# Patient Record
Sex: FEMALE | Race: WHITE | NOT HISPANIC OR LATINO | Employment: OTHER | URBAN - METROPOLITAN AREA
[De-identification: names, ages, dates, MRNs, and addresses within clinical notes are randomized per-mention and may not be internally consistent; named-entity substitution may affect disease eponyms.]

---

## 2017-07-31 ENCOUNTER — GENERIC CONVERSION - ENCOUNTER (OUTPATIENT)
Dept: OTHER | Facility: OTHER | Age: 62
End: 2017-07-31

## 2018-01-11 NOTE — PROCEDURES
Results/Data    Procedure: Electromyogram and Nerve Conduction Study  Indication: Bilateral Upper Extremities   Referred by Dr Champion Slight  The procedure's were discussed with the patient  Written consent was obtained prior to the procedure and is detailed in the patient's record  Prior to the start of the procedure a time out was taken and the identity of the patient was confirmed via name and date of birth with the patient  The correct site and the procedure to be performed were confirmed  The correct side was confirmed if applicable  The positioning of the patient was verified  The availability of the correct equipment was verified  Procedure Start Time: 9:30    Technique: A sterile concentric needle electrode was used  The patient tolerated the procedure well  There were no complications  Results  : Motor and sensory nerve conduction studies were performed on the bilateral median and ulnar nerves  The bilateral median and ulnar compound motor action potentials were within normal limits  The bilateral median and ulnar F wave latencies were within normal limits  The right median sensory peak latency was prolonged with a normal sensory action potential amplitude  The left median sensory peak latency was prolonged with a normal sensory action potential amplitude  The bilateral ulnar sensory action potentials were within normal limits  The right median palmar evoked response was prolonged by 1 0 ms as compared to the right ulnar palmar evoked response at the same distance  The left median palmar evoked response was prolonged by 0 7 ms as compared to the left ulnar palmar evoked response at the same distance  Concentric needle examination was performed on various proximal and distal muscles of the left upper extremity including deltoid, biceps, triceps, pronator teres, APB, FDI and low cervical paraspinals   Needle examination was not performed on the right upper extremity secondary to history of recent mastectomy with lymph node resection  There was no evidence of active denervation in any of the muscles tested  The compound motor unit action potentials were of normal configuration with interference patterns being full or full for effort  There is electro physiologic evidence of a:  1  Mild median nerve compression neuropathy at the wrist bilaterally with demyelinative changes, consistent with a diagnosis of carpal tunnel syndrome  2  There is no evidence of a ulnar neuropathy bilaterally  3  There is no evidence of a cervical radiculopathy on the left  Clinical correlation is recommended           Signatures   Electronically signed by : Joel Leonard MD; Feb 8 2016 10:30AM EST                       (Author)

## 2018-07-11 ENCOUNTER — TRANSCRIBE ORDERS (OUTPATIENT)
Dept: LAB | Facility: CLINIC | Age: 63
End: 2018-07-11

## 2018-07-11 ENCOUNTER — APPOINTMENT (OUTPATIENT)
Dept: LAB | Facility: CLINIC | Age: 63
End: 2018-07-11
Payer: MEDICARE

## 2018-07-11 DIAGNOSIS — R07.89 CHEST WALL PAIN: ICD-10-CM

## 2018-07-11 DIAGNOSIS — M54.2 NECK PAIN: ICD-10-CM

## 2018-07-11 DIAGNOSIS — K76.0 FATTY LIVER: ICD-10-CM

## 2018-07-11 DIAGNOSIS — K59.00 CONSTIPATION, UNSPECIFIED CONSTIPATION TYPE: ICD-10-CM

## 2018-07-11 DIAGNOSIS — I25.119 CORONARY ARTERY DISEASE WITH ANGINA PECTORIS, UNSPECIFIED VESSEL OR LESION TYPE, UNSPECIFIED WHETHER NATIVE OR TRANSPLANTED HEART (HCC): ICD-10-CM

## 2018-07-11 DIAGNOSIS — E03.9 HYPOTHYROIDISM, UNSPECIFIED TYPE: ICD-10-CM

## 2018-07-11 DIAGNOSIS — F41.9 ANXIETY: Primary | ICD-10-CM

## 2018-07-11 DIAGNOSIS — K57.90 DIVERTICULOSIS OF INTESTINE WITHOUT BLEEDING, UNSPECIFIED INTESTINAL TRACT LOCATION: ICD-10-CM

## 2018-07-11 DIAGNOSIS — G47.33 OBSTRUCTIVE SLEEP APNEA (ADULT) (PEDIATRIC): ICD-10-CM

## 2018-07-11 LAB
ALBUMIN SERPL BCP-MCNC: 4.1 G/DL (ref 3.5–5)
ALP SERPL-CCNC: 73 U/L (ref 46–116)
ALT SERPL W P-5'-P-CCNC: 39 U/L (ref 12–78)
ANION GAP SERPL CALCULATED.3IONS-SCNC: 8 MMOL/L (ref 4–13)
AST SERPL W P-5'-P-CCNC: 19 U/L (ref 5–45)
BASOPHILS # BLD AUTO: 0.06 THOUSANDS/ΜL (ref 0–0.1)
BASOPHILS NFR BLD AUTO: 1 % (ref 0–1)
BILIRUB SERPL-MCNC: 0.69 MG/DL (ref 0.2–1)
BUN SERPL-MCNC: 19 MG/DL (ref 5–25)
CALCIUM SERPL-MCNC: 9.1 MG/DL (ref 8.3–10.1)
CHLORIDE SERPL-SCNC: 106 MMOL/L (ref 100–108)
CHOLEST SERPL-MCNC: 131 MG/DL (ref 50–200)
CO2 SERPL-SCNC: 27 MMOL/L (ref 21–32)
CREAT SERPL-MCNC: 0.78 MG/DL (ref 0.6–1.3)
EOSINOPHIL # BLD AUTO: 0.22 THOUSAND/ΜL (ref 0–0.61)
EOSINOPHIL NFR BLD AUTO: 3 % (ref 0–6)
ERYTHROCYTE [DISTWIDTH] IN BLOOD BY AUTOMATED COUNT: 13.2 % (ref 11.6–15.1)
GFR SERPL CREATININE-BSD FRML MDRD: 82 ML/MIN/1.73SQ M
GLUCOSE P FAST SERPL-MCNC: 98 MG/DL (ref 65–99)
HCT VFR BLD AUTO: 41 % (ref 34.8–46.1)
HDLC SERPL-MCNC: 40 MG/DL (ref 40–60)
HGB BLD-MCNC: 13.9 G/DL (ref 11.5–15.4)
IMM GRANULOCYTES # BLD AUTO: 0.02 THOUSAND/UL (ref 0–0.2)
IMM GRANULOCYTES NFR BLD AUTO: 0 % (ref 0–2)
LDLC SERPL CALC-MCNC: 65 MG/DL (ref 0–100)
LYMPHOCYTES # BLD AUTO: 2.13 THOUSANDS/ΜL (ref 0.6–4.47)
LYMPHOCYTES NFR BLD AUTO: 33 % (ref 14–44)
MCH RBC QN AUTO: 30.4 PG (ref 26.8–34.3)
MCHC RBC AUTO-ENTMCNC: 33.9 G/DL (ref 31.4–37.4)
MCV RBC AUTO: 90 FL (ref 82–98)
MONOCYTES # BLD AUTO: 0.56 THOUSAND/ΜL (ref 0.17–1.22)
MONOCYTES NFR BLD AUTO: 9 % (ref 4–12)
NEUTROPHILS # BLD AUTO: 3.51 THOUSANDS/ΜL (ref 1.85–7.62)
NEUTS SEG NFR BLD AUTO: 54 % (ref 43–75)
NONHDLC SERPL-MCNC: 91 MG/DL
NRBC BLD AUTO-RTO: 0 /100 WBCS
PLATELET # BLD AUTO: 321 THOUSANDS/UL (ref 149–390)
PMV BLD AUTO: 10.1 FL (ref 8.9–12.7)
POTASSIUM SERPL-SCNC: 4.1 MMOL/L (ref 3.5–5.3)
PROT SERPL-MCNC: 7.8 G/DL (ref 6.4–8.2)
RBC # BLD AUTO: 4.57 MILLION/UL (ref 3.81–5.12)
SODIUM SERPL-SCNC: 141 MMOL/L (ref 136–145)
T4 SERPL-MCNC: 9.5 UG/DL (ref 4.7–13.3)
TRIGL SERPL-MCNC: 130 MG/DL
TSH SERPL DL<=0.05 MIU/L-ACNC: 2.56 UIU/ML (ref 0.36–3.74)
WBC # BLD AUTO: 6.5 THOUSAND/UL (ref 4.31–10.16)

## 2018-07-11 PROCEDURE — 80053 COMPREHEN METABOLIC PANEL: CPT

## 2018-07-11 PROCEDURE — 84436 ASSAY OF TOTAL THYROXINE: CPT

## 2018-07-11 PROCEDURE — 80061 LIPID PANEL: CPT

## 2018-07-11 PROCEDURE — 85025 COMPLETE CBC W/AUTO DIFF WBC: CPT

## 2018-07-11 PROCEDURE — 84443 ASSAY THYROID STIM HORMONE: CPT

## 2018-11-07 DIAGNOSIS — F41.9 ANXIETY: Primary | ICD-10-CM

## 2018-11-07 RX ORDER — LORAZEPAM 1 MG/1
1 TABLET ORAL 2 TIMES DAILY PRN
Qty: 60 TABLET | Refills: 1 | Status: SHIPPED | OUTPATIENT
Start: 2018-11-07 | End: 2018-12-04 | Stop reason: SDUPTHER

## 2018-11-07 RX ORDER — LORAZEPAM 1 MG/1
1 TABLET ORAL 2 TIMES DAILY PRN
Refills: 1 | COMMUNITY
Start: 2018-09-09 | End: 2018-11-07 | Stop reason: SDUPTHER

## 2018-12-04 ENCOUNTER — OFFICE VISIT (OUTPATIENT)
Dept: FAMILY MEDICINE CLINIC | Facility: CLINIC | Age: 63
End: 2018-12-04
Payer: MEDICARE

## 2018-12-04 VITALS
TEMPERATURE: 98.3 F | DIASTOLIC BLOOD PRESSURE: 82 MMHG | SYSTOLIC BLOOD PRESSURE: 118 MMHG | WEIGHT: 205 LBS | HEIGHT: 66 IN | OXYGEN SATURATION: 94 % | BODY MASS INDEX: 32.95 KG/M2 | HEART RATE: 79 BPM

## 2018-12-04 DIAGNOSIS — I10 ESSENTIAL HYPERTENSION: ICD-10-CM

## 2018-12-04 DIAGNOSIS — I25.10 ATHEROSCLEROSIS OF NATIVE CORONARY ARTERY OF NATIVE HEART WITHOUT ANGINA PECTORIS: Primary | ICD-10-CM

## 2018-12-04 DIAGNOSIS — F32.A DEPRESSIVE DISORDER: ICD-10-CM

## 2018-12-04 DIAGNOSIS — F41.9 ANXIETY: ICD-10-CM

## 2018-12-04 DIAGNOSIS — F41.1 GENERALIZED ANXIETY DISORDER: ICD-10-CM

## 2018-12-04 DIAGNOSIS — Z85.3 HISTORY OF BREAST CANCER: ICD-10-CM

## 2018-12-04 PROCEDURE — 99214 OFFICE O/P EST MOD 30 MIN: CPT | Performed by: FAMILY MEDICINE

## 2018-12-04 RX ORDER — MONTELUKAST SODIUM 10 MG/1
10 TABLET ORAL
COMMUNITY
End: 2019-06-06 | Stop reason: ALTCHOICE

## 2018-12-04 RX ORDER — DULOXETIN HYDROCHLORIDE 60 MG/1
60 CAPSULE, DELAYED RELEASE ORAL 2 TIMES DAILY
Refills: 3 | COMMUNITY
Start: 2018-11-14 | End: 2019-12-06 | Stop reason: SDUPTHER

## 2018-12-04 RX ORDER — GEMFIBROZIL 600 MG/1
600 TABLET, FILM COATED ORAL 2 TIMES DAILY
Refills: 3 | COMMUNITY
Start: 2018-10-18 | End: 2022-07-11

## 2018-12-04 RX ORDER — LANOLIN ALCOHOL/MO/W.PET/CERES
20 CREAM (GRAM) TOPICAL
COMMUNITY

## 2018-12-04 RX ORDER — LEVOTHYROXINE SODIUM 0.07 MG/1
75 TABLET ORAL DAILY
Refills: 3 | COMMUNITY
Start: 2018-10-23 | End: 2019-04-16 | Stop reason: SDUPTHER

## 2018-12-04 RX ORDER — IBUPROFEN 600 MG/1
600 TABLET ORAL EVERY 6 HOURS PRN
COMMUNITY
End: 2019-02-08

## 2018-12-04 RX ORDER — ASPIRIN 81 MG/1
81 TABLET ORAL DAILY
Refills: 3 | COMMUNITY
Start: 2018-10-29 | End: 2019-06-06 | Stop reason: ALTCHOICE

## 2018-12-04 RX ORDER — NITROGLYCERIN 0.4 MG/1
0.4 TABLET SUBLINGUAL
COMMUNITY
End: 2019-06-06 | Stop reason: ALTCHOICE

## 2018-12-04 RX ORDER — LUBIPROSTONE 24 UG/1
24 CAPSULE, GELATIN COATED ORAL 2 TIMES DAILY WITH MEALS
Refills: 3 | COMMUNITY
Start: 2018-09-21 | End: 2019-09-09 | Stop reason: SDUPTHER

## 2018-12-04 RX ORDER — OMEPRAZOLE 20 MG/1
20 CAPSULE, DELAYED RELEASE ORAL DAILY
Refills: 3 | COMMUNITY
Start: 2018-11-11 | End: 2019-04-16

## 2018-12-04 RX ORDER — LORAZEPAM 1 MG/1
1 TABLET ORAL 2 TIMES DAILY PRN
Qty: 60 TABLET | Refills: 0 | Status: SHIPPED | OUTPATIENT
Start: 2018-12-04 | End: 2019-03-14 | Stop reason: SDUPTHER

## 2018-12-04 RX ORDER — CLOPIDOGREL BISULFATE 75 MG/1
75 TABLET ORAL DAILY
Refills: 3 | COMMUNITY
Start: 2018-10-23

## 2018-12-04 NOTE — ASSESSMENT & PLAN NOTE
Patient is sleeping well appetite is good no recent panic attacks her mood changes  Anxiety is under good control  The current medical regimen is effective;  continue present plan and medications  On the lorazepam now with control   Stress in her life due to her daughters; oldest in  [address] and youngest on psych meds

## 2018-12-04 NOTE — ASSESSMENT & PLAN NOTE
Patient denies chest pain or angina no shortness of breath at this time she follows up with Cardiology periodically and is medically stable on her current regimen no changes to the medications at this time

## 2018-12-04 NOTE — ASSESSMENT & PLAN NOTE
Depression and mood is stable on Cymbalta no change in dosage at this time patient is sleeping well appetite is good she remains active

## 2018-12-04 NOTE — PROGRESS NOTES
Assessment/Plan:       Problem List Items Addressed This Visit     Atherosclerotic heart disease of native coronary artery without angina pectoris - Primary     Patient denies chest pain or angina no shortness of breath at this time she follows up with Cardiology periodically and is medically stable on her current regimen no changes to the medications at this time         Relevant Medications    clopidogrel (PLAVIX) 75 mg tablet    metoprolol tartrate (LOPRESSOR) 25 mg tablet    nitroglycerin (NITROSTAT) 0 4 mg SL tablet    Depressive disorder     Depression and mood is stable on Cymbalta no change in dosage at this time patient is sleeping well appetite is good she remains active         Relevant Medications    DULoxetine (CYMBALTA) 60 mg delayed release capsule    Essential hypertension     Blood pressure is stable at 1 18/82 no change to the medication at this time avoid salt intake         Relevant Medications    metoprolol tartrate (LOPRESSOR) 25 mg tablet    Generalized anxiety disorder     Patient is sleeping well appetite is good no recent panic attacks her mood changes  Anxiety is under good control         Relevant Medications    DULoxetine (CYMBALTA) 60 mg delayed release capsule    History of breast cancer     Patient is post mastectomy stable at this time follows up with her gynecologist yearly           Other Visit Diagnoses     Anxiety                Subjective:      Patient ID: Osmany Garcia is a 59 y o  female  HPI    The following portions of the patient's history were reviewed and updated as appropriate: allergies, current medications, past family history, past medical history, past social history, past surgical history and problem list     Review of Systems   Constitutional: Negative for chills, fatigue and fever  HENT: Negative for congestion, nosebleeds, rhinorrhea, sinus pressure and sore throat  Eyes: Negative for discharge and redness     Respiratory: Negative for cough and shortness of breath  Cardiovascular: Negative for chest pain, palpitations and leg swelling  Gastrointestinal: Negative for abdominal pain, blood in stool and nausea  Endocrine: Negative for cold intolerance, heat intolerance and polyuria  Genitourinary: Negative for dysuria and frequency  Musculoskeletal: Negative for arthralgias, back pain and myalgias  Skin: Negative for rash  Neurological: Negative for dizziness, weakness and headaches  Hematological: Negative for adenopathy  Psychiatric/Behavioral: Negative for behavioral problems and sleep disturbance  The patient is not nervous/anxious  Objective:      /82   Pulse 79   Temp 98 3 °F (36 8 °C)   Ht 5' 6" (1 676 m)   Wt 93 kg (205 lb)   SpO2 94%   BMI 33 09 kg/m²        Physical Exam   Constitutional: She is oriented to person, place, and time  She appears well-developed and well-nourished  No distress  HENT:   Head: Normocephalic and atraumatic  Right Ear: External ear normal    Left Ear: External ear normal    Nose: Nose normal    Mouth/Throat: Oropharynx is clear and moist  No oropharyngeal exudate  Eyes: Pupils are equal, round, and reactive to light  Conjunctivae and EOM are normal  Right eye exhibits no discharge  Left eye exhibits no discharge  No scleral icterus  Neck: Normal range of motion  No JVD present  No thyromegaly present  Cardiovascular: Normal rate, regular rhythm and normal heart sounds  No murmur heard  Pulmonary/Chest: Effort normal  She has no wheezes  She has no rales  She exhibits no tenderness  Abdominal: Soft  Bowel sounds are normal  She exhibits no distension and no mass  There is no tenderness  Musculoskeletal: Normal range of motion  She exhibits no edema, tenderness or deformity  Lymphadenopathy:     She has no cervical adenopathy  Neurological: She is alert and oriented to person, place, and time  She has normal reflexes  No cranial nerve deficit   Coordination normal    Skin: Skin is warm and dry  No rash noted  Psychiatric: She has a normal mood and affect  Her behavior is normal  Judgment and thought content normal    Nursing note and vitals reviewed         Data:    Laboratory Results: I have personally reviewed the pertinent laboratory results/reports   Radiology/Other Diagnostic Testing Results:      Lab Results   Component Value Date    WBC 6 50 07/11/2018    HGB 13 9 07/11/2018    HCT 41 0 07/11/2018    MCV 90 07/11/2018     07/11/2018     Lab Results   Component Value Date    K 4 1 07/11/2018     07/11/2018    CO2 27 07/11/2018    BUN 19 07/11/2018    CREATININE 0 78 07/11/2018    GLUF 98 07/11/2018    CALCIUM 9 1 07/11/2018    AST 19 07/11/2018    ALT 39 07/11/2018    ALKPHOS 73 07/11/2018    EGFR 82 07/11/2018     Lab Results   Component Value Date    CHOLESTEROL 131 07/11/2018     Lab Results   Component Value Date    HDL 40 07/11/2018     Lab Results   Component Value Date    LDLCALC 65 07/11/2018     Lab Results   Component Value Date    TRIG 130 07/11/2018     No results found for: [place], [place]  Lab Results   Component Value Date    JOD3PHWIBQER 2 560 07/11/2018     No results found for: HGBA1C  No results found for: PSA    Soraya Or, DO

## 2018-12-04 NOTE — PATIENT INSTRUCTIONS
Hypertension   AMBULATORY CARE:   Hypertension  is high blood pressure (BP)  Your BP is the force of your blood moving against the walls of your arteries  Normal BP is less than 120/80  Prehypertension is between 120/80 and 139/89  Hypertension is 140/90 or higher  Hypertension causes your BP to get so high that your heart has to work much harder than normal  This can damage your heart  You can control hypertension with a healthy lifestyle or medicines  A controlled blood pressure helps protect your organs, such as your heart, lungs, brain, and kidneys  Common symptoms include the following:   · Headache     · Blurred vision     · Chest pain     · Dizziness or weakness     · Trouble breathing    · Nosebleeds  Call 911 for any of the following:   · You have discomfort in your chest that feels like squeezing, pressure, fullness, or pain  · You become confused or have difficulty speaking  · You suddenly feel lightheaded or have trouble breathing  · You have pain or discomfort in your back, neck, jaw, stomach, or arm  Seek care immediately if:   · You have a severe headache or vision loss  · You have weakness in an arm or leg  Contact your healthcare provider if:   · You feel faint, dizzy, confused, or drowsy  · You have been taking your BP medicine and your BP is still higher than your healthcare provider says it should be  · You have questions or concerns about your condition or care  Treatment for hypertension  may include medicine to lower your BP and lower your cholesterol level  A low cholesterol level helps prevent heart disease and makes it easier to control your blood pressure  You may also need to make lifestyle changes  Take your medicine exactly as directed  Manage hypertension:  Talk with your healthcare provider about these and other ways to manage hypertension:  · Check your BP at home  Sit and rest for 5 minutes before you take your BP   Extend your arm and support it on a flat surface  Your arm should be at the same level as your heart  Follow the directions that came with your BP monitor  If possible, take at least 2 BP readings each time  Take your BP at least twice a day at the same times each day, such as morning and evening  Keep a record of your BP readings and bring it to your follow-up visits  Ask your healthcare provider what your BP should be  · Limit sodium (salt) as directed  Too much sodium can affect your fluid balance  Check labels to find low-sodium or no-salt-added foods  Some low-sodium foods use potassium salts for flavor  Too much potassium can also cause health problems  Your healthcare provider will tell you how much sodium and potassium are safe for you to have in a day  He or she may recommend that you limit sodium to 2,300 mg a day  · Follow the meal plan recommended by your healthcare provider  A dietitian or your provider can give you more information on low-sodium plans or the DASH (Dietary Approaches to Stop Hypertension) eating plan  The DASH plan is low in sodium, unhealthy fats, and total fat  It is high in potassium, calcium, and fiber  · Exercise to maintain a healthy weight  Exercise at least 30 minutes per day, on most days of the week  This will help decrease your blood pressure  Ask your healthcare provider about the best exercise plan for you  · Decrease stress  This may help lower your BP  Learn ways to relax, such as deep breathing or listening to music  · Limit alcohol  Women should limit alcohol to 1 drink a day  Men should limit alcohol to 2 drinks a day  A drink of alcohol is 12 ounces of beer, 5 ounces of wine, or 1½ ounces of liquor  · Do not smoke  Nicotine and other chemicals in cigarettes and cigars can increase your BP and also cause lung damage  Ask your healthcare provider for information if you currently smoke and need help to quit  E-cigarettes or smokeless tobacco still contain nicotine  Talk to your healthcare provider before you use these products  · Manage any other health conditions you have  Health conditions such as diabetes can increase your risk for hypertension  Follow your healthcare provider's instructions and take all your medicines as directed  Follow up with your healthcare provider as directed: You will need to return to have your BP checked and to have other lab tests done  Write down your questions so you remember to ask them during your visits  © 2017 [employer] Information is for End User's use only and may not be sold, redistributed or otherwise used for commercial purposes  All illustrations and images included in CareNotes® are the copyrighted property of [employer]  or [employer]  The above information is an  only  It is not intended as medical advice for individual conditions or treatments  Talk to your doctor, nurse or pharmacist before following any medical regimen to see if it is safe and effective for you

## 2019-01-29 ENCOUNTER — TELEPHONE (OUTPATIENT)
Dept: GASTROENTEROLOGY | Facility: CLINIC | Age: 64
End: 2019-01-29

## 2019-01-29 DIAGNOSIS — K21.9 GASTROESOPHAGEAL REFLUX DISEASE WITHOUT ESOPHAGITIS: Primary | ICD-10-CM

## 2019-01-29 RX ORDER — OMEPRAZOLE 20 MG/1
20 CAPSULE, DELAYED RELEASE ORAL 2 TIMES DAILY
Qty: 180 CAPSULE | Refills: 3 | Status: SHIPPED | OUTPATIENT
Start: 2019-01-29 | End: 2019-04-16

## 2019-01-29 RX ORDER — OMEPRAZOLE 20 MG/1
20 CAPSULE, DELAYED RELEASE ORAL DAILY
Qty: 90 CAPSULE | Refills: 3 | Status: SHIPPED | OUTPATIENT
Start: 2019-01-29 | End: 2019-01-29 | Stop reason: SDUPTHER

## 2019-01-29 NOTE — TELEPHONE ENCOUNTER
Received request from [facility] to refill RX Omeprazole DR 20 mg capsule, qty 60, 0 refills  Please advise   Thank you

## 2019-02-07 ENCOUNTER — TELEPHONE (OUTPATIENT)
Dept: OTHER | Facility: OTHER | Age: 64
End: 2019-02-07

## 2019-02-08 ENCOUNTER — APPOINTMENT (EMERGENCY)
Dept: CT IMAGING | Facility: HOSPITAL | Age: 64
End: 2019-02-08
Payer: MEDICARE

## 2019-02-08 ENCOUNTER — HOSPITAL ENCOUNTER (EMERGENCY)
Facility: HOSPITAL | Age: 64
Discharge: HOME | End: 2019-02-08
Attending: EMERGENCY MEDICINE | Admitting: EMERGENCY MEDICINE
Payer: MEDICARE

## 2019-02-08 VITALS
OXYGEN SATURATION: 94 % | BODY MASS INDEX: 33.09 KG/M2 | SYSTOLIC BLOOD PRESSURE: 133 MMHG | TEMPERATURE: 98.5 F | WEIGHT: 205.03 LBS | HEART RATE: 70 BPM | RESPIRATION RATE: 18 BRPM | DIASTOLIC BLOOD PRESSURE: 82 MMHG

## 2019-02-08 DIAGNOSIS — R10.9 RIGHT FLANK PAIN: Primary | ICD-10-CM

## 2019-02-08 LAB
ANION GAP SERPL CALCULATED.3IONS-SCNC: 9 MMOL/L (ref 4–13)
BASOPHILS # BLD AUTO: 0.07 THOUSANDS/ΜL (ref 0–0.1)
BASOPHILS NFR BLD AUTO: 1 % (ref 0–1)
BILIRUB UR QL STRIP: NEGATIVE
BUN SERPL-MCNC: 19 MG/DL (ref 5–25)
CALCIUM SERPL-MCNC: 9.5 MG/DL (ref 8.3–10.1)
CHLORIDE SERPL-SCNC: 103 MMOL/L (ref 100–108)
CLARITY UR: NORMAL
CO2 SERPL-SCNC: 28 MMOL/L (ref 21–32)
COLOR UR: YELLOW
CREAT SERPL-MCNC: 0.76 MG/DL (ref 0.6–1.3)
EOSINOPHIL # BLD AUTO: 0.29 THOUSAND/ΜL (ref 0–0.61)
EOSINOPHIL NFR BLD AUTO: 3 % (ref 0–6)
ERYTHROCYTE [DISTWIDTH] IN BLOOD BY AUTOMATED COUNT: 13 % (ref 11.6–15.1)
GFR SERPL CREATININE-BSD FRML MDRD: 84 ML/MIN/1.73SQ M
GLUCOSE SERPL-MCNC: 114 MG/DL (ref 65–140)
GLUCOSE UR STRIP-MCNC: NEGATIVE MG/DL
HCT VFR BLD AUTO: 42.8 % (ref 34.8–46.1)
HGB BLD-MCNC: 14.2 G/DL (ref 11.5–15.4)
HGB UR QL STRIP.AUTO: NEGATIVE
IMM GRANULOCYTES # BLD AUTO: 0.03 THOUSAND/UL (ref 0–0.2)
IMM GRANULOCYTES NFR BLD AUTO: 0 % (ref 0–2)
KETONES UR STRIP-MCNC: NEGATIVE MG/DL
LEUKOCYTE ESTERASE UR QL STRIP: NEGATIVE
LYMPHOCYTES # BLD AUTO: 3.07 THOUSANDS/ΜL (ref 0.6–4.47)
LYMPHOCYTES NFR BLD AUTO: 34 % (ref 14–44)
MCH RBC QN AUTO: 30.8 PG (ref 26.8–34.3)
MCHC RBC AUTO-ENTMCNC: 33.2 G/DL (ref 31.4–37.4)
MCV RBC AUTO: 93 FL (ref 82–98)
MONOCYTES # BLD AUTO: 0.94 THOUSAND/ΜL (ref 0.17–1.22)
MONOCYTES NFR BLD AUTO: 10 % (ref 4–12)
NEUTROPHILS # BLD AUTO: 4.75 THOUSANDS/ΜL (ref 1.85–7.62)
NEUTS SEG NFR BLD AUTO: 52 % (ref 43–75)
NITRITE UR QL STRIP: NEGATIVE
NRBC BLD AUTO-RTO: 0 /100 WBCS
PH UR STRIP.AUTO: 6.5 [PH] (ref 4.5–8)
PLATELET # BLD AUTO: 375 THOUSANDS/UL (ref 149–390)
PMV BLD AUTO: 9.1 FL (ref 8.9–12.7)
POTASSIUM SERPL-SCNC: 3.7 MMOL/L (ref 3.5–5.3)
PROT UR STRIP-MCNC: NEGATIVE MG/DL
RBC # BLD AUTO: 4.61 MILLION/UL (ref 3.81–5.12)
SODIUM SERPL-SCNC: 140 MMOL/L (ref 136–145)
SP GR UR STRIP.AUTO: 1.01 (ref 1–1.03)
UROBILINOGEN UR QL STRIP.AUTO: 1 E.U./DL
WBC # BLD AUTO: 9.15 THOUSAND/UL (ref 4.31–10.16)

## 2019-02-08 PROCEDURE — 81003 URINALYSIS AUTO W/O SCOPE: CPT | Performed by: EMERGENCY MEDICINE

## 2019-02-08 PROCEDURE — 96360 HYDRATION IV INFUSION INIT: CPT

## 2019-02-08 PROCEDURE — 80048 BASIC METABOLIC PNL TOTAL CA: CPT | Performed by: EMERGENCY MEDICINE

## 2019-02-08 PROCEDURE — 99284 EMERGENCY DEPT VISIT MOD MDM: CPT

## 2019-02-08 PROCEDURE — 85025 COMPLETE CBC W/AUTO DIFF WBC: CPT | Performed by: EMERGENCY MEDICINE

## 2019-02-08 PROCEDURE — 36415 COLL VENOUS BLD VENIPUNCTURE: CPT | Performed by: EMERGENCY MEDICINE

## 2019-02-08 PROCEDURE — 74176 CT ABD & PELVIS W/O CONTRAST: CPT

## 2019-02-08 RX ORDER — LIDOCAINE 50 MG/G
1 PATCH TOPICAL ONCE
Status: DISCONTINUED | OUTPATIENT
Start: 2019-02-08 | End: 2019-02-08 | Stop reason: HOSPADM

## 2019-02-08 RX ORDER — METHOCARBAMOL 750 MG/1
750 TABLET, FILM COATED ORAL 3 TIMES DAILY PRN
Qty: 42 TABLET | Refills: 0 | Status: SHIPPED | OUTPATIENT
Start: 2019-02-08 | End: 2019-06-06 | Stop reason: ALTCHOICE

## 2019-02-08 RX ORDER — METHOCARBAMOL 500 MG/1
500 TABLET, FILM COATED ORAL ONCE
Status: COMPLETED | OUTPATIENT
Start: 2019-02-08 | End: 2019-02-08

## 2019-02-08 RX ORDER — IBUPROFEN 600 MG/1
600 TABLET ORAL EVERY 6 HOURS PRN
Qty: 30 TABLET | Refills: 0 | Status: SHIPPED | OUTPATIENT
Start: 2019-02-08 | End: 2019-06-06 | Stop reason: ALTCHOICE

## 2019-02-08 RX ORDER — LIDOCAINE 50 MG/G
1 PATCH TOPICAL EVERY 24 HOURS
Qty: 6 PATCH | Refills: 0 | Status: SHIPPED | OUTPATIENT
Start: 2019-02-08 | End: 2019-06-06 | Stop reason: ALTCHOICE

## 2019-02-08 RX ADMIN — SODIUM CHLORIDE 500 ML: 0.9 INJECTION, SOLUTION INTRAVENOUS at 14:55

## 2019-02-08 RX ADMIN — LIDOCAINE 1 PATCH: 50 PATCH TOPICAL at 15:15

## 2019-02-08 RX ADMIN — METHOCARBAMOL 500 MG: 500 TABLET ORAL at 15:14

## 2019-02-08 NOTE — ED PROVIDER NOTES
History  Chief Complaint   Patient presents with    Flank Pain     R flank pain "for months"  worsening this week  nausea  denies fever/vomiting/diarrhea  hx stones  denies urinary s/s     58 yo female c/o pain she localizes to right mid to lower flank area, no radiation to low back or right leg, without associated paresthesias, or weakness, she says has been ongoing for "several months", although she did affirm she has pain free days, and this flare has been for "several days" although she can't pinpoint an exact onset  She attributes it to kidney stone, knowing that she was previously treated, several years ago with lithotripsy and a stent  Although, for this pain, she has not had workup over the several months she has been experiencing it  In fact, she was at [facility]/1/19 for a chest pain symptom that was evaluated, and asked if she had the discomfort then, she said "you know I did  I think I mentioned it "          History provided by:  Patient  Flank Pain   Pain location:  R flank  Pain quality: aching    Pain radiates to:  Does not radiate  Pain severity:  Moderate  Onset quality:  Unable to specify  Timing:  Constant  Progression:  Waxing and waning  Chronicity:  Chronic  Context: not diet changes, not previous surgeries (required surgery for renal stones) and not suspicious food intake    Relieved by:  Nothing  Worsened by:  Nothing  Associated symptoms: no chest pain, no chills, no cough, no diarrhea, no dysuria, no fever, no hematemesis, no hematochezia, no hematuria, no nausea, no shortness of breath, no sore throat and no vomiting    Risk factors: has not had multiple surgeries        Prior to Admission Medications   Prescriptions Last Dose Informant Patient Reported? Taking?    AMITIZA 24 MCG capsule   Yes Yes   Sig: Take 24 mcg by mouth 2 (two) times a day with meals   B Complex Vitamins (VITAMIN-B COMPLEX PO)   Yes Yes   Sig: Take 1 tablet by mouth   DULoxetine (CYMBALTA) 60 mg delayed release capsule   Yes Yes   Sig: Take 60 mg by mouth 2 (two) times a day   Evolocumab 140 MG/ML SOAJ   Yes Yes   Sig: Inject 2 mL under the skin   LORazepam (ATIVAN) 1 mg tablet   No Yes   Sig: Take 1 tablet (1 mg total) by mouth 2 (two) times a day as needed for anxiety   aspirin (ECOTRIN LOW STRENGTH) 81 mg EC tablet   Yes Yes   Sig: Take 81 mg by mouth daily   clopidogrel (PLAVIX) 75 mg tablet   Yes Yes   Sig: Take 75 mg by mouth daily   gemfibrozil (LOPID) 600 mg tablet   Yes Yes   Sig: Take 600 mg by mouth 2 (two) times a day   levothyroxine 75 mcg tablet   Yes Yes   Sig: Take 75 mcg by mouth daily   melatonin 3 mg   Yes Yes   Sig: Take 5 mg by mouth   metoprolol tartrate (LOPRESSOR) 25 mg tablet   Yes Yes   Sig: Take 25 mg by mouth 2 (two) times a day   montelukast (SINGULAIR) 10 mg tablet   Yes Yes   Sig: Take 10 mg by mouth   nitroglycerin (NITROSTAT) 0 4 mg SL tablet   Yes Yes   Sig: Place 0 4 mg under the tongue   omeprazole (PriLOSEC) 20 mg delayed release capsule   Yes Yes   Sig: Take 20 mg by mouth daily   omeprazole (PriLOSEC) 20 mg delayed release capsule   No Yes   Sig: Take 1 capsule (20 mg total) by mouth 2 (two) times a day      Facility-Administered Medications: None       Past Medical History:   Diagnosis Date    Anxiety     Breast cancer ([facility] [address] )     CAD (coronary artery disease)     Elevated cholesterol     Fibromyalgia     Hypertension     BRAYDEN (obstructive sleep apnea)     Panic attack        Past Surgical History:   Procedure Laterality Date    BREAST SURGERY      MASTECTOMY Bilateral        Family History   Problem Relation Age of Onset    Heart attack Mother     Alcohol abuse Mother     Heart failure Mother      I have reviewed and agree with the history as documented      Social History   Substance Use Topics    Smoking status: Never Smoker    Smokeless tobacco: Never Used    Alcohol use No        Review of Systems   Constitutional: Negative for appetite change, chills and fever    HENT: Negative for sore throat  Respiratory: Negative for cough, shortness of breath and wheezing  Cardiovascular: Negative for chest pain and palpitations  Gastrointestinal: Negative for abdominal pain, diarrhea, hematemesis, hematochezia, nausea and vomiting  Genitourinary: Positive for flank pain  Negative for dysuria, hematuria and urgency  Musculoskeletal: Negative for neck pain  Skin: Negative for rash  Neurological: Negative for dizziness, weakness and headaches  Psychiatric/Behavioral: Negative for suicidal ideas  All other systems reviewed and are negative  Physical Exam  Physical Exam   Constitutional: She is oriented to person, place, and time  Vital signs are normal  She appears well-developed and well-nourished  Non-toxic appearance  HENT:   Head: Normocephalic and atraumatic  Right Ear: Tympanic membrane and external ear normal    Left Ear: Tympanic membrane and external ear normal    Nose: Nose normal    Mouth/Throat: Oropharynx is clear and moist    Eyes: Pupils are equal, round, and reactive to light  Conjunctivae and EOM are normal    Neck: Normal range of motion and full passive range of motion without pain  Neck supple  No Brudzinski's sign and no Kernig's sign noted  Cardiovascular: Normal rate, regular rhythm, normal heart sounds, intact distal pulses and normal pulses  No murmur heard  Pulmonary/Chest: Effort normal and breath sounds normal  No tachypnea  No respiratory distress  She has no wheezes  Abdominal: Soft  Bowel sounds are normal  She exhibits no distension  There is no tenderness  There is no rigidity, no rebound, no guarding and no CVA tenderness (Not really reproducible, she just affirms "yeah that's where it hurts")  Musculoskeletal: Normal range of motion  Right lower leg: She exhibits no swelling  Left lower leg: She exhibits no swelling  Lymphadenopathy:     She has no cervical adenopathy     Neurological: She is alert and oriented to person, place, and time  She has normal strength and normal reflexes  No cranial nerve deficit or sensory deficit  Coordination and gait normal  GCS eye subscore is 4  GCS verbal subscore is 5  GCS motor subscore is 6  Skin: Skin is warm and dry  No rash noted  She is not diaphoretic  No pallor  Psychiatric: She has a normal mood and affect  Her speech is normal and behavior is normal  Judgment and thought content normal  Cognition and memory are normal    Nursing note and vitals reviewed        Vital Signs  ED Triage Vitals   Temperature Pulse Respirations Blood Pressure SpO2   02/08/19 1345 02/08/19 1345 02/08/19 1345 02/08/19 1345 02/08/19 1345   98 5 °F (36 9 °C) 88 20 129/82 95 %      Temp src Heart Rate Source Patient Position - Orthostatic VS BP Location FiO2 (%)   -- 02/08/19 1603 02/08/19 1603 02/08/19 1603 --    Monitor Lying Left arm       Pain Score       02/08/19 1345       7           Vitals:    02/08/19 1345 02/08/19 1603   BP: 129/82 133/82   Pulse: 88 70   Patient Position - Orthostatic VS:  Lying       Visual Acuity      ED Medications  Medications   lidocaine (LIDODERM) 5 % patch 1 patch (1 patch Topical Medication Applied 2/8/19 1515)   sodium chloride 0 9 % bolus 500 mL (0 mL Intravenous Stopped 2/8/19 1603)   methocarbamol (ROBAXIN) tablet 500 mg (500 mg Oral Given 2/8/19 1514)       Diagnostic Studies  Results Reviewed     Procedure Component Value Units Date/Time    Basic metabolic panel [[id number]] Collected:  02/08/19 1454    Lab Status:  Final result Specimen:  Blood from Hand, Left Updated:  02/08/19 1512     Sodium 140 mmol/L      Potassium 3 7 mmol/L      Chloride 103 mmol/L      CO2 28 mmol/L      ANION GAP 9 mmol/L      BUN 19 mg/dL      Creatinine 0 76 mg/dL      Glucose 114 mg/dL      Calcium 9 5 mg/dL      eGFR 84 ml/min/1 73sq m     Narrative:         National Kidney Disease Education Program recommendations are as follows:  GFR calculation is accurate only with a steady state creatinine  Chronic Kidney disease less than 60 ml/min/1 73 sq  meters  Kidney failure less than 15 ml/min/1 73 sq  meters  CBC and differential [[id number]] Collected:  02/08/19 1454    Lab Status:  Final result Specimen:  Blood from Hand, Left Updated:  02/08/19 1503     WBC 9 15 Thousand/uL      RBC 4 61 Million/uL      Hemoglobin 14 2 g/dL      Hematocrit 42 8 %      MCV 93 fL      MCH 30 8 pg      MCHC 33 2 g/dL      RDW 13 0 %      MPV 9 1 fL      Platelets 657 Thousands/uL      nRBC 0 /100 WBCs      Neutrophils Relative 52 %      Immat GRANS % 0 %      Lymphocytes Relative 34 %      Monocytes Relative 10 %      Eosinophils Relative 3 %      Basophils Relative 1 %      Neutrophils Absolute 4 75 Thousands/µL      Immature Grans Absolute 0 03 Thousand/uL      Lymphocytes Absolute 3 07 Thousands/µL      Monocytes Absolute 0 94 Thousand/µL      Eosinophils Absolute 0 29 Thousand/µL      Basophils Absolute 0 07 Thousands/µL     UA w Reflex to Microscopic w Reflex to Culture [[id number]] Collected:  02/08/19 1445    Lab Status:  Final result Specimen:  Urine from Urine, Clean Catch Updated:  02/08/19 1458     Color, UA Yellow     Clarity, UA Slightly Cloudy     Specific Gravity, UA 1 015     pH, UA 6 5     Leukocytes, UA Negative     Nitrite, UA Negative     Protein, UA Negative mg/dl      Glucose, UA Negative mg/dl      Ketones, UA Negative mg/dl      Urobilinogen, UA 1 0 E U /dl      Bilirubin, UA Negative     Blood, UA Negative                 CT renal stone study abdomen pelvis without contrast   Final Result by Tye Gill MD (02/08 1608)         1  No obstructive uropathy  2   Normal appendix  3   No acute abnormality in the abdomen or pelvis               Workstation performed: [id number]                    Procedures  Procedures       Phone Contacts  ED Phone Contact    ED Course  ED Course as of Feb 08 1621 Fri Feb 08, 2019   1408 Reviewed PA --prescribed lorazepam 1mg, #60 monthly    7127 UA is normal, CT to investigate the chronic pain component, suspecting more musculoskeletal    1617 Normal CT CT renal stone study abdomen pelvis without contrast   1617 Reviewed results with patient at bedside and updated on the plan  She reports improvement, mainly from the topical treatment, and she can take NSAIDs, just has to "be careful with my stomach "  She is satisfied with workup negative for kidney stone  MDM    Disposition  Final diagnoses:   Right flank pain     Time reflects when diagnosis was documented in both MDM as applicable and the Disposition within this note     Time User Action Codes Description Comment    2/8/2019  4:20 PM Kameronjohn Ivan Add [R10 9] Right flank pain       ED Disposition     ED Disposition Condition Date/Time Comment    Discharge  Fri Feb 8, 2019  4:20 PM Rudolfo Sensor discharge to home/self care  Condition at discharge: Good        Follow-up Information     Follow up With Specialties Details Why Contact Chioma Anthony DO Family Medicine Schedule an appointment as soon as possible for a visit For followup [address]  [phone]  [phone] [facility]  [phone]            Patient's Medications   Discharge Prescriptions    IBUPROFEN (MOTRIN) 600 MG TABLET    Take 1 tablet (600 mg total) by mouth every 6 (six) hours as needed for mild pain or moderate pain       Start Date: 2/8/2019  End Date: --       Order Dose: 600 mg       Quantity: 30 tablet    Refills: 0    LIDOCAINE (LIDODERM) 5 %    Apply 1 patch topically every 24 hours Remove & Discard patch within 12 hours or as directed by MD       Start Date: 2/8/2019  End Date: --       Order Dose: 1 patch       Quantity: 6 patch    Refills: 0    METHOCARBAMOL (ROBAXIN) 750 MG TABLET    Take 1 tablet (750 mg total) by mouth 3 (three) times a day as needed for muscle spasms       Start Date: 2/8/2019  End Date: --       Order Dose: 750 mg       Quantity: 42 tablet    Refills: 0     No discharge procedures on file      ED Provider  Electronically Signed by           Blade Sheppard MD  02/08/19 9357

## 2019-03-06 DIAGNOSIS — I25.10 ATHEROSCLEROSIS OF NATIVE CORONARY ARTERY OF NATIVE HEART WITHOUT ANGINA PECTORIS: Primary | ICD-10-CM

## 2019-03-06 RX ORDER — DIAPER,BRIEF,ADULT, DISPOSABLE
EACH MISCELLANEOUS
Qty: 30 TABLET | Refills: 5 | Status: SHIPPED | OUTPATIENT
Start: 2019-03-06 | End: 2019-07-18 | Stop reason: SDUPTHER

## 2019-03-13 DIAGNOSIS — Z12.11 COLON CANCER SCREENING: Primary | ICD-10-CM

## 2019-03-14 ENCOUNTER — TELEPHONE (OUTPATIENT)
Dept: FAMILY MEDICINE CLINIC | Facility: CLINIC | Age: 64
End: 2019-03-14

## 2019-03-14 ENCOUNTER — OFFICE VISIT (OUTPATIENT)
Dept: FAMILY MEDICINE CLINIC | Facility: CLINIC | Age: 64
End: 2019-03-14
Payer: MEDICARE

## 2019-03-14 VITALS
DIASTOLIC BLOOD PRESSURE: 90 MMHG | HEART RATE: 88 BPM | SYSTOLIC BLOOD PRESSURE: 130 MMHG | HEIGHT: 66 IN | TEMPERATURE: 98.7 F | BODY MASS INDEX: 33.07 KG/M2 | OXYGEN SATURATION: 96 % | WEIGHT: 205.8 LBS

## 2019-03-14 DIAGNOSIS — M54.50 LOW BACK PAIN AT MULTIPLE SITES: ICD-10-CM

## 2019-03-14 DIAGNOSIS — F41.1 GENERALIZED ANXIETY DISORDER: ICD-10-CM

## 2019-03-14 DIAGNOSIS — F41.9 ANXIETY: ICD-10-CM

## 2019-03-14 DIAGNOSIS — R07.89 CHEST WALL PAIN: Primary | ICD-10-CM

## 2019-03-14 DIAGNOSIS — I25.119 CORONARY ARTERY DISEASE WITH ANGINA PECTORIS, UNSPECIFIED VESSEL OR LESION TYPE, UNSPECIFIED WHETHER NATIVE OR TRANSPLANTED HEART (HCC): ICD-10-CM

## 2019-03-14 PROCEDURE — 99214 OFFICE O/P EST MOD 30 MIN: CPT | Performed by: FAMILY MEDICINE

## 2019-03-14 RX ORDER — LORAZEPAM 1 MG/1
1 TABLET ORAL 2 TIMES DAILY PRN
Qty: 60 TABLET | Refills: 0 | Status: SHIPPED | OUTPATIENT
Start: 2019-03-14 | End: 2019-05-06 | Stop reason: SDUPTHER

## 2019-03-14 NOTE — PROGRESS NOTES
Assessment/Plan:       Problem List Items Addressed This Visit        Cardiovascular and Mediastinum    Coronary artery disease with angina pectoris ([address] )       Other    Generalized anxiety disorder     General anxiety disorder renew the lorazepam at this time continue this as needed and follow up at next office visit         Relevant Medications    LORazepam (ATIVAN) 1 mg tablet    Chest wall pain - Primary     Chest wall pain costochondritis and rib pain bilaterally present since lifting wood and changing moving firewood around  No angina with this  She will need to do home stretching exercises and wait for resolution if not improved will get a chest x-ray         Low back pain at multiple sites     Degenerative joint disease osteoarthritis and chronic back pain throughout the low back thoracic and even into the neck at times  She benefits from a rolling walker has difficulty with ambulation after 50 ft of walking she requires rest or to lean on a walker she did use a rolling walker in the past and borrowed 1 this helped dramatically with reducing back pain  At this point I will prescribe a rolling walker           Other Visit Diagnoses     Anxiety        Relevant Medications    LORazepam (ATIVAN) 1 mg tablet            Subjective:      Patient ID: Marine Duverney is a 59 y o  female  Patient presents for left rib pain and re-evaluate of general medical problems denies trauma falling at this time no shortness of breath or anginal-type chest pain no indigestion headaches or visual change no change in bowel or bladder      The following portions of the patient's history were reviewed and updated as appropriate: allergies, current medications, past family history, past medical history, past social history, past surgical history and problem list     Review of Systems   Constitutional: Negative for chills, fatigue and fever  HENT: Negative for congestion, nosebleeds, rhinorrhea, sinus pressure and sore throat  Eyes: Negative for discharge and redness  Respiratory: Negative for cough and shortness of breath  Cardiovascular: Positive for chest pain  Negative for palpitations and leg swelling  Gastrointestinal: Negative for abdominal pain, blood in stool and nausea  Endocrine: Negative for cold intolerance, heat intolerance and polyuria  Genitourinary: Negative for dysuria and frequency  Musculoskeletal: Negative for arthralgias, back pain and myalgias  Skin: Negative for rash  Neurological: Negative for dizziness, weakness and headaches  Hematological: Negative for adenopathy  Psychiatric/Behavioral: Negative for behavioral problems and sleep disturbance  The patient is not nervous/anxious  Objective:      /90 (BP Location: Left arm, Patient Position: Sitting)   Pulse 88   Temp 98 7 °F (37 1 °C) (Tympanic)   Ht 5' 6" (1 676 m)   Wt 93 4 kg (205 lb 12 8 oz)   SpO2 96%   BMI 33 22 kg/m²        Physical Exam   Constitutional: She is oriented to person, place, and time  She appears well-developed and well-nourished  No distress  HENT:   Head: Normocephalic and atraumatic  Right Ear: External ear normal    Left Ear: External ear normal    Nose: Nose normal    Mouth/Throat: Oropharynx is clear and moist  No oropharyngeal exudate  Eyes: Pupils are equal, round, and reactive to light  Conjunctivae and EOM are normal  Right eye exhibits no discharge  Left eye exhibits no discharge  No scleral icterus  Neck: Normal range of motion  No JVD present  No thyromegaly present  Cardiovascular: Normal rate, regular rhythm and normal heart sounds  No murmur heard  Pulmonary/Chest: Effort normal  She has no wheezes  She has no rales  She exhibits no tenderness  Abdominal: Soft  Bowel sounds are normal  She exhibits no distension and no mass  There is no tenderness  Musculoskeletal: Normal range of motion  She exhibits no edema, tenderness or deformity      Tender over left ribcage ribs 6 through 9 laterally   Lymphadenopathy:     She has no cervical adenopathy  Neurological: She is alert and oriented to person, place, and time  She has normal reflexes  She displays normal reflexes  No cranial nerve deficit  Coordination normal    Skin: Skin is warm and dry  No rash noted  Psychiatric: She has a normal mood and affect  Her behavior is normal  Judgment and thought content normal    Nursing note and vitals reviewed  Data:    Laboratory Results: I have personally reviewed the pertinent laboratory results/reports   Radiology/Other Diagnostic Testing Results: I have personally reviewed pertinent reports         Lab Results   Component Value Date    WBC 9 15 02/08/2019    HGB 14 2 02/08/2019    HCT 42 8 02/08/2019    MCV 93 02/08/2019     02/08/2019     Lab Results   Component Value Date    K 3 7 02/08/2019     02/08/2019    CO2 28 02/08/2019    BUN 19 02/08/2019    CREATININE 0 76 02/08/2019    GLUF 98 07/11/2018    CALCIUM 9 5 02/08/2019    AST 19 07/11/2018    ALT 39 07/11/2018    ALKPHOS 73 07/11/2018    EGFR 84 02/08/2019     Lab Results   Component Value Date    CHOLESTEROL 131 07/11/2018     Lab Results   Component Value Date    HDL 40 07/11/2018     Lab Results   Component Value Date    LDLCALC 65 07/11/2018     Lab Results   Component Value Date    TRIG 130 07/11/2018     No results found for: [place], [place]  Lab Results   Component Value Date    [id number] 2 560 07/11/2018     No results found for: HGBA1C  No results found for: MIGUEL Richards DO

## 2019-03-14 NOTE — ASSESSMENT & PLAN NOTE
Chest wall pain costochondritis and rib pain bilaterally present since lifting wood and changing moving firewood around  No angina with this    She will need to do home stretching exercises and wait for resolution if not improved will get a chest x-ray

## 2019-03-14 NOTE — ASSESSMENT & PLAN NOTE
Degenerative joint disease osteoarthritis and chronic back pain throughout the low back thoracic and even into the neck at times  She benefits from a rolling walker has difficulty with ambulation after 50 ft of walking she requires rest or to lean on a walker she did use a rolling walker in the past and borrowed 1 this helped dramatically with reducing back pain    At this point I will prescribe a rolling walker

## 2019-03-14 NOTE — ASSESSMENT & PLAN NOTE
General anxiety disorder renew the lorazepam at this time continue this as needed and follow up at next office visit

## 2019-03-15 ENCOUNTER — TELEPHONE (OUTPATIENT)
Dept: FAMILY MEDICINE CLINIC | Facility: CLINIC | Age: 64
End: 2019-03-15

## 2019-03-15 NOTE — TELEPHONE ENCOUNTER
Send pharmacy yesterday's office visit note if requested  Clarify if the diagnosis codes are not acceptable and inquire if a new code needs to be given for ambulatory dysfunction    I have it noted in the chart under generalized back pain and osteoarthritis and described the need for a rolling walker however they may need  A specific code for approval

## 2019-03-15 NOTE — TELEPHONE ENCOUNTER
Clarice from [facility] [facility] called and stated that for the walker to be issued the notes in her records from yesterday need to reflect that she is in need of it - this is due to Medicare requiring it to make sure she is eligible

## 2019-03-18 DIAGNOSIS — M54.5 BILATERAL LOW BACK PAIN, UNSPECIFIED CHRONICITY, WITH SCIATICA PRESENCE UNSPECIFIED: Primary | ICD-10-CM

## 2019-03-18 NOTE — TELEPHONE ENCOUNTER
The prescription for walker was written wrong, it is supposed to be a rollator with wheels, [address]  They will also need dr note for why she needs it , please fax to [phone] [facility]

## 2019-04-01 ENCOUNTER — TELEPHONE (OUTPATIENT)
Dept: FAMILY MEDICINE CLINIC | Facility: CLINIC | Age: 64
End: 2019-04-01

## 2019-04-02 DIAGNOSIS — I25.119 CORONARY ARTERY DISEASE INVOLVING NATIVE HEART WITH ANGINA PECTORIS, UNSPECIFIED VESSEL OR LESION TYPE (HCC): Primary | ICD-10-CM

## 2019-04-15 RX ORDER — ASPIRIN 325 MG
TABLET ORAL
COMMUNITY
End: 2019-06-06 | Stop reason: ALTCHOICE

## 2019-04-15 RX ORDER — RANITIDINE 300 MG/1
CAPSULE ORAL
COMMUNITY
End: 2019-04-16 | Stop reason: SDUPTHER

## 2019-04-15 RX ORDER — TOPIRAMATE 25 MG/1
25 TABLET ORAL
COMMUNITY
Start: 2019-04-03 | End: 2019-06-06 | Stop reason: ALTCHOICE

## 2019-04-15 RX ORDER — GABAPENTIN 300 MG/1
CAPSULE ORAL
COMMUNITY
End: 2019-06-06 | Stop reason: ALTCHOICE

## 2019-04-15 RX ORDER — ASPIRIN 81 MG/1
TABLET ORAL
COMMUNITY

## 2019-04-15 RX ORDER — SUCRALFATE 1 G/1
TABLET ORAL
COMMUNITY
End: 2021-10-12

## 2019-04-15 RX ORDER — ATORVASTATIN CALCIUM 10 MG/1
TABLET, FILM COATED ORAL
COMMUNITY
End: 2019-06-06 | Stop reason: ALTCHOICE

## 2019-04-15 RX ORDER — GABAPENTIN 600 MG/1
TABLET ORAL
COMMUNITY
End: 2019-06-06 | Stop reason: ALTCHOICE

## 2019-04-15 RX ORDER — MULTIVITAMIN WITH IRON
150 TABLET ORAL
COMMUNITY
End: 2019-06-06 | Stop reason: SDUPTHER

## 2019-04-15 RX ORDER — LANSOPRAZOLE 30 MG/1
CAPSULE, DELAYED RELEASE ORAL
COMMUNITY
End: 2019-06-06 | Stop reason: ALTCHOICE

## 2019-04-15 RX ORDER — MULTIVITAMIN WITH IRON
TABLET ORAL
COMMUNITY
End: 2019-06-06 | Stop reason: SDUPTHER

## 2019-04-15 RX ORDER — TRAMADOL HYDROCHLORIDE 50 MG/1
TABLET ORAL
COMMUNITY
End: 2019-06-06 | Stop reason: ALTCHOICE

## 2019-04-15 RX ORDER — ALBUTEROL SULFATE 2.5 MG/3ML
SOLUTION RESPIRATORY (INHALATION)
COMMUNITY
End: 2019-06-18

## 2019-04-15 RX ORDER — SUCRALFATE ORAL 1 G/10ML
1 SUSPENSION ORAL 4 TIMES DAILY PRN
COMMUNITY
End: 2019-06-06 | Stop reason: ALTCHOICE

## 2019-04-16 ENCOUNTER — OFFICE VISIT (OUTPATIENT)
Dept: GASTROENTEROLOGY | Facility: CLINIC | Age: 64
End: 2019-04-16
Payer: MEDICARE

## 2019-04-16 ENCOUNTER — APPOINTMENT (EMERGENCY)
Dept: RADIOLOGY | Facility: HOSPITAL | Age: 64
End: 2019-04-16
Payer: MEDICARE

## 2019-04-16 ENCOUNTER — HOSPITAL ENCOUNTER (EMERGENCY)
Facility: HOSPITAL | Age: 64
Discharge: HOME | End: 2019-04-16
Payer: MEDICARE

## 2019-04-16 VITALS
SYSTOLIC BLOOD PRESSURE: 153 MMHG | RESPIRATION RATE: 18 BRPM | HEIGHT: 66 IN | HEART RATE: 90 BPM | TEMPERATURE: 98.7 F | OXYGEN SATURATION: 98 % | DIASTOLIC BLOOD PRESSURE: 82 MMHG | BODY MASS INDEX: 33.27 KG/M2 | WEIGHT: 207 LBS

## 2019-04-16 VITALS
HEART RATE: 89 BPM | HEIGHT: 66 IN | DIASTOLIC BLOOD PRESSURE: 88 MMHG | WEIGHT: 208.6 LBS | SYSTOLIC BLOOD PRESSURE: 122 MMHG | BODY MASS INDEX: 33.52 KG/M2

## 2019-04-16 DIAGNOSIS — K21.9 GASTROESOPHAGEAL REFLUX DISEASE WITHOUT ESOPHAGITIS: Primary | ICD-10-CM

## 2019-04-16 DIAGNOSIS — R14.0 BLOATING: ICD-10-CM

## 2019-04-16 DIAGNOSIS — E03.9 HYPOTHYROIDISM, UNSPECIFIED TYPE: Primary | ICD-10-CM

## 2019-04-16 DIAGNOSIS — R42 LIGHTHEADEDNESS: Primary | ICD-10-CM

## 2019-04-16 LAB
ALBUMIN SERPL BCP-MCNC: 4.1 G/DL (ref 3.5–5)
ALP SERPL-CCNC: 92 U/L (ref 46–116)
ALT SERPL W P-5'-P-CCNC: 56 U/L (ref 12–78)
ANION GAP SERPL CALCULATED.3IONS-SCNC: 8 MMOL/L (ref 4–13)
AST SERPL W P-5'-P-CCNC: 21 U/L (ref 5–45)
ATRIAL RATE: 91 BPM
BASOPHILS # BLD AUTO: 0.08 THOUSANDS/ΜL (ref 0–0.1)
BASOPHILS NFR BLD AUTO: 1 % (ref 0–1)
BILIRUB SERPL-MCNC: 0.4 MG/DL (ref 0.2–1)
BUN SERPL-MCNC: 17 MG/DL (ref 5–25)
CALCIUM SERPL-MCNC: 9.6 MG/DL (ref 8.3–10.1)
CHLORIDE SERPL-SCNC: 105 MMOL/L (ref 100–108)
CO2 SERPL-SCNC: 27 MMOL/L (ref 21–32)
CREAT SERPL-MCNC: 0.97 MG/DL (ref 0.6–1.3)
DEPRECATED D DIMER PPP: 393 NG/ML (FEU)
EOSINOPHIL # BLD AUTO: 0.23 THOUSAND/ΜL (ref 0–0.61)
EOSINOPHIL NFR BLD AUTO: 2 % (ref 0–6)
ERYTHROCYTE [DISTWIDTH] IN BLOOD BY AUTOMATED COUNT: 13.6 % (ref 11.6–15.1)
GFR SERPL CREATININE-BSD FRML MDRD: 63 ML/MIN/1.73SQ M
GLUCOSE SERPL-MCNC: 121 MG/DL (ref 65–140)
HCT VFR BLD AUTO: 41.5 % (ref 34.8–46.1)
HGB BLD-MCNC: 13.9 G/DL (ref 11.5–15.4)
IMM GRANULOCYTES # BLD AUTO: 0.06 THOUSAND/UL (ref 0–0.2)
IMM GRANULOCYTES NFR BLD AUTO: 1 % (ref 0–2)
LYMPHOCYTES # BLD AUTO: 3.35 THOUSANDS/ΜL (ref 0.6–4.47)
LYMPHOCYTES NFR BLD AUTO: 35 % (ref 14–44)
MCH RBC QN AUTO: 30.8 PG (ref 26.8–34.3)
MCHC RBC AUTO-ENTMCNC: 33.5 G/DL (ref 31.4–37.4)
MCV RBC AUTO: 92 FL (ref 82–98)
MONOCYTES # BLD AUTO: 0.9 THOUSAND/ΜL (ref 0.17–1.22)
MONOCYTES NFR BLD AUTO: 9 % (ref 4–12)
NEUTROPHILS # BLD AUTO: 4.95 THOUSANDS/ΜL (ref 1.85–7.62)
NEUTS SEG NFR BLD AUTO: 52 % (ref 43–75)
NRBC BLD AUTO-RTO: 0 /100 WBCS
P AXIS: 50 DEGREES
PLATELET # BLD AUTO: 396 THOUSANDS/UL (ref 149–390)
PMV BLD AUTO: 9.2 FL (ref 8.9–12.7)
POTASSIUM SERPL-SCNC: 4.1 MMOL/L (ref 3.5–5.3)
PR INTERVAL: 158 MS
PROT SERPL-MCNC: 8.1 G/DL (ref 6.4–8.2)
QRS AXIS: 89 DEGREES
QRSD INTERVAL: 80 MS
QT INTERVAL: 352 MS
QTC INTERVAL: 432 MS
RBC # BLD AUTO: 4.51 MILLION/UL (ref 3.81–5.12)
SODIUM SERPL-SCNC: 140 MMOL/L (ref 136–145)
T WAVE AXIS: 82 DEGREES
TROPONIN I SERPL-MCNC: <0.02 NG/ML
TSH SERPL DL<=0.05 MIU/L-ACNC: 3.1 UIU/ML (ref 0.36–3.74)
VENTRICULAR RATE: 91 BPM
WBC # BLD AUTO: 9.57 THOUSAND/UL (ref 4.31–10.16)

## 2019-04-16 PROCEDURE — 84443 ASSAY THYROID STIM HORMONE: CPT | Performed by: PHYSICIAN ASSISTANT

## 2019-04-16 PROCEDURE — 96361 HYDRATE IV INFUSION ADD-ON: CPT

## 2019-04-16 PROCEDURE — 85025 COMPLETE CBC W/AUTO DIFF WBC: CPT

## 2019-04-16 PROCEDURE — 93005 ELECTROCARDIOGRAM TRACING: CPT

## 2019-04-16 PROCEDURE — 80053 COMPREHEN METABOLIC PANEL: CPT

## 2019-04-16 PROCEDURE — 85379 FIBRIN DEGRADATION QUANT: CPT | Performed by: PHYSICIAN ASSISTANT

## 2019-04-16 PROCEDURE — 96360 HYDRATION IV INFUSION INIT: CPT

## 2019-04-16 PROCEDURE — 99284 EMERGENCY DEPT VISIT MOD MDM: CPT | Performed by: PHYSICIAN ASSISTANT

## 2019-04-16 PROCEDURE — 93010 ELECTROCARDIOGRAM REPORT: CPT | Performed by: INTERNAL MEDICINE

## 2019-04-16 PROCEDURE — 99214 OFFICE O/P EST MOD 30 MIN: CPT | Performed by: PHYSICIAN ASSISTANT

## 2019-04-16 PROCEDURE — 99284 EMERGENCY DEPT VISIT MOD MDM: CPT

## 2019-04-16 PROCEDURE — 36415 COLL VENOUS BLD VENIPUNCTURE: CPT

## 2019-04-16 PROCEDURE — 84484 ASSAY OF TROPONIN QUANT: CPT

## 2019-04-16 PROCEDURE — 71046 X-RAY EXAM CHEST 2 VIEWS: CPT

## 2019-04-16 RX ORDER — LEVOTHYROXINE SODIUM 0.07 MG/1
TABLET ORAL
Qty: 90 TABLET | Refills: 3 | Status: SHIPPED | OUTPATIENT
Start: 2019-04-16 | End: 2020-04-29 | Stop reason: SDUPTHER

## 2019-04-16 RX ORDER — RANITIDINE 300 MG/1
300 CAPSULE ORAL EVERY EVENING
Qty: 30 CAPSULE | Refills: 0 | Status: SHIPPED | OUTPATIENT
Start: 2019-04-16 | End: 2019-05-09 | Stop reason: SDUPTHER

## 2019-04-16 RX ORDER — PANTOPRAZOLE SODIUM 40 MG/1
40 TABLET, DELAYED RELEASE ORAL 2 TIMES DAILY
Qty: 60 TABLET | Refills: 0 | Status: SHIPPED | OUTPATIENT
Start: 2019-04-16 | End: 2019-05-09 | Stop reason: SDUPTHER

## 2019-04-16 RX ADMIN — SODIUM CHLORIDE 1000 ML: 0.9 INJECTION, SOLUTION INTRAVENOUS at 16:01

## 2019-04-23 ENCOUNTER — HOSPITAL ENCOUNTER (OUTPATIENT)
Dept: RADIOLOGY | Facility: HOSPITAL | Age: 64
Discharge: HOME | End: 2019-04-23
Payer: MEDICARE

## 2019-04-23 ENCOUNTER — TELEPHONE (OUTPATIENT)
Dept: GASTROENTEROLOGY | Facility: CLINIC | Age: 64
End: 2019-04-23

## 2019-04-23 DIAGNOSIS — K21.9 GASTROESOPHAGEAL REFLUX DISEASE WITHOUT ESOPHAGITIS: ICD-10-CM

## 2019-04-23 PROCEDURE — 74249 HB CONTRST X-RAY UPPR GI TRACT (SMALL INTESTINE FOLLOW-THROUGH): CPT

## 2019-04-27 ENCOUNTER — TELEPHONE (OUTPATIENT)
Dept: OTHER | Facility: HOSPITAL | Age: 64
End: 2019-04-27

## 2019-04-29 ENCOUNTER — TELEPHONE (OUTPATIENT)
Dept: GASTROENTEROLOGY | Facility: CLINIC | Age: 64
End: 2019-04-29

## 2019-05-05 ENCOUNTER — HOSPITAL ENCOUNTER (OUTPATIENT)
Dept: NUCLEAR MEDICINE | Facility: HOSPITAL | Age: 64
Discharge: HOME | End: 2019-05-05
Payer: MEDICARE

## 2019-05-05 DIAGNOSIS — K21.9 GASTROESOPHAGEAL REFLUX DISEASE WITHOUT ESOPHAGITIS: ICD-10-CM

## 2019-05-05 PROCEDURE — A9541 TC99M SULFUR COLLOID: HCPCS

## 2019-05-05 PROCEDURE — 78264 GASTRIC EMPTYING IMG STUDY: CPT

## 2019-05-06 ENCOUNTER — TELEPHONE (OUTPATIENT)
Dept: FAMILY MEDICINE CLINIC | Facility: CLINIC | Age: 64
End: 2019-05-06

## 2019-05-06 ENCOUNTER — TELEPHONE (OUTPATIENT)
Dept: GASTROENTEROLOGY | Facility: CLINIC | Age: 64
End: 2019-05-06

## 2019-05-06 DIAGNOSIS — F41.9 ANXIETY: ICD-10-CM

## 2019-05-06 DIAGNOSIS — K31.84 GASTROPARESIS: Primary | ICD-10-CM

## 2019-05-06 RX ORDER — METOCLOPRAMIDE 5 MG/1
5 TABLET ORAL 2 TIMES DAILY
Qty: 60 TABLET | Refills: 0 | Status: SHIPPED | OUTPATIENT
Start: 2019-05-06 | End: 2019-06-06 | Stop reason: ALTCHOICE

## 2019-05-06 RX ORDER — LORAZEPAM 1 MG/1
1 TABLET ORAL 2 TIMES DAILY PRN
Qty: 60 TABLET | Refills: 0 | Status: SHIPPED | OUTPATIENT
Start: 2019-05-06 | End: 2019-06-06 | Stop reason: ALTCHOICE

## 2019-05-08 ENCOUNTER — TELEPHONE (OUTPATIENT)
Dept: GASTROENTEROLOGY | Facility: CLINIC | Age: 64
End: 2019-05-08

## 2019-05-09 DIAGNOSIS — K21.9 GASTROESOPHAGEAL REFLUX DISEASE WITHOUT ESOPHAGITIS: ICD-10-CM

## 2019-05-09 RX ORDER — PANTOPRAZOLE SODIUM 40 MG/1
TABLET, DELAYED RELEASE ORAL
Qty: 180 TABLET | Refills: 3 | Status: SHIPPED | OUTPATIENT
Start: 2019-05-09 | End: 2020-02-01 | Stop reason: SDUPTHER

## 2019-05-09 RX ORDER — RANITIDINE 300 MG/1
300 CAPSULE ORAL EVERY EVENING
Qty: 30 CAPSULE | Refills: 0 | Status: SHIPPED | OUTPATIENT
Start: 2019-05-09 | End: 2019-06-13 | Stop reason: SDUPTHER

## 2019-05-16 ENCOUNTER — TELEPHONE (OUTPATIENT)
Dept: GASTROENTEROLOGY | Facility: CLINIC | Age: 64
End: 2019-05-16

## 2019-05-21 ENCOUNTER — TELEPHONE (OUTPATIENT)
Dept: GASTROENTEROLOGY | Facility: CLINIC | Age: 64
End: 2019-05-21

## 2019-05-22 RX ORDER — BACLOFEN 10 MG/1
TABLET ORAL
COMMUNITY
Start: 2019-04-03 | End: 2019-06-06 | Stop reason: ALTCHOICE

## 2019-05-22 RX ORDER — MODAFINIL 200 MG/1
TABLET ORAL
COMMUNITY
Start: 2019-05-20 | End: 2019-06-18 | Stop reason: SDUPTHER

## 2019-05-22 RX ORDER — OMEPRAZOLE 20 MG/1
20 CAPSULE, DELAYED RELEASE ORAL 2 TIMES DAILY
Refills: 3 | COMMUNITY
Start: 2019-05-01 | End: 2019-06-06 | Stop reason: ALTCHOICE

## 2019-05-22 RX ORDER — TOPIRAMATE 50 MG/1
TABLET, FILM COATED ORAL
COMMUNITY
Start: 2019-04-17 | End: 2019-06-06 | Stop reason: ALTCHOICE

## 2019-05-23 ENCOUNTER — OFFICE VISIT (OUTPATIENT)
Dept: GASTROENTEROLOGY | Facility: CLINIC | Age: 64
End: 2019-05-23
Payer: MEDICARE

## 2019-05-23 VITALS
HEART RATE: 75 BPM | HEIGHT: 66 IN | DIASTOLIC BLOOD PRESSURE: 90 MMHG | SYSTOLIC BLOOD PRESSURE: 128 MMHG | BODY MASS INDEX: 33.59 KG/M2 | WEIGHT: 209 LBS

## 2019-05-23 DIAGNOSIS — K31.84 GASTROPARESIS: Primary | ICD-10-CM

## 2019-05-23 PROCEDURE — 99213 OFFICE O/P EST LOW 20 MIN: CPT | Performed by: PHYSICIAN ASSISTANT

## 2019-05-28 ENCOUNTER — PREP FOR PROCEDURE (OUTPATIENT)
Dept: GASTROENTEROLOGY | Facility: CLINIC | Age: 64
End: 2019-05-28

## 2019-05-28 DIAGNOSIS — R14.0 BLOATING: ICD-10-CM

## 2019-05-28 DIAGNOSIS — K21.9 GASTROESOPHAGEAL REFLUX DISEASE WITHOUT ESOPHAGITIS: ICD-10-CM

## 2019-05-28 DIAGNOSIS — K31.84 GASTROPARESIS: Primary | ICD-10-CM

## 2019-06-05 ENCOUNTER — TELEPHONE (OUTPATIENT)
Dept: GASTROENTEROLOGY | Facility: CLINIC | Age: 64
End: 2019-06-05

## 2019-06-05 ENCOUNTER — ANESTHESIA EVENT (OUTPATIENT)
Dept: GASTROENTEROLOGY | Facility: HOSPITAL | Age: 64
End: 2019-06-05

## 2019-06-06 ENCOUNTER — HOSPITAL ENCOUNTER (OUTPATIENT)
Dept: GASTROENTEROLOGY | Facility: HOSPITAL | Age: 64
Setting detail: OUTPATIENT SURGERY
Discharge: HOME | End: 2019-06-06
Attending: INTERNAL MEDICINE | Admitting: INTERNAL MEDICINE
Payer: MEDICARE

## 2019-06-06 ENCOUNTER — ANESTHESIA (OUTPATIENT)
Dept: GASTROENTEROLOGY | Facility: HOSPITAL | Age: 64
End: 2019-06-06

## 2019-06-06 ENCOUNTER — TELEPHONE (OUTPATIENT)
Dept: GASTROENTEROLOGY | Facility: CLINIC | Age: 64
End: 2019-06-06

## 2019-06-06 VITALS
TEMPERATURE: 97 F | BODY MASS INDEX: 32.88 KG/M2 | SYSTOLIC BLOOD PRESSURE: 142 MMHG | HEART RATE: 69 BPM | DIASTOLIC BLOOD PRESSURE: 75 MMHG | HEIGHT: 66 IN | RESPIRATION RATE: 22 BRPM | OXYGEN SATURATION: 96 % | WEIGHT: 204.59 LBS

## 2019-06-06 DIAGNOSIS — K21.9 GASTROESOPHAGEAL REFLUX DISEASE WITHOUT ESOPHAGITIS: ICD-10-CM

## 2019-06-06 DIAGNOSIS — R14.0 BLOATING: ICD-10-CM

## 2019-06-06 DIAGNOSIS — K31.84 GASTROPARESIS: ICD-10-CM

## 2019-06-06 RX ORDER — SODIUM CHLORIDE, SODIUM LACTATE, POTASSIUM CHLORIDE, CALCIUM CHLORIDE 600; 310; 30; 20 MG/100ML; MG/100ML; MG/100ML; MG/100ML
125 INJECTION, SOLUTION INTRAVENOUS CONTINUOUS
Status: DISCONTINUED | OUTPATIENT
Start: 2019-06-06 | End: 2019-06-10 | Stop reason: HOSPADM

## 2019-06-06 RX ORDER — LORAZEPAM 1 MG/1
1 TABLET ORAL 2 TIMES DAILY
COMMUNITY
End: 2019-07-02 | Stop reason: SDUPTHER

## 2019-06-06 RX ORDER — SODIUM CHLORIDE, SODIUM LACTATE, POTASSIUM CHLORIDE, CALCIUM CHLORIDE 600; 310; 30; 20 MG/100ML; MG/100ML; MG/100ML; MG/100ML
INJECTION, SOLUTION INTRAVENOUS CONTINUOUS PRN
Status: DISCONTINUED | OUTPATIENT
Start: 2019-06-06 | End: 2019-06-06 | Stop reason: SURG

## 2019-06-06 RX ORDER — PROPOFOL 10 MG/ML
INJECTION, EMULSION INTRAVENOUS AS NEEDED
Status: DISCONTINUED | OUTPATIENT
Start: 2019-06-06 | End: 2019-06-06 | Stop reason: SURG

## 2019-06-06 RX ORDER — LIDOCAINE HYDROCHLORIDE 10 MG/ML
INJECTION, SOLUTION INFILTRATION; PERINEURAL AS NEEDED
Status: DISCONTINUED | OUTPATIENT
Start: 2019-06-06 | End: 2019-06-06 | Stop reason: SURG

## 2019-06-06 RX ADMIN — PROPOFOL 80 MG: 10 INJECTION, EMULSION INTRAVENOUS at 14:56

## 2019-06-06 RX ADMIN — PROPOFOL 40 MG: 10 INJECTION, EMULSION INTRAVENOUS at 15:02

## 2019-06-06 RX ADMIN — PROPOFOL 40 MG: 10 INJECTION, EMULSION INTRAVENOUS at 14:59

## 2019-06-06 RX ADMIN — SODIUM CHLORIDE, SODIUM LACTATE, POTASSIUM CHLORIDE, AND CALCIUM CHLORIDE 125 ML/HR: .6; .31; .03; .02 INJECTION, SOLUTION INTRAVENOUS at 14:36

## 2019-06-06 RX ADMIN — ONABOTULINUMTOXINA 100 UNITS: 100 INJECTION, POWDER, LYOPHILIZED, FOR SOLUTION INTRADERMAL; INTRAMUSCULAR at 15:03

## 2019-06-06 RX ADMIN — SODIUM CHLORIDE, SODIUM LACTATE, POTASSIUM CHLORIDE, AND CALCIUM CHLORIDE: .6; .31; .03; .02 INJECTION, SOLUTION INTRAVENOUS at 14:36

## 2019-06-06 RX ADMIN — LIDOCAINE HYDROCHLORIDE 50 MG: 10 INJECTION, SOLUTION INFILTRATION; PERINEURAL at 14:56

## 2019-06-06 RX ADMIN — PROPOFOL 20 MG: 10 INJECTION, EMULSION INTRAVENOUS at 14:57

## 2019-06-13 ENCOUNTER — OFFICE VISIT (OUTPATIENT)
Dept: GASTROENTEROLOGY | Facility: CLINIC | Age: 64
End: 2019-06-13
Payer: MEDICARE

## 2019-06-13 VITALS
RESPIRATION RATE: 16 BRPM | BODY MASS INDEX: 33.27 KG/M2 | HEIGHT: 66 IN | WEIGHT: 207 LBS | DIASTOLIC BLOOD PRESSURE: 90 MMHG | SYSTOLIC BLOOD PRESSURE: 122 MMHG | HEART RATE: 98 BPM

## 2019-06-13 DIAGNOSIS — K21.9 GASTROESOPHAGEAL REFLUX DISEASE WITHOUT ESOPHAGITIS: Primary | ICD-10-CM

## 2019-06-13 DIAGNOSIS — R14.0 BLOATING: ICD-10-CM

## 2019-06-13 PROCEDURE — 99213 OFFICE O/P EST LOW 20 MIN: CPT | Performed by: PHYSICIAN ASSISTANT

## 2019-06-13 RX ORDER — DICYCLOMINE HCL 20 MG
20 TABLET ORAL EVERY 6 HOURS
Qty: 60 TABLET | Refills: 3 | Status: SHIPPED | OUTPATIENT
Start: 2019-06-13 | End: 2020-10-20

## 2019-06-13 RX ORDER — RANITIDINE 300 MG/1
300 CAPSULE ORAL EVERY EVENING
Qty: 30 CAPSULE | Refills: 6 | Status: SHIPPED | OUTPATIENT
Start: 2019-06-13 | End: 2019-12-18 | Stop reason: ALTCHOICE

## 2019-06-14 ENCOUNTER — TELEPHONE (OUTPATIENT)
Dept: GASTROENTEROLOGY | Facility: CLINIC | Age: 64
End: 2019-06-14

## 2019-06-17 ENCOUNTER — TELEPHONE (OUTPATIENT)
Dept: GASTROENTEROLOGY | Facility: CLINIC | Age: 64
End: 2019-06-17

## 2019-06-18 ENCOUNTER — OFFICE VISIT (OUTPATIENT)
Dept: FAMILY MEDICINE CLINIC | Facility: CLINIC | Age: 64
End: 2019-06-18
Payer: MEDICARE

## 2019-06-18 VITALS
HEART RATE: 78 BPM | BODY MASS INDEX: 32.62 KG/M2 | TEMPERATURE: 98.7 F | WEIGHT: 203 LBS | DIASTOLIC BLOOD PRESSURE: 82 MMHG | OXYGEN SATURATION: 93 % | HEIGHT: 66 IN | SYSTOLIC BLOOD PRESSURE: 122 MMHG

## 2019-06-18 DIAGNOSIS — E78.2 MIXED HYPERLIPIDEMIA: ICD-10-CM

## 2019-06-18 DIAGNOSIS — E03.9 HYPOTHYROIDISM, UNSPECIFIED TYPE: ICD-10-CM

## 2019-06-18 DIAGNOSIS — Z99.89 OSA ON CPAP: ICD-10-CM

## 2019-06-18 DIAGNOSIS — I25.10 ATHEROSCLEROSIS OF NATIVE CORONARY ARTERY OF NATIVE HEART WITHOUT ANGINA PECTORIS: ICD-10-CM

## 2019-06-18 DIAGNOSIS — I10 ESSENTIAL HYPERTENSION: ICD-10-CM

## 2019-06-18 DIAGNOSIS — G47.33 OSA ON CPAP: ICD-10-CM

## 2019-06-18 DIAGNOSIS — Z00.00 MEDICARE ANNUAL WELLNESS VISIT, SUBSEQUENT: ICD-10-CM

## 2019-06-18 DIAGNOSIS — Z11.59 ENCOUNTER FOR HEPATITIS C SCREENING TEST FOR LOW RISK PATIENT: Primary | ICD-10-CM

## 2019-06-18 PROCEDURE — 99214 OFFICE O/P EST MOD 30 MIN: CPT | Performed by: FAMILY MEDICINE

## 2019-06-18 PROCEDURE — G0439 PPPS, SUBSEQ VISIT: HCPCS | Performed by: FAMILY MEDICINE

## 2019-06-18 RX ORDER — MODAFINIL 200 MG/1
200 TABLET ORAL DAILY
Qty: 30 TABLET | Refills: 1 | Status: SHIPPED | OUTPATIENT
Start: 2019-06-18 | End: 2019-12-18 | Stop reason: ALTCHOICE

## 2019-07-02 DIAGNOSIS — F41.1 GENERALIZED ANXIETY DISORDER: Primary | ICD-10-CM

## 2019-07-02 DIAGNOSIS — F41.9 ANXIETY: ICD-10-CM

## 2019-07-02 RX ORDER — LORAZEPAM 1 MG/1
1 TABLET ORAL 2 TIMES DAILY PRN
Qty: 60 TABLET | Refills: 0 | OUTPATIENT
Start: 2019-07-02

## 2019-07-02 RX ORDER — LORAZEPAM 1 MG/1
1 TABLET ORAL 2 TIMES DAILY
Qty: 60 TABLET | Refills: 1 | Status: CANCELLED | OUTPATIENT
Start: 2019-07-02

## 2019-07-02 RX ORDER — LORAZEPAM 1 MG/1
1 TABLET ORAL 2 TIMES DAILY
Qty: 60 TABLET | Refills: 0 | Status: SHIPPED | OUTPATIENT
Start: 2019-07-02 | End: 2019-08-19 | Stop reason: SDUPTHER

## 2019-07-02 NOTE — TELEPHONE ENCOUNTER
Called pt she states that medication was never D/C and she is still taking 2 tabs daily pt states that she does need a refill on medication

## 2019-07-03 ENCOUNTER — TELEPHONE (OUTPATIENT)
Dept: FAMILY MEDICINE CLINIC | Facility: CLINIC | Age: 64
End: 2019-07-03

## 2019-07-03 NOTE — TELEPHONE ENCOUNTER
I recommend that she increase her blood pressure medication the metoprolol she has takes 25 mg twice daily I would like her to take 50 mg in the morning and 25 mg in the evening now and do this for the next 5 days and give me a call on Monday with her blood pressure report at that time if her pressure is still elevated she can return to my office for an appointment and I can possibly start another medication or change things also she must avoid sodium in her diet as this can raise the blood pressure quickly over the course of a several hours if she 8 out at dinner and had a salty meal with caffeine also this could have raised her pressure

## 2019-07-03 NOTE — TELEPHONE ENCOUNTER
PT is worried about her BP it was 150/100 last night she took her med and then this morning it was 134/93  Should she be concerned or is there something she should do

## 2019-07-18 DIAGNOSIS — I25.10 ATHEROSCLEROSIS OF NATIVE CORONARY ARTERY OF NATIVE HEART WITHOUT ANGINA PECTORIS: ICD-10-CM

## 2019-07-18 RX ORDER — DIAPER,BRIEF,ADULT, DISPOSABLE
EACH MISCELLANEOUS
Qty: 90 TABLET | Refills: 1 | Status: SHIPPED | OUTPATIENT
Start: 2019-07-18 | End: 2020-01-20

## 2019-07-30 ENCOUNTER — APPOINTMENT (OUTPATIENT)
Dept: LAB | Age: 64
End: 2019-07-30
Payer: MEDICARE

## 2019-07-30 ENCOUNTER — TRANSCRIBE ORDERS (OUTPATIENT)
Dept: ADMINISTRATIVE | Facility: HOSPITAL | Age: 64
End: 2019-07-30

## 2019-07-30 DIAGNOSIS — R53.83 OTHER FATIGUE: ICD-10-CM

## 2019-07-30 DIAGNOSIS — M79.10 MYALGIA: ICD-10-CM

## 2019-07-30 DIAGNOSIS — M79.7 SCAPULOHUMERAL FIBROSITIS: Primary | ICD-10-CM

## 2019-07-30 DIAGNOSIS — M79.7 SCAPULOHUMERAL FIBROSITIS: ICD-10-CM

## 2019-07-30 LAB
25(OH)D3 SERPL-MCNC: 29.6 NG/ML (ref 30–100)
ALBUMIN SERPL BCP-MCNC: 3.9 G/DL (ref 3.5–5)
ALP SERPL-CCNC: 89 U/L (ref 46–116)
ALT SERPL W P-5'-P-CCNC: 39 U/L (ref 12–78)
ANION GAP SERPL CALCULATED.3IONS-SCNC: 6 MMOL/L (ref 4–13)
AST SERPL W P-5'-P-CCNC: 21 U/L (ref 5–45)
BASOPHILS # BLD AUTO: 0.08 THOUSANDS/ΜL (ref 0–0.1)
BASOPHILS NFR BLD AUTO: 1 % (ref 0–1)
BILIRUB SERPL-MCNC: 0.53 MG/DL (ref 0.2–1)
BUN SERPL-MCNC: 15 MG/DL (ref 5–25)
CALCIUM SERPL-MCNC: 9.3 MG/DL (ref 8.3–10.1)
CHLORIDE SERPL-SCNC: 105 MMOL/L (ref 100–108)
CO2 SERPL-SCNC: 28 MMOL/L (ref 21–32)
CREAT SERPL-MCNC: 0.94 MG/DL (ref 0.6–1.3)
EOSINOPHIL # BLD AUTO: 0.23 THOUSAND/ΜL (ref 0–0.61)
EOSINOPHIL NFR BLD AUTO: 3 % (ref 0–6)
ERYTHROCYTE [DISTWIDTH] IN BLOOD BY AUTOMATED COUNT: 13.2 % (ref 11.6–15.1)
ERYTHROCYTE [SEDIMENTATION RATE] IN BLOOD: 16 MM/HOUR (ref 0–20)
GFR SERPL CREATININE-BSD FRML MDRD: 65 ML/MIN/1.73SQ M
GLUCOSE SERPL-MCNC: 124 MG/DL (ref 65–140)
HCT VFR BLD AUTO: 41.8 % (ref 34.8–46.1)
HGB BLD-MCNC: 13.6 G/DL (ref 11.5–15.4)
IMM GRANULOCYTES # BLD AUTO: 0.03 THOUSAND/UL (ref 0–0.2)
IMM GRANULOCYTES NFR BLD AUTO: 0 % (ref 0–2)
LYMPHOCYTES # BLD AUTO: 3.18 THOUSANDS/ΜL (ref 0.6–4.47)
LYMPHOCYTES NFR BLD AUTO: 37 % (ref 14–44)
MCH RBC QN AUTO: 30.1 PG (ref 26.8–34.3)
MCHC RBC AUTO-ENTMCNC: 32.5 G/DL (ref 31.4–37.4)
MCV RBC AUTO: 93 FL (ref 82–98)
MONOCYTES # BLD AUTO: 0.77 THOUSAND/ΜL (ref 0.17–1.22)
MONOCYTES NFR BLD AUTO: 9 % (ref 4–12)
NEUTROPHILS # BLD AUTO: 4.23 THOUSANDS/ΜL (ref 1.85–7.62)
NEUTS SEG NFR BLD AUTO: 50 % (ref 43–75)
NRBC BLD AUTO-RTO: 0 /100 WBCS
PLATELET # BLD AUTO: 385 THOUSANDS/UL (ref 149–390)
PMV BLD AUTO: 9.7 FL (ref 8.9–12.7)
POTASSIUM SERPL-SCNC: 4 MMOL/L (ref 3.5–5.3)
PROT SERPL-MCNC: 7.8 G/DL (ref 6.4–8.2)
RBC # BLD AUTO: 4.52 MILLION/UL (ref 3.81–5.12)
SODIUM SERPL-SCNC: 139 MMOL/L (ref 136–145)
TSH SERPL DL<=0.05 MIU/L-ACNC: 2.5 UIU/ML (ref 0.36–3.74)
WBC # BLD AUTO: 8.52 THOUSAND/UL (ref 4.31–10.16)

## 2019-07-30 PROCEDURE — 85652 RBC SED RATE AUTOMATED: CPT

## 2019-07-30 PROCEDURE — 84443 ASSAY THYROID STIM HORMONE: CPT

## 2019-07-30 PROCEDURE — 36415 COLL VENOUS BLD VENIPUNCTURE: CPT

## 2019-07-30 PROCEDURE — 80053 COMPREHEN METABOLIC PANEL: CPT

## 2019-07-30 PROCEDURE — 86038 ANTINUCLEAR ANTIBODIES: CPT

## 2019-07-30 PROCEDURE — 85025 COMPLETE CBC W/AUTO DIFF WBC: CPT

## 2019-07-30 PROCEDURE — 82306 VITAMIN D 25 HYDROXY: CPT

## 2019-07-31 LAB — RYE IGE QN: NEGATIVE

## 2019-08-19 ENCOUNTER — OFFICE VISIT (OUTPATIENT)
Dept: FAMILY MEDICINE CLINIC | Facility: CLINIC | Age: 64
End: 2019-08-19
Payer: MEDICARE

## 2019-08-19 ENCOUNTER — APPOINTMENT (OUTPATIENT)
Dept: LAB | Facility: HOSPITAL | Age: 64
End: 2019-08-19
Payer: MEDICARE

## 2019-08-19 VITALS
HEART RATE: 95 BPM | SYSTOLIC BLOOD PRESSURE: 122 MMHG | OXYGEN SATURATION: 93 % | TEMPERATURE: 99.5 F | WEIGHT: 200.2 LBS | DIASTOLIC BLOOD PRESSURE: 80 MMHG | HEIGHT: 66 IN | BODY MASS INDEX: 32.17 KG/M2

## 2019-08-19 DIAGNOSIS — D72.828 OTHER ELEVATED WHITE BLOOD CELL (WBC) COUNT: Primary | ICD-10-CM

## 2019-08-19 DIAGNOSIS — R53.82 CHRONIC FATIGUE: ICD-10-CM

## 2019-08-19 DIAGNOSIS — D72.828 OTHER ELEVATED WHITE BLOOD CELL (WBC) COUNT: ICD-10-CM

## 2019-08-19 DIAGNOSIS — F41.1 GENERALIZED ANXIETY DISORDER: ICD-10-CM

## 2019-08-19 LAB
BASOPHILS # BLD AUTO: 0.09 THOUSANDS/ΜL (ref 0–0.1)
BASOPHILS NFR BLD AUTO: 1 % (ref 0–1)
EOSINOPHIL # BLD AUTO: 0.22 THOUSAND/ΜL (ref 0–0.61)
EOSINOPHIL NFR BLD AUTO: 2 % (ref 0–6)
ERYTHROCYTE [DISTWIDTH] IN BLOOD BY AUTOMATED COUNT: 13.8 % (ref 11.6–15.1)
HCT VFR BLD AUTO: 41.9 % (ref 34.8–46.1)
HGB BLD-MCNC: 13.8 G/DL (ref 11.5–15.4)
IMM GRANULOCYTES # BLD AUTO: 0.07 THOUSAND/UL (ref 0–0.2)
IMM GRANULOCYTES NFR BLD AUTO: 1 % (ref 0–2)
LYMPHOCYTES # BLD AUTO: 3.34 THOUSANDS/ΜL (ref 0.6–4.47)
LYMPHOCYTES NFR BLD AUTO: 27 % (ref 14–44)
MCH RBC QN AUTO: 30.7 PG (ref 26.8–34.3)
MCHC RBC AUTO-ENTMCNC: 32.9 G/DL (ref 31.4–37.4)
MCV RBC AUTO: 93 FL (ref 82–98)
MONOCYTES # BLD AUTO: 1.01 THOUSAND/ΜL (ref 0.17–1.22)
MONOCYTES NFR BLD AUTO: 8 % (ref 4–12)
NEUTROPHILS # BLD AUTO: 7.63 THOUSANDS/ΜL (ref 1.85–7.62)
NEUTS SEG NFR BLD AUTO: 61 % (ref 43–75)
NRBC BLD AUTO-RTO: 0 /100 WBCS
PLATELET # BLD AUTO: 323 THOUSANDS/UL (ref 149–390)
PMV BLD AUTO: 9 FL (ref 8.9–12.7)
RBC # BLD AUTO: 4.49 MILLION/UL (ref 3.81–5.12)
VIT B12 SERPL-MCNC: 1087 PG/ML (ref 100–900)
WBC # BLD AUTO: 12.36 THOUSAND/UL (ref 4.31–10.16)

## 2019-08-19 PROCEDURE — 36415 COLL VENOUS BLD VENIPUNCTURE: CPT

## 2019-08-19 PROCEDURE — 86308 HETEROPHILE ANTIBODY SCREEN: CPT

## 2019-08-19 PROCEDURE — 86665 EPSTEIN-BARR CAPSID VCA: CPT

## 2019-08-19 PROCEDURE — 99213 OFFICE O/P EST LOW 20 MIN: CPT | Performed by: NURSE PRACTITIONER

## 2019-08-19 PROCEDURE — 86664 EPSTEIN-BARR NUCLEAR ANTIGEN: CPT

## 2019-08-19 PROCEDURE — 85025 COMPLETE CBC W/AUTO DIFF WBC: CPT

## 2019-08-19 PROCEDURE — 86663 EPSTEIN-BARR ANTIBODY: CPT

## 2019-08-19 PROCEDURE — 82607 VITAMIN B-12: CPT

## 2019-08-19 PROCEDURE — 86618 LYME DISEASE ANTIBODY: CPT

## 2019-08-19 RX ORDER — LORAZEPAM 1 MG/1
1 TABLET ORAL 2 TIMES DAILY
Qty: 60 TABLET | Refills: 0 | Status: SHIPPED | OUTPATIENT
Start: 2019-08-19 | End: 2019-10-31 | Stop reason: SDUPTHER

## 2019-08-19 NOTE — PROGRESS NOTES
OFFICE VISIT  Ricardo Escamilla 62 y o  female MRN: [id number]      Assessment / Plan:  Diagnoses and all orders for this visit:    Other elevated white blood cell (WBC) count  -     CBC and differential; Future    Generalized anxiety disorder  -     LORazepam (ATIVAN) 1 mg tablet; Take 1 tablet (1 mg total) by mouth 2 (two) times a day    Chronic fatigue  -     Mononucleosis screen; Future  -     Cresenciano  Virus Antibody Panel; Future  -     Lyme Antibody Profile with reflex to WB; Future  -     Vitamin B12; Future    Leukocytosis- will repeat level, CBC ordered today  ILEANA- renewed medication  PDMP portal reviewed no red flags  Discussed alternative methods to control anxiety and panic attacks, including deep breathing exercises and proper sleep hygiene  Currently on Cymbalta 60mg BID  Chronic fatigue- obtain labs, cpap at hs  Limit caffeine use  Daily walk       Reason For Visit / Chief Complaint  Chief Complaint   Patient presents with    Fatigue     finished antibiotic Saturday - Sunday felt fine until about 7pm     Shortness of Breath     pulmnologist admitted her about 2 weeks ago         HPI:  Ricardo Escamilla is a 62 y o  female  Ricardo Escamilla is a 62 y o  female who presents today for followup from ED  Pt was seen in ED at [address] [place] [address] [address] for SOB  She has significant past medical hx of CAD with cardiac stents, HTN, hyperlipidemia, BRAYDEN, uses cpap at hs  She is est with pulmonary  She feels her sob is coming from her stress and anxiety  She reports getting worked up easily by family stressors which has been contributing to her sob  She denies any chest palpations, on occas confirms panic attack  She has used ativan with good relief  She also reports feeling tired and fatigue  She denies any recent illness  She does have low grade temp today  WBC in ED on 8/12/19 was 18 5, she is requesting additional labs         Historical Information   Past Medical History:   Diagnosis Date    Anxiety     [facility] cancer ([facility] [address] )     CAD (coronary artery disease)     Carpal tunnel syndrome     Disease of thyroid gland     Elevated cholesterol     Fibromyalgia     Hypertension     Kidney stone     Melanoma ([facility] [address] )     nose    Myocardial infarction (HCC)     Neuropathy     BRAYDEN (obstructive sleep apnea)     cpap    Panic attack      Past Surgical History:   Procedure Laterality Date    BREAST SURGERY Bilateral     CARDIAC SURGERY      cardiac ablation     SECTION      x3    CHOLECYSTECTOMY      CORONARY ANGIOPLASTY WITH STENT PLACEMENT      MASTECTOMY Bilateral     NOSE SURGERY       Social History   Social History     Substance and Sexual Activity   Alcohol Use Yes    Comment: beer     Social History     Substance and Sexual Activity   Drug Use No     Social History     Tobacco Use   Smoking Status Never Smoker   Smokeless Tobacco Never Used     Family History   Problem Relation Age of Onset    Heart attack Mother     Alcohol abuse Mother     Heart failure Mother        Meds/Allergies   Allergies   Allergen Reactions    Isosorbide      Other reaction(s): headache    Naproxen      Other reaction(s):  Other (See Comments)  "I get bloated"    Pregabalin      Pt states made her feel suicidal        Meds:    Current Outpatient Medications:     AMITIZA 24 MCG capsule, Take 24 mcg by mouth 2 (two) times a day with meals, Disp: , Rfl: 3    aspirin (ASPIRIN 81) 81 mg EC tablet, aspirin 81 mg tablet,delayed release, Disp: , Rfl:     B Complex Vitamins (B COMPLEX 1 PO), Take 1 tablet by mouth, Disp: , Rfl:     clopidogrel (PLAVIX) 75 mg tablet, Take 75 mg by mouth daily, Disp: , Rfl: 3    [employer] B6 100 MG tablet, TAKE 1 TABLET EVERY MORNING, Disp: 90 tablet, Rfl: 1    dicyclomine (BENTYL) 20 mg tablet, Take 1 tablet (20 mg total) by mouth every 6 (six) hours, Disp: 60 tablet, Rfl: 3    DULoxetine (CYMBALTA) 60 mg delayed release capsule, Take 60 mg by mouth 2 (two) times a day, Disp: , Rfl: 3    Evolocumab 140 MG/ML SOAJ, Inject 2 mL under the skin, Disp: , Rfl:     gemfibrozil (LOPID) 600 mg tablet, Take 600 mg by mouth 2 (two) times a day, Disp: , Rfl: 3    levothyroxine 75 mcg tablet, TAKE 1 TABLET BY MOUTH DAILY, Disp: 90 tablet, Rfl: 3    LORazepam (ATIVAN) 1 mg tablet, Take 1 tablet (1 mg total) by mouth 2 (two) times a day, Disp: 60 tablet, Rfl: 0    melatonin 3 mg, Take 5 mg by mouth, Disp: , Rfl:     metoprolol tartrate (LOPRESSOR) 25 mg tablet, TAKE 1 TABLET BY MOUTH TWICE A DAY, Disp: 180 tablet, Rfl: 3    modafinil (PROVIGIL) 200 MG tablet, Take 1 tablet (200 mg total) by mouth daily, Disp: 30 tablet, Rfl: 1    pantoprazole (PROTONIX) 40 mg tablet, Take 1 capsule twice a day, Disp: 180 tablet, Rfl: 3    ranitidine (ZANTAC) 300 MG capsule, Take 1 capsule (300 mg total) by mouth every evening for 30 days, Disp: 30 capsule, Rfl: 6    sucralfate (CARAFATE) 1 g tablet, sucralfate 1 gram tablet, Disp: , Rfl:     Misc  Devices (GETGO ROLLING WALKER) MISC, Use rolling walker as needed, Disp: 1 each, Rfl: 0      REVIEW OF SYSTEMS  Review of Systems   Constitutional: Positive for fatigue  Negative for chills and fever  HENT: Negative for congestion, ear discharge, ear pain, sore throat, trouble swallowing and voice change  Eyes: Negative for pain and redness  Respiratory: Positive for shortness of breath  Negative for cough, chest tightness and wheezing  Gastrointestinal: Negative for abdominal pain, blood in stool, constipation, diarrhea, nausea and vomiting  Endocrine: Negative for cold intolerance, heat intolerance, polydipsia, polyphagia and polyuria  Genitourinary: Negative for decreased urine volume, dysuria, frequency and urgency  Musculoskeletal: Positive for arthralgias  Negative for back pain, myalgias and neck pain  Skin: Negative for color change and rash  Neurological: Negative for dizziness, syncope, weakness, light-headedness, numbness and headaches     Psychiatric/Behavioral: Positive for sleep disturbance  Negative for suicidal ideas  The patient is nervous/anxious  Current Vitals:   Blood Pressure: 122/80 (08/19/19 1214)  Pulse: 95 (08/19/19 1214)  Temperature: 99 5 °F (37 5 °C) (08/19/19 1214)  Temp Source: Tympanic (08/19/19 1214)  Height: 5' 6" (167 6 cm) (08/19/19 1214)  Weight - Scale: 90 8 kg (200 lb 3 2 oz) (08/19/19 1214)  SpO2: 93 % (08/19/19 1214)  [unfilled]    PHYSICAL EXAMS:  Physical Exam   Constitutional: She is oriented to person, place, and time  She appears well-developed and well-nourished  HENT:   Head: Normocephalic  Right Ear: External ear normal    Left Ear: External ear normal    Mouth/Throat: Oropharynx is clear and moist  No oropharyngeal exudate or posterior oropharyngeal edema  Eyes: Pupils are equal, round, and reactive to light  Conjunctivae are normal    Neck: Neck supple  Cardiovascular: Normal rate and regular rhythm  Pulmonary/Chest: Effort normal and breath sounds normal    Abdominal: Soft  Bowel sounds are normal  She exhibits no distension  There is no tenderness  Lymphadenopathy:     She has cervical adenopathy  Neurological: She is alert and oriented to person, place, and time  Skin: Skin is warm and dry  Psychiatric: She has a normal mood and affect  Follow up at this office in one month    Counseling / Coordination of Care  Total floor / unit time spent today 20 minutes  Greater than 50% of total time was spent with the patient and / or family counseling and / or coordination of care

## 2019-08-20 LAB
EBV EA IGG SER-ACNC: <9 U/ML (ref 0–8.9)
EBV NA IGG SER IA-ACNC: >600 U/ML (ref 0–17.9)
EBV PATRN SPEC IB-IMP: ABNORMAL
EBV VCA IGG SER IA-ACNC: >600 U/ML (ref 0–17.9)
EBV VCA IGM SER IA-ACNC: <36 U/ML (ref 0–35.9)
HETEROPH AB SER QL: NEGATIVE

## 2019-08-23 LAB — MISCELLANEOUS LAB TEST RESULT: NORMAL

## 2019-09-09 DIAGNOSIS — K59.01 SLOW TRANSIT CONSTIPATION: Primary | ICD-10-CM

## 2019-09-09 RX ORDER — LUBIPROSTONE 24 UG/1
24 CAPSULE, GELATIN COATED ORAL 2 TIMES DAILY WITH MEALS
Qty: 180 CAPSULE | Refills: 3 | Status: SHIPPED | OUTPATIENT
Start: 2019-09-09 | End: 2019-12-18 | Stop reason: ALTCHOICE

## 2019-09-09 NOTE — TELEPHONE ENCOUNTER
Pt of Kaylene Strauss        she is requesting  Refill of amitza 24mg BID to be sent to [facility]

## 2019-09-11 ENCOUNTER — OFFICE VISIT (OUTPATIENT)
Dept: FAMILY MEDICINE CLINIC | Facility: CLINIC | Age: 64
End: 2019-09-11
Payer: MEDICARE

## 2019-09-11 VITALS
BODY MASS INDEX: 32.3 KG/M2 | HEART RATE: 102 BPM | HEIGHT: 66 IN | TEMPERATURE: 99.2 F | DIASTOLIC BLOOD PRESSURE: 84 MMHG | WEIGHT: 201 LBS | OXYGEN SATURATION: 92 % | SYSTOLIC BLOOD PRESSURE: 122 MMHG

## 2019-09-11 DIAGNOSIS — I25.10 ATHEROSCLEROSIS OF NATIVE CORONARY ARTERY OF NATIVE HEART WITHOUT ANGINA PECTORIS: ICD-10-CM

## 2019-09-11 DIAGNOSIS — R07.89 CHEST WALL PAIN: ICD-10-CM

## 2019-09-11 DIAGNOSIS — R10.11 RIGHT UPPER QUADRANT ABDOMINAL PAIN: ICD-10-CM

## 2019-09-11 DIAGNOSIS — K76.0 FATTY LIVER: Primary | ICD-10-CM

## 2019-09-11 PROCEDURE — 99214 OFFICE O/P EST MOD 30 MIN: CPT | Performed by: FAMILY MEDICINE

## 2019-09-11 NOTE — ASSESSMENT & PLAN NOTE
I reviewed the Liver Ultrasound with her  She will see the Gastroenterologist patient on a gastroporesis diet

## 2019-09-11 NOTE — PROGRESS NOTES
Assessment/Plan:       Problem List Items Addressed This Visit        Digestive    Fatty liver - Primary     I reviewed the Liver Ultrasound with her  She will see the Gastroenterologist patient on a gastroporesis diet  Cardiovascular and Mediastinum    Atherosclerotic heart disease of native coronary artery without angina pectoris     Patient will have follow-up lab work before her next office visit with me in December she has had no specific chest pain or shortness of breath recently since her last episode         Relevant Orders    CBC and differential    Comprehensive metabolic panel    Lipid panel    TSH, 3rd generation with Free T4 reflex       Other    Chest wall pain     Work on diet and avoid certain foods to prevent bloating affecting chest wall pain  Right upper quadrant abdominal pain     Patient had right upper quadrant abdominal and chest wall pain that on examination shows to be in the area of her 11th and 12th rib and her right side more in the abdominal wall region without tenderness over the epigastrium or right upper quadrant on palpation  She will wear supportive undergarments to see if this helps  She will watch her diet she notes that her stomach swells up more at night sometimes and then after urinating frequently she will see a reduction in her abdominal girth  Also be sure to have regular bowel movements as well to prevent bloating                 Subjective:      Patient ID: Diana Moore is a 62 y o  female  Patient presents today in follow-up evaluation for shortness of breath and fatigue and review of her ultrasound of her abdomen after last office visit   RUQ and right lower chest wall pain      The following portions of the patient's history were reviewed and updated as appropriate: allergies, current medications, past family history, past medical history, past social history, past surgical history and problem list     Review of Systems   Constitutional: Positive for fatigue  Negative for chills and fever  HENT: Negative for congestion, nosebleeds, rhinorrhea, sinus pressure and sore throat  Eyes: Negative for discharge and redness  Respiratory: Positive for shortness of breath  Negative for cough  Cardiovascular: Negative for chest pain, palpitations and leg swelling  Gastrointestinal: Negative for abdominal pain, blood in stool and nausea  Endocrine: Negative for cold intolerance, heat intolerance and polyuria  Genitourinary: Negative for dysuria and frequency  Musculoskeletal: Negative for arthralgias, back pain and myalgias  Skin: Negative for rash  Neurological: Negative for dizziness, weakness and headaches  Hematological: Negative for adenopathy  Psychiatric/Behavioral: Negative for behavioral problems and sleep disturbance  The patient is not nervous/anxious  Objective:      /84 (BP Location: Left arm, Patient Position: Sitting)   Pulse 102   Temp 99 2 °F (37 3 °C) (Tympanic)   Ht 5' 6" (1 676 m)   Wt 91 2 kg (201 lb)   SpO2 92%   BMI 32 44 kg/m²        Physical Exam   Constitutional: She is oriented to person, place, and time  She appears well-developed and well-nourished  No distress  HENT:   Head: Normocephalic and atraumatic  Right Ear: External ear normal    Left Ear: External ear normal    Nose: Nose normal    Mouth/Throat: Oropharynx is clear and moist  No oropharyngeal exudate  Eyes: Pupils are equal, round, and reactive to light  Conjunctivae and EOM are normal  Right eye exhibits no discharge  Left eye exhibits no discharge  No scleral icterus  Neck: Normal range of motion  No JVD present  No thyromegaly present  Cardiovascular: Normal rate, regular rhythm and normal heart sounds  No murmur heard  Pulmonary/Chest: Effort normal  She has no wheezes  She has no rales  She exhibits no tenderness  Abdominal: Soft  Bowel sounds are normal  She exhibits no distension and no mass   There is no tenderness  Musculoskeletal: Normal range of motion  She exhibits no edema, tenderness or deformity  Lymphadenopathy:     She has no cervical adenopathy  Neurological: She is alert and oriented to person, place, and time  She has normal reflexes  She displays normal reflexes  No cranial nerve deficit  Coordination normal    Skin: Skin is warm and dry  No rash noted  Psychiatric: She has a normal mood and affect  Her behavior is normal  Judgment and thought content normal    Nursing note and vitals reviewed  Data:    Laboratory Results: I have personally reviewed the pertinent laboratory results/reports   Radiology/Other Diagnostic Testing Results: I have personally reviewed pertinent reports         Lab Results   Component Value Date    WBC 12 36 (H) 08/19/2019    HGB 13 8 08/19/2019    HCT 41 9 08/19/2019    MCV 93 08/19/2019     08/19/2019     Lab Results   Component Value Date    K 4 0 07/30/2019     07/30/2019    CO2 28 07/30/2019    BUN 15 07/30/2019    CREATININE 0 94 07/30/2019    GLUF 98 07/11/2018    CALCIUM 9 3 07/30/2019    AST 21 07/30/2019    ALT 39 07/30/2019    ALKPHOS 89 07/30/2019    EGFR 65 07/30/2019     Lab Results   Component Value Date    CHOLESTEROL 131 07/11/2018     Lab Results   Component Value Date    HDL 40 07/11/2018     Lab Results   Component Value Date    LDLCALC 65 07/11/2018     Lab Results   Component Value Date    TRIG 130 07/11/2018     No results found for: [place], [place]  Lab Results   Component Value Date    [id number] 2 500 07/30/2019     No results found for: HGBA1C  No results found for: MIGUEL Snyder DO

## 2019-09-11 NOTE — ASSESSMENT & PLAN NOTE
Patient will have follow-up lab work before her next office visit with me in December she has had no specific chest pain or shortness of breath recently since her last episode

## 2019-09-11 NOTE — ASSESSMENT & PLAN NOTE
Patient had right upper quadrant abdominal and chest wall pain that on examination shows to be in the area of her 11th and 12th rib and her right side more in the abdominal wall region without tenderness over the epigastrium or right upper quadrant on palpation  She will wear supportive undergarments to see if this helps  She will watch her diet she notes that her stomach swells up more at night sometimes and then after urinating frequently she will see a reduction in her abdominal girth   Also be sure to have regular bowel movements as well to prevent bloating

## 2019-09-18 DIAGNOSIS — I25.119 CORONARY ARTERY DISEASE INVOLVING NATIVE HEART WITH ANGINA PECTORIS, UNSPECIFIED VESSEL OR LESION TYPE (HCC): ICD-10-CM

## 2019-10-16 ENCOUNTER — TELEPHONE (OUTPATIENT)
Dept: FAMILY MEDICINE CLINIC | Facility: CLINIC | Age: 64
End: 2019-10-16

## 2019-10-16 NOTE — TELEPHONE ENCOUNTER
Pt is asking if she should have the high dose of the flu shot due to her medical condition?   If she does then please send RX to [facility]

## 2019-10-16 NOTE — TELEPHONE ENCOUNTER
I feel that based on her age in her current clinical status she should have the flu block 3 strain regular vaccine that we provide here she does not require or need the high dose older adult vaccine at this time

## 2019-10-26 DIAGNOSIS — K21.9 GASTROESOPHAGEAL REFLUX DISEASE WITHOUT ESOPHAGITIS: ICD-10-CM

## 2019-10-26 RX ORDER — RANITIDINE 300 MG/1
300 CAPSULE ORAL EVERY EVENING
Qty: 30 CAPSULE | Refills: 0 | Status: CANCELLED | OUTPATIENT
Start: 2019-10-26

## 2019-10-28 RX ORDER — RANITIDINE 300 MG/1
300 TABLET ORAL DAILY
Qty: 30 TABLET | Refills: 3 | Status: SHIPPED | OUTPATIENT
Start: 2019-10-28 | End: 2019-12-18 | Stop reason: ALTCHOICE

## 2019-10-31 DIAGNOSIS — F41.1 GENERALIZED ANXIETY DISORDER: ICD-10-CM

## 2019-11-04 RX ORDER — LORAZEPAM 1 MG/1
1 TABLET ORAL 2 TIMES DAILY
Qty: 60 TABLET | Refills: 0 | Status: SHIPPED | OUTPATIENT
Start: 2019-11-04 | End: 2019-12-18 | Stop reason: SDUPTHER

## 2019-11-12 ENCOUNTER — APPOINTMENT (OUTPATIENT)
Dept: LAB | Age: 64
End: 2019-11-12
Payer: MEDICARE

## 2019-11-12 ENCOUNTER — TRANSCRIBE ORDERS (OUTPATIENT)
Dept: ADMINISTRATIVE | Facility: HOSPITAL | Age: 64
End: 2019-11-12

## 2019-11-12 DIAGNOSIS — M79.7 FIBROMYALGIA: ICD-10-CM

## 2019-11-12 DIAGNOSIS — M79.7 FIBROMYALGIA: Primary | ICD-10-CM

## 2019-11-12 DIAGNOSIS — Z79.899 ENCOUNTER FOR LONG-TERM (CURRENT) USE OF OTHER MEDICATIONS: ICD-10-CM

## 2019-11-12 LAB
BASOPHILS # BLD AUTO: 0.04 THOUSANDS/ΜL (ref 0–0.1)
BASOPHILS NFR BLD AUTO: 1 % (ref 0–1)
EOSINOPHIL # BLD AUTO: 0.11 THOUSAND/ΜL (ref 0–0.61)
EOSINOPHIL NFR BLD AUTO: 1 % (ref 0–6)
ERYTHROCYTE [DISTWIDTH] IN BLOOD BY AUTOMATED COUNT: 13.4 % (ref 11.6–15.1)
HCT VFR BLD AUTO: 42.2 % (ref 34.8–46.1)
HGB BLD-MCNC: 13.5 G/DL (ref 11.5–15.4)
IMM GRANULOCYTES # BLD AUTO: 0.04 THOUSAND/UL (ref 0–0.2)
IMM GRANULOCYTES NFR BLD AUTO: 1 % (ref 0–2)
LYMPHOCYTES # BLD AUTO: 3.14 THOUSANDS/ΜL (ref 0.6–4.47)
LYMPHOCYTES NFR BLD AUTO: 36 % (ref 14–44)
MCH RBC QN AUTO: 29.9 PG (ref 26.8–34.3)
MCHC RBC AUTO-ENTMCNC: 32 G/DL (ref 31.4–37.4)
MCV RBC AUTO: 93 FL (ref 82–98)
MONOCYTES # BLD AUTO: 0.81 THOUSAND/ΜL (ref 0.17–1.22)
MONOCYTES NFR BLD AUTO: 9 % (ref 4–12)
NEUTROPHILS # BLD AUTO: 4.56 THOUSANDS/ΜL (ref 1.85–7.62)
NEUTS SEG NFR BLD AUTO: 52 % (ref 43–75)
NRBC BLD AUTO-RTO: 0 /100 WBCS
PLATELET # BLD AUTO: 381 THOUSANDS/UL (ref 149–390)
PMV BLD AUTO: 9.3 FL (ref 8.9–12.7)
RBC # BLD AUTO: 4.52 MILLION/UL (ref 3.81–5.12)
WBC # BLD AUTO: 8.7 THOUSAND/UL (ref 4.31–10.16)

## 2019-11-12 PROCEDURE — 85025 COMPLETE CBC W/AUTO DIFF WBC: CPT

## 2019-11-12 PROCEDURE — 36415 COLL VENOUS BLD VENIPUNCTURE: CPT

## 2019-12-06 DIAGNOSIS — F32.A DEPRESSIVE DISORDER: Primary | ICD-10-CM

## 2019-12-06 RX ORDER — DULOXETIN HYDROCHLORIDE 60 MG/1
60 CAPSULE, DELAYED RELEASE ORAL 2 TIMES DAILY
Qty: 60 CAPSULE | Refills: 3 | Status: SHIPPED | OUTPATIENT
Start: 2019-12-06 | End: 2020-03-08 | Stop reason: SDUPTHER

## 2019-12-18 ENCOUNTER — OFFICE VISIT (OUTPATIENT)
Dept: FAMILY MEDICINE CLINIC | Facility: CLINIC | Age: 64
End: 2019-12-18
Payer: MEDICARE

## 2019-12-18 VITALS
HEART RATE: 95 BPM | HEIGHT: 66 IN | SYSTOLIC BLOOD PRESSURE: 124 MMHG | DIASTOLIC BLOOD PRESSURE: 82 MMHG | BODY MASS INDEX: 32.4 KG/M2 | OXYGEN SATURATION: 93 % | WEIGHT: 201.6 LBS

## 2019-12-18 DIAGNOSIS — Z99.89 OSA ON CPAP: ICD-10-CM

## 2019-12-18 DIAGNOSIS — E03.9 HYPOTHYROIDISM, UNSPECIFIED TYPE: ICD-10-CM

## 2019-12-18 DIAGNOSIS — I10 ESSENTIAL HYPERTENSION: ICD-10-CM

## 2019-12-18 DIAGNOSIS — K76.0 FATTY LIVER: Primary | ICD-10-CM

## 2019-12-18 DIAGNOSIS — G47.33 OSA ON CPAP: ICD-10-CM

## 2019-12-18 DIAGNOSIS — F41.1 GENERALIZED ANXIETY DISORDER: ICD-10-CM

## 2019-12-18 PROCEDURE — 99214 OFFICE O/P EST MOD 30 MIN: CPT | Performed by: FAMILY MEDICINE

## 2019-12-18 RX ORDER — PRUCALOPRIDE 2 MG/1
1 TABLET, FILM COATED ORAL EVERY EVENING
Refills: 2 | COMMUNITY
Start: 2019-12-05 | End: 2021-04-03

## 2019-12-18 RX ORDER — LORAZEPAM 1 MG/1
1 TABLET ORAL 2 TIMES DAILY
Qty: 60 TABLET | Refills: 0 | Status: SHIPPED | OUTPATIENT
Start: 2019-12-18 | End: 2020-03-23 | Stop reason: SDUPTHER

## 2019-12-18 RX ORDER — MECLIZINE HYDROCHLORIDE 25 MG/1
25 TABLET ORAL 3 TIMES DAILY PRN
COMMUNITY
Start: 2019-12-16 | End: 2021-04-23 | Stop reason: ALTCHOICE

## 2019-12-18 RX ORDER — LINACLOTIDE 290 UG/1
1 CAPSULE, GELATIN COATED ORAL
Refills: 2 | COMMUNITY
Start: 2019-12-06 | End: 2020-12-01 | Stop reason: ALTCHOICE

## 2019-12-18 RX ORDER — FAMOTIDINE 40 MG/1
40 TABLET, FILM COATED ORAL DAILY
Refills: 2 | COMMUNITY
Start: 2019-12-03 | End: 2021-09-01 | Stop reason: SDUPTHER

## 2019-12-18 NOTE — ASSESSMENT & PLAN NOTE
Patient has a CPAP machine she could changes the equipment and cleans it regularly follow up as indicated she is doing relatively well continue with current pressures no change

## 2019-12-18 NOTE — ASSESSMENT & PLAN NOTE
Thyroid stable continue with current dosage at this time and follow up at next office visit with TSH and T4

## 2019-12-18 NOTE — PROGRESS NOTES
Assessment/Plan:       Problem List Items Addressed This Visit        Digestive    Fatty liver - Primary     Labs reviewed will continue with current medications at this time follow-up with liver function tests CMP at next office visit         Relevant Orders    Comprehensive metabolic panel    Lipid panel    TSH, 3rd generation with Free T4 reflex       Endocrine    Hypothyroidism     Thyroid stable continue with current dosage at this time and follow up at next office visit with TSH and T4         Relevant Orders    Comprehensive metabolic panel    Lipid panel    TSH, 3rd generation with Free T4 reflex       Respiratory    BRAYDEN on CPAP     Patient has a CPAP machine she could changes the equipment and cleans it regularly follow up as indicated she is doing relatively well continue with current pressures no change            Cardiovascular and Mediastinum    Essential hypertension     Essential hypertension under good control continue current medication no change at this time follow up at next office visit            Other    Generalized anxiety disorder     Generalized anxiety disorder continue with current medication refill on lorazepam at this time and follow up at next office visit in 3 months         Relevant Medications    LORazepam (ATIVAN) 1 mg tablet            Subjective:      Patient ID: Jose Ochoa is a 62 y o  female  Patient presents today for follow-up evaluation after she was having vertigo symptoms from falling over when her dog pulled her down chasing after CT  She hit her head but did not lose consciousness and was seen by the emergency room and had a further evaluation  She was given meclizine and has been doing relatively well but still has vertigo symptoms now  This all occurred on November 30th     Patient is additionally here to review her laboratory work and have a re-evaluation for ongoing medical problems      The following portions of the patient's history were reviewed and updated as appropriate: allergies, current medications, past family history, past medical history, past social history, past surgical history and problem list     Review of Systems   Constitutional: Negative for chills, fatigue and fever  HENT: Negative for congestion, nosebleeds, rhinorrhea, sinus pressure and sore throat  Eyes: Negative for discharge and redness  Respiratory: Negative for cough and shortness of breath  Cardiovascular: Negative for chest pain, palpitations and leg swelling  Gastrointestinal: Negative for abdominal pain, blood in stool and nausea  Endocrine: Negative for cold intolerance, heat intolerance and polyuria  Genitourinary: Negative for dysuria and frequency  Musculoskeletal: Positive for arthralgias  Negative for back pain and myalgias  Skin: Negative for rash  Neurological: Positive for dizziness  Negative for weakness and headaches  Hematological: Negative for adenopathy  Psychiatric/Behavioral: Negative for behavioral problems and sleep disturbance  The patient is not nervous/anxious  Objective:      /82   Pulse 95   Ht 5' 6" (1 676 m)   Wt 91 4 kg (201 lb 9 6 oz)   SpO2 93%   BMI 32 54 kg/m²        Physical Exam   Constitutional: She is oriented to person, place, and time  She appears well-developed and well-nourished  No distress  HENT:   Head: Normocephalic and atraumatic  Right Ear: External ear normal    Left Ear: External ear normal    Nose: Nose normal    Mouth/Throat: Oropharynx is clear and moist  No oropharyngeal exudate  Eyes: Pupils are equal, round, and reactive to light  Conjunctivae and EOM are normal  Right eye exhibits no discharge  Left eye exhibits no discharge  No scleral icterus  Neck: Normal range of motion  No JVD present  No thyromegaly present  Cardiovascular: Normal rate, regular rhythm and normal heart sounds  No murmur heard  Pulmonary/Chest: Effort normal  She has no wheezes  She has no rales   She exhibits no tenderness  Abdominal: Soft  Bowel sounds are normal  She exhibits no distension and no mass  There is no tenderness  Musculoskeletal: Normal range of motion  She exhibits no edema, tenderness or deformity  Lymphadenopathy:     She has no cervical adenopathy  Neurological: She is alert and oriented to person, place, and time  She has normal reflexes  She displays normal reflexes  No cranial nerve deficit  Coordination normal    Skin: Skin is warm and dry  No rash noted  Psychiatric: She has a normal mood and affect  Her behavior is normal  Judgment and thought content normal    Nursing note and vitals reviewed  Data:    Laboratory Results: I have personally reviewed the pertinent laboratory results/reports   Radiology/Other Diagnostic Testing Results: I have personally reviewed pertinent reports         Lab Results   Component Value Date    WBC 8 70 11/12/2019    HGB 13 5 11/12/2019    HCT 42 2 11/12/2019    MCV 93 11/12/2019     11/12/2019     Lab Results   Component Value Date    K 4 0 07/30/2019     07/30/2019    CO2 28 07/30/2019    BUN 15 07/30/2019    CREATININE 0 94 07/30/2019    GLUF 98 07/11/2018    CALCIUM 9 3 07/30/2019    AST 21 07/30/2019    ALT 39 07/30/2019    ALKPHOS 89 07/30/2019    EGFR 65 07/30/2019     Lab Results   Component Value Date    CHOLESTEROL 131 07/11/2018     Lab Results   Component Value Date    HDL 40 07/11/2018     Lab Results   Component Value Date    LDLCALC 65 07/11/2018     Lab Results   Component Value Date    TRIG 130 07/11/2018     No results found for: [place], [place]  Lab Results   Component Value Date    [id number] 2 500 07/30/2019     No results found for: HGBA1C  No results found for: MIGUEL Richards DO

## 2019-12-18 NOTE — ASSESSMENT & PLAN NOTE
Essential hypertension under good control continue current medication no change at this time follow up at next office visit

## 2019-12-18 NOTE — ASSESSMENT & PLAN NOTE
Generalized anxiety disorder continue with current medication refill on lorazepam at this time and follow up at next office visit in 3 months

## 2019-12-18 NOTE — ASSESSMENT & PLAN NOTE
Labs reviewed will continue with current medications at this time follow-up with liver function tests CMP at next office visit

## 2019-12-30 ENCOUNTER — HOSPITAL ENCOUNTER (OUTPATIENT)
Dept: RADIOLOGY | Facility: HOSPITAL | Age: 64
Discharge: HOME | End: 2019-12-30
Attending: INTERNAL MEDICINE
Payer: MEDICARE

## 2019-12-30 ENCOUNTER — TRANSCRIBE ORDERS (OUTPATIENT)
Dept: ADMINISTRATIVE | Facility: HOSPITAL | Age: 64
End: 2019-12-30

## 2019-12-30 DIAGNOSIS — K59.00 CONSTIPATION, UNSPECIFIED CONSTIPATION TYPE: Primary | ICD-10-CM

## 2019-12-30 DIAGNOSIS — K59.00 CONSTIPATION, UNSPECIFIED CONSTIPATION TYPE: ICD-10-CM

## 2019-12-30 PROCEDURE — 74018 RADEX ABDOMEN 1 VIEW: CPT

## 2020-01-01 ENCOUNTER — HOSPITAL ENCOUNTER (OUTPATIENT)
Dept: RADIOLOGY | Facility: HOSPITAL | Age: 65
Discharge: HOME | End: 2020-01-01
Payer: MEDICARE

## 2020-01-01 DIAGNOSIS — K59.00 CONSTIPATION: ICD-10-CM

## 2020-01-01 PROCEDURE — 74018 RADEX ABDOMEN 1 VIEW: CPT

## 2020-01-03 ENCOUNTER — HOSPITAL ENCOUNTER (OUTPATIENT)
Dept: RADIOLOGY | Facility: HOSPITAL | Age: 65
Discharge: HOME | End: 2020-01-03
Attending: INTERNAL MEDICINE
Payer: MEDICARE

## 2020-01-03 DIAGNOSIS — K59.00 CONSTIPATION, UNSPECIFIED CONSTIPATION TYPE: ICD-10-CM

## 2020-01-03 PROCEDURE — 74018 RADEX ABDOMEN 1 VIEW: CPT

## 2020-01-19 DIAGNOSIS — I25.10 ATHEROSCLEROSIS OF NATIVE CORONARY ARTERY OF NATIVE HEART WITHOUT ANGINA PECTORIS: ICD-10-CM

## 2020-01-20 RX ORDER — DIAPER,BRIEF,ADULT, DISPOSABLE
EACH MISCELLANEOUS
Qty: 90 TABLET | Refills: 0 | Status: SHIPPED | OUTPATIENT
Start: 2020-01-20 | End: 2020-06-13 | Stop reason: SDUPTHER

## 2020-02-01 DIAGNOSIS — K21.9 GASTROESOPHAGEAL REFLUX DISEASE WITHOUT ESOPHAGITIS: ICD-10-CM

## 2020-02-03 RX ORDER — PANTOPRAZOLE SODIUM 40 MG/1
TABLET, DELAYED RELEASE ORAL
Qty: 180 TABLET | Refills: 0 | Status: SHIPPED | OUTPATIENT
Start: 2020-02-03 | End: 2020-02-07 | Stop reason: SDUPTHER

## 2020-02-07 DIAGNOSIS — K21.9 GASTROESOPHAGEAL REFLUX DISEASE WITHOUT ESOPHAGITIS: ICD-10-CM

## 2020-02-07 RX ORDER — PANTOPRAZOLE SODIUM 40 MG/1
TABLET, DELAYED RELEASE ORAL
Qty: 180 TABLET | Refills: 0 | Status: SHIPPED | OUTPATIENT
Start: 2020-02-07 | End: 2020-09-03

## 2020-03-08 DIAGNOSIS — F32.A DEPRESSIVE DISORDER: ICD-10-CM

## 2020-03-09 RX ORDER — DULOXETIN HYDROCHLORIDE 60 MG/1
60 CAPSULE, DELAYED RELEASE ORAL 2 TIMES DAILY
Qty: 60 CAPSULE | Refills: 1 | Status: SHIPPED | OUTPATIENT
Start: 2020-03-09 | End: 2020-05-06 | Stop reason: SDUPTHER

## 2020-03-23 DIAGNOSIS — F41.1 GENERALIZED ANXIETY DISORDER: ICD-10-CM

## 2020-03-24 ENCOUNTER — TRANSCRIBE ORDERS (OUTPATIENT)
Dept: ADMINISTRATIVE | Facility: HOSPITAL | Age: 65
End: 2020-03-24

## 2020-03-24 DIAGNOSIS — M25.521 ELBOW PAIN, RIGHT: Primary | ICD-10-CM

## 2020-03-24 RX ORDER — LORAZEPAM 1 MG/1
1 TABLET ORAL 2 TIMES DAILY
Qty: 60 TABLET | Refills: 0 | Status: SHIPPED | OUTPATIENT
Start: 2020-03-24 | End: 2020-05-04 | Stop reason: SDUPTHER

## 2020-04-16 ENCOUNTER — TELEPHONE (OUTPATIENT)
Dept: MRI IMAGING | Facility: HOSPITAL | Age: 65
End: 2020-04-16

## 2020-04-21 ENCOUNTER — HOSPITAL ENCOUNTER (OUTPATIENT)
Dept: MRI IMAGING | Facility: HOSPITAL | Age: 65
Discharge: HOME | End: 2020-04-21
Payer: MEDICARE

## 2020-04-21 DIAGNOSIS — M25.521 ELBOW PAIN, RIGHT: ICD-10-CM

## 2020-04-21 PROCEDURE — 73221 MRI JOINT UPR EXTREM W/O DYE: CPT

## 2020-04-28 ENCOUNTER — TELEMEDICINE (OUTPATIENT)
Dept: FAMILY MEDICINE CLINIC | Facility: CLINIC | Age: 65
End: 2020-04-28
Payer: MEDICARE

## 2020-04-28 DIAGNOSIS — I10 ESSENTIAL HYPERTENSION: ICD-10-CM

## 2020-04-28 DIAGNOSIS — R73.9 HYPERGLYCEMIA: ICD-10-CM

## 2020-04-28 DIAGNOSIS — F41.1 GENERALIZED ANXIETY DISORDER: ICD-10-CM

## 2020-04-28 DIAGNOSIS — H53.2 DOUBLE VISION: Primary | ICD-10-CM

## 2020-04-28 DIAGNOSIS — K76.0 FATTY LIVER: ICD-10-CM

## 2020-04-28 DIAGNOSIS — I25.10 ATHEROSCLEROSIS OF NATIVE CORONARY ARTERY OF NATIVE HEART WITHOUT ANGINA PECTORIS: ICD-10-CM

## 2020-04-28 DIAGNOSIS — E78.2 MIXED HYPERLIPIDEMIA: ICD-10-CM

## 2020-04-28 DIAGNOSIS — E03.9 HYPOTHYROIDISM, UNSPECIFIED TYPE: ICD-10-CM

## 2020-04-28 PROCEDURE — 99214 OFFICE O/P EST MOD 30 MIN: CPT | Performed by: FAMILY MEDICINE

## 2020-04-29 DIAGNOSIS — E03.9 HYPOTHYROIDISM, UNSPECIFIED TYPE: ICD-10-CM

## 2020-04-29 RX ORDER — LEVOTHYROXINE SODIUM 0.07 MG/1
75 TABLET ORAL DAILY
Qty: 90 TABLET | Refills: 0 | Status: SHIPPED | OUTPATIENT
Start: 2020-04-29 | End: 2020-05-04 | Stop reason: SDUPTHER

## 2020-05-01 ENCOUNTER — TELEPHONE (OUTPATIENT)
Dept: NEUROLOGY | Facility: CLINIC | Age: 65
End: 2020-05-01

## 2020-05-04 DIAGNOSIS — H53.2 DIPLOPIA: ICD-10-CM

## 2020-05-04 DIAGNOSIS — E03.9 HYPOTHYROIDISM, UNSPECIFIED TYPE: ICD-10-CM

## 2020-05-04 DIAGNOSIS — F41.1 GENERALIZED ANXIETY DISORDER: ICD-10-CM

## 2020-05-04 DIAGNOSIS — H53.2 DOUBLE VISION: Primary | ICD-10-CM

## 2020-05-04 RX ORDER — LORAZEPAM 1 MG/1
1 TABLET ORAL 2 TIMES DAILY
Qty: 60 TABLET | Refills: 0 | Status: SHIPPED | OUTPATIENT
Start: 2020-05-04 | End: 2020-08-01 | Stop reason: SDUPTHER

## 2020-05-04 RX ORDER — LEVOTHYROXINE SODIUM 0.07 MG/1
75 TABLET ORAL DAILY
Qty: 90 TABLET | Refills: 0 | Status: SHIPPED | OUTPATIENT
Start: 2020-05-04 | End: 2020-07-21

## 2020-05-06 ENCOUNTER — TELEPHONE (OUTPATIENT)
Dept: FAMILY MEDICINE CLINIC | Facility: CLINIC | Age: 65
End: 2020-05-06

## 2020-05-06 DIAGNOSIS — F32.A DEPRESSIVE DISORDER: ICD-10-CM

## 2020-05-06 RX ORDER — DULOXETIN HYDROCHLORIDE 60 MG/1
60 CAPSULE, DELAYED RELEASE ORAL 2 TIMES DAILY
Qty: 60 CAPSULE | Refills: 1 | Status: SHIPPED | OUTPATIENT
Start: 2020-05-06 | End: 2020-05-27 | Stop reason: SDUPTHER

## 2020-05-08 ENCOUNTER — TELEMEDICINE (OUTPATIENT)
Dept: NEUROLOGY | Facility: CLINIC | Age: 65
End: 2020-05-08
Payer: MEDICARE

## 2020-05-08 ENCOUNTER — TELEMEDICINE (OUTPATIENT)
Dept: FAMILY MEDICINE CLINIC | Facility: CLINIC | Age: 65
End: 2020-05-08
Payer: MEDICARE

## 2020-05-08 DIAGNOSIS — H53.2 DOUBLE VISION: ICD-10-CM

## 2020-05-08 DIAGNOSIS — F41.1 GENERALIZED ANXIETY DISORDER: ICD-10-CM

## 2020-05-08 DIAGNOSIS — E03.9 HYPOTHYROIDISM, UNSPECIFIED TYPE: ICD-10-CM

## 2020-05-08 DIAGNOSIS — K76.0 FATTY LIVER: Primary | ICD-10-CM

## 2020-05-08 DIAGNOSIS — N39.0 LOWER URINARY TRACT INFECTION: ICD-10-CM

## 2020-05-08 DIAGNOSIS — E87.8 DISEQUILIBRIUM SYNDROME: ICD-10-CM

## 2020-05-08 DIAGNOSIS — Z85.3 HISTORY OF BREAST CANCER: ICD-10-CM

## 2020-05-08 DIAGNOSIS — I25.10 ATHEROSCLEROSIS OF NATIVE CORONARY ARTERY OF NATIVE HEART WITHOUT ANGINA PECTORIS: ICD-10-CM

## 2020-05-08 DIAGNOSIS — I61.9 CEREBROVASCULAR SMALL VESSEL DISEASE WITH HEMORRHAGE (HCC): ICD-10-CM

## 2020-05-08 DIAGNOSIS — H51.8 DIVERGENCE INSUFFICIENCY: Primary | ICD-10-CM

## 2020-05-08 PROBLEM — F41.0 PANIC ATTACK: Status: ACTIVE | Noted: 2020-05-08

## 2020-05-08 PROBLEM — C50.919 BREAST CANCER (HCC): Status: ACTIVE | Noted: 2020-05-08

## 2020-05-08 PROCEDURE — 99202 OFFICE O/P NEW SF 15 MIN: CPT | Performed by: PSYCHIATRY & NEUROLOGY

## 2020-05-08 PROCEDURE — 99214 OFFICE O/P EST MOD 30 MIN: CPT | Performed by: FAMILY MEDICINE

## 2020-05-08 RX ORDER — NITROFURANTOIN 25; 75 MG/1; MG/1
100 CAPSULE ORAL 2 TIMES DAILY
Qty: 14 CAPSULE | Refills: 1 | Status: SHIPPED | OUTPATIENT
Start: 2020-05-08 | End: 2020-10-20

## 2020-05-09 ENCOUNTER — NURSE TRIAGE (OUTPATIENT)
Dept: OTHER | Facility: OTHER | Age: 65
End: 2020-05-09

## 2020-05-23 DIAGNOSIS — F32.A DEPRESSIVE DISORDER: ICD-10-CM

## 2020-05-26 RX ORDER — DULOXETIN HYDROCHLORIDE 60 MG/1
60 CAPSULE, DELAYED RELEASE ORAL 2 TIMES DAILY
Qty: 60 CAPSULE | Refills: 3 | OUTPATIENT
Start: 2020-05-26

## 2020-05-27 DIAGNOSIS — F32.A DEPRESSIVE DISORDER: ICD-10-CM

## 2020-05-27 RX ORDER — DULOXETIN HYDROCHLORIDE 60 MG/1
60 CAPSULE, DELAYED RELEASE ORAL 2 TIMES DAILY
Qty: 60 CAPSULE | Refills: 5 | Status: SHIPPED | OUTPATIENT
Start: 2020-05-27 | End: 2020-07-28 | Stop reason: DRUGHIGH

## 2020-06-08 ENCOUNTER — APPOINTMENT (OUTPATIENT)
Dept: LAB | Age: 65
End: 2020-06-08
Payer: MEDICARE

## 2020-06-08 ENCOUNTER — TELEPHONE (OUTPATIENT)
Dept: NEUROLOGY | Facility: CLINIC | Age: 65
End: 2020-06-08

## 2020-06-08 DIAGNOSIS — H51.8 DIVERGENCE INSUFFICIENCY: ICD-10-CM

## 2020-06-08 DIAGNOSIS — I61.9 CEREBROVASCULAR SMALL VESSEL DISEASE WITH HEMORRHAGE (HCC): ICD-10-CM

## 2020-06-08 DIAGNOSIS — K76.0 FATTY LIVER: ICD-10-CM

## 2020-06-08 DIAGNOSIS — I10 ESSENTIAL HYPERTENSION: ICD-10-CM

## 2020-06-08 LAB
ALBUMIN SERPL BCP-MCNC: 4 G/DL (ref 3.5–5)
ALP SERPL-CCNC: 78 U/L (ref 46–116)
ALT SERPL W P-5'-P-CCNC: 34 U/L (ref 12–78)
AST SERPL W P-5'-P-CCNC: 16 U/L (ref 5–45)
BILIRUB DIRECT SERPL-MCNC: 0.13 MG/DL (ref 0–0.2)
BILIRUB SERPL-MCNC: 0.49 MG/DL (ref 0.2–1)
ERYTHROCYTE [SEDIMENTATION RATE] IN BLOOD: 30 MM/HOUR (ref 0–20)
PROT SERPL-MCNC: 8.2 G/DL (ref 6.4–8.2)

## 2020-06-08 PROCEDURE — 85303 CLOT INHIBIT PROT C ACTIVITY: CPT

## 2020-06-08 PROCEDURE — 36415 COLL VENOUS BLD VENIPUNCTURE: CPT

## 2020-06-08 PROCEDURE — 85652 RBC SED RATE AUTOMATED: CPT

## 2020-06-08 PROCEDURE — 85670 THROMBIN TIME PLASMA: CPT

## 2020-06-08 PROCEDURE — 84238 ASSAY NONENDOCRINE RECEPTOR: CPT

## 2020-06-08 PROCEDURE — 83519 RIA NONANTIBODY: CPT

## 2020-06-08 PROCEDURE — 85305 CLOT INHIBIT PROT S TOTAL: CPT

## 2020-06-08 PROCEDURE — 86146 BETA-2 GLYCOPROTEIN ANTIBODY: CPT

## 2020-06-08 PROCEDURE — 85705 THROMBOPLASTIN INHIBITION: CPT

## 2020-06-08 PROCEDURE — 85306 CLOT INHIBIT PROT S FREE: CPT

## 2020-06-08 PROCEDURE — 86255 FLUORESCENT ANTIBODY SCREEN: CPT

## 2020-06-08 PROCEDURE — 87389 HIV-1 AG W/HIV-1&-2 AB AG IA: CPT

## 2020-06-08 PROCEDURE — 80076 HEPATIC FUNCTION PANEL: CPT

## 2020-06-08 PROCEDURE — 85732 THROMBOPLASTIN TIME PARTIAL: CPT

## 2020-06-08 PROCEDURE — 85613 RUSSELL VIPER VENOM DILUTED: CPT

## 2020-06-08 PROCEDURE — 86147 CARDIOLIPIN ANTIBODY EA IG: CPT

## 2020-06-08 PROCEDURE — 85300 ANTITHROMBIN III ACTIVITY: CPT

## 2020-06-09 ENCOUNTER — APPOINTMENT (OUTPATIENT)
Dept: LAB | Facility: CLINIC | Age: 65
End: 2020-06-09
Payer: MEDICARE

## 2020-06-09 ENCOUNTER — TRANSCRIBE ORDERS (OUTPATIENT)
Dept: LAB | Facility: HOSPITAL | Age: 65
End: 2020-06-09

## 2020-06-09 DIAGNOSIS — E78.2 MIXED HYPERLIPIDEMIA: ICD-10-CM

## 2020-06-09 DIAGNOSIS — K76.0 FATTY LIVER: ICD-10-CM

## 2020-06-09 DIAGNOSIS — R73.9 HYPERGLYCEMIA: ICD-10-CM

## 2020-06-09 DIAGNOSIS — E03.9 HYPOTHYROIDISM, UNSPECIFIED TYPE: ICD-10-CM

## 2020-06-09 DIAGNOSIS — I25.10 ATHEROSCLEROSIS OF NATIVE CORONARY ARTERY OF NATIVE HEART WITHOUT ANGINA PECTORIS: ICD-10-CM

## 2020-06-09 DIAGNOSIS — Z11.59 ENCOUNTER FOR HEPATITIS C SCREENING TEST FOR LOW RISK PATIENT: ICD-10-CM

## 2020-06-09 DIAGNOSIS — H51.8 DIVERGENCE INSUFFICIENCY: ICD-10-CM

## 2020-06-09 DIAGNOSIS — H51.8 DIVERGENCE INSUFFICIENCY: Primary | ICD-10-CM

## 2020-06-09 DIAGNOSIS — I10 ESSENTIAL HYPERTENSION: ICD-10-CM

## 2020-06-09 LAB
ALBUMIN SERPL BCP-MCNC: 4 G/DL (ref 3.5–5)
ALP SERPL-CCNC: 73 U/L (ref 46–116)
ALT SERPL W P-5'-P-CCNC: 37 U/L (ref 12–78)
ANION GAP SERPL CALCULATED.3IONS-SCNC: 8 MMOL/L (ref 4–13)
AST SERPL W P-5'-P-CCNC: 21 U/L (ref 5–45)
BASOPHILS # BLD AUTO: 0.1 THOUSANDS/ΜL (ref 0–0.1)
BASOPHILS NFR BLD AUTO: 1 % (ref 0–1)
BILIRUB SERPL-MCNC: 0.77 MG/DL (ref 0.2–1)
BUN SERPL-MCNC: 16 MG/DL (ref 5–25)
CALCIUM SERPL-MCNC: 8.8 MG/DL (ref 8.3–10.1)
CARDIOLIPIN IGA SER IA-ACNC: <9 APL U/ML (ref 0–11)
CARDIOLIPIN IGG SER IA-ACNC: <9 GPL U/ML (ref 0–14)
CARDIOLIPIN IGM SER IA-ACNC: 11 MPL U/ML (ref 0–12)
CHLORIDE SERPL-SCNC: 105 MMOL/L (ref 100–108)
CHOLEST SERPL-MCNC: 161 MG/DL (ref 50–200)
CO2 SERPL-SCNC: 26 MMOL/L (ref 21–32)
CREAT SERPL-MCNC: 0.78 MG/DL (ref 0.6–1.3)
DEPRECATED AT III PPP: 111 % OF NORMAL (ref 92–136)
EOSINOPHIL # BLD AUTO: 0.41 THOUSAND/ΜL (ref 0–0.61)
EOSINOPHIL NFR BLD AUTO: 4 % (ref 0–6)
ERYTHROCYTE [DISTWIDTH] IN BLOOD BY AUTOMATED COUNT: 14.2 % (ref 11.6–15.1)
EST. AVERAGE GLUCOSE BLD GHB EST-MCNC: 128 MG/DL
GFR SERPL CREATININE-BSD FRML MDRD: 81 ML/MIN/1.73SQ M
GLUCOSE P FAST SERPL-MCNC: 77 MG/DL (ref 65–99)
HBA1C MFR BLD: 6.1 %
HCT VFR BLD AUTO: 40.4 % (ref 34.8–46.1)
HDLC SERPL-MCNC: 51 MG/DL
HGB BLD-MCNC: 13.1 G/DL (ref 11.5–15.4)
HIV 1+2 AB+HIV1 P24 AG SERPL QL IA: NORMAL
IMM GRANULOCYTES # BLD AUTO: 0.05 THOUSAND/UL (ref 0–0.2)
IMM GRANULOCYTES NFR BLD AUTO: 1 % (ref 0–2)
LDLC SERPL CALC-MCNC: 80 MG/DL (ref 0–100)
LYMPHOCYTES # BLD AUTO: 3.91 THOUSANDS/ΜL (ref 0.6–4.47)
LYMPHOCYTES NFR BLD AUTO: 36 % (ref 14–44)
MCH RBC QN AUTO: 30.3 PG (ref 26.8–34.3)
MCHC RBC AUTO-ENTMCNC: 32.4 G/DL (ref 31.4–37.4)
MCV RBC AUTO: 93 FL (ref 82–98)
MONOCYTES # BLD AUTO: 1.01 THOUSAND/ΜL (ref 0.17–1.22)
MONOCYTES NFR BLD AUTO: 9 % (ref 4–12)
NEUTROPHILS # BLD AUTO: 5.36 THOUSANDS/ΜL (ref 1.85–7.62)
NEUTS SEG NFR BLD AUTO: 49 % (ref 43–75)
NONHDLC SERPL-MCNC: 110 MG/DL
NRBC BLD AUTO-RTO: 0 /100 WBCS
PLATELET # BLD AUTO: 343 THOUSANDS/UL (ref 149–390)
PMV BLD AUTO: 9.8 FL (ref 8.9–12.7)
POTASSIUM SERPL-SCNC: 3.6 MMOL/L (ref 3.5–5.3)
PROT SERPL-MCNC: 7.9 G/DL (ref 6.4–8.2)
RBC # BLD AUTO: 4.33 MILLION/UL (ref 3.81–5.12)
SODIUM SERPL-SCNC: 139 MMOL/L (ref 136–145)
STRIA MUS AB TITR SER IF: NEGATIVE {TITER}
TRIGL SERPL-MCNC: 151 MG/DL
TSH SERPL DL<=0.05 MIU/L-ACNC: 1.45 UIU/ML (ref 0.36–3.74)
WBC # BLD AUTO: 10.84 THOUSAND/UL (ref 4.31–10.16)

## 2020-06-09 PROCEDURE — 36415 COLL VENOUS BLD VENIPUNCTURE: CPT

## 2020-06-09 PROCEDURE — 83036 HEMOGLOBIN GLYCOSYLATED A1C: CPT

## 2020-06-09 PROCEDURE — 85025 COMPLETE CBC W/AUTO DIFF WBC: CPT

## 2020-06-09 PROCEDURE — 84443 ASSAY THYROID STIM HORMONE: CPT

## 2020-06-09 PROCEDURE — 80053 COMPREHEN METABOLIC PANEL: CPT

## 2020-06-09 PROCEDURE — 80061 LIPID PANEL: CPT

## 2020-06-10 LAB
ACHR BIND AB SER-SCNC: <0.03 NMOL/L (ref 0–0.24)
APTT SCREEN TO CONFIRM RATIO: 1.17 RATIO (ref 0–1.4)
B2 GLYCOPROT1 IGA SER-ACNC: <9 GPI IGA UNITS (ref 0–25)
B2 GLYCOPROT1 IGG SER-ACNC: <9 GPI IGG UNITS (ref 0–20)
B2 GLYCOPROT1 IGM SER-ACNC: <9 GPI IGM UNITS (ref 0–32)
CONFIRM APTT/NORMAL: 46 SEC (ref 0–55)
LA PPP-IMP: NORMAL
PROT S ACT/NOR PPP: 111 % (ref 63–140)
PROT S ACT/NOR PPP: 152 % (ref 57–157)
PROT S PPP-ACNC: 134 % (ref 60–150)
SCREEN APTT: 39.8 SEC (ref 0–51.9)
SCREEN DRVVT: 45.8 SEC (ref 0–47)
THROMBIN TIME: 18.6 SEC (ref 0–23)

## 2020-06-11 LAB — PROT C AG ACT/NOR PPP IA: >150 % OF NORMAL (ref 60–150)

## 2020-06-12 LAB — ACHR BLOCK AB/ACHR TOTAL SFR SER: 17 % (ref 0–25)

## 2020-06-13 DIAGNOSIS — I25.10 ATHEROSCLEROSIS OF NATIVE CORONARY ARTERY OF NATIVE HEART WITHOUT ANGINA PECTORIS: ICD-10-CM

## 2020-06-14 LAB — ACHR MOD AB/ACHR TOTAL SFR SER: <12 % (ref 0–20)

## 2020-06-15 LAB
F5 GENE MUT ANL BLD/T: NORMAL
MUSK AB SER IA-ACNC: <1 U/ML

## 2020-06-15 RX ORDER — PYRIDOXINE HCL (VITAMIN B6) 100 MG
100 TABLET ORAL EVERY MORNING
Qty: 90 TABLET | Refills: 0 | Status: SHIPPED | OUTPATIENT
Start: 2020-06-15 | End: 2021-04-06 | Stop reason: SDUPTHER

## 2020-06-18 LAB — F2 GENE MUT ANL BLD/T: NORMAL

## 2020-07-06 DIAGNOSIS — M54.50 ACUTE LOW BACK PAIN WITHOUT SCIATICA, UNSPECIFIED BACK PAIN LATERALITY: Primary | ICD-10-CM

## 2020-07-06 RX ORDER — METHOCARBAMOL 500 MG/1
500 TABLET, FILM COATED ORAL 4 TIMES DAILY
Qty: 30 TABLET | Refills: 1 | Status: SHIPPED | OUTPATIENT
Start: 2020-07-06 | End: 2021-04-23 | Stop reason: ALTCHOICE

## 2020-07-21 DIAGNOSIS — E03.9 HYPOTHYROIDISM, UNSPECIFIED TYPE: ICD-10-CM

## 2020-07-21 RX ORDER — LEVOTHYROXINE SODIUM 0.07 MG/1
TABLET ORAL
Qty: 90 TABLET | Refills: 0 | Status: SHIPPED | OUTPATIENT
Start: 2020-07-21 | End: 2020-12-03 | Stop reason: SDUPTHER

## 2020-07-26 NOTE — PROGRESS NOTES
[facility] MULTIPLE SCLEROSIS CENTER  PATIENT:  Lor Anne  MRN:  [id number]  :  1955  DATE OF SERVICE:  2020    Assessment/Plan:           Problem List Items Addressed This Visit        Cardiovascular and Mediastinum    Cerebrovascular small vessel disease with hemorrhage (HCC)       Nervous and Auditory    Bilateral carpal tunnel syndrome    Disequilibrium syndrome - Primary       Other    Right upper quadrant abdominal pain      Other Visit Diagnoses     Restless legs syndrome (RLS)        Relevant Medications    pramipexole (MIRAPEX) 0 125 mg tablet         Mrs Iman Li has presented to 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  Since last visit, patient described her Vision dysfunction has been improved, with no other issues has been reported, including vision loss, double vision, dysphagia, dysphasia or vertigo  We personally reviewed patient brain imaging, we agreed patient has no signs of acute or remote ischemic or hemorrhagic changes, with no metastatic changes considering her recently diagnosed metastatic neoplasm  Patient does have several microhemorrhages in bifrontal region, with recommendation to continue Plavix 75 mg and consider tight blood pressure control  Carotid Doppler ultrasound was completed with no signs of internal carotid arteries pathology appreciated  Duloxetine 20 mg bid has been recently advised, considering she had confusional state and fall while taking duloxetine 120 mg/day    Patient has been reporting signs of restless leg syndrome, including pain and paresthesia in her feet with jerking movement in her lower extremities starting around 6:00 p m  patient previously was taking ropinirole- we agreed to restart this medication again, but considering her epidural increasing risk of melanoma and patient has no known melanoma, decision was made to have patient starting pramipexole 0 125 mg 3 fours before bed, patient may take up to 4 tablets at night gradual titration required  Gabapentin 3000 mg a day was previously provided discontinued due to side effects  Breast cancer with lung and bony metastasis has been recently diagnosed  Patient has been working with rad Oncology for malignant neoplasm to left lung, breast primary as per Dr Ana Muñoz  Follow-up with Neurology within 4-6 months  Subjective: visual dysfunction, pain in feet, jerking legs     HPI    Jaylene Plunkett is a 60 y o  female who was referred to [address] [place] [address] for evaluation of double vision  Patient was seeing as virtual visit on 5/8/2020 for disequilibrium syndrome, divergence insufficiency/double vision, ILEANA, cerebrovascular small vessels disease with hemorrhage  Patient was advised to consider thrombosis panel for hypercoagulable state, as well as myasthenia gravis and autoimmune dysfunction  Blood work was completed and normal      needle bx and showed cancer in her lung, going for a pet scan tomorrow at [address] [place] [address]  she was hospitalized for a week in may and they took her off cymbalta  states that she was just collapsing, so weak that she couldn't stand up   pulse ox was low and they said she was taking to much cymbalta  she was previously taking 60mg 1 tab bid, now taking 20mg bid, has terrible nerve pain  Patient had MRI brain in May 2020 as patient was noted to have moderate degree small microvascular disorder, and several micro-hemmorhages noted in bi-frontal region  PET scan : Hypermetabolic 1 8 cm left upper lobe pulmonary nodule, most consistent with  malignancy  Hypermetabolic foci in the left scapula and the right iliac bone, suspicious  for bony metastatic disease  Cardiomegaly  Atherosclerotic disease  Hepatic steatosis  Stable left adrenal nodularity that is without abnormal FDG uptake    Biopsy of the left upper lung nodule was obtained on 6/1/20 and pathology showed poorly differentiated non-small cell carcinoma consistent with adenocarcinoma, most likely of breast origin  Possibility of a lung primary cannot be excluded, however  eCadherin and the SOX10 are positive in the tumor cells  The Brst2 is negative  S/p SBRT to the left lung mass and left scapular focal bone area, completed on 20  The following portions of the patient's history were reviewed and updated as appropriate:   She  has a past medical history of Anxiety, Breast cancer ([facility] ), CAD (coronary artery disease), Cancer ([facility] ), Carpal tunnel syndrome, Disease of thyroid gland, Elevated cholesterol, Fibromyalgia, Hypertension, Kidney stone, Melanoma ([facility] ), Myocardial infarction ([facility] ), Neuropathy, BRAYDEN (obstructive sleep apnea), and Panic attack  She   Patient Active Problem List    Diagnosis Date Noted    Lower urinary tract infection 2020    Breast cancer ([facility] ) 2020    Panic attack 2020    Divergence insufficiency 2020    Cerebrovascular small vessel disease with hemorrhage ([facility] ) 2020    Disequilibrium syndrome 2020    Double vision 2020    Right upper quadrant abdominal pain 2019    Fatty liver 2019    Medicare annual wellness visit, subsequent 2019    Hypothyroidism 2019    Chest wall pain 2019    Coronary artery disease with angina pectoris ([facility] ) 2019    Low back pain at multiple sites 2019    Atherosclerotic heart disease of native coronary artery without angina pectoris 2018    Depressive disorder 2016    Essential hypertension 2016    Fibromyalgia 2016    Generalized anxiety disorder 2016    History of breast cancer 2016    Mixed hyperlipidemia 2016    BRAYDEN on CPAP 2016    Bilateral carpal tunnel syndrome 2016     She  has a past surgical history that includes Mastectomy (Bilateral); Breast surgery (Bilateral); Coronary angioplasty with stent; Cholecystectomy;  section;  Nose surgery; and Cardiac surgery  Her family history includes Alcohol abuse in her mother; Heart attack in her mother; Heart failure in her mother  She  reports that she has never smoked  She has never used smokeless tobacco  She reports that she drinks alcohol  She reports that she has current or past drug history  Drug: Marijuana  Current Outpatient Medications   Medication Sig Dispense Refill    aspirin (ASPIRIN 81) 81 mg EC tablet aspirin 81 mg tablet,delayed release      B Complex Vitamins (B COMPLEX 1 PO) Take 1 tablet by mouth      clopidogrel (PLAVIX) 75 mg tablet Take 75 mg by mouth daily  3    DULoxetine (CYMBALTA) 20 mg capsule Take 20 mg by mouth 2 (two) times a day      Evolocumab 140 MG/ML SOAJ Inject 2 mL under the skin      famotidine (PEPCID) 40 MG tablet Take 40 mg by mouth daily  2    gemfibrozil (LOPID) 600 mg tablet Take 600 mg by mouth 2 (two) times a day  3    levothyroxine 75 mcg tablet TAKE 1 TABLET BY MOUTH EVERY DAY 90 tablet 0    LINZESS 290 MCG CAPS Take 1 capsule by mouth daily before breakfast  2    LORazepam (ATIVAN) 1 mg tablet Take 1 tablet (1 mg total) by mouth 2 (two) times a day 60 tablet 0    meclizine (ANTIVERT) 25 mg tablet Take 25 mg by mouth Three times daily as needed      melatonin 3 mg Take 5 mg by mouth      methocarbamol (ROBAXIN) 500 mg tablet Take 1 tablet (500 mg total) by mouth 4 (four) times a day 30 tablet 1    metoprolol tartrate (LOPRESSOR) 25 mg tablet Take 1 tablet (25 mg total) by mouth 2 (two) times a day 180 tablet 3    Misc   Devices (GETGO ROLLING WALKER) MISC Use rolling walker as needed 1 each 0    MOTEGRITY 2 MG TABS Take 1 tablet by mouth every evening  2    pantoprazole (PROTONIX) 40 mg tablet Take 1 capsule twice a day 180 tablet 0    pyridoxine (CVS B6) 100 MG tablet Take 1 tablet (100 mg total) by mouth every morning 90 tablet 0    sucralfate (CARAFATE) 1 g tablet sucralfate 1 gram tablet      dicyclomine (BENTYL) 20 mg tablet Take 1 tablet (20 mg total) by mouth every 6 (six) hours (Patient not taking: Reported on 12/18/2019) 60 tablet 3    nitrofurantoin (MACROBID) 100 mg capsule Take 1 capsule (100 mg total) by mouth 2 (two) times a day (Patient not taking: Reported on 5/8/2020) 14 capsule 1    pramipexole (MIRAPEX) 0 125 mg tablet Take 1 tab PO 3 h before bed for 4 days, then 2 tabs at night for 4 d , increase by 1 tab every night till 4 tabs at night  120 tablet 1     No current facility-administered medications for this visit  Current Outpatient Medications on File Prior to Visit   Medication Sig    aspirin (ASPIRIN 81) 81 mg EC tablet aspirin 81 mg tablet,delayed release    B Complex Vitamins (B COMPLEX 1 PO) Take 1 tablet by mouth    clopidogrel (PLAVIX) 75 mg tablet Take 75 mg by mouth daily    DULoxetine (CYMBALTA) 20 mg capsule Take 20 mg by mouth 2 (two) times a day    Evolocumab 140 MG/ML SOAJ Inject 2 mL under the skin    famotidine (PEPCID) 40 MG tablet Take 40 mg by mouth daily    gemfibrozil (LOPID) 600 mg tablet Take 600 mg by mouth 2 (two) times a day    levothyroxine 75 mcg tablet TAKE 1 TABLET BY MOUTH EVERY DAY    LINZESS 290 MCG CAPS Take 1 capsule by mouth daily before breakfast    LORazepam (ATIVAN) 1 mg tablet Take 1 tablet (1 mg total) by mouth 2 (two) times a day    meclizine (ANTIVERT) 25 mg tablet Take 25 mg by mouth Three times daily as needed    melatonin 3 mg Take 5 mg by mouth    methocarbamol (ROBAXIN) 500 mg tablet Take 1 tablet (500 mg total) by mouth 4 (four) times a day    metoprolol tartrate (LOPRESSOR) 25 mg tablet Take 1 tablet (25 mg total) by mouth 2 (two) times a day    Misc   Devices (GETGO ROLLING WALKER) MISC Use rolling walker as needed    MOTEGRITY 2 MG TABS Take 1 tablet by mouth every evening    pantoprazole (PROTONIX) 40 mg tablet Take 1 capsule twice a day    pyridoxine (CVS B6) 100 MG tablet Take 1 tablet (100 mg total) by mouth every morning    sucralfate (CARAFATE) 1 g tablet sucralfate 1 gram tablet    dicyclomine (BENTYL) 20 mg tablet Take 1 tablet (20 mg total) by mouth every 6 (six) hours (Patient not taking: Reported on 12/18/2019)    nitrofurantoin (MACROBID) 100 mg capsule Take 1 capsule (100 mg total) by mouth 2 (two) times a day (Patient not taking: Reported on 5/8/2020)    [DISCONTINUED] DULoxetine (CYMBALTA) 60 mg delayed release capsule Take 1 capsule (60 mg total) by mouth 2 (two) times a day (Patient not taking: Reported on 7/28/2020)     No current facility-administered medications on file prior to visit  She is allergic to metoclopramide; nitrofurantoin; isosorbide; naproxen; and pregabalin            Objective:    Blood pressure 116/90, pulse 90, temperature 98 5 °F (36 9 °C), temperature source Oral, resp  rate 16, height 5' 6" (1 676 m), weight 94 5 kg (208 lb 6 4 oz)  Physical Exam    Neurological Exam  CONSTITUTIONAL: NAD, pleasant  NECK: supple, no lymphadenopathy, no thyromegaly, no JVD  CARDIOVASCULAR: RRR, normal S1S2, no murmurs, no rubs  RESP: clear to auscultation bilaterally, no wheezes/rhonchi/rales  ABDOMEN: soft, non tender, non distended  SKIN: no rash or skin lesions  EXTREMITIES: no edema, pulses 2+bilaterally  PSYCH: appropriate mood and affect  NEUROLOGIC COMPREHENSIVE EXAM: Patient is oriented to person, place and time, NAD; appropriate affect  CN II, III, IV, V, VI, VII,VIII,IX,X,XI-XII intact with EOMI, PERRLA, OKN intact, VF grossly intact, fundi poorly visualized secondary to pupillary constriction; symmetric face noted  Motor: 5/5 UE/LE bilateral symmetric; Sensory: intact to light touch and pinprick bilaterally; normal vibration sensation feet bilaterally; Coordination within normal limits on FTN and SHERI testing; DTR: 1/4 through, no Babinski, no clonus  Tandem gait is abnormal  Romberg: positive        ROS:  12 points of review of system was reviewed with the patient and was unremarkable with exception: see HPI  Review of Systems  Constitutional: Negative  Negative for appetite change and fever  HENT: Negative  Negative for hearing loss, tinnitus, trouble swallowing and voice change  Eyes: Negative  Negative for photophobia and pain  Respiratory: Negative  Negative for shortness of breath  Cardiovascular: Negative  Negative for palpitations  Gastrointestinal: Negative  Negative for nausea and vomiting  Endocrine: Negative  Negative for cold intolerance  Genitourinary: Negative  Negative for dysuria, frequency and urgency  Musculoskeletal: Negative  Negative for myalgias and neck pain  Skin: Negative  Negative for rash  Allergic/Immunologic: Negative  Neurological: Negative  Negative for dizziness, tremors, seizures, syncope, facial asymmetry, speech difficulty, weakness, light-headedness, numbness and headaches  Hematological: Negative  Does not bruise/bleed easily  Psychiatric/Behavioral: Negative  Negative for confusion, hallucinations and sleep disturbance

## 2020-07-28 ENCOUNTER — OFFICE VISIT (OUTPATIENT)
Dept: NEUROLOGY | Facility: CLINIC | Age: 65
End: 2020-07-28
Payer: MEDICARE

## 2020-07-28 VITALS
SYSTOLIC BLOOD PRESSURE: 116 MMHG | WEIGHT: 208.4 LBS | RESPIRATION RATE: 16 BRPM | BODY MASS INDEX: 33.49 KG/M2 | DIASTOLIC BLOOD PRESSURE: 90 MMHG | TEMPERATURE: 98.5 F | HEIGHT: 66 IN | HEART RATE: 90 BPM

## 2020-07-28 DIAGNOSIS — E87.8 DISEQUILIBRIUM SYNDROME: Primary | ICD-10-CM

## 2020-07-28 DIAGNOSIS — R10.11 RIGHT UPPER QUADRANT ABDOMINAL PAIN: ICD-10-CM

## 2020-07-28 DIAGNOSIS — G25.81 RESTLESS LEGS SYNDROME (RLS): ICD-10-CM

## 2020-07-28 DIAGNOSIS — G56.03 BILATERAL CARPAL TUNNEL SYNDROME: ICD-10-CM

## 2020-07-28 DIAGNOSIS — I61.9 CEREBROVASCULAR SMALL VESSEL DISEASE WITH HEMORRHAGE (HCC): ICD-10-CM

## 2020-07-28 PROCEDURE — 99215 OFFICE O/P EST HI 40 MIN: CPT | Performed by: PSYCHIATRY & NEUROLOGY

## 2020-07-28 PROCEDURE — 3008F BODY MASS INDEX DOCD: CPT | Performed by: PSYCHIATRY & NEUROLOGY

## 2020-07-28 PROCEDURE — 3074F SYST BP LT 130 MM HG: CPT | Performed by: PSYCHIATRY & NEUROLOGY

## 2020-07-28 PROCEDURE — 3080F DIAST BP >= 90 MM HG: CPT | Performed by: PSYCHIATRY & NEUROLOGY

## 2020-07-28 PROCEDURE — 1036F TOBACCO NON-USER: CPT | Performed by: PSYCHIATRY & NEUROLOGY

## 2020-07-28 RX ORDER — DULOXETIN HYDROCHLORIDE 20 MG/1
20 CAPSULE, DELAYED RELEASE ORAL 2 TIMES DAILY
COMMUNITY
Start: 2020-07-09 | End: 2020-12-30 | Stop reason: ALTCHOICE

## 2020-07-28 RX ORDER — ROPINIROLE 0.25 MG/1
TABLET, FILM COATED ORAL
Qty: 120 TABLET | Refills: 1 | Status: SHIPPED | OUTPATIENT
Start: 2020-07-28 | End: 2020-07-28 | Stop reason: ALTCHOICE

## 2020-07-28 RX ORDER — PRAMIPEXOLE DIHYDROCHLORIDE 0.12 MG/1
TABLET ORAL
Qty: 120 TABLET | Refills: 1 | Status: SHIPPED | OUTPATIENT
Start: 2020-07-28 | End: 2020-09-22

## 2020-08-01 DIAGNOSIS — F41.1 GENERALIZED ANXIETY DISORDER: ICD-10-CM

## 2020-08-03 RX ORDER — LORAZEPAM 1 MG/1
1 TABLET ORAL 2 TIMES DAILY
Qty: 60 TABLET | Refills: 0 | Status: SHIPPED | OUTPATIENT
Start: 2020-08-03 | End: 2020-09-19 | Stop reason: SDUPTHER

## 2020-09-03 DIAGNOSIS — K21.9 GASTROESOPHAGEAL REFLUX DISEASE WITHOUT ESOPHAGITIS: ICD-10-CM

## 2020-09-03 RX ORDER — PANTOPRAZOLE SODIUM 40 MG/1
TABLET, DELAYED RELEASE ORAL
Qty: 180 TABLET | Refills: 0 | Status: SHIPPED | OUTPATIENT
Start: 2020-09-03 | End: 2021-01-04 | Stop reason: SDUPTHER

## 2020-09-19 DIAGNOSIS — F41.1 GENERALIZED ANXIETY DISORDER: ICD-10-CM

## 2020-09-22 DIAGNOSIS — G25.81 RESTLESS LEGS SYNDROME (RLS): ICD-10-CM

## 2020-09-22 RX ORDER — LORAZEPAM 1 MG/1
1 TABLET ORAL 2 TIMES DAILY
Qty: 60 TABLET | Refills: 0 | Status: SHIPPED | OUTPATIENT
Start: 2020-09-22 | End: 2020-11-10 | Stop reason: SDUPTHER

## 2020-09-22 RX ORDER — PRAMIPEXOLE DIHYDROCHLORIDE 0.12 MG/1
TABLET ORAL
Qty: 120 TABLET | Refills: 1 | Status: SHIPPED | OUTPATIENT
Start: 2020-09-22 | End: 2020-11-28 | Stop reason: SDUPTHER

## 2020-10-20 ENCOUNTER — OFFICE VISIT (OUTPATIENT)
Dept: FAMILY MEDICINE CLINIC | Facility: CLINIC | Age: 65
End: 2020-10-20
Payer: MEDICARE

## 2020-10-20 VITALS
WEIGHT: 221 LBS | HEART RATE: 104 BPM | SYSTOLIC BLOOD PRESSURE: 124 MMHG | OXYGEN SATURATION: 97 % | TEMPERATURE: 98.7 F | HEIGHT: 66 IN | BODY MASS INDEX: 35.52 KG/M2 | DIASTOLIC BLOOD PRESSURE: 74 MMHG

## 2020-10-20 DIAGNOSIS — Z00.00 MEDICARE ANNUAL WELLNESS VISIT, SUBSEQUENT: ICD-10-CM

## 2020-10-20 DIAGNOSIS — K76.0 FATTY LIVER: ICD-10-CM

## 2020-10-20 DIAGNOSIS — I10 ESSENTIAL HYPERTENSION: ICD-10-CM

## 2020-10-20 DIAGNOSIS — I25.119 CORONARY ARTERY DISEASE WITH ANGINA PECTORIS, UNSPECIFIED VESSEL OR LESION TYPE, UNSPECIFIED WHETHER NATIVE OR TRANSPLANTED HEART (HCC): ICD-10-CM

## 2020-10-20 DIAGNOSIS — G47.33 OSA ON CPAP: ICD-10-CM

## 2020-10-20 DIAGNOSIS — E78.2 MIXED HYPERLIPIDEMIA: ICD-10-CM

## 2020-10-20 DIAGNOSIS — E03.9 HYPOTHYROIDISM, UNSPECIFIED TYPE: ICD-10-CM

## 2020-10-20 DIAGNOSIS — Z99.89 OSA ON CPAP: ICD-10-CM

## 2020-10-20 DIAGNOSIS — Z23 ENCOUNTER FOR VACCINATION: Primary | ICD-10-CM

## 2020-10-20 DIAGNOSIS — I61.9 CEREBROVASCULAR SMALL VESSEL DISEASE WITH HEMORRHAGE (HCC): ICD-10-CM

## 2020-10-20 PROCEDURE — 90682 RIV4 VACC RECOMBINANT DNA IM: CPT | Performed by: FAMILY MEDICINE

## 2020-10-20 PROCEDURE — 99214 OFFICE O/P EST MOD 30 MIN: CPT | Performed by: FAMILY MEDICINE

## 2020-10-20 PROCEDURE — G0439 PPPS, SUBSEQ VISIT: HCPCS | Performed by: FAMILY MEDICINE

## 2020-10-20 PROCEDURE — G0008 ADMIN INFLUENZA VIRUS VAC: HCPCS | Performed by: FAMILY MEDICINE

## 2020-10-20 RX ORDER — NAPROXEN 500 MG/1
500 TABLET ORAL 2 TIMES DAILY WITH MEALS
Qty: 60 TABLET | Refills: 5 | Status: SHIPPED | OUTPATIENT
Start: 2020-10-20 | End: 2021-04-23 | Stop reason: ALTCHOICE

## 2020-10-26 ENCOUNTER — OFFICE VISIT (OUTPATIENT)
Dept: OBGYN CLINIC | Facility: CLINIC | Age: 65
End: 2020-10-26
Payer: MEDICARE

## 2020-10-26 VITALS
SYSTOLIC BLOOD PRESSURE: 152 MMHG | BODY MASS INDEX: 35.13 KG/M2 | HEIGHT: 66 IN | DIASTOLIC BLOOD PRESSURE: 88 MMHG | WEIGHT: 218.6 LBS | TEMPERATURE: 97.4 F

## 2020-10-26 DIAGNOSIS — M70.61 TROCHANTERIC BURSITIS OF RIGHT HIP: ICD-10-CM

## 2020-10-26 DIAGNOSIS — M46.1 SACROILIITIS (HCC): ICD-10-CM

## 2020-10-26 DIAGNOSIS — M25.551 PAIN IN RIGHT HIP: Primary | ICD-10-CM

## 2020-10-26 PROCEDURE — 20610 DRAIN/INJ JOINT/BURSA W/O US: CPT | Performed by: ORTHOPAEDIC SURGERY

## 2020-10-26 PROCEDURE — 99213 OFFICE O/P EST LOW 20 MIN: CPT | Performed by: ORTHOPAEDIC SURGERY

## 2020-10-26 RX ORDER — METHYLPREDNISOLONE ACETATE 40 MG/ML
2 INJECTION, SUSPENSION INTRA-ARTICULAR; INTRALESIONAL; INTRAMUSCULAR; SOFT TISSUE
Status: COMPLETED | OUTPATIENT
Start: 2020-10-26 | End: 2020-10-26

## 2020-10-26 RX ORDER — BETAMETHASONE SODIUM PHOSPHATE AND BETAMETHASONE ACETATE 3; 3 MG/ML; MG/ML
6 INJECTION, SUSPENSION INTRA-ARTICULAR; INTRALESIONAL; INTRAMUSCULAR; SOFT TISSUE
Status: COMPLETED | OUTPATIENT
Start: 2020-10-26 | End: 2020-10-26

## 2020-10-26 RX ORDER — BUPIVACAINE HYDROCHLORIDE 2.5 MG/ML
1 INJECTION, SOLUTION EPIDURAL; INFILTRATION; INTRACAUDAL
Status: COMPLETED | OUTPATIENT
Start: 2020-10-26 | End: 2020-10-26

## 2020-10-26 RX ORDER — BUPIVACAINE HYDROCHLORIDE 2.5 MG/ML
2 INJECTION, SOLUTION INFILTRATION; PERINEURAL
Status: COMPLETED | OUTPATIENT
Start: 2020-10-26 | End: 2020-10-26

## 2020-10-26 RX ADMIN — BUPIVACAINE HYDROCHLORIDE 1 ML: 2.5 INJECTION, SOLUTION EPIDURAL; INFILTRATION; INTRACAUDAL at 15:35

## 2020-10-26 RX ADMIN — BUPIVACAINE HYDROCHLORIDE 2 ML: 2.5 INJECTION, SOLUTION INFILTRATION; PERINEURAL at 15:34

## 2020-10-26 RX ADMIN — BETAMETHASONE SODIUM PHOSPHATE AND BETAMETHASONE ACETATE 6 MG: 3; 3 INJECTION, SUSPENSION INTRA-ARTICULAR; INTRALESIONAL; INTRAMUSCULAR; SOFT TISSUE at 15:35

## 2020-10-26 RX ADMIN — METHYLPREDNISOLONE ACETATE 2 ML: 40 INJECTION, SUSPENSION INTRA-ARTICULAR; INTRALESIONAL; INTRAMUSCULAR; SOFT TISSUE at 15:34

## 2020-11-03 DIAGNOSIS — F32.A DEPRESSIVE DISORDER: ICD-10-CM

## 2020-11-03 RX ORDER — DULOXETIN HYDROCHLORIDE 60 MG/1
60 CAPSULE, DELAYED RELEASE ORAL 2 TIMES DAILY
Qty: 60 CAPSULE | Refills: 1 | Status: SHIPPED | OUTPATIENT
Start: 2020-11-03 | End: 2020-12-30

## 2020-11-10 DIAGNOSIS — F41.1 GENERALIZED ANXIETY DISORDER: ICD-10-CM

## 2020-11-10 RX ORDER — LORAZEPAM 1 MG/1
1 TABLET ORAL 2 TIMES DAILY
Qty: 60 TABLET | Refills: 0 | Status: SHIPPED | OUTPATIENT
Start: 2020-11-10 | End: 2021-01-03 | Stop reason: SDUPTHER

## 2020-11-28 DIAGNOSIS — G25.81 RESTLESS LEGS SYNDROME (RLS): ICD-10-CM

## 2020-11-30 RX ORDER — PRAMIPEXOLE DIHYDROCHLORIDE 0.12 MG/1
TABLET ORAL
Qty: 120 TABLET | Refills: 0 | Status: SHIPPED | OUTPATIENT
Start: 2020-11-30 | End: 2020-12-18

## 2020-12-03 DIAGNOSIS — E03.9 HYPOTHYROIDISM, UNSPECIFIED TYPE: ICD-10-CM

## 2020-12-03 RX ORDER — LEVOTHYROXINE SODIUM 0.07 MG/1
75 TABLET ORAL DAILY
Qty: 90 TABLET | Refills: 1 | Status: SHIPPED | OUTPATIENT
Start: 2020-12-03 | End: 2021-03-12 | Stop reason: DRUGHIGH

## 2020-12-07 ENCOUNTER — OFFICE VISIT (OUTPATIENT)
Dept: OBGYN CLINIC | Facility: CLINIC | Age: 65
End: 2020-12-07
Payer: MEDICARE

## 2020-12-07 VITALS
HEART RATE: 80 BPM | HEIGHT: 66 IN | WEIGHT: 218 LBS | SYSTOLIC BLOOD PRESSURE: 127 MMHG | DIASTOLIC BLOOD PRESSURE: 84 MMHG | BODY MASS INDEX: 35.03 KG/M2

## 2020-12-07 DIAGNOSIS — M70.61 TROCHANTERIC BURSITIS OF RIGHT HIP: Primary | ICD-10-CM

## 2020-12-07 PROCEDURE — 99213 OFFICE O/P EST LOW 20 MIN: CPT | Performed by: PHYSICIAN ASSISTANT

## 2020-12-07 PROCEDURE — 20610 DRAIN/INJ JOINT/BURSA W/O US: CPT | Performed by: PHYSICIAN ASSISTANT

## 2020-12-07 RX ORDER — BUPIVACAINE HYDROCHLORIDE 2.5 MG/ML
4 INJECTION, SOLUTION INFILTRATION; PERINEURAL
Status: COMPLETED | OUTPATIENT
Start: 2020-12-07 | End: 2020-12-07

## 2020-12-07 RX ORDER — METHYLPREDNISOLONE ACETATE 40 MG/ML
2 INJECTION, SUSPENSION INTRA-ARTICULAR; INTRALESIONAL; INTRAMUSCULAR; SOFT TISSUE
Status: COMPLETED | OUTPATIENT
Start: 2020-12-07 | End: 2020-12-07

## 2020-12-07 RX ADMIN — BUPIVACAINE HYDROCHLORIDE 4 ML: 2.5 INJECTION, SOLUTION INFILTRATION; PERINEURAL at 11:35

## 2020-12-07 RX ADMIN — METHYLPREDNISOLONE ACETATE 2 ML: 40 INJECTION, SUSPENSION INTRA-ARTICULAR; INTRALESIONAL; INTRAMUSCULAR; SOFT TISSUE at 11:35

## 2020-12-15 DIAGNOSIS — G25.81 RESTLESS LEGS SYNDROME (RLS): ICD-10-CM

## 2020-12-18 RX ORDER — PRAMIPEXOLE DIHYDROCHLORIDE 0.12 MG/1
TABLET ORAL
Qty: 120 TABLET | Refills: 0 | Status: SHIPPED | OUTPATIENT
Start: 2020-12-18 | End: 2021-01-15 | Stop reason: SDUPTHER

## 2020-12-30 ENCOUNTER — TELEPHONE (OUTPATIENT)
Dept: NEUROLOGY | Facility: CLINIC | Age: 65
End: 2020-12-30

## 2020-12-30 ENCOUNTER — TELEMEDICINE (OUTPATIENT)
Dept: NEUROLOGY | Facility: CLINIC | Age: 65
End: 2020-12-30
Payer: MEDICARE

## 2020-12-30 VITALS — HEIGHT: 66 IN | WEIGHT: 215 LBS | BODY MASS INDEX: 34.55 KG/M2

## 2020-12-30 DIAGNOSIS — G25.81 RESTLESS LEG SYNDROME: Primary | ICD-10-CM

## 2020-12-30 DIAGNOSIS — R73.01 IMPAIRED FASTING GLUCOSE: ICD-10-CM

## 2020-12-30 DIAGNOSIS — G60.9 IDIOPATHIC PERIPHERAL NEUROPATHY: ICD-10-CM

## 2020-12-30 DIAGNOSIS — C80.1 MALIGNANT (PRIMARY) NEOPLASM, UNSPECIFIED (HCC): ICD-10-CM

## 2020-12-30 DIAGNOSIS — E55.9 VITAMIN D DEFICIENCY: ICD-10-CM

## 2020-12-30 DIAGNOSIS — G60.9 IDIOPATHIC PERIPHERAL NEUROPATHY: Primary | ICD-10-CM

## 2020-12-30 PROCEDURE — 99214 OFFICE O/P EST MOD 30 MIN: CPT | Performed by: PHYSICIAN ASSISTANT

## 2020-12-30 RX ORDER — DULOXETIN HYDROCHLORIDE 30 MG/1
30 CAPSULE, DELAYED RELEASE ORAL 2 TIMES DAILY
COMMUNITY
Start: 2020-12-03 | End: 2020-12-30

## 2020-12-30 RX ORDER — DULOXETIN HYDROCHLORIDE 30 MG/1
CAPSULE, DELAYED RELEASE ORAL
Qty: 90 CAPSULE | Refills: 2 | Status: SHIPPED | OUTPATIENT
Start: 2020-12-30 | End: 2020-12-31

## 2020-12-31 RX ORDER — DULOXETIN HYDROCHLORIDE 60 MG/1
CAPSULE, DELAYED RELEASE ORAL
Qty: 30 CAPSULE | Refills: 5 | Status: SHIPPED | OUTPATIENT
Start: 2020-12-31 | End: 2021-06-23

## 2020-12-31 RX ORDER — DULOXETIN HYDROCHLORIDE 30 MG/1
CAPSULE, DELAYED RELEASE ORAL
Qty: 30 CAPSULE | Refills: 5 | Status: SHIPPED | OUTPATIENT
Start: 2020-12-31 | End: 2021-07-01

## 2021-01-03 ENCOUNTER — PATIENT MESSAGE (OUTPATIENT)
Dept: FAMILY MEDICINE CLINIC | Facility: CLINIC | Age: 66
End: 2021-01-03

## 2021-01-03 DIAGNOSIS — K21.9 GASTROESOPHAGEAL REFLUX DISEASE WITHOUT ESOPHAGITIS: ICD-10-CM

## 2021-01-03 DIAGNOSIS — F41.1 GENERALIZED ANXIETY DISORDER: ICD-10-CM

## 2021-01-04 RX ORDER — LORAZEPAM 1 MG/1
1 TABLET ORAL 2 TIMES DAILY
Qty: 60 TABLET | Refills: 0 | Status: SHIPPED | OUTPATIENT
Start: 2021-01-04 | End: 2021-03-16 | Stop reason: SDUPTHER

## 2021-01-04 RX ORDER — PANTOPRAZOLE SODIUM 40 MG/1
TABLET, DELAYED RELEASE ORAL
Qty: 180 TABLET | Refills: 0 | Status: SHIPPED | OUTPATIENT
Start: 2021-01-04 | End: 2021-01-14

## 2021-01-04 NOTE — TELEPHONE ENCOUNTER
From: Zacarias Enter  To: Ericka Gloria DO  Sent: 1/3/2021 8:36 PM EST  Subject: Prescription Question    Please refill my protonex 40 ml

## 2021-01-07 ENCOUNTER — TELEPHONE (OUTPATIENT)
Dept: NEUROLOGY | Facility: CLINIC | Age: 66
End: 2021-01-07

## 2021-01-07 LAB — HBA1C MFR BLD HPLC: 6.5 %

## 2021-01-07 NOTE — TELEPHONE ENCOUNTER
Patient calling to have lab scripts faxed to [facility] at [phone]  Labs faxed

## 2021-01-08 ENCOUNTER — TELEPHONE (OUTPATIENT)
Dept: FAMILY MEDICINE CLINIC | Facility: CLINIC | Age: 66
End: 2021-01-08

## 2021-01-08 ENCOUNTER — TELEMEDICINE (OUTPATIENT)
Dept: FAMILY MEDICINE CLINIC | Facility: CLINIC | Age: 66
End: 2021-01-08
Payer: MEDICARE

## 2021-01-08 DIAGNOSIS — E78.2 MIXED HYPERLIPIDEMIA: ICD-10-CM

## 2021-01-08 DIAGNOSIS — I25.119 CORONARY ARTERY DISEASE WITH ANGINA PECTORIS, UNSPECIFIED VESSEL OR LESION TYPE, UNSPECIFIED WHETHER NATIVE OR TRANSPLANTED HEART (HCC): ICD-10-CM

## 2021-01-08 DIAGNOSIS — R73.9 HYPERGLYCEMIA: Primary | ICD-10-CM

## 2021-01-08 DIAGNOSIS — I25.119 CORONARY ARTERY DISEASE INVOLVING NATIVE HEART WITH ANGINA PECTORIS, UNSPECIFIED VESSEL OR LESION TYPE (HCC): ICD-10-CM

## 2021-01-08 DIAGNOSIS — I10 ESSENTIAL HYPERTENSION: ICD-10-CM

## 2021-01-08 DIAGNOSIS — K76.0 FATTY LIVER: ICD-10-CM

## 2021-01-08 PROCEDURE — 99213 OFFICE O/P EST LOW 20 MIN: CPT | Performed by: FAMILY MEDICINE

## 2021-01-08 RX ORDER — METFORMIN HYDROCHLORIDE 500 MG/1
500 TABLET, EXTENDED RELEASE ORAL
Qty: 30 TABLET | Refills: 5 | Status: SHIPPED | OUTPATIENT
Start: 2021-01-08 | End: 2021-03-12 | Stop reason: DRUGHIGH

## 2021-01-08 NOTE — TELEPHONE ENCOUNTER
----- Message from Zoila Molina sent at 1/8/2021  2:37 PM EST -----  Regarding: Prescription Question  Contact: [phone]  Refill for metoprolol 25 please

## 2021-01-08 NOTE — PROGRESS NOTES
Virtual Regular Visit      Assessment/Plan:    Problem List Items Addressed This Visit        Digestive    Fatty liver    Relevant Orders    CBC and differential    Comprehensive metabolic panel    Lipid panel    Hemoglobin A1C    TSH, 3rd generation with Free T4 reflex       Cardiovascular and Mediastinum    Essential hypertension    Relevant Orders    CBC and differential    Comprehensive metabolic panel    Lipid panel    Hemoglobin A1C    TSH, 3rd generation with Free T4 reflex    Coronary artery disease with angina pectoris (HCC)    Relevant Orders    CBC and differential    Comprehensive metabolic panel    Lipid panel    Hemoglobin A1C    TSH, 3rd generation with Free T4 reflex       Other    Mixed hyperlipidemia    Relevant Orders    CBC and differential    Comprehensive metabolic panel    Lipid panel    Hemoglobin A1C    TSH, 3rd generation with Free T4 reflex    Hyperglycemia - Primary      Patient notes increased urinary frequency nocturia excessive thirst fatigue and she has gained weight concerned about diabetes and A1c test was done and ordered through her neurologist and a came back at 6 5 she will start metformin extended release 500 mg now work on diet exercise weight reduction avoidance of excess carbohydrates start using more whole grains and re-evaluate here with A1c and repeat lab work in 3 months before her next appointment         Relevant Medications    metFORMIN (GLUCOPHAGE-XR) 500 mg 24 hr tablet    Other Relevant Orders    CBC and differential    Comprehensive metabolic panel    Lipid panel    Hemoglobin A1C    TSH, 3rd generation with Free T4 reflex               Reason for visit is No chief complaint on file  Encounter provider Iris Milligan DO    Provider located at [address]  [address] [address]  [phone]      Recent Visits  No visits were found meeting these conditions     Showing recent visits within past 7 days and meeting all other requirements     Today's Visits  Date Type Provider Dept   21 Telemedicine [facility] O[facility]ry, DO [address] today's visits and meeting all other requirements     Future Appointments  No visits were found meeting these conditions  Showing future appointments within next 150 days and meeting all other requirements        The patient was identified by name and date of birth  Pancho Harding was informed that this is a telemedicine visit and that the visit is being conducted through [employer] and patient was informed that this is not a secure, HIPAA-compliant platform  She agrees to proceed     My office door was closed  No one else was in the room  She acknowledged consent and understanding of privacy and security of the video platform  The patient has agreed to participate and understands they can discontinue the visit at any time  Patient is aware this is a billable service  Subjective  Pancho Harding is a 60 y o  female  New onset diabetes          Patient presents for new onset of diabetes with weight gain excessive thirst increased frequency of urination       Past Medical History:   Diagnosis Date    Anxiety     [facility]     CAD (coronary artery disease)     [facility]     Carpal tunnel syndrome     Disease of thyroid gland     Elevated cholesterol     Fibromyalgia     Hypertension     Kidney stone     Melanoma [facility] )     nose    Myocardial infarction (HCC)     Neuropathy     BRAYDEN (obstructive sleep apnea)     cpap    Panic attack        Past Surgical History:   Procedure Laterality Date    BREAST SURGERY Bilateral     CARDIAC SURGERY      cardiac ablation     SECTION      x3    CHOLECYSTECTOMY      CORONARY ANGIOPLASTY WITH STENT PLACEMENT      MASTECTOMY Bilateral     NOSE SURGERY         Current Outpatient Medications   Medication Sig Dispense Refill    aspirin (ASPIRIN 81) 81 mg EC tablet aspirin 81 mg tablet,delayed release      B Complex Vitamins (B COMPLEX 1 PO) Take 1 tablet by mouth      clopidogrel (PLAVIX) 75 mg tablet Take 75 mg by mouth daily  3    DULoxetine (CYMBALTA) 30 mg delayed release capsule Take 1 cap by mouth daily in the am 30 capsule 5    DULoxetine (CYMBALTA) 60 mg delayed release capsule Take 1 cap daily by mouth in the evening 30 capsule 5    Evolocumab 140 MG/ML SOAJ Inject 2 mL under the skin      famotidine (PEPCID) 40 MG tablet Take 40 mg by mouth daily  2    gemfibrozil (LOPID) 600 mg tablet Take 600 mg by mouth 2 (two) times a day  3    levothyroxine 75 mcg tablet Take 1 tablet (75 mcg total) by mouth daily 90 tablet 1    LORazepam (ATIVAN) 1 mg tablet Take 1 tablet (1 mg total) by mouth 2 (two) times a day 60 tablet 0    meclizine (ANTIVERT) 25 mg tablet Take 25 mg by mouth Three times daily as needed      melatonin 3 mg Take 5 mg by mouth      metFORMIN (GLUCOPHAGE-XR) 500 mg 24 hr tablet Take 1 tablet (500 mg total) by mouth daily with dinner 30 tablet 5    methocarbamol (ROBAXIN) 500 mg tablet Take 1 tablet (500 mg total) by mouth 4 (four) times a day (Patient not taking: Reported on 12/30/2020) 30 tablet 1    metoprolol tartrate (LOPRESSOR) 25 mg tablet Take 1 tablet (25 mg total) by mouth 2 (two) times a day 180 tablet 3    Misc   Devices (GETGO ROLLING WALKER) MISC Use rolling walker as needed (Patient not taking: Reported on 12/30/2020) 1 each 0    MOTEGRITY 2 MG TABS Take 1 tablet by mouth every evening  2    naproxen (NAPROSYN) 500 mg tablet Take 1 tablet (500 mg total) by mouth 2 (two) times a day with meals 60 tablet 5    pantoprazole (PROTONIX) 40 mg tablet TAKE 1 TABLET BY MOUTH TWICE A  tablet 0    pramipexole (MIRAPEX) 0 125 mg tablet PLEASE SEE ATTACHED FOR DETAILED DIRECTIONS 120 tablet 0    pyridoxine (CVS B6) 100 MG tablet Take 1 tablet (100 mg total) by mouth every morning 90 tablet 0    sucralfate (CARAFATE) 1 g tablet sucralfate 1 gram tablet       No current facility-administered medications for this visit  Allergies   Allergen Reactions    Metoclopramide Other (See Comments)    Nitrofurantoin Other (See Comments)     Very weak  Fell    Isosorbide      Other reaction(s): headache    Naproxen      Other reaction(s): Other (See Comments)  "I get bloated"    Pregabalin      Pt states made her feel suicidal        Review of Systems   Constitutional: Negative for chills, fatigue and fever  HENT: Negative for congestion, nosebleeds, rhinorrhea, sinus pressure and sore throat  Eyes: Negative for discharge and redness  Respiratory: Negative for cough and shortness of breath  Cardiovascular: Negative for chest pain, palpitations and leg swelling  Gastrointestinal: Negative for abdominal pain, blood in stool and nausea  Endocrine: Positive for cold intolerance, polydipsia and polyuria  Negative for heat intolerance  Genitourinary: Positive for frequency  Negative for dysuria  Musculoskeletal: Negative for arthralgias, back pain and myalgias  Skin: Negative for rash  Neurological: Negative for dizziness, weakness and headaches  Hematological: Negative for adenopathy  Psychiatric/Behavioral: Negative for behavioral problems and sleep disturbance  The patient is not nervous/anxious  Video Exam    There were no vitals filed for this visit  Physical Exam  Vitals signs and nursing note reviewed  Constitutional:       General: She is not in acute distress  Appearance: Normal appearance  She is well-developed  She is obese  HENT:      Head: Normocephalic and atraumatic  Right Ear: External ear normal       Left Ear: External ear normal       Nose: Nose normal       Mouth/Throat:      Pharynx: No oropharyngeal exudate  Eyes:      General: No scleral icterus  Right eye: No discharge  Left eye: No discharge        Conjunctiva/sclera: Conjunctivae normal       Pupils: Pupils are equal, round, and reactive to light  Neck:      Musculoskeletal: Normal range of motion  Thyroid: No thyromegaly  Vascular: No JVD  Cardiovascular:      Rate and Rhythm: Normal rate and regular rhythm  Heart sounds: Normal heart sounds  No murmur  Pulmonary:      Effort: Pulmonary effort is normal       Breath sounds: No wheezing or rales  Chest:      Chest wall: No tenderness  Abdominal:      General: Bowel sounds are normal  There is no distension  Palpations: Abdomen is soft  There is no mass  Tenderness: There is no abdominal tenderness  Musculoskeletal: Normal range of motion  General: No tenderness or deformity  Lymphadenopathy:      Cervical: No cervical adenopathy  Skin:     General: Skin is warm and dry  Findings: No rash  Neurological:      Mental Status: She is alert and oriented to person, place, and time  Cranial Nerves: No cranial nerve deficit  Coordination: Coordination normal       Deep Tendon Reflexes: Reflexes are normal and symmetric  Reflexes normal    Psychiatric:         Behavior: Behavior normal          Thought Content: Thought content normal          Judgment: Judgment normal           I spent 15 minutes directly with the patient during this visit      [address] acknowledges that she has consented to an online visit or consultation  She understands that the online visit is based solely on information provided by her, and that, in the absence of a face-to-face physical evaluation by the physician, the diagnosis she receives is both limited and provisional in terms of accuracy and completeness  This is not intended to replace a full medical face-to-face evaluation by the physician  Cathie Campos understands and accepts these terms

## 2021-01-08 NOTE — ASSESSMENT & PLAN NOTE
Patient notes increased urinary frequency nocturia excessive thirst fatigue and she has gained weight concerned about diabetes and A1c test was done and ordered through her neurologist and a came back at 6 5 she will start metformin extended release 500 mg now work on diet exercise weight reduction avoidance of excess carbohydrates start using more whole grains and re-evaluate here with A1c and repeat lab work in 3 months before her next appointment

## 2021-01-13 DIAGNOSIS — K21.9 GASTROESOPHAGEAL REFLUX DISEASE WITHOUT ESOPHAGITIS: ICD-10-CM

## 2021-01-14 RX ORDER — PANTOPRAZOLE SODIUM 40 MG/1
TABLET, DELAYED RELEASE ORAL
Qty: 180 TABLET | Refills: 0 | Status: SHIPPED | OUTPATIENT
Start: 2021-01-14 | End: 2021-04-23 | Stop reason: ALTCHOICE

## 2021-01-15 DIAGNOSIS — G25.81 RESTLESS LEGS SYNDROME (RLS): ICD-10-CM

## 2021-01-15 NOTE — TELEPHONE ENCOUNTER
pramipexole (MIRAPEX) 0 125 mg tablet     Medication refill check list    Correct patient? yes   Correct medication name, dose, and pill size? yes   Correct provider? yes   Last and Next appt  scheduled? Yes, last date 12/30/20 & next date 02/01/21   Right pharmacy listed? yes   Correct quantity for 30 or 90 days? yes   Is the patient out of refills? When was it last prescribed? Yes, last date 12/15/20   Directions match what the patient says they are taking?  yes   Enough refills? (none for controlled substances, 1 year for routine medications) yes

## 2021-01-17 RX ORDER — PRAMIPEXOLE DIHYDROCHLORIDE 0.12 MG/1
TABLET ORAL
Qty: 120 TABLET | Refills: 0 | Status: SHIPPED | OUTPATIENT
Start: 2021-01-17 | End: 2021-02-11

## 2021-01-29 NOTE — PROGRESS NOTES
[facility] MULTIPLE SCLEROSIS CENTER  PATIENT:  Shy Schrader  MRN:  [id number]  :  1955  DATE OF SERVICE:  2021    Assessment/Plan:         {Assess/PlanSmartLinks:92340}       Subjective:    HPI    {[facility] HPI texts:78717}    {Common ambulatory SmartLinks:92516}         Objective: There were no vitals taken for this visit  Physical Exam    Neurological Exam      ROS:    Review of Systems   Constitutional: Negative  Negative for appetite change and fever  HENT: Negative  Negative for hearing loss, tinnitus, trouble swallowing and voice change  Eyes: Negative  Negative for photophobia and pain  Respiratory: Negative  Negative for shortness of breath  Cardiovascular: Negative  Negative for palpitations  Gastrointestinal: Negative  Negative for nausea and vomiting  Endocrine: Negative  Negative for cold intolerance  Genitourinary: Negative  Negative for dysuria, frequency and urgency  Musculoskeletal: Negative  Negative for myalgias and neck pain  Skin: Negative  Negative for rash  Allergic/Immunologic: Negative  Neurological: Negative  Negative for dizziness, tremors, seizures, syncope, facial asymmetry, speech difficulty, weakness, light-headedness, numbness and headaches  Hematological: Negative  Does not bruise/bleed easily  Psychiatric/Behavioral: Negative  Negative for confusion, hallucinations and sleep disturbance

## 2021-02-01 ENCOUNTER — TELEMEDICINE (OUTPATIENT)
Dept: NEUROLOGY | Facility: CLINIC | Age: 66
End: 2021-02-01
Payer: MEDICARE

## 2021-02-01 DIAGNOSIS — G51.4 MYOKYMIA OF RIGHT SIDE OF FACE: ICD-10-CM

## 2021-02-01 DIAGNOSIS — G62.9 PERIPHERAL POLYNEUROPATHY: ICD-10-CM

## 2021-02-01 DIAGNOSIS — I61.9 CEREBROVASCULAR SMALL VESSEL DISEASE WITH HEMORRHAGE (HCC): Primary | ICD-10-CM

## 2021-02-01 PROCEDURE — 99214 OFFICE O/P EST MOD 30 MIN: CPT | Performed by: PSYCHIATRY & NEUROLOGY

## 2021-02-01 NOTE — PROGRESS NOTES
[facility] MULTIPLE SCLEROSIS CENTER  PATIENT:  Jeffrey Sánchez  MRN:  [id number]  :  1955  DATE OF SERVICE:  2021  Virtual Regular Visit      Assessment/Plan:    Problem List Items Addressed This Visit        Cardiovascular and Mediastinum    Cerebrovascular small vessel disease with hemorrhage ([facility] [address] ) - Primary       Nervous and Auditory    Peripheral polyneuropathy       Other    Myokymia of right side of face               Reason for visit is Routine Visit   Chief Complaint   Patient presents with    Virtual Regular Visit        Encounter provider Rachel Avalos MD    Provider located at [facility] [facility] [address]      Recent Visits  No visits were found meeting these conditions  Showing recent visits within past 7 days and meeting all other requirements     Today's Visits  Date Type Provider Dept   21 Telemedicine Rachel Avalos MD [facility]   Showing today's visits and meeting all other requirements     Future Appointments  No visits were found meeting these conditions  Showing future appointments within next 150 days and meeting all other requirements        The patient was identified by name and date of birth  Jeffrey Sánchez was informed that this is a telemedicine visit and that the visit is being conducted through [employer] and patient was informed that this is a secure, HIPAA-compliant platform  She agrees to proceed     My office door was closed  The patient was notified the following individuals were present in the room Dr Elizabeth Stearns  She acknowledged consent and understanding of privacy and security of the video platform  The patient has agreed to participate and understands they can discontinue the visit at any time  Patient agrees to participate in a virtual check in via telephone/video visit instead of presenting to the office to address urgent/immediate medical needs   Patient is aware this is a billable service  Patient acknowledged consent and understood privacy and security of the virtual check -in visit  I informed the patient that I have reviewed the records in Epic and presented the opportunity to ask any questions regarding the visit today  The patient initiated communication and agreed to participate  We discussed the limitations of the telemedicine platform and that my medical advice and examination would be limited as a consequence, the patient expressed full understanding  The patient initiated communication and agreed to participate  Subjective  Dominique Devries is a 60 y o  female with disequilibrium and eye twitching, numbness in her feet   HPI     Darrel Dean a 60 y  o  female who presented to  [address] [place] [address] for follo up onf double vision  Patient was seeing as virtual visit on 5/8/2020 for disequilibrium syndrome, divergence insufficiency/double vision, ILEANA, cerebrovascular small vessels disease with micro-hemorrhage;   Patient reported No further events of visual dysfunction, she described intermittent twitching under her right eye, with no double vision has been noted  Patient stated she has spasms in her right side of the face lasting few minutes, can be multiple events of the day  No facial asymmetry has been reported, no dysphagia or dysarthria has been described  Patient has been taking Cymbalta 30 mg in the morning 60 mg at night with cannabis use provides good benefits and in part due to [facility] in the product  patient reports no significant headaches, considering she had several microhemorrhages in bifrontal region  Worsening numbness in her lower extremities has been discussed as well, patient has sensory dysfunction due to remote chemotherapy use in addition to worsening diabetes and related polyneuropathy  Patient has been taking pramipexole 0 125 mg  4 tablets a day with wonderful response has been noted  Patient described no new weakness, but persistent balance dysfunction  ASSESSMENT AND PLAN    Mrs Chinedu Cameron  Has presented to [facility] multiple [address] for follow-up on sensory dysfunction in her lower extremities as well as facial spasms  We agreed patient has developed right-sided facial myokymia, with no concern for blepharospasm, no signs of facial asymmetry  Patient had her brain imaging in May 2020, we previously discussed her findings S several microhemorrhages noted in frontal region, no concerning at that time, patient described no headaches  patient is continue taking her regimen of Cymbalta with pramipexole for peripheral polyneuropathy which we believed  Is multifactorial     Patient is willing to continue having sessions and swimming pool, she is going to be reaching [facility] today to schedule her sessions  Patient is to continue following with oncology team Dr Jovan Lau for lung nodule/ metastasis, with malignant neoplasm of right breast     patient is following with Pain Management for lower back issues,  No significant changes has been reported  patient was advised to continue taking magnesium 500 mg with lipoic acid for right sided myokymia,  May offer baclofen 10 mg at night if myokymia gets worse  follow-up with [facility] Neurology within 6 months      Past Medical History:   Diagnosis Date    Anxiety     [facility]     CAD (coronary artery disease)     Cancer ([facility] )     Carpal tunnel syndrome     Disease of thyroid gland     Elevated cholesterol     Fibromyalgia     Hypertension     Kidney stone     Melanoma [facility] )     nose    Myocardial infarction (HCC)     Neuropathy     BRAYDEN (obstructive sleep apnea)     cpap    Panic attack        Past Surgical History:   Procedure Laterality Date    BREAST SURGERY Bilateral     CARDIAC SURGERY      cardiac ablation     SECTION      x3    CHOLECYSTECTOMY      CORONARY ANGIOPLASTY WITH STENT PLACEMENT  MASTECTOMY Bilateral     NOSE SURGERY         Current Outpatient Medications   Medication Sig Dispense Refill    aspirin (ASPIRIN 81) 81 mg EC tablet aspirin 81 mg tablet,delayed release      B Complex Vitamins (B COMPLEX 1 PO) Take 1 tablet by mouth      clopidogrel (PLAVIX) 75 mg tablet Take 75 mg by mouth daily  3    DULoxetine (CYMBALTA) 30 mg delayed release capsule Take 1 cap by mouth daily in the am 30 capsule 5    DULoxetine (CYMBALTA) 60 mg delayed release capsule Take 1 cap daily by mouth in the evening 30 capsule 5    Evolocumab 140 MG/ML SOAJ Inject 2 mL under the skin      famotidine (PEPCID) 40 MG tablet Take 40 mg by mouth daily  2    gemfibrozil (LOPID) 600 mg tablet Take 600 mg by mouth 2 (two) times a day  3    levothyroxine 75 mcg tablet Take 1 tablet (75 mcg total) by mouth daily 90 tablet 1    LORazepam (ATIVAN) 1 mg tablet Take 1 tablet (1 mg total) by mouth 2 (two) times a day 60 tablet 0    meclizine (ANTIVERT) 25 mg tablet Take 25 mg by mouth Three times daily as needed      melatonin 3 mg Take 5 mg by mouth      metFORMIN (GLUCOPHAGE-XR) 500 mg 24 hr tablet Take 1 tablet (500 mg total) by mouth daily with dinner 30 tablet 5    methocarbamol (ROBAXIN) 500 mg tablet Take 1 tablet (500 mg total) by mouth 4 (four) times a day (Patient not taking: Reported on 12/30/2020) 30 tablet 1    metoprolol tartrate (LOPRESSOR) 25 mg tablet Take 1 tablet (25 mg total) by mouth 2 (two) times a day 180 tablet 3    Misc   Devices (GETGO ROLLING WALKER) MISC Use rolling walker as needed (Patient not taking: Reported on 12/30/2020) 1 each 0    MOTEGRITY 2 MG TABS Take 1 tablet by mouth every evening  2    naproxen (NAPROSYN) 500 mg tablet Take 1 tablet (500 mg total) by mouth 2 (two) times a day with meals 60 tablet 5    pantoprazole (PROTONIX) 40 mg tablet TAKE 1 TABLET BY MOUTH TWICE A  tablet 0    pramipexole (MIRAPEX) 0 125 mg tablet Take four tablets daily, one hour apart 120 tablet 0    pyridoxine (CVS B6) 100 MG tablet Take 1 tablet (100 mg total) by mouth every morning 90 tablet 0    sucralfate (CARAFATE) 1 g tablet sucralfate 1 gram tablet       No current facility-administered medications for this visit  Allergies   Allergen Reactions    Metoclopramide Other (See Comments)    Nitrofurantoin Other (See Comments)     Very weak  Fell    Isosorbide      Other reaction(s): headache    Naproxen      Other reaction(s): Other (See Comments)  "I get bloated"    Pregabalin      Pt states made her feel suicidal        Review of Systems  Constitutional: Negative  Negative for appetite change and fever  HENT: Negative  Negative for hearing loss, tinnitus, trouble swallowing and voice change  Eyes: Negative  Negative for photophobia and pain  Respiratory: Negative  Negative for shortness of breath  Cardiovascular: Negative  Negative for palpitations  Gastrointestinal: Negative  Negative for nausea and vomiting  Endocrine: Negative  Negative for cold intolerance  Genitourinary: Negative  Negative for dysuria, frequency and urgency  Musculoskeletal: Negative  Negative for myalgias and neck pain  Skin: Negative  Negative for rash  Allergic/Immunologic: Negative  Neurological: Negative  Negative for dizziness, tremors, seizures, syncope, facial asymmetry, speech difficulty, weakness, light-headedness, numbness and headaches  Hematological: Negative  Does not bruise/bleed easily  Psychiatric/Behavioral: Negative  Negative for confusion, hallucinations and sleep disturbance  Video Exam    There were no vitals filed for this visit  Physical Exam   CONSTITUTIONAL: NAD, pleasant  NECK: supple,  PSYCH: appropriate mood and affect  NEUROLOGIC COMPREHENSIVE EXAM: Patient is oriented to person, place and time, NAD; appropriate affect  CN II, III, IV, V, VI, VII,VIII,IX,X,XI-XII intact with EOMI, PERRLA, OKN intact,  symmetric face noted   Motor: grossly intact UE/LE bilateral symmetric; Sensory: decreased to light touch and pinprick bilaterally; normal vibration sensation feet bilaterally; Coordination within normal limits on FTN and SHERI testing; Tandem gait is abnormal  Romberg: negative  I spent 30 minutes with patient today in which greater than 50% of the time was spent in counseling/coordination of care regarding Pathophysiology of underlying neurological condition  VIRTUAL VISIT DISCLAIMER    Cathie Campos acknowledges that she has consented to an online visit or consultation  She understands that the online visit is based solely on information provided by her, and that, in the absence of a face-to-face physical evaluation by the physician, the diagnosis she receives is both limited and provisional in terms of accuracy and completeness  This is not intended to replace a full medical face-to-face evaluation by the physician  Cathie Campos understands and accepts these terms

## 2021-02-02 ENCOUNTER — VBI (OUTPATIENT)
Dept: ADMINISTRATIVE | Facility: OTHER | Age: 66
End: 2021-02-02

## 2021-02-02 NOTE — TELEPHONE ENCOUNTER
02/02/21 12:23 PM     [facility]

## 2021-02-10 DIAGNOSIS — G25.81 RESTLESS LEGS SYNDROME (RLS): ICD-10-CM

## 2021-02-11 RX ORDER — PRAMIPEXOLE DIHYDROCHLORIDE 0.12 MG/1
TABLET ORAL
Qty: 120 TABLET | Refills: 0 | Status: SHIPPED | OUTPATIENT
Start: 2021-02-11 | End: 2021-03-04 | Stop reason: SDUPTHER

## 2021-02-12 DIAGNOSIS — I25.119 CORONARY ARTERY DISEASE INVOLVING NATIVE HEART WITH ANGINA PECTORIS, UNSPECIFIED VESSEL OR LESION TYPE (HCC): ICD-10-CM

## 2021-02-12 RX ORDER — METOPROLOL TARTRATE 50 MG/1
50 TABLET, FILM COATED ORAL 2 TIMES DAILY
Qty: 180 TABLET | Refills: 1 | Status: SHIPPED | OUTPATIENT
Start: 2021-02-12 | End: 2021-05-02 | Stop reason: SDUPTHER

## 2021-02-16 ENCOUNTER — TELEPHONE (OUTPATIENT)
Dept: FAMILY MEDICINE CLINIC | Facility: CLINIC | Age: 66
End: 2021-02-16

## 2021-02-16 NOTE — TELEPHONE ENCOUNTER
----- Message from Tonna Ganser, DO sent at 2/12/2021  4:14 PM EST -----   Notify patient I sent in an increased dose on the metoprolol up to 50 mg twice daily now she can remain on this while on the prednisone and then cut back down to half a tablet at the 25 mg dose that she was currently taking  ----- Message -----  From: Amanda Gibbs MA  Sent: 2/12/2021   9:52 AM EST  To: Tonna Ganser, DO    Pt called stating that her Pulmonologist had prescribed her prednisone which is causing her blood pressure to rise  This morning her blood pressure was 150-96  Pt stated that she had doubled up on her metoprolol tartrate (LOPRESSOR) 25 mg tablet  She would like to know if its ok to doubled up on he meds and if so she needs a refill because she is almost out of meds  She tried to call her pulmonologist but she stated they are not in the office today     Ty

## 2021-02-16 NOTE — TELEPHONE ENCOUNTER
----- Message from Tj Hagan DO sent at 2/12/2021  4:15 PM EST -----   I contacted patient  ----- Message -----  From: Israel Larry MA  Sent: 2/12/2021   9:52 AM EST  To: Tj Hagan DO    Pt called stating that her Pulmonologist had prescribed her prednisone which is causing her blood pressure to rise  This morning her blood pressure was 150-96  Pt stated that she had doubled up on her metoprolol tartrate (LOPRESSOR) 25 mg tablet  She would like to know if its ok to doubled up on he meds and if so she needs a refill because she is almost out of meds  She tried to call her pulmonologist but she stated they are not in the office today     Ty

## 2021-03-04 DIAGNOSIS — G25.81 RESTLESS LEGS SYNDROME (RLS): ICD-10-CM

## 2021-03-05 RX ORDER — PRAMIPEXOLE DIHYDROCHLORIDE 0.12 MG/1
TABLET ORAL
Qty: 120 TABLET | Refills: 11 | Status: SHIPPED | OUTPATIENT
Start: 2021-03-05 | End: 2021-05-25

## 2021-03-10 DIAGNOSIS — Z23 ENCOUNTER FOR IMMUNIZATION: ICD-10-CM

## 2021-03-12 ENCOUNTER — OFFICE VISIT (OUTPATIENT)
Dept: FAMILY MEDICINE CLINIC | Facility: CLINIC | Age: 66
End: 2021-03-12
Payer: MEDICARE

## 2021-03-12 VITALS
HEART RATE: 70 BPM | DIASTOLIC BLOOD PRESSURE: 80 MMHG | WEIGHT: 221 LBS | TEMPERATURE: 97.5 F | SYSTOLIC BLOOD PRESSURE: 130 MMHG | BODY MASS INDEX: 35.52 KG/M2 | HEIGHT: 66 IN | OXYGEN SATURATION: 96 %

## 2021-03-12 DIAGNOSIS — M46.1 SACROILIITIS (HCC): ICD-10-CM

## 2021-03-12 DIAGNOSIS — C79.51 BONE METASTASES (HCC): ICD-10-CM

## 2021-03-12 DIAGNOSIS — N17.9 AKI (ACUTE KIDNEY INJURY) (HCC): ICD-10-CM

## 2021-03-12 DIAGNOSIS — J43.1 PANLOBULAR EMPHYSEMA (HCC): ICD-10-CM

## 2021-03-12 DIAGNOSIS — E03.9 HYPOTHYROIDISM, UNSPECIFIED TYPE: ICD-10-CM

## 2021-03-12 DIAGNOSIS — R73.9 HYPERGLYCEMIA: Primary | ICD-10-CM

## 2021-03-12 DIAGNOSIS — F41.1 GENERALIZED ANXIETY DISORDER: ICD-10-CM

## 2021-03-12 DIAGNOSIS — R06.02 SHORTNESS OF BREATH: Primary | ICD-10-CM

## 2021-03-12 DIAGNOSIS — I25.119 CORONARY ARTERY DISEASE WITH ANGINA PECTORIS, UNSPECIFIED VESSEL OR LESION TYPE, UNSPECIFIED WHETHER NATIVE OR TRANSPLANTED HEART (HCC): ICD-10-CM

## 2021-03-12 DIAGNOSIS — I10 ESSENTIAL HYPERTENSION: ICD-10-CM

## 2021-03-12 DIAGNOSIS — G47.33 OSA ON CPAP: ICD-10-CM

## 2021-03-12 DIAGNOSIS — G62.0 DRUG-INDUCED PERIPHERAL NEUROPATHY (HCC): ICD-10-CM

## 2021-03-12 DIAGNOSIS — Z99.89 OSA ON CPAP: ICD-10-CM

## 2021-03-12 DIAGNOSIS — B37.0 THRUSH: ICD-10-CM

## 2021-03-12 DIAGNOSIS — R73.9 HYPERGLYCEMIA: ICD-10-CM

## 2021-03-12 DIAGNOSIS — E78.2 MIXED HYPERLIPIDEMIA: ICD-10-CM

## 2021-03-12 DIAGNOSIS — E66.01 OBESITY, MORBID (HCC): ICD-10-CM

## 2021-03-12 PROCEDURE — 99214 OFFICE O/P EST MOD 30 MIN: CPT | Performed by: FAMILY MEDICINE

## 2021-03-12 RX ORDER — PREDNISONE 10 MG/1
TABLET ORAL
Qty: 24 TABLET | Refills: 0 | Status: SHIPPED | OUTPATIENT
Start: 2021-03-12 | End: 2021-04-23 | Stop reason: ALTCHOICE

## 2021-03-12 RX ORDER — BLOOD SUGAR DIAGNOSTIC
STRIP MISCELLANEOUS
Qty: 100 EACH | Refills: 1 | Status: SHIPPED | OUTPATIENT
Start: 2021-03-12 | End: 2021-10-19 | Stop reason: ALTCHOICE

## 2021-03-12 RX ORDER — BLOOD-GLUCOSE METER
EACH MISCELLANEOUS
Qty: 1 EACH | Refills: 0 | Status: SHIPPED | OUTPATIENT
Start: 2021-03-12 | End: 2021-10-19 | Stop reason: ALTCHOICE

## 2021-03-12 RX ORDER — BUDESONIDE AND FORMOTEROL FUMARATE DIHYDRATE 160; 4.5 UG/1; UG/1
2 AEROSOL RESPIRATORY (INHALATION) 2 TIMES DAILY
COMMUNITY
Start: 2021-02-09 | End: 2022-03-05 | Stop reason: ALTCHOICE

## 2021-03-12 RX ORDER — LEVOTHYROXINE SODIUM 88 UG/1
TABLET ORAL
COMMUNITY
Start: 2021-03-09 | End: 2021-04-23 | Stop reason: ALTCHOICE

## 2021-03-12 RX ORDER — ALBUTEROL SULFATE 90 UG/1
2 AEROSOL, METERED RESPIRATORY (INHALATION) EVERY 6 HOURS PRN
Qty: 1 INHALER | Refills: 5 | Status: SHIPPED | OUTPATIENT
Start: 2021-03-12 | End: 2022-03-05 | Stop reason: ALTCHOICE

## 2021-03-12 RX ORDER — ALBUTEROL SULFATE 2.5 MG/3ML
2.5 SOLUTION RESPIRATORY (INHALATION) EVERY 6 HOURS PRN
COMMUNITY
Start: 2021-02-09 | End: 2022-03-05 | Stop reason: ALTCHOICE

## 2021-03-12 RX ORDER — B-COMPLEX WITH VITAMIN C
1 TABLET ORAL 2 TIMES DAILY WITH MEALS
COMMUNITY
Start: 2021-02-21 | End: 2021-11-03 | Stop reason: SDUPTHER

## 2021-03-12 RX ORDER — LANCETS
EACH MISCELLANEOUS
Qty: 100 EACH | Refills: 1 | Status: SHIPPED | OUTPATIENT
Start: 2021-03-12

## 2021-03-12 RX ORDER — BLOOD-GLUCOSE METER
EACH MISCELLANEOUS
Qty: 1 EACH | Refills: 0 | OUTPATIENT
Start: 2021-03-12

## 2021-03-12 RX ORDER — TIOTROPIUM BROMIDE INHALATION SPRAY 3.12 UG/1
SPRAY, METERED RESPIRATORY (INHALATION) DAILY
COMMUNITY
Start: 2021-02-09 | End: 2021-04-23 | Stop reason: ALTCHOICE

## 2021-03-12 NOTE — ASSESSMENT & PLAN NOTE
He woke up with significant sore throat and redness will add nystatin liquid if not improved will call me on Monday

## 2021-03-12 NOTE — ASSESSMENT & PLAN NOTE
Continue medications as directed if change in mood or worsening anxiety or panic attacks follow-up closely

## 2021-03-12 NOTE — PROGRESS NOTES
Assessment/Plan:       Problem List Items Addressed This Visit        Digestive    Nonda Deep     He woke up with significant sore throat and redness will add nystatin liquid if not improved will call me on Monday         Relevant Medications    predniSONE 10 mg tablet       Endocrine    Hypothyroidism      Follow T3-T4 TSH every 6 months continue with medications         Relevant Medications    levothyroxine 88 mcg tablet    nystatin (MYCOSTATIN) 500,000 units/5 mL suspension    albuterol (PROVENTIL HFA,VENTOLIN HFA) 90 mcg/act inhaler    predniSONE 10 mg tablet       Respiratory    BRAYDEN on CPAP      Continue CPAP clean equipment regularly exchange equipment every 6 months         Relevant Medications    nystatin (MYCOSTATIN) 500,000 units/5 mL suspension    albuterol (PROVENTIL HFA,VENTOLIN HFA) 90 mcg/act inhaler    predniSONE 10 mg tablet    Panlobular emphysema (HCC)    Relevant Medications    tiotropium (Spiriva Respimat) 2 5 MCG/ACT AERS inhaler    budesonide-formoterol (Symbicort) 160-4 5 mcg/act inhaler    albuterol (2 5 mg/3 mL) 0 083 % nebulizer solution    nystatin (MYCOSTATIN) 500,000 units/5 mL suspension    albuterol (PROVENTIL HFA,VENTOLIN HFA) 90 mcg/act inhaler    predniSONE 10 mg tablet       Cardiovascular and Mediastinum    Essential hypertension      Continue medications avoiding sodium in the diet follow-up at next scheduled office visit         Relevant Medications    nystatin (MYCOSTATIN) 500,000 units/5 mL suspension    albuterol (PROVENTIL HFA,VENTOLIN HFA) 90 mcg/act inhaler    predniSONE 10 mg tablet    Coronary artery disease with angina pectoris (HCC)      Recent worsening shortness of breath follow-up with Cardiology         Relevant Medications    nystatin (MYCOSTATIN) 500,000 units/5 mL suspension    albuterol (PROVENTIL HFA,VENTOLIN HFA) 90 mcg/act inhaler    predniSONE 10 mg tablet       Nervous and Auditory    Drug-induced peripheral neuropathy (HCC)    Relevant Medications nystatin (MYCOSTATIN) 500,000 units/5 mL suspension    albuterol (PROVENTIL HFA,VENTOLIN HFA) 90 mcg/act inhaler    predniSONE 10 mg tablet       Musculoskeletal and Integument    Sacroiliitis (HCC)    Relevant Medications    nystatin (MYCOSTATIN) 500,000 units/5 mL suspension    albuterol (PROVENTIL HFA,VENTOLIN HFA) 90 mcg/act inhaler    predniSONE 10 mg tablet    Bone metastases (HCC)    Relevant Medications    nystatin (MYCOSTATIN) 500,000 units/5 mL suspension    albuterol (PROVENTIL HFA,VENTOLIN HFA) 90 mcg/act inhaler    predniSONE 10 mg tablet       Genitourinary    JESUS (acute kidney injury) (HCC)    Relevant Medications    nystatin (MYCOSTATIN) 500,000 units/5 mL suspension    albuterol (PROVENTIL HFA,VENTOLIN HFA) 90 mcg/act inhaler    predniSONE 10 mg tablet       Other    Generalized anxiety disorder      Continue medications as directed if change in mood or worsening anxiety or panic attacks follow-up closely         Relevant Medications    nystatin (MYCOSTATIN) 500,000 units/5 mL suspension    albuterol (PROVENTIL HFA,VENTOLIN HFA) 90 mcg/act inhaler    predniSONE 10 mg tablet    Mixed hyperlipidemia      Continue current medications follow-up heart healthy diet avoiding saturated fats repeat laboratory work CMP lipid profile every 6 months         Relevant Medications    nystatin (MYCOSTATIN) 500,000 units/5 mL suspension    albuterol (PROVENTIL HFA,VENTOLIN HFA) 90 mcg/act inhaler    predniSONE 10 mg tablet    Shortness of breath - Primary      Shortness of breath persistent over the last several weeks likely multifactorial patient has been evaluated at the emergency department and should follow-up with both Pulmonary Medicine and Cardiology continue with her current inhalers follow a heart healthy diet avoid exacerbating factors for her asthma continue with CPAP         Relevant Medications    nystatin (MYCOSTATIN) 500,000 units/5 mL suspension    albuterol (PROVENTIL HFA,VENTOLIN HFA) 90 mcg/act inhaler    predniSONE 10 mg tablet    Obesity, morbid (HCC)    Relevant Medications    nystatin (MYCOSTATIN) 500,000 units/5 mL suspension    albuterol (PROVENTIL HFA,VENTOLIN HFA) 90 mcg/act inhaler    predniSONE 10 mg tablet            Subjective:      Patient ID: Brandin Lares is a 60 y o  female  Patient presents for ongoing shortness of breath general checkup today after evaluation at the emergency department and review of general health concerns and medications      The following portions of the patient's history were reviewed and updated as appropriate: allergies, current medications, past family history, past medical history, past social history, past surgical history and problem list     Review of Systems   Constitutional: Negative for chills, fatigue and fever  HENT: Negative for congestion, nosebleeds, rhinorrhea, sinus pressure and sore throat  Eyes: Negative for discharge and redness  Respiratory: Positive for shortness of breath  Negative for cough  Cardiovascular: Negative for chest pain, palpitations and leg swelling  Gastrointestinal: Negative for abdominal pain, blood in stool and nausea  Endocrine: Negative for cold intolerance, heat intolerance and polyuria  Genitourinary: Negative for dysuria and frequency  Musculoskeletal: Negative for arthralgias, back pain and myalgias  Skin: Negative for rash  Neurological: Negative for dizziness, weakness and headaches  Hematological: Negative for adenopathy  Psychiatric/Behavioral: Negative for behavioral problems and sleep disturbance  The patient is not nervous/anxious  Objective:      /80 (BP Location: Left arm, Patient Position: Sitting)   Pulse 70   Temp 97 5 °F (36 4 °C)   Ht 5' 6" (1 676 m)   Wt 100 kg (221 lb)   SpO2 96%   BMI 35 67 kg/m²        Physical Exam  Vitals signs and nursing note reviewed  Constitutional:       General: She is not in acute distress       Appearance: She is well-developed and normal weight  HENT:      Head: Normocephalic and atraumatic  Right Ear: External ear normal       Left Ear: External ear normal       Nose: Nose normal       Mouth/Throat:      Pharynx: No oropharyngeal exudate  Eyes:      General: No scleral icterus  Right eye: No discharge  Left eye: No discharge  Conjunctiva/sclera: Conjunctivae normal       Pupils: Pupils are equal, round, and reactive to light  Neck:      Musculoskeletal: Normal range of motion  Thyroid: No thyromegaly  Vascular: No JVD  Cardiovascular:      Rate and Rhythm: Normal rate and regular rhythm  Heart sounds: Normal heart sounds  No murmur  Pulmonary:      Effort: Pulmonary effort is normal       Breath sounds: No decreased breath sounds, wheezing or rales  Chest:      Chest wall: No tenderness  Abdominal:      General: Bowel sounds are normal  There is no distension  Palpations: Abdomen is soft  There is no mass  Tenderness: There is no abdominal tenderness  Musculoskeletal: Normal range of motion  General: No tenderness or deformity  Lymphadenopathy:      Cervical: No cervical adenopathy  Skin:     General: Skin is warm and dry  Findings: No rash  Neurological:      Mental Status: She is alert and oriented to person, place, and time  Cranial Nerves: No cranial nerve deficit  Coordination: Coordination normal       Deep Tendon Reflexes: Reflexes are normal and symmetric  Reflexes normal    Psychiatric:         Behavior: Behavior normal          Thought Content: Thought content normal          Judgment: Judgment normal           Data:    Laboratory Results: I have personally reviewed the pertinent laboratory results/reports   Radiology/Other Diagnostic Testing Results: I have personally reviewed pertinent reports         Lab Results   Component Value Date    WBC 10 84 (H) 06/09/2020    HGB 13 1 06/09/2020    HCT 40 4 06/09/2020    MCV 93 06/09/2020  06/09/2020     Lab Results   Component Value Date    K 3 6 06/09/2020     06/09/2020    CO2 26 06/09/2020    BUN 16 06/09/2020    CREATININE 0 78 06/09/2020    GLUF 77 06/09/2020    CALCIUM 8 8 06/09/2020    AST 21 06/09/2020    ALT 37 06/09/2020    ALKPHOS 73 06/09/2020    EGFR 81 06/09/2020     Lab Results   Component Value Date    CHOLESTEROL 161 06/09/2020    CHOLESTEROL 131 07/11/2018     Lab Results   Component Value Date    HDL 51 06/09/2020    HDL 40 07/11/2018     Lab Results   Component Value Date    LDLCALC 80 06/09/2020    LDLCALC 65 07/11/2018     Lab Results   Component Value Date    TRIG 151 (H) 06/09/2020    TRIG 130 07/11/2018     No results found for: [place], [place]  Lab Results   Component Value Date    [id number] 1 450 06/09/2020     Lab Results   Component Value Date    HGBA1C 6 5 (H) 01/07/2021     No results found for: MIGUEL Richards DO

## 2021-03-12 NOTE — ASSESSMENT & PLAN NOTE
Shortness of breath persistent over the last several weeks likely multifactorial patient has been evaluated at the emergency department and should follow-up with both Pulmonary Medicine and Cardiology continue with her current inhalers follow a heart healthy diet avoid exacerbating factors for her asthma continue with CPAP

## 2021-03-12 NOTE — ASSESSMENT & PLAN NOTE
Continue current medications follow-up heart healthy diet avoiding saturated fats repeat laboratory work CMP lipid profile every 6 months Self

## 2021-03-16 DIAGNOSIS — F41.1 GENERALIZED ANXIETY DISORDER: ICD-10-CM

## 2021-03-17 ENCOUNTER — IMMUNIZATIONS (OUTPATIENT)
Dept: FAMILY MEDICINE CLINIC | Facility: HOSPITAL | Age: 66
End: 2021-03-17

## 2021-03-17 DIAGNOSIS — Z23 ENCOUNTER FOR IMMUNIZATION: Primary | ICD-10-CM

## 2021-03-17 PROCEDURE — 91301 SARS-COV-2 / COVID-19 MRNA VACCINE (MODERNA) 100 MCG: CPT

## 2021-03-17 PROCEDURE — 0011A SARS-COV-2 / COVID-19 MRNA VACCINE (MODERNA) 100 MCG: CPT

## 2021-03-17 RX ORDER — LORAZEPAM 1 MG/1
1 TABLET ORAL 2 TIMES DAILY
Qty: 60 TABLET | Refills: 0 | Status: SHIPPED | OUTPATIENT
Start: 2021-03-17 | End: 2021-05-09 | Stop reason: SDUPTHER

## 2021-03-22 DIAGNOSIS — M79.672 FOOT PAIN, BILATERAL: Primary | ICD-10-CM

## 2021-03-22 DIAGNOSIS — M79.671 FOOT PAIN, BILATERAL: Primary | ICD-10-CM

## 2021-03-23 ENCOUNTER — PREP FOR PROCEDURE (OUTPATIENT)
Dept: GASTROENTEROLOGY | Facility: CLINIC | Age: 66
End: 2021-03-23

## 2021-03-23 ENCOUNTER — TELEPHONE (OUTPATIENT)
Dept: GASTROENTEROLOGY | Facility: CLINIC | Age: 66
End: 2021-03-23

## 2021-03-23 DIAGNOSIS — Z86.010 HISTORY OF COLON POLYPS: Primary | ICD-10-CM

## 2021-03-25 NOTE — TELEPHONE ENCOUNTER
Received clearance from Dr Bishop Bryan  Pt is clear for procedure and may hold plavix 5 days prior to her procedure  Do not stop Aspirin  Will call patient to inform

## 2021-03-26 ENCOUNTER — OFFICE VISIT (OUTPATIENT)
Dept: OBGYN CLINIC | Facility: CLINIC | Age: 66
End: 2021-03-26
Payer: MEDICARE

## 2021-03-26 VITALS — HEIGHT: 66 IN | BODY MASS INDEX: 35.52 KG/M2 | WEIGHT: 221 LBS

## 2021-03-26 DIAGNOSIS — M70.61 TROCHANTERIC BURSITIS OF RIGHT HIP: ICD-10-CM

## 2021-03-26 DIAGNOSIS — M17.0 PRIMARY OSTEOARTHRITIS OF BOTH KNEES: ICD-10-CM

## 2021-03-26 DIAGNOSIS — M46.1 SACROILIITIS (HCC): Primary | ICD-10-CM

## 2021-03-26 PROCEDURE — 99213 OFFICE O/P EST LOW 20 MIN: CPT | Performed by: PHYSICIAN ASSISTANT

## 2021-03-26 PROCEDURE — 20610 DRAIN/INJ JOINT/BURSA W/O US: CPT | Performed by: PHYSICIAN ASSISTANT

## 2021-03-26 RX ORDER — BUPIVACAINE HYDROCHLORIDE 2.5 MG/ML
1 INJECTION, SOLUTION INFILTRATION; PERINEURAL
Status: COMPLETED | OUTPATIENT
Start: 2021-03-26 | End: 2021-03-26

## 2021-03-26 RX ORDER — METHYLPREDNISOLONE ACETATE 40 MG/ML
2 INJECTION, SUSPENSION INTRA-ARTICULAR; INTRALESIONAL; INTRAMUSCULAR; SOFT TISSUE
Status: COMPLETED | OUTPATIENT
Start: 2021-03-26 | End: 2021-03-26

## 2021-03-26 RX ORDER — BETAMETHASONE SODIUM PHOSPHATE AND BETAMETHASONE ACETATE 3; 3 MG/ML; MG/ML
6 INJECTION, SUSPENSION INTRA-ARTICULAR; INTRALESIONAL; INTRAMUSCULAR; SOFT TISSUE
Status: COMPLETED | OUTPATIENT
Start: 2021-03-26 | End: 2021-03-26

## 2021-03-26 RX ORDER — BUPIVACAINE HYDROCHLORIDE 2.5 MG/ML
4 INJECTION, SOLUTION INFILTRATION; PERINEURAL
Status: COMPLETED | OUTPATIENT
Start: 2021-03-26 | End: 2021-03-26

## 2021-03-26 RX ADMIN — METHYLPREDNISOLONE ACETATE 2 ML: 40 INJECTION, SUSPENSION INTRA-ARTICULAR; INTRALESIONAL; INTRAMUSCULAR; SOFT TISSUE at 11:31

## 2021-03-26 RX ADMIN — BUPIVACAINE HYDROCHLORIDE 4 ML: 2.5 INJECTION, SOLUTION INFILTRATION; PERINEURAL at 11:31

## 2021-03-26 RX ADMIN — BUPIVACAINE HYDROCHLORIDE 1 ML: 2.5 INJECTION, SOLUTION INFILTRATION; PERINEURAL at 11:31

## 2021-03-26 RX ADMIN — BETAMETHASONE SODIUM PHOSPHATE AND BETAMETHASONE ACETATE 6 MG: 3; 3 INJECTION, SUSPENSION INTRA-ARTICULAR; INTRALESIONAL; INTRAMUSCULAR; SOFT TISSUE at 11:31

## 2021-03-26 NOTE — PROGRESS NOTES
ASSESSMENT/PLAN:    Diagnoses and all orders for this visit:    Sacroiliitis ([address] )    Trochanteric bursitis of right hip    Primary osteoarthritis of both knees    Other orders  -     Large joint arthrocentesis  -     Large joint arthrocentesis  -     Medium joint arthrocentesis          Both of the patient's knees and her right trochanteric bursa were injected with Depo-Medrol and Marcaine  She tolerated the injection quite well  The patient's right SI joint was injected with Celestone and Marcaine  The patient tolerated that injection without issue  She should continue a home exercise program for strengthening, stretching and range of motion  The patient will follow up with our office in 3 months for strength and motion check  She is acceptable to this plan  Return in about 3 months (around 6/26/2021)  both of patient's knees and right trochanteric bursa were injected with Depo-Medrol and Marcaine, the sacroiliac joint was injected with Celestone and Marcaine  She tolerated all the procedures well  Return back in 3 months for strength and motion check  I agree with the advanced practitioner history, physical exam, and medical decision making      _____________________________________________________  CHIEF COMPLAINT:  Chief Complaint   Patient presents with    Right Knee - Pain    Right Hip - Pain    Left Knee - Pain         SUBJECTIVE:  Lanny Watt is a 60 y o  female with a history of primary osteoarthritis of both knees, trochanteric bursitis of the right hip, and right-sided sacroiliitis  She presents to our office today complaining of pain in all 4 areas  She would like all 4 structures injected with corticosteroids  She denies any new falls or trauma  She denies any numbness or tingling  She denies any fever or chills      The following portions of the patient's history were reviewed and updated as appropriate: allergies, current medications, past family history, past medical history, past social history, past surgical history and problem list     PAST MEDICAL HISTORY:  Past Medical History:   Diagnosis Date    Anxiety     [facility]     CAD (coronary artery disease)     Cancer ([facility] )     Carpal tunnel syndrome     Disease of thyroid gland     Elevated cholesterol     Fibromyalgia     Hypertension     Kidney stone     Melanoma ([facility] )     nose    Myocardial infarction (HCC)     Neuropathy     BRAYDEN (obstructive sleep apnea)     cpap    Panic attack        PAST SURGICAL HISTORY:  Past Surgical History:   Procedure Laterality Date    BREAST SURGERY Bilateral     CARDIAC SURGERY      cardiac ablation     SECTION      x3    CHOLECYSTECTOMY      CORONARY ANGIOPLASTY WITH STENT PLACEMENT      MASTECTOMY Bilateral     NOSE SURGERY         FAMILY HISTORY:  Family History   Problem Relation Age of Onset    Heart attack Mother     Alcohol abuse Mother     Heart failure Mother        SOCIAL HISTORY:  Social History     Tobacco Use    Smoking status: Never Smoker    Smokeless tobacco: Never Used   Substance Use Topics    Alcohol use: Yes     Frequency: Monthly or less     Drinks per session: 1 or 2     Binge frequency: Never     Comment: beer    Drug use: Yes     Types: Marijuana     Comment: Medical reilly       MEDICATIONS:    Current Outpatient Medications:     albuterol (2 5 mg/3 mL) 0 083 % nebulizer solution, Inhale 2 5 mg every 6 (six) hours as needed, Disp: , Rfl:     albuterol (PROVENTIL HFA,VENTOLIN HFA) 90 mcg/act inhaler, Inhale 2 puffs every 6 (six) hours as needed for wheezing or shortness of breath, Disp: 1 Inhaler, Rfl: 5    aspirin (ASPIRIN 81) 81 mg EC tablet, aspirin 81 mg tablet,delayed release, Disp: , Rfl:     B Complex Vitamins (B COMPLEX 1 PO), Take 1 tablet by mouth, Disp: , Rfl:     Blood Glucose Monitoring Suppl (ONE TOUCH ULTRA 2) w/Device KIT, Patient to test once daily, Disp: 1 each, Rfl: 0    budesonide-formoterol (Symbicort) 160-4 5 mcg/act inhaler, Inhale 2 puffs 2 (two) times a day, Disp: , Rfl:     calcium carbonate-vitamin D (OSCAL-D) 500 mg-200 units per tablet, Take 1 tablet by mouth 2 (two) times a day with meals, Disp: , Rfl:     clopidogrel (PLAVIX) 75 mg tablet, Take 75 mg by mouth daily, Disp: , Rfl: 3    DULoxetine (CYMBALTA) 30 mg delayed release capsule, Take 1 cap by mouth daily in the am, Disp: 30 capsule, Rfl: 5    DULoxetine (CYMBALTA) 60 mg delayed release capsule, Take 1 cap daily by mouth in the evening, Disp: 30 capsule, Rfl: 5    Evolocumab 140 MG/ML SOAJ, Inject 2 mL under the skin, Disp: , Rfl:     famotidine (PEPCID) 40 MG tablet, Take 40 mg by mouth daily, Disp: , Rfl: 2    gemfibrozil (LOPID) 600 mg tablet, Take 600 mg by mouth 2 (two) times a day, Disp: , Rfl: 3    glucose blood (OneTouch Ultra) test strip, Patient to test once daily, Disp: 100 each, Rfl: 1    Lancets (onetouch ultrasoft) lancets, Patient to test once daily, Disp: 100 each, Rfl: 1    levothyroxine 88 mcg tablet, , Disp: , Rfl:     LORazepam (ATIVAN) 1 mg tablet, Take 1 tablet (1 mg total) by mouth 2 (two) times a day, Disp: 60 tablet, Rfl: 0    meclizine (ANTIVERT) 25 mg tablet, Take 25 mg by mouth Three times daily as needed, Disp: , Rfl:     melatonin 3 mg, Take 5 mg by mouth, Disp: , Rfl:     metFORMIN (GLUCOPHAGE) 500 mg tablet, Take 500 mg by mouth 2 (two) times a day with meals , Disp: , Rfl:     methocarbamol (ROBAXIN) 500 mg tablet, Take 1 tablet (500 mg total) by mouth 4 (four) times a day (Patient not taking: Reported on 12/30/2020), Disp: 30 tablet, Rfl: 1    metoprolol tartrate (LOPRESSOR) 50 mg tablet, Take 1 tablet (50 mg total) by mouth 2 (two) times a day, Disp: 180 tablet, Rfl: 1    Misc   Devices (GETGO ROLLING WALKER) MISC, Use rolling walker as needed (Patient not taking: Reported on 12/30/2020), Disp: 1 each, Rfl: 0    MOTEGRITY 2 MG TABS, Take 1 tablet by mouth every evening, Disp: , Rfl: 2    naproxen (NAPROSYN) 500 mg tablet, Take 1 tablet (500 mg total) by mouth 2 (two) times a day with meals, Disp: 60 tablet, Rfl: 5    nystatin (MYCOSTATIN) 500,000 units/5 mL suspension, Apply 5 mL (500,000 Units total) to the mouth or throat 4 (four) times a day, Disp: 120 mL, Rfl: 2    pantoprazole (PROTONIX) 40 mg tablet, TAKE 1 TABLET BY MOUTH TWICE A DAY, Disp: 180 tablet, Rfl: 0    pramipexole (MIRAPEX) 0 125 mg tablet, TAKE 4 TABLETS BY MOUTH DAILY AS DIRECTED, Disp: 120 tablet, Rfl: 11    predniSONE 10 mg tablet, Take 3 tablets daily for 4 days then 2 tablets daily for 4 days then 1 tablet daily for 4 days, Disp: 24 tablet, Rfl: 0    pyridoxine (CVS B6) 100 MG tablet, Take 1 tablet (100 mg total) by mouth every morning, Disp: 90 tablet, Rfl: 0    sucralfate (CARAFATE) 1 g tablet, sucralfate 1 gram tablet, Disp: , Rfl:     tiotropium (Spiriva Respimat) 2 5 MCG/ACT AERS inhaler, Inhale daily, Disp: , Rfl:     ALLERGIES:  Allergies   Allergen Reactions    Metoclopramide Other (See Comments)    Nitrofurantoin Other (See Comments)     Very weak  Fell    Isosorbide      Other reaction(s): headache    Pregabalin      Pt states made her feel suicidal        ROS:  Review of Systems     Constitutional: Negative for fatigue, fever or loss of appetite  HENT: Negative  Respiratory: Negative for shortness of breath, dyspnea  Cardiovascular: Negative for chest pain/tightness  Gastrointestinal: Negative for abdominal pain, N/V  Endocrine: Negative for cold/heat intolerance, unexplained weight loss/gain  Genitourinary: Negative for flank pain, dysuria, hematuria  Musculoskeletal: Positive for arthralgia   Skin: Negative for rash  Neurological: Negative for numbness or tingling  Psychiatric/Behavioral: Negative for agitation  _____________________________________________________  PHYSICAL EXAMINATION:    Height 5' 6" (1 676 m), weight 100 kg (221 lb)      Constitutional: Oriented to person, place, and time  Appears well-developed and well-nourished  No distress  HENT:   Head: Normocephalic  Eyes: Conjunctivae are normal  Right eye exhibits no discharge  Left eye exhibits no discharge  No scleral icterus  Cardiovascular: Normal rate  Pulmonary/Chest: Effort normal    Neurological: Alert and oriented to person, place, and time  Skin: Skin is warm and dry  No rash noted  Not diaphoretic  No erythema  No pallor  Psychiatric: Normal mood and affect  Behavior is normal  Judgment and thought content normal       MUSCULOSKELETAL EXAMINATION:   Physical Exam  Ortho Exam    Bilateral lower extremities are neurovascularly intact  Toes are pink and mobile   Compartments are soft  No warmth, erythema or ecchymosis  ROM of knees are from 5-115 degrees  Negative Lachman, drawer or pivot shift  No medial instability  Medial joint line tenderness, slight lateral joint line tenderness  Patellofemoral crepitation    Range of motion of the right hip is intact   Tenderness to palpation of the right trochanteric bursa and right SI joint  Positive Vladislav's  Objective:  BP Readings from Last 1 Encounters:   03/12/21 130/80      Wt Readings from Last 1 Encounters:   03/26/21 100 kg (221 lb)        BMI:   Estimated body mass index is 35 67 kg/m² as calculated from the following:    Height as of this encounter: 5' 6" (1 676 m)  Weight as of this encounter: 100 kg (221 lb)        PROCEDURES PERFORMED:  Large joint arthrocentesis: R greater trochanteric bursa  Universal Protocol:  Consent given by: patient  Site marked: the operative site was marked  Supporting Documentation  Indications: pain   Procedure Details  Location: hip - R greater trochanteric bursa  Needle size: 22 G  Approach: lateral  Medications administered: 4 mL bupivacaine 0 25 %; 2 mL methylPREDNISolone acetate 40 mg/mL    Patient tolerance: patient tolerated the procedure well with no immediate complications  Dressing:  Sterile dressing applied    Large joint arthrocentesis: bilateral knee  Universal Protocol:  Consent given by: patient  Time out: Immediately prior to procedure a "time out" was called to verify the correct patient, procedure, equipment, support staff and site/side marked as required  Site marked: the operative site was marked  Supporting Documentation  Indications: pain   Procedure Details  Location: knee - bilateral knee  Needle size: 22 G  Approach: lateral    Medications (Right): 4 mL bupivacaine 0 25 %; 2 mL methylPREDNISolone acetate 40 mg/mLMedications (Left): 4 mL bupivacaine 0 25 %; 2 mL methylPREDNISolone acetate 40 mg/mL   Patient tolerance: patient tolerated the procedure well with no immediate complications  Dressing:  Sterile dressing applied    Medium joint arthrocentesis  Universal Protocol:  Risks and benefits: risks, benefits and alternatives were discussed  Consent given by: patient  Patient understanding: patient states understanding of the procedure being performed    Supporting Documentation  Indications: pain   Procedure Details  Location: Right SI joint    Needle size: 27 G  Approach: dorsal  Medications administered: 1 mL bupivacaine 0 25 %; 6 mg betamethasone acetate-betamethasone sodium phosphate 6 (3-3) mg/mL    Patient tolerance: patient tolerated the procedure well with no immediate complications  Dressing:  Sterile dressing applied            Gina Helton PA-C

## 2021-04-01 NOTE — TELEPHONE ENCOUNTER
Called and informed patient she may hold her plavix 5 days prior to her 5/24 procedure but do not stop Aspirin, only hold morning of procedure and resume after

## 2021-04-03 DIAGNOSIS — K59.01 SLOW TRANSIT CONSTIPATION: Primary | ICD-10-CM

## 2021-04-03 RX ORDER — PRUCALOPRIDE 2 MG/1
TABLET, FILM COATED ORAL
Qty: 90 TABLET | Refills: 1 | Status: SHIPPED | OUTPATIENT
Start: 2021-04-03 | End: 2021-07-28 | Stop reason: ALTCHOICE

## 2021-04-06 DIAGNOSIS — I25.10 ATHEROSCLEROSIS OF NATIVE CORONARY ARTERY OF NATIVE HEART WITHOUT ANGINA PECTORIS: ICD-10-CM

## 2021-04-06 RX ORDER — PYRIDOXINE HCL (VITAMIN B6) 100 MG
100 TABLET ORAL EVERY MORNING
Qty: 90 TABLET | Refills: 0 | Status: SHIPPED | OUTPATIENT
Start: 2021-04-06 | End: 2021-05-28

## 2021-04-07 PROBLEM — K29.70 GASTRITIS: Status: ACTIVE | Noted: 2021-04-07

## 2021-04-07 PROBLEM — Z12.11 COLON CANCER SCREENING: Status: ACTIVE | Noted: 2021-04-07

## 2021-04-08 ENCOUNTER — TELEPHONE (OUTPATIENT)
Dept: GASTROENTEROLOGY | Facility: CLINIC | Age: 66
End: 2021-04-08

## 2021-04-08 ENCOUNTER — OFFICE VISIT (OUTPATIENT)
Dept: GASTROENTEROLOGY | Facility: CLINIC | Age: 66
End: 2021-04-08
Payer: MEDICARE

## 2021-04-08 VITALS
HEIGHT: 66 IN | BODY MASS INDEX: 36.48 KG/M2 | DIASTOLIC BLOOD PRESSURE: 69 MMHG | SYSTOLIC BLOOD PRESSURE: 127 MMHG | WEIGHT: 227 LBS | HEART RATE: 65 BPM

## 2021-04-08 DIAGNOSIS — K21.00 GASTROESOPHAGEAL REFLUX DISEASE WITH ESOPHAGITIS WITHOUT HEMORRHAGE: ICD-10-CM

## 2021-04-08 DIAGNOSIS — K59.01 CONSTIPATION BY DELAYED COLONIC TRANSIT: ICD-10-CM

## 2021-04-08 DIAGNOSIS — R14.0 BLOATING: ICD-10-CM

## 2021-04-08 DIAGNOSIS — E73.9 LACTOSE INTOLERANCE: ICD-10-CM

## 2021-04-08 DIAGNOSIS — Z12.11 COLON CANCER SCREENING: ICD-10-CM

## 2021-04-08 DIAGNOSIS — K31.84 GASTROPARESIS: Primary | ICD-10-CM

## 2021-04-08 PROBLEM — K63.5 COLON POLYPS: Status: ACTIVE | Noted: 2021-04-08

## 2021-04-08 PROBLEM — K21.9 GERD (GASTROESOPHAGEAL REFLUX DISEASE): Status: ACTIVE | Noted: 2021-04-08

## 2021-04-08 PROBLEM — K31.7 GASTRIC POLYPS: Status: ACTIVE | Noted: 2021-04-08

## 2021-04-08 PROCEDURE — 99214 OFFICE O/P EST MOD 30 MIN: CPT | Performed by: INTERNAL MEDICINE

## 2021-04-08 RX ORDER — PRAMIPEXOLE DIHYDROCHLORIDE 0.12 MG/1
0.5 TABLET ORAL DAILY
COMMUNITY
Start: 2021-03-05 | End: 2021-04-23 | Stop reason: ALTCHOICE

## 2021-04-08 RX ORDER — NAPROXEN 500 MG/1
500 TABLET ORAL 2 TIMES DAILY WITH MEALS
COMMUNITY
Start: 2021-03-08 | End: 2021-04-08 | Stop reason: ALTCHOICE

## 2021-04-08 RX ORDER — FAMOTIDINE 40 MG/1
40 TABLET, FILM COATED ORAL DAILY
Qty: 90 TABLET | Refills: 1 | Status: SHIPPED | OUTPATIENT
Start: 2021-04-08 | End: 2021-04-28 | Stop reason: SDUPTHER

## 2021-04-08 RX ORDER — PRUCALOPRIDE 2 MG/1
TABLET, FILM COATED ORAL
COMMUNITY
Start: 2021-04-05 | End: 2021-04-23 | Stop reason: ALTCHOICE

## 2021-04-08 RX ORDER — B-COMPLEX WITH VITAMIN C
1 TABLET ORAL 2 TIMES DAILY WITH MEALS
COMMUNITY
Start: 2021-02-21 | End: 2021-04-23 | Stop reason: ALTCHOICE

## 2021-04-08 RX ORDER — GEMFIBROZIL 600 MG/1
TABLET, FILM COATED ORAL
COMMUNITY
Start: 2021-04-05 | End: 2021-04-23 | Stop reason: ALTCHOICE

## 2021-04-08 RX ORDER — PANTOPRAZOLE SODIUM 40 MG/1
40 TABLET, DELAYED RELEASE ORAL 2 TIMES DAILY
COMMUNITY
Start: 2021-03-08 | End: 2021-04-08 | Stop reason: ALTCHOICE

## 2021-04-08 RX ORDER — TIOTROPIUM BROMIDE INHALATION SPRAY 3.12 UG/1
SPRAY, METERED RESPIRATORY (INHALATION)
COMMUNITY
Start: 2021-03-09 | End: 2022-03-05 | Stop reason: ALTCHOICE

## 2021-04-08 RX ORDER — LEVOTHYROXINE SODIUM 88 UG/1
88 TABLET ORAL DAILY
COMMUNITY
Start: 2021-03-09 | End: 2021-05-10 | Stop reason: SDUPTHER

## 2021-04-08 RX ORDER — ALBUTEROL SULFATE 90 UG/1
AEROSOL, METERED RESPIRATORY (INHALATION)
COMMUNITY
Start: 2021-03-12 | End: 2021-04-23 | Stop reason: ALTCHOICE

## 2021-04-08 RX ORDER — CLOPIDOGREL BISULFATE 75 MG/1
TABLET ORAL
COMMUNITY
Start: 2021-04-04 | End: 2021-04-23 | Stop reason: ALTCHOICE

## 2021-04-08 RX ORDER — LORAZEPAM 1 MG/1
1 TABLET ORAL 2 TIMES DAILY
COMMUNITY
Start: 2021-03-17 | End: 2021-04-23 | Stop reason: ALTCHOICE

## 2021-04-08 RX ORDER — MULTIVITAMIN WITH IRON
TABLET ORAL
COMMUNITY
Start: 2021-04-06 | End: 2021-04-23 | Stop reason: ALTCHOICE

## 2021-04-08 RX ORDER — EVOLOCUMAB 140 MG/ML
INJECTION, SOLUTION SUBCUTANEOUS
COMMUNITY
Start: 2021-03-25 | End: 2022-07-14

## 2021-04-08 RX ORDER — DULOXETIN HYDROCHLORIDE 30 MG/1
CAPSULE, DELAYED RELEASE ORAL
COMMUNITY
Start: 2021-04-08 | End: 2021-04-23 | Stop reason: ALTCHOICE

## 2021-04-08 NOTE — PROGRESS NOTES
[facility] - Outpatient Follow-up Note  Kayla Whatley 60 y o  female MRN: [id number]  Encounter: [id number]          ASSESSMENT AND PLAN:      1  Gastroparesis   status post Botox 2019 doing better  Recently diagnosed with diabetes currently on metformin  Takes Motegrity for constipation    2  Gastroesophageal reflux disease with esophagitis without hemorrhage    Well controlled occasional Carafate is required  We will stop her pantoprazole and start famotidine    3  Colon cancer screening   up-to-date she is due for colonoscopy in June she had 11 polyps last year  They were hyperplastic  4  Constipation by delayed colonic transit    Improved since on metformin she likewise takes motegrity    5  Bloating   continue mode take rate 18 we will initiate aligned as well as FD Guard  She will continue to avoid dairy products  6  Lactose intolerance   continue to avoid milk products  She will follow-up in 3 months in the meantime will attempt to obtain her CT scan from [facility]  ______________________________________________________________________    SUBJECTIVE:    Very pleasant 56-year-old lady with history gastroparesis  She is status post Botox 2019  She had a colonoscopy last year 12 polyps removed hyperplastic in nature  She additionally had a bleeding hyperplastic polyp removed from the stomach in 2019  She is due for colonoscopy later this year  She does complain of bloating  Since placed on metformin after a hospitalization at [facility] her bowel habits of markedly improved  No melena bright red blood per rectum  She occasionally has reflux symptoms she is on pantoprazole twice a day  We discussed stopping that we will start famotidine  She will avoid in soluble fibers  She will continue with reflux precautions  She is exercising and doing water walking  REVIEW OF SYSTEMS IS OTHERWISE NEGATIVE  Historical Information   History reviewed   No pertinent past medical history  History reviewed  No pertinent surgical history  Social History   Social History     Substance and Sexual Activity   Alcohol Use None     Social History     Substance and Sexual Activity   Drug Use Not on file     Social History     Tobacco Use   Smoking Status Not on file     History reviewed  No pertinent family history  Meds/Allergies       Current Outpatient Medications:     albuterol (PROVENTIL HFA,VENTOLIN HFA) 90 mcg/act inhaler    calcium carbonate-vitamin D (OSCAL-D) 500 mg-200 units per tablet    clopidogrel (PLAVIX) 75 mg tablet    DULoxetine (CYMBALTA) 30 mg delayed release capsule    gemfibrozil (LOPID) 600 mg tablet    levothyroxine 88 mcg tablet    LORazepam (ATIVAN) 1 mg tablet    metFORMIN (GLUCOPHAGE) 500 mg tablet    metoprolol tartrate (LOPRESSOR) 25 mg tablet    Motegrity 2 MG TABS    nystatin (MYCOSTATIN) 500,000 units/5 mL suspension    pramipexole (MIRAPEX) 0 125 mg tablet    pyridoxine (VITAMIN B6) 100 mg tablet    Repatha SureClick 391 MG/ML SOAJ    Spiriva Respimat 2 5 MCG/ACT AERS inhaler    Allergies   Allergen Reactions    Reglan [Metoclopramide] Other (See Comments)     Flares her neuropathy           Objective     Blood pressure 127/69, pulse 65, height 5' 6" (1 676 m), weight 103 kg (227 lb)  Body mass index is 36 64 kg/m²  PHYSICAL EXAM:      General Appearance:   Alert, cooperative, no distress   HEENT:   Normocephalic, atraumatic, anicteric      Neck:  Supple, symmetrical, trachea midline   Lungs:   Clear to auscultation bilaterally; no rales, rhonchi or wheezing; respirations unlabored    Heart[de-identified]   Regular rate and rhythm; no murmur, rub, or gallop     Abdomen:   Soft, non-tender, non-distended; normal bowel sounds; no masses, no organomegaly    Genitalia:   Deferred    Rectal:   Deferred    Extremities:  No cyanosis, clubbing or edema    Pulses:  2+ and symmetric    Skin:  No jaundice, rashes, or lesions    Lymph nodes:  No palpable cervical lymphadenopathy        Lab Results:   No visits with results within 1 Day(s) from this visit  Latest known visit with results is:   No results found for any previous visit  Radiology Results:   No results found

## 2021-04-12 NOTE — TELEPHONE ENCOUNTER
Spoke to the patient who is aware and understands to stop plavix 5 days prior to the procedure    Thanks [employer]

## 2021-04-15 ENCOUNTER — IMMUNIZATIONS (OUTPATIENT)
Dept: FAMILY MEDICINE CLINIC | Facility: HOSPITAL | Age: 66
End: 2021-04-15

## 2021-04-15 DIAGNOSIS — Z23 ENCOUNTER FOR IMMUNIZATION: Primary | ICD-10-CM

## 2021-04-15 PROCEDURE — 91301 SARS-COV-2 / COVID-19 MRNA VACCINE (MODERNA) 100 MCG: CPT

## 2021-04-15 PROCEDURE — 0012A SARS-COV-2 / COVID-19 MRNA VACCINE (MODERNA) 100 MCG: CPT

## 2021-04-19 ENCOUNTER — APPOINTMENT (OUTPATIENT)
Dept: LAB | Age: 66
End: 2021-04-19
Payer: MEDICARE

## 2021-04-19 DIAGNOSIS — E78.2 MIXED HYPERLIPIDEMIA: ICD-10-CM

## 2021-04-19 DIAGNOSIS — I10 ESSENTIAL HYPERTENSION: ICD-10-CM

## 2021-04-19 DIAGNOSIS — K76.0 FATTY LIVER: ICD-10-CM

## 2021-04-19 DIAGNOSIS — C80.1 MALIGNANT (PRIMARY) NEOPLASM, UNSPECIFIED (HCC): ICD-10-CM

## 2021-04-19 DIAGNOSIS — E55.9 VITAMIN D DEFICIENCY: ICD-10-CM

## 2021-04-19 DIAGNOSIS — R73.01 IMPAIRED FASTING GLUCOSE: ICD-10-CM

## 2021-04-19 DIAGNOSIS — G25.81 RESTLESS LEG SYNDROME: ICD-10-CM

## 2021-04-19 DIAGNOSIS — I25.119 CORONARY ARTERY DISEASE WITH ANGINA PECTORIS, UNSPECIFIED VESSEL OR LESION TYPE, UNSPECIFIED WHETHER NATIVE OR TRANSPLANTED HEART (HCC): ICD-10-CM

## 2021-04-19 DIAGNOSIS — G60.9 IDIOPATHIC PERIPHERAL NEUROPATHY: ICD-10-CM

## 2021-04-19 DIAGNOSIS — R73.9 HYPERGLYCEMIA: ICD-10-CM

## 2021-04-19 LAB
25(OH)D3 SERPL-MCNC: 86.6 NG/ML (ref 30–100)
ALBUMIN SERPL BCP-MCNC: 3.8 G/DL (ref 3.5–5)
ALP SERPL-CCNC: 40 U/L (ref 46–116)
ALT SERPL W P-5'-P-CCNC: 39 U/L (ref 12–78)
ANION GAP SERPL CALCULATED.3IONS-SCNC: 4 MMOL/L (ref 4–13)
AST SERPL W P-5'-P-CCNC: 18 U/L (ref 5–45)
BASOPHILS # BLD AUTO: 0.07 THOUSANDS/ΜL (ref 0–0.1)
BASOPHILS NFR BLD AUTO: 1 % (ref 0–1)
BILIRUB SERPL-MCNC: 0.53 MG/DL (ref 0.2–1)
BUN SERPL-MCNC: 19 MG/DL (ref 5–25)
CALCIUM SERPL-MCNC: 9.9 MG/DL (ref 8.3–10.1)
CHLORIDE SERPL-SCNC: 108 MMOL/L (ref 100–108)
CHOLEST SERPL-MCNC: 148 MG/DL (ref 50–200)
CO2 SERPL-SCNC: 28 MMOL/L (ref 21–32)
CREAT SERPL-MCNC: 0.92 MG/DL (ref 0.6–1.3)
EOSINOPHIL # BLD AUTO: 0.35 THOUSAND/ΜL (ref 0–0.61)
EOSINOPHIL NFR BLD AUTO: 5 % (ref 0–6)
ERYTHROCYTE [DISTWIDTH] IN BLOOD BY AUTOMATED COUNT: 14.7 % (ref 11.6–15.1)
EST. AVERAGE GLUCOSE BLD GHB EST-MCNC: 131 MG/DL
FERRITIN SERPL-MCNC: 18 NG/ML (ref 8–388)
GFR SERPL CREATININE-BSD FRML MDRD: 66 ML/MIN/1.73SQ M
GLUCOSE P FAST SERPL-MCNC: 86 MG/DL (ref 65–99)
HBA1C MFR BLD: 6.2 %
HCT VFR BLD AUTO: 40.9 % (ref 34.8–46.1)
HDLC SERPL-MCNC: 60 MG/DL
HGB BLD-MCNC: 12.7 G/DL (ref 11.5–15.4)
IMM GRANULOCYTES # BLD AUTO: 0.03 THOUSAND/UL (ref 0–0.2)
IMM GRANULOCYTES NFR BLD AUTO: 0 % (ref 0–2)
IRON SATN MFR SERPL: 16 %
IRON SERPL-MCNC: 77 UG/DL (ref 50–170)
LDLC SERPL CALC-MCNC: 69 MG/DL (ref 0–100)
LYMPHOCYTES # BLD AUTO: 2.44 THOUSANDS/ΜL (ref 0.6–4.47)
LYMPHOCYTES NFR BLD AUTO: 32 % (ref 14–44)
MCH RBC QN AUTO: 28.7 PG (ref 26.8–34.3)
MCHC RBC AUTO-ENTMCNC: 31.1 G/DL (ref 31.4–37.4)
MCV RBC AUTO: 92 FL (ref 82–98)
MONOCYTES # BLD AUTO: 0.82 THOUSAND/ΜL (ref 0.17–1.22)
MONOCYTES NFR BLD AUTO: 11 % (ref 4–12)
NEUTROPHILS # BLD AUTO: 3.87 THOUSANDS/ΜL (ref 1.85–7.62)
NEUTS SEG NFR BLD AUTO: 51 % (ref 43–75)
NONHDLC SERPL-MCNC: 88 MG/DL
NRBC BLD AUTO-RTO: 0 /100 WBCS
PLATELET # BLD AUTO: 351 THOUSANDS/UL (ref 149–390)
PMV BLD AUTO: 9.8 FL (ref 8.9–12.7)
POTASSIUM SERPL-SCNC: 4.5 MMOL/L (ref 3.5–5.3)
PROT SERPL-MCNC: 7.6 G/DL (ref 6.4–8.2)
RBC # BLD AUTO: 4.43 MILLION/UL (ref 3.81–5.12)
SODIUM SERPL-SCNC: 140 MMOL/L (ref 136–145)
TIBC SERPL-MCNC: 473 UG/DL (ref 250–450)
TRIGL SERPL-MCNC: 95 MG/DL
TSH SERPL DL<=0.05 MIU/L-ACNC: 1.62 UIU/ML (ref 0.36–3.74)
VIT B12 SERPL-MCNC: 951 PG/ML (ref 100–900)
WBC # BLD AUTO: 7.58 THOUSAND/UL (ref 4.31–10.16)

## 2021-04-19 PROCEDURE — 83540 ASSAY OF IRON: CPT

## 2021-04-19 PROCEDURE — 80053 COMPREHEN METABOLIC PANEL: CPT

## 2021-04-19 PROCEDURE — 80061 LIPID PANEL: CPT

## 2021-04-19 PROCEDURE — 82306 VITAMIN D 25 HYDROXY: CPT

## 2021-04-19 PROCEDURE — 83550 IRON BINDING TEST: CPT

## 2021-04-19 PROCEDURE — 84443 ASSAY THYROID STIM HORMONE: CPT

## 2021-04-19 PROCEDURE — 83036 HEMOGLOBIN GLYCOSYLATED A1C: CPT

## 2021-04-19 PROCEDURE — 36415 COLL VENOUS BLD VENIPUNCTURE: CPT

## 2021-04-19 PROCEDURE — 85025 COMPLETE CBC W/AUTO DIFF WBC: CPT

## 2021-04-19 PROCEDURE — 82728 ASSAY OF FERRITIN: CPT

## 2021-04-19 PROCEDURE — 82607 VITAMIN B-12: CPT

## 2021-04-23 ENCOUNTER — OFFICE VISIT (OUTPATIENT)
Dept: FAMILY MEDICINE CLINIC | Facility: CLINIC | Age: 66
End: 2021-04-23
Payer: MEDICARE

## 2021-04-23 VITALS
TEMPERATURE: 98.5 F | HEIGHT: 66 IN | BODY MASS INDEX: 35.2 KG/M2 | WEIGHT: 219 LBS | DIASTOLIC BLOOD PRESSURE: 76 MMHG | SYSTOLIC BLOOD PRESSURE: 136 MMHG | HEART RATE: 84 BPM | OXYGEN SATURATION: 97 %

## 2021-04-23 DIAGNOSIS — E03.9 HYPOTHYROIDISM, UNSPECIFIED TYPE: ICD-10-CM

## 2021-04-23 DIAGNOSIS — G47.33 OSA ON CPAP: ICD-10-CM

## 2021-04-23 DIAGNOSIS — Z99.89 OSA ON CPAP: ICD-10-CM

## 2021-04-23 DIAGNOSIS — G62.9 PERIPHERAL POLYNEUROPATHY: ICD-10-CM

## 2021-04-23 DIAGNOSIS — I25.10 ATHEROSCLEROSIS OF NATIVE CORONARY ARTERY OF NATIVE HEART WITHOUT ANGINA PECTORIS: Primary | ICD-10-CM

## 2021-04-23 DIAGNOSIS — E08.610 DIABETES MELLITUS DUE TO UNDERLYING CONDITION WITH DIABETIC NEUROPATHIC ARTHROPATHY, WITHOUT LONG-TERM CURRENT USE OF INSULIN (HCC): ICD-10-CM

## 2021-04-23 DIAGNOSIS — I61.9 CEREBROVASCULAR SMALL VESSEL DISEASE WITH HEMORRHAGE (HCC): ICD-10-CM

## 2021-04-23 DIAGNOSIS — M17.0 PRIMARY OSTEOARTHRITIS OF BOTH KNEES: ICD-10-CM

## 2021-04-23 DIAGNOSIS — I10 ESSENTIAL HYPERTENSION: ICD-10-CM

## 2021-04-23 DIAGNOSIS — K59.01 CONSTIPATION BY DELAYED COLONIC TRANSIT: ICD-10-CM

## 2021-04-23 PROCEDURE — 99214 OFFICE O/P EST MOD 30 MIN: CPT | Performed by: FAMILY MEDICINE

## 2021-04-23 NOTE — ASSESSMENT & PLAN NOTE
Essential hypertension under stable control continue current medications metoprolol heart healthy diet avoiding sodium and re-evaluate at next office visit

## 2021-04-23 NOTE — ASSESSMENT & PLAN NOTE
Heart disease has been currently stable with medication regimen now monitoring lipid profile and keeping blood pressure under stable control and following up with Cardiology continue with Plavix and metoprolol and baby aspirin as directed

## 2021-04-23 NOTE — ASSESSMENT & PLAN NOTE
Patient is doing well without neurologic change or deficit over the last several months since last office visit she will continue with combination Plavix and low-dose aspirin follow-up with neurology as needed yearly and recheck here at next office visit

## 2021-04-23 NOTE — ASSESSMENT & PLAN NOTE
Peripheral polyneuropathy currently stable patient notes at times neuropathic pain into her lower extremities but this is been under better control with duloxetine to continue medication without change follow-up as scheduled at next office visit

## 2021-04-23 NOTE — PROGRESS NOTES
BMI Counseling: Body mass index is 35 35 kg/m²  The BMI is above normal  Nutrition recommendations include reducing portion sizes, decreasing overall calorie intake, consuming healthier snacks, decreasing soda and/or juice intake, increasing intake of lean protein, reducing intake of saturated fat and trans fat and reducing intake of cholesterol  Exercise recommendations include exercising 3-5 times per week

## 2021-04-23 NOTE — PROGRESS NOTES
Assessment/Plan:       Problem List Items Addressed This Visit        Digestive    Constipation by delayed colonic transit     Overall improved with metformin continue medication as directed         Relevant Orders    Comprehensive metabolic panel    Lipid panel    CBC and differential    TSH, 3rd generation with Free T4 reflex    Hemoglobin A1C       Endocrine    Hypothyroidism      Continue current medications follow-up TSH T4         Relevant Orders    Comprehensive metabolic panel    Lipid panel    CBC and differential    TSH, 3rd generation with Free T4 reflex    Hemoglobin A1C       Respiratory    BRAYDEN on CPAP      Sleep apnea on CPAP stable no change in settings follow-up at next office visit         Relevant Orders    Comprehensive metabolic panel    Lipid panel    CBC and differential    TSH, 3rd generation with Free T4 reflex    Hemoglobin A1C       Cardiovascular and Mediastinum    Atherosclerotic heart disease of native coronary artery without angina pectoris - Primary     Heart disease has been currently stable with medication regimen now monitoring lipid profile and keeping blood pressure under stable control and following up with Cardiology continue with Plavix and metoprolol and baby aspirin as directed         Relevant Orders    Comprehensive metabolic panel    Lipid panel    CBC and differential    TSH, 3rd generation with Free T4 reflex    Hemoglobin A1C    Essential hypertension      Essential hypertension under stable control continue current medications metoprolol heart healthy diet avoiding sodium and re-evaluate at next office visit         Relevant Orders    Comprehensive metabolic panel    Lipid panel    CBC and differential    TSH, 3rd generation with Free T4 reflex    Hemoglobin A1C    Cerebrovascular small vessel disease with hemorrhage [facility])      Patient is doing well without neurologic change or deficit over the last several months since last office visit she will continue with combination Plavix and low-dose aspirin follow-up with neurology as needed yearly and recheck here at next office visit         Relevant Orders    Comprehensive metabolic panel    Lipid panel    CBC and differential    TSH, 3rd generation with Free T4 reflex    Hemoglobin A1C       Nervous and Auditory    Peripheral polyneuropathy      Peripheral polyneuropathy currently stable patient notes at times neuropathic pain into her lower extremities but this is been under better control with duloxetine to continue medication without change follow-up as scheduled at next office visit         Relevant Orders    Comprehensive metabolic panel    Lipid panel    CBC and differential    TSH, 3rd generation with Free T4 reflex    Hemoglobin A1C       Musculoskeletal and Integument    Primary osteoarthritis of both knees     Patient follows up with Orthopedics Dr REBECCA Lopez also she will add turmuric  Relevant Orders    Comprehensive metabolic panel    Lipid panel    CBC and differential    TSH, 3rd generation with Free T4 reflex    Hemoglobin A1C      Other Visit Diagnoses     Diabetes mellitus due to underlying condition with diabetic neuropathic arthropathy, without long-term current use of insulin (HCC)         Relevant Orders    Hemoglobin A1C            Subjective:      Patient ID: Janel Birmingham is a 60 y o  female  Patient presents for lab review checkup discussion about medications in overall health  Cant lose weight, knee pain and generalized arthritic pains in hips knees feet back  Metformin has help with bowel regularity  The following portions of the patient's history were reviewed and updated as appropriate: allergies, current medications, past family history, past medical history, past social history, past surgical history and problem list     Review of Systems   Constitutional: Negative for chills, fatigue and fever     HENT: Negative for congestion, nosebleeds, rhinorrhea, sinus pressure and sore throat  Eyes: Negative for discharge and redness  Respiratory: Negative for cough and shortness of breath  Cardiovascular: Negative for chest pain, palpitations and leg swelling  Gastrointestinal: Negative for abdominal pain, blood in stool and nausea  Endocrine: Negative for cold intolerance, heat intolerance and polyuria  Genitourinary: Negative for dysuria and frequency  Musculoskeletal: Positive for arthralgias  Negative for back pain and myalgias  Skin: Negative for rash  Neurological: Negative for dizziness, weakness and headaches  Hematological: Negative for adenopathy  Psychiatric/Behavioral: Negative for behavioral problems and sleep disturbance  The patient is not nervous/anxious  Objective:      /76   Pulse 84   Temp 98 5 °F (36 9 °C)   Ht 5' 6" (1 676 m)   Wt 99 3 kg (219 lb)   SpO2 97%   BMI 35 35 kg/m²        Physical Exam  Vitals signs and nursing note reviewed  Constitutional:       General: She is not in acute distress  Appearance: Normal appearance  She is well-developed  HENT:      Head: Normocephalic and atraumatic  Right Ear: Tympanic membrane and external ear normal       Left Ear: Tympanic membrane and external ear normal       Nose: Nose normal       Mouth/Throat:      Mouth: Mucous membranes are moist       Pharynx: Oropharynx is clear  No oropharyngeal exudate  Eyes:      General: No scleral icterus  Right eye: No discharge  Left eye: No discharge  Conjunctiva/sclera: Conjunctivae normal       Pupils: Pupils are equal, round, and reactive to light  Neck:      Musculoskeletal: Normal range of motion  Thyroid: No thyromegaly  Vascular: No JVD  Cardiovascular:      Rate and Rhythm: Normal rate and regular rhythm  Heart sounds: Normal heart sounds  No murmur  Pulmonary:      Effort: Pulmonary effort is normal       Breath sounds: No wheezing or rales  Chest:      Chest wall: No tenderness  Abdominal:      General: Bowel sounds are normal  There is no distension  Palpations: Abdomen is soft  There is no mass  Tenderness: There is no abdominal tenderness  Musculoskeletal: Normal range of motion  General: No tenderness or deformity  Lymphadenopathy:      Cervical: No cervical adenopathy  Skin:     General: Skin is warm and dry  Findings: No rash  Neurological:      General: No focal deficit present  Mental Status: She is alert and oriented to person, place, and time  Cranial Nerves: No cranial nerve deficit  Coordination: Coordination normal       Deep Tendon Reflexes: Reflexes are normal and symmetric  Reflexes normal    Psychiatric:         Mood and Affect: Mood normal          Behavior: Behavior normal          Thought Content: Thought content normal          Judgment: Judgment normal           Data:    Laboratory Results: I have personally reviewed the pertinent laboratory results/reports   Radiology/Other Diagnostic Testing Results: I have personally reviewed pertinent reports         Lab Results   Component Value Date    WBC 7 58 04/19/2021    HGB 12 7 04/19/2021    HCT 40 9 04/19/2021    MCV 92 04/19/2021     04/19/2021     Lab Results   Component Value Date    K 4 5 04/19/2021     04/19/2021    CO2 28 04/19/2021    BUN 19 04/19/2021    CREATININE 0 92 04/19/2021    GLUF 86 04/19/2021    CALCIUM 9 9 04/19/2021    AST 18 04/19/2021    ALT 39 04/19/2021    ALKPHOS 40 (L) 04/19/2021    EGFR 66 04/19/2021     Lab Results   Component Value Date    CHOLESTEROL 148 04/19/2021    CHOLESTEROL 161 06/09/2020    CHOLESTEROL 131 07/11/2018     Lab Results   Component Value Date    HDL 60 04/19/2021    HDL 51 06/09/2020    HDL 40 07/11/2018     Lab Results   Component Value Date    LDLCALC 69 04/19/2021    LDLCALC 80 06/09/2020    LDLCALC 65 07/11/2018     Lab Results   Component Value Date    TRIG 95 04/19/2021    TRIG 151 (H) 06/09/2020    TRIG 130 07/11/2018     No results found for: [place], [place]  Lab Results   Component Value Date    [id number] 1 620 04/19/2021     Lab Results   Component Value Date    HGBA1C 6 2 (H) 04/19/2021     No results found for: MIGUEL Reid DO

## 2021-04-27 ENCOUNTER — TELEPHONE (OUTPATIENT)
Dept: GASTROENTEROLOGY | Facility: CLINIC | Age: 66
End: 2021-04-27

## 2021-04-27 NOTE — TELEPHONE ENCOUNTER
Pt lmom to schedule appt with Dr Samantha Whiting for Stomach pain  LMOM advising her to call the office back to get scheduled

## 2021-04-28 ENCOUNTER — PREP FOR PROCEDURE (OUTPATIENT)
Dept: OTHER | Facility: HOSPITAL | Age: 66
End: 2021-04-28

## 2021-04-28 ENCOUNTER — TELEPHONE (OUTPATIENT)
Dept: GASTROENTEROLOGY | Facility: CLINIC | Age: 66
End: 2021-04-28

## 2021-04-28 DIAGNOSIS — K31.84 GASTROPARESIS: Primary | ICD-10-CM

## 2021-04-28 DIAGNOSIS — K21.00 GASTROESOPHAGEAL REFLUX DISEASE WITH ESOPHAGITIS WITHOUT HEMORRHAGE: ICD-10-CM

## 2021-04-28 DIAGNOSIS — R13.10 DYSPHAGIA, UNSPECIFIED TYPE: ICD-10-CM

## 2021-04-28 DIAGNOSIS — E61.1 IRON DEFICIENCY: ICD-10-CM

## 2021-04-28 DIAGNOSIS — K21.9 GASTROESOPHAGEAL REFLUX DISEASE WITHOUT ESOPHAGITIS: ICD-10-CM

## 2021-04-28 DIAGNOSIS — K31.7 GASTRIC POLYPS: ICD-10-CM

## 2021-04-28 RX ORDER — PANTOPRAZOLE SODIUM 40 MG/1
40 TABLET, DELAYED RELEASE ORAL DAILY
Qty: 90 TABLET | Refills: 0 | Status: SHIPPED | OUTPATIENT
Start: 2021-04-28 | End: 2021-10-19 | Stop reason: SDUPTHER

## 2021-04-28 NOTE — TELEPHONE ENCOUNTER
Spoke with patient, she denies any melena or hematochezia  She does report increased reflux symptoms since Dr Heladio Bowling stopped her PPI BID and put her on Pepcid earlier this month  She also notes intermittent dysphagia  She denies any nausea/vomiting, abdominal pain, constipation, diarrhea  Recent labs reviewed, her ferritin is low and she is scheduled for iron infusions with her hematologist Dr Lionel Essex  Hgb remains stable  Discussed with pt she can start PPI daily  Will obtain occult stool and add EGD to colonoscopy already ordered for June

## 2021-04-28 NOTE — TELEPHONE ENCOUNTER
Patient called to schedule appointment with Dr Dhara Sommer at the direction of her oncologist, Dr Colby Hernandez with [facility]  She had labs done that indicated possible internal bleeding  She is scheduled for a colonoscopy 6/1 and a FU in August   She is experiencing increased reflux pain, Right sided upper back pain and stabbing pains in the stomach area  She is scheduled for an iron infusion also  Dr Azul Perez next available appt in [facility] is 9/2/21  [place] in [place], she feels to ill to travel further  Please call PT to discuss and advise      Thank you

## 2021-05-02 DIAGNOSIS — I25.119 CORONARY ARTERY DISEASE INVOLVING NATIVE HEART WITH ANGINA PECTORIS, UNSPECIFIED VESSEL OR LESION TYPE (HCC): ICD-10-CM

## 2021-05-03 ENCOUNTER — APPOINTMENT (OUTPATIENT)
Dept: LAB | Facility: CLINIC | Age: 66
End: 2021-05-03
Payer: MEDICARE

## 2021-05-03 RX ORDER — METOPROLOL TARTRATE 50 MG/1
50 TABLET, FILM COATED ORAL 2 TIMES DAILY
Qty: 180 TABLET | Refills: 0 | Status: SHIPPED | OUTPATIENT
Start: 2021-05-03 | End: 2021-08-02

## 2021-05-04 DIAGNOSIS — K59.01 CONSTIPATION BY DELAYED COLONIC TRANSIT: Primary | ICD-10-CM

## 2021-05-04 RX ORDER — DOCUSATE SODIUM 100 MG/1
100 CAPSULE, LIQUID FILLED ORAL
Qty: 90 CAPSULE | Refills: 0 | Status: SHIPPED | OUTPATIENT
Start: 2021-05-04 | End: 2021-08-18 | Stop reason: SDUPTHER

## 2021-05-05 ENCOUNTER — TELEPHONE (OUTPATIENT)
Dept: GASTROENTEROLOGY | Facility: CLINIC | Age: 66
End: 2021-05-05

## 2021-05-05 NOTE — TELEPHONE ENCOUNTER
Pt called to let you know she had her first iron infusion yesterday and at 12:30 am she woke up with chest and abdominal pain that lasted for 20 minutes  She is feeling good now  I advised her to let her cardiologist and pcp know  Also I advised her to go to the ER if this happens again    She is scheduled for a flip and is taking her PPI

## 2021-05-07 ENCOUNTER — PATIENT MESSAGE (OUTPATIENT)
Dept: NEUROLOGY | Facility: CLINIC | Age: 66
End: 2021-05-07

## 2021-05-09 DIAGNOSIS — F41.1 GENERALIZED ANXIETY DISORDER: ICD-10-CM

## 2021-05-10 DIAGNOSIS — R73.9 HYPERGLYCEMIA: ICD-10-CM

## 2021-05-10 DIAGNOSIS — K59.01 CONSTIPATION BY DELAYED COLONIC TRANSIT: Primary | ICD-10-CM

## 2021-05-10 DIAGNOSIS — I25.10 ATHEROSCLEROSIS OF NATIVE CORONARY ARTERY OF NATIVE HEART WITHOUT ANGINA PECTORIS: ICD-10-CM

## 2021-05-10 RX ORDER — LEVOTHYROXINE SODIUM 88 UG/1
88 TABLET ORAL DAILY
Qty: 90 TABLET | Refills: 0 | Status: SHIPPED | OUTPATIENT
Start: 2021-05-10 | End: 2021-08-02

## 2021-05-10 RX ORDER — LORAZEPAM 1 MG/1
1 TABLET ORAL 2 TIMES DAILY
Qty: 60 TABLET | Refills: 0 | Status: SHIPPED | OUTPATIENT
Start: 2021-05-10 | End: 2021-07-16 | Stop reason: SDUPTHER

## 2021-05-20 ENCOUNTER — OFFICE VISIT (OUTPATIENT)
Dept: GASTROENTEROLOGY | Facility: CLINIC | Age: 66
End: 2021-05-20
Payer: MEDICARE

## 2021-05-20 VITALS
HEART RATE: 80 BPM | HEIGHT: 66 IN | BODY MASS INDEX: 35.2 KG/M2 | DIASTOLIC BLOOD PRESSURE: 98 MMHG | SYSTOLIC BLOOD PRESSURE: 142 MMHG | WEIGHT: 219 LBS

## 2021-05-20 DIAGNOSIS — E73.9 LACTOSE INTOLERANCE: ICD-10-CM

## 2021-05-20 DIAGNOSIS — R14.0 BLOATING: ICD-10-CM

## 2021-05-20 DIAGNOSIS — E61.1 IRON DEFICIENCY: ICD-10-CM

## 2021-05-20 DIAGNOSIS — K21.9 GASTROESOPHAGEAL REFLUX DISEASE WITHOUT ESOPHAGITIS: ICD-10-CM

## 2021-05-20 DIAGNOSIS — K31.84 GASTROPARESIS: ICD-10-CM

## 2021-05-20 DIAGNOSIS — K59.01 CONSTIPATION BY DELAYED COLONIC TRANSIT: Primary | ICD-10-CM

## 2021-05-20 PROCEDURE — 99214 OFFICE O/P EST MOD 30 MIN: CPT | Performed by: INTERNAL MEDICINE

## 2021-05-20 NOTE — PROGRESS NOTES
[facility] - Outpatient Follow-up Note  Reba Cartwright 60 y o  female MRN: [id number]  Encounter: [id number]          ASSESSMENT AND PLAN:      1  Constipation by delayed colonic transit    Much improved after starting metformin she is having  least 2 bowel movements a day  2  Gastroesophageal reflux disease without esophagitis    Controlled on famotidine  3  Iron deficiency    Apparently got iron infusion saturation 16% she is due for EGD colonoscopy  4  Lactose intolerance    Controlled by omission of lactose    5  Gastroparesis   responded to Botox in the past will repeat in her upcoming EGD colonoscopy  6  Bloating    Possibly components of diabetes  Will give a trial of meant that XL  She will stop FD Guard  Botox is planned  She will otherwise follow-up in 3 months     ______________________________________________________________________    SUBJECTIVE:    Very pleasant white female we see for low transit constipation bloating gastroesophageal reflux iron deficiency lactose intolerance gastroparesis, and multiple colon polyps  Last colonoscopy   She is due this year  She has no melena bright red blood per rectum  Had hemoccults x3 which were negative  Most recent iron saturation was 16% her iron level was 77  CBC was normal       REVIEW OF SYSTEMS IS OTHERWISE NEGATIVE        Historical Information   Past Medical History:   Diagnosis Date    Anxiety     Breast cancer ([facility] )     CAD (coronary artery disease)     Cancer ([facility] )     Carpal tunnel syndrome     Disease of thyroid gland     Elevated cholesterol     Fibromyalgia     Hypertension     Kidney stone     Melanoma ([facility] )     nose    Myocardial infarction (HCC)     Neuropathy     BRAYDEN (obstructive sleep apnea)     cpap    Panic attack      Past Surgical History:   Procedure Laterality Date    BREAST SURGERY Bilateral     CARDIAC SURGERY      cardiac ablation     SECTION      x3    CHOLECYSTECTOMY      CORONARY ANGIOPLASTY WITH STENT PLACEMENT      MASTECTOMY Bilateral     NOSE SURGERY       Social History   Social History     Substance and Sexual Activity   Alcohol Use Yes    Frequency: Monthly or less    Drinks per session: 1 or 2    Binge frequency: Never    Comment: beer     Social History     Substance and Sexual Activity   Drug Use Yes    Types: Marijuana    Comment: Medical reilly     Social History     Tobacco Use   Smoking Status Never Smoker   Smokeless Tobacco Never Used     Family History   Problem Relation Age of Onset    Heart attack Mother     Alcohol abuse Mother     Heart failure Mother        Meds/Allergies       Current Outpatient Medications:     albuterol (2 5 mg/3 mL) 0 083 % nebulizer solution    albuterol (PROVENTIL HFA,VENTOLIN HFA) 90 mcg/act inhaler    aspirin (ASPIRIN 81) 81 mg EC tablet    B Complex Vitamins (B COMPLEX 1 PO)    Blood Glucose Monitoring Suppl (ONE TOUCH ULTRA 2) w/Device KIT    budesonide-formoterol (Symbicort) 160-4 5 mcg/act inhaler    calcium carbonate-vitamin D (OSCAL-D) 500 mg-200 units per tablet    clopidogrel (PLAVIX) 75 mg tablet    docusate sodium (COLACE) 100 mg capsule    DULoxetine (CYMBALTA) 30 mg delayed release capsule    DULoxetine (CYMBALTA) 60 mg delayed release capsule    Evolocumab 140 MG/ML SOAJ    famotidine (PEPCID) 40 MG tablet    gemfibrozil (LOPID) 600 mg tablet    glucose blood (OneTouch Ultra) test strip    Lancets (onetouch ultrasoft) lancets    levothyroxine 88 mcg tablet    LORazepam (ATIVAN) 1 mg tablet    melatonin 3 mg    metFORMIN (GLUCOPHAGE) 500 mg tablet    metoprolol tartrate (LOPRESSOR) 50 mg tablet    Misc   Devices (GETGO ROLLING WALKER) MISC    Motegrity 2 MG TABS    pantoprazole (PROTONIX) 40 mg tablet    pramipexole (MIRAPEX) 0 125 mg tablet    pyridoxine (CVS B6) 100 MG tablet    Repatha SureClick 941 MG/ML SOAJ    Spiriva Respimat 2 5 MCG/ACT AERS inhaler   sucralfate (CARAFATE) 1 g tablet    Allergies   Allergen Reactions    Metoclopramide Other (See Comments)    Nitrofurantoin Other (See Comments)     Very weak  Fell    Isosorbide      Other reaction(s): headache    Pregabalin      Pt states made her feel suicidal     Reglan [Metoclopramide] Other (See Comments)     Flares her neuropathy           Objective     Blood pressure 142/98, pulse 80, height 5' 6" (1 676 m), weight 99 3 kg (219 lb)  Body mass index is 35 35 kg/m²  PHYSICAL EXAM:      General Appearance:   Alert, cooperative, no distress   HEENT:   Normocephalic, atraumatic, anicteric      Neck:  Supple, symmetrical, trachea midline   Lungs:   Clear to auscultation bilaterally; no rales, rhonchi or wheezing; respirations unlabored    Heart[de-identified]   Regular rate and rhythm; no murmur, rub, or gallop  Abdomen:   Soft, non-tender, non-distended; normal bowel sounds; no masses, no organomegaly    Genitalia:   Deferred    Rectal:   Deferred    Extremities:  No cyanosis, clubbing or edema    Pulses:  2+ and symmetric    Skin:  No jaundice, rashes, or lesions    Lymph nodes:  No palpable cervical lymphadenopathy        Lab Results:   No visits with results within 1 Day(s) from this visit  Latest known visit with results is:   Appointment on 04/30/2021   Component Date Value    Fecal Occult Blood Diagn* 05/03/2021 Negative     Fecal Occult Blood Diagn* 05/03/2021 Negative     Fecal Occult Blood Diagn* 05/03/2021 Negative          Radiology Results:   No results found

## 2021-05-20 NOTE — H&P (VIEW-ONLY)
[facility] - Outpatient Follow-up Note  Janel Birmingham 60 y o  female MRN: [id number]  Encounter: [id number]          ASSESSMENT AND PLAN:      1  Constipation by delayed colonic transit    Much improved after starting metformin she is having  least 2 bowel movements a day  2  Gastroesophageal reflux disease without esophagitis    Controlled on famotidine  3  Iron deficiency    Apparently got iron infusion saturation 16% she is due for EGD colonoscopy  4  Lactose intolerance    Controlled by omission of lactose    5  Gastroparesis   responded to Botox in the past will repeat in her upcoming EGD colonoscopy  6  Bloating    Possibly components of diabetes  Will give a trial of meant that XL  She will stop FD Guard  Botox is planned  She will otherwise follow-up in 3 months     ______________________________________________________________________    SUBJECTIVE:    Very pleasant white female we see for low transit constipation bloating gastroesophageal reflux iron deficiency lactose intolerance gastroparesis, and multiple colon polyps  Last colonoscopy   She is due this year  She has no melena bright red blood per rectum  Had hemoccults x3 which were negative  Most recent iron saturation was 16% her iron level was 77  CBC was normal       REVIEW OF SYSTEMS IS OTHERWISE NEGATIVE        Historical Information   Past Medical History:   Diagnosis Date    Anxiety     [facility] ([facility] )     CAD (coronary artery disease)     Cancer ([facility] )     Carpal tunnel syndrome     Disease of thyroid gland     Elevated cholesterol     Fibromyalgia     Hypertension     Kidney stone     Melanoma ([facility] )     nose    Myocardial infarction (HCC)     Neuropathy     BRAYDEN (obstructive sleep apnea)     cpap    Panic attack      Past Surgical History:   Procedure Laterality Date    BREAST SURGERY Bilateral     CARDIAC SURGERY      cardiac ablation     SECTION      x3    CHOLECYSTECTOMY      CORONARY ANGIOPLASTY WITH STENT PLACEMENT      MASTECTOMY Bilateral     NOSE SURGERY       Social History   Social History     Substance and Sexual Activity   Alcohol Use Yes    Frequency: Monthly or less    Drinks per session: 1 or 2    Binge frequency: Never    Comment: beer     Social History     Substance and Sexual Activity   Drug Use Yes    Types: Marijuana    Comment: Medical reilly     Social History     Tobacco Use   Smoking Status Never Smoker   Smokeless Tobacco Never Used     Family History   Problem Relation Age of Onset    Heart attack Mother     Alcohol abuse Mother     Heart failure Mother        Meds/Allergies       Current Outpatient Medications:     albuterol (2 5 mg/3 mL) 0 083 % nebulizer solution    albuterol (PROVENTIL HFA,VENTOLIN HFA) 90 mcg/act inhaler    aspirin (ASPIRIN 81) 81 mg EC tablet    B Complex Vitamins (B COMPLEX 1 PO)    Blood Glucose Monitoring Suppl (ONE TOUCH ULTRA 2) w/Device KIT    budesonide-formoterol (Symbicort) 160-4 5 mcg/act inhaler    calcium carbonate-vitamin D (OSCAL-D) 500 mg-200 units per tablet    clopidogrel (PLAVIX) 75 mg tablet    docusate sodium (COLACE) 100 mg capsule    DULoxetine (CYMBALTA) 30 mg delayed release capsule    DULoxetine (CYMBALTA) 60 mg delayed release capsule    Evolocumab 140 MG/ML SOAJ    famotidine (PEPCID) 40 MG tablet    gemfibrozil (LOPID) 600 mg tablet    glucose blood (OneTouch Ultra) test strip    Lancets (onetouch ultrasoft) lancets    levothyroxine 88 mcg tablet    LORazepam (ATIVAN) 1 mg tablet    melatonin 3 mg    metFORMIN (GLUCOPHAGE) 500 mg tablet    metoprolol tartrate (LOPRESSOR) 50 mg tablet    Misc   Devices (GETGO ROLLING WALKER) MISC    Motegrity 2 MG TABS    pantoprazole (PROTONIX) 40 mg tablet    pramipexole (MIRAPEX) 0 125 mg tablet    pyridoxine (CVS B6) 100 MG tablet    Repatha SureClick 262 MG/ML SOAJ    Spiriva Respimat 2 5 MCG/ACT AERS inhaler   sucralfate (CARAFATE) 1 g tablet    Allergies   Allergen Reactions    Metoclopramide Other (See Comments)    Nitrofurantoin Other (See Comments)     Very weak  Fell    Isosorbide      Other reaction(s): headache    Pregabalin      Pt states made her feel suicidal     Reglan [Metoclopramide] Other (See Comments)     Flares her neuropathy           Objective     Blood pressure 142/98, pulse 80, height 5' 6" (1 676 m), weight 99 3 kg (219 lb)  Body mass index is 35 35 kg/m²  PHYSICAL EXAM:      General Appearance:   Alert, cooperative, no distress   HEENT:   Normocephalic, atraumatic, anicteric      Neck:  Supple, symmetrical, trachea midline   Lungs:   Clear to auscultation bilaterally; no rales, rhonchi or wheezing; respirations unlabored    Heart[de-identified]   Regular rate and rhythm; no murmur, rub, or gallop  Abdomen:   Soft, non-tender, non-distended; normal bowel sounds; no masses, no organomegaly    Genitalia:   Deferred    Rectal:   Deferred    Extremities:  No cyanosis, clubbing or edema    Pulses:  2+ and symmetric    Skin:  No jaundice, rashes, or lesions    Lymph nodes:  No palpable cervical lymphadenopathy        Lab Results:   No visits with results within 1 Day(s) from this visit  Latest known visit with results is:   Appointment on 04/30/2021   Component Date Value    Fecal Occult Blood Diagn* 05/03/2021 Negative     Fecal Occult Blood Diagn* 05/03/2021 Negative     Fecal Occult Blood Diagn* 05/03/2021 Negative          Radiology Results:   No results found

## 2021-05-25 ENCOUNTER — TELEPHONE (OUTPATIENT)
Dept: NEUROLOGY | Facility: CLINIC | Age: 66
End: 2021-05-25

## 2021-05-25 ENCOUNTER — TELEPHONE (OUTPATIENT)
Dept: GASTROENTEROLOGY | Facility: CLINIC | Age: 66
End: 2021-05-25

## 2021-05-25 DIAGNOSIS — G25.81 RESTLESS LEGS SYNDROME (RLS): ICD-10-CM

## 2021-05-25 RX ORDER — PRAMIPEXOLE DIHYDROCHLORIDE 0.25 MG/1
0.25 TABLET ORAL 3 TIMES DAILY
Qty: 270 TABLET | Refills: 1 | Status: SHIPPED | OUTPATIENT
Start: 2021-05-25 | End: 2021-06-02 | Stop reason: ALTCHOICE

## 2021-05-25 NOTE — TELEPHONE ENCOUNTER
----- Message from Misael Guzmán LPN sent at 6/46/2807  8:31 AM EDT -----  Regarding: FW: Pre-Op/Post-Op Question  Contact: [phone]    ----- Message -----  From: Janel Birmingham  Sent: 5/22/2021   7:40 PM EDT  To: [address] [facility]  Subject: Pre-Op/Post-Op Question                          Please send me prep instructions for June 1 procedures  I misplaced them  Thank you

## 2021-05-25 NOTE — TELEPHONE ENCOUNTER
Called pt  She reports neuropathy will start as early as 4:30pm in both feet and calves  Also notes twitching in the calves  States previously this would not start until around 7pm  Reports restless legs all night  Pt confirms she is taking the following medications:    Duloxetine 30mg daily in the AM  Duloxetine 60mg daily in the evening   Pramipexole 0 125mg tabs 4 tabs daily    Pt notes duloxetine is also helping with fibromyalgia  Please advise       [phone]  Okay to leave detailed message

## 2021-05-25 NOTE — TELEPHONE ENCOUNTER
Regarding: FW: Test Results Question  Contact: [phone]    ----- Message -----  From: Ron Tirado  Sent: 5/20/2021   7:22 PM EDT  To: [facility] [place] [facility]  Subject: RE: Test Results Question                        Actually my neuropathy has been starting up earlier and I hear my legs are restless all night long  Please advise     ----- Message -----  From: Anna Shahid MD  Sent: 5/12/21 7:08 PM  To: Ron Louislisa  Subject: RE: Test Results Question    Mrs Scott Sayres -  Thank you for the updates- I hope your treatment will help with your symptoms and you will feel better soon  CRYSTAL Ramírez       ----- Message -----       From:Kim Filomena Barthel       Sent:5/7/2021 12:29 PM EDT         To:Dolores Hernandez MD    Subject:Test Results Question    After seeing my oncologist at [facility] ans she reviewed the recent bloodwork she said I  needed an iron infusion which I had this past Tuesday  I wanted to make you aware  Thank you    Her name is Dr Temo Henley

## 2021-05-28 ENCOUNTER — TELEPHONE (OUTPATIENT)
Dept: GASTROENTEROLOGY | Facility: HOSPITAL | Age: 66
End: 2021-05-28

## 2021-05-28 DIAGNOSIS — I25.10 ATHEROSCLEROSIS OF NATIVE CORONARY ARTERY OF NATIVE HEART WITHOUT ANGINA PECTORIS: ICD-10-CM

## 2021-05-28 RX ORDER — MULTIVITAMIN WITH IRON
TABLET ORAL
Qty: 30 TABLET | Refills: 2 | Status: SHIPPED | OUTPATIENT
Start: 2021-05-28 | End: 2021-09-11 | Stop reason: SDUPTHER

## 2021-05-31 ENCOUNTER — TELEPHONE (OUTPATIENT)
Dept: OTHER | Facility: OTHER | Age: 66
End: 2021-05-31

## 2021-05-31 NOTE — TELEPHONE ENCOUNTER
She states that she has a colonoscopy on 6/1 and she has started her prep but has thrown it up  She would like advice       Paged Dr Shahbaz Cooney

## 2021-06-01 ENCOUNTER — HOSPITAL ENCOUNTER (OUTPATIENT)
Dept: GASTROENTEROLOGY | Facility: HOSPITAL | Age: 66
Setting detail: OUTPATIENT SURGERY
Discharge: HOME | End: 2021-06-01
Attending: INTERNAL MEDICINE
Payer: MEDICARE

## 2021-06-01 ENCOUNTER — TELEPHONE (OUTPATIENT)
Dept: GASTROENTEROLOGY | Facility: CLINIC | Age: 66
End: 2021-06-01

## 2021-06-01 ENCOUNTER — ANESTHESIA EVENT (OUTPATIENT)
Dept: GASTROENTEROLOGY | Facility: HOSPITAL | Age: 66
End: 2021-06-01

## 2021-06-01 ENCOUNTER — ANESTHESIA (OUTPATIENT)
Dept: GASTROENTEROLOGY | Facility: HOSPITAL | Age: 66
End: 2021-06-01

## 2021-06-01 VITALS
TEMPERATURE: 97.7 F | HEIGHT: 66 IN | OXYGEN SATURATION: 93 % | HEART RATE: 84 BPM | BODY MASS INDEX: 35.2 KG/M2 | WEIGHT: 219 LBS | SYSTOLIC BLOOD PRESSURE: 153 MMHG | RESPIRATION RATE: 15 BRPM | DIASTOLIC BLOOD PRESSURE: 85 MMHG

## 2021-06-01 DIAGNOSIS — K31.7 GASTRIC POLYPS: ICD-10-CM

## 2021-06-01 DIAGNOSIS — K21.00 GASTROESOPHAGEAL REFLUX DISEASE WITH ESOPHAGITIS WITHOUT HEMORRHAGE: ICD-10-CM

## 2021-06-01 DIAGNOSIS — E61.1 IRON DEFICIENCY: ICD-10-CM

## 2021-06-01 DIAGNOSIS — Z86.010 HISTORY OF COLON POLYPS: ICD-10-CM

## 2021-06-01 DIAGNOSIS — K31.84 GASTROPARESIS: ICD-10-CM

## 2021-06-01 DIAGNOSIS — R13.10 DYSPHAGIA, UNSPECIFIED TYPE: ICD-10-CM

## 2021-06-01 PROBLEM — D50.9 IRON DEFICIENCY ANEMIA: Status: ACTIVE | Noted: 2021-06-01

## 2021-06-01 PROCEDURE — 43239 EGD BIOPSY SINGLE/MULTIPLE: CPT | Performed by: INTERNAL MEDICINE

## 2021-06-01 PROCEDURE — 45385 COLONOSCOPY W/LESION REMOVAL: CPT | Performed by: INTERNAL MEDICINE

## 2021-06-01 PROCEDURE — 88305 TISSUE EXAM BY PATHOLOGIST: CPT | Performed by: PATHOLOGY

## 2021-06-01 PROCEDURE — 45380 COLONOSCOPY AND BIOPSY: CPT | Performed by: INTERNAL MEDICINE

## 2021-06-01 PROCEDURE — 43236 UPPR GI SCOPE W/SUBMUC INJ: CPT | Performed by: INTERNAL MEDICINE

## 2021-06-01 RX ORDER — PROPOFOL 10 MG/ML
INJECTION, EMULSION INTRAVENOUS AS NEEDED
Status: DISCONTINUED | OUTPATIENT
Start: 2021-06-01 | End: 2021-06-01

## 2021-06-01 RX ORDER — GLYCOPYRROLATE 0.2 MG/ML
INJECTION INTRAMUSCULAR; INTRAVENOUS AS NEEDED
Status: DISCONTINUED | OUTPATIENT
Start: 2021-06-01 | End: 2021-06-01

## 2021-06-01 RX ORDER — LIDOCAINE HYDROCHLORIDE 10 MG/ML
INJECTION, SOLUTION EPIDURAL; INFILTRATION; INTRACAUDAL; PERINEURAL AS NEEDED
Status: DISCONTINUED | OUTPATIENT
Start: 2021-06-01 | End: 2021-06-01

## 2021-06-01 RX ORDER — SODIUM CHLORIDE, SODIUM LACTATE, POTASSIUM CHLORIDE, CALCIUM CHLORIDE 600; 310; 30; 20 MG/100ML; MG/100ML; MG/100ML; MG/100ML
INJECTION, SOLUTION INTRAVENOUS CONTINUOUS PRN
Status: DISCONTINUED | OUTPATIENT
Start: 2021-06-01 | End: 2021-06-01

## 2021-06-01 RX ADMIN — PROPOFOL 10 MG: 10 INJECTION, EMULSION INTRAVENOUS at 12:07

## 2021-06-01 RX ADMIN — PROPOFOL 10 MG: 10 INJECTION, EMULSION INTRAVENOUS at 12:24

## 2021-06-01 RX ADMIN — PROPOFOL 20 MG: 10 INJECTION, EMULSION INTRAVENOUS at 12:25

## 2021-06-01 RX ADMIN — PROPOFOL 10 MG: 10 INJECTION, EMULSION INTRAVENOUS at 12:06

## 2021-06-01 RX ADMIN — PROPOFOL 150 MG: 10 INJECTION, EMULSION INTRAVENOUS at 11:51

## 2021-06-01 RX ADMIN — PROPOFOL 10 MG: 10 INJECTION, EMULSION INTRAVENOUS at 11:57

## 2021-06-01 RX ADMIN — PROPOFOL 10 MG: 10 INJECTION, EMULSION INTRAVENOUS at 12:03

## 2021-06-01 RX ADMIN — PROPOFOL 10 MG: 10 INJECTION, EMULSION INTRAVENOUS at 12:05

## 2021-06-01 RX ADMIN — PROPOFOL 10 MG: 10 INJECTION, EMULSION INTRAVENOUS at 12:18

## 2021-06-01 RX ADMIN — PROPOFOL 10 MG: 10 INJECTION, EMULSION INTRAVENOUS at 11:55

## 2021-06-01 RX ADMIN — PROPOFOL 10 MG: 10 INJECTION, EMULSION INTRAVENOUS at 11:54

## 2021-06-01 RX ADMIN — PROPOFOL 20 MG: 10 INJECTION, EMULSION INTRAVENOUS at 12:13

## 2021-06-01 RX ADMIN — PROPOFOL 10 MG: 10 INJECTION, EMULSION INTRAVENOUS at 12:27

## 2021-06-01 RX ADMIN — PROPOFOL 10 MG: 10 INJECTION, EMULSION INTRAVENOUS at 12:10

## 2021-06-01 RX ADMIN — PROPOFOL 10 MG: 10 INJECTION, EMULSION INTRAVENOUS at 12:00

## 2021-06-01 RX ADMIN — GLYCOPYRROLATE 0.2 MG: 0.2 INJECTION, SOLUTION INTRAMUSCULAR; INTRAVENOUS at 11:49

## 2021-06-01 RX ADMIN — PROPOFOL 10 MG: 10 INJECTION, EMULSION INTRAVENOUS at 11:59

## 2021-06-01 RX ADMIN — LIDOCAINE HYDROCHLORIDE 40 MG: 10 INJECTION, SOLUTION EPIDURAL; INFILTRATION; INTRACAUDAL; PERINEURAL at 11:51

## 2021-06-01 RX ADMIN — PROPOFOL 20 MG: 10 INJECTION, EMULSION INTRAVENOUS at 12:19

## 2021-06-01 RX ADMIN — PROPOFOL 10 MG: 10 INJECTION, EMULSION INTRAVENOUS at 12:21

## 2021-06-01 RX ADMIN — ONABOTULINUMTOXINA 200 UNITS: 100 INJECTION, POWDER, LYOPHILIZED, FOR SOLUTION INTRADERMAL; INTRAMUSCULAR at 12:01

## 2021-06-01 RX ADMIN — PROPOFOL 20 MG: 10 INJECTION, EMULSION INTRAVENOUS at 12:16

## 2021-06-01 RX ADMIN — PROPOFOL 10 MG: 10 INJECTION, EMULSION INTRAVENOUS at 12:15

## 2021-06-01 RX ADMIN — PROPOFOL 10 MG: 10 INJECTION, EMULSION INTRAVENOUS at 12:02

## 2021-06-01 RX ADMIN — PROPOFOL 10 MG: 10 INJECTION, EMULSION INTRAVENOUS at 12:04

## 2021-06-01 RX ADMIN — PROPOFOL 10 MG: 10 INJECTION, EMULSION INTRAVENOUS at 11:58

## 2021-06-01 RX ADMIN — SODIUM CHLORIDE, SODIUM LACTATE, POTASSIUM CHLORIDE, AND CALCIUM CHLORIDE: .6; .31; .03; .02 INJECTION, SOLUTION INTRAVENOUS at 10:48

## 2021-06-01 RX ADMIN — PROPOFOL 10 MG: 10 INJECTION, EMULSION INTRAVENOUS at 12:09

## 2021-06-01 RX ADMIN — PROPOFOL 10 MG: 10 INJECTION, EMULSION INTRAVENOUS at 11:53

## 2021-06-01 NOTE — DISCHARGE INSTRUCTIONS
Upper Endoscopy   WHAT YOU NEED TO KNOW:   An upper endoscopy is also called an upper gastrointestinal (GI) endoscopy, or an esophagogastroduodenoscopy (EGD)  It is a procedure to examine the inside of your esophagus, stomach, and duodenum (first part of the small intestine) with a scope  You may feel bloated, gassy, or have some abdominal discomfort after your procedure  Your throat may be sore for 24 to 36 hours  You may burp or pass gas from air that is still inside your body  DISCHARGE INSTRUCTIONS:   Seek care immediately if:    You have sudden, severe abdominal pain   You have problems swallowing   You have a large amount of black, sticky bowel movements or blood in your bowel movements   You have sudden trouble breathing   You feel weak, lightheaded, or faint or your heart beats faster than normal for you  Contact your healthcare provider if:    You have a fever and chills   You have nausea or are vomiting   Your abdomen is bloated or feels full and hard   You have abdominal pain   You have black, sticky bowel movements or blood in your bowel movements   You have not had a bowel movement for 3 days after your procedure   You have rash or hives   You have questions or concerns about your procedure  Activity:    Do not lift, strain, or run for 24 hours after your procedure   Rest after your procedure  You have been given medicine to relax you  Do not drive or make important decisions until the day after your procedure  Return to your normal activity as directed   Relieve gas and discomfort from bloating by lying on your right side with a heating pad on your abdomen  You may need to take short walks to help the gas move out  Eat small meals until bloating is relieved  Follow up with your healthcare provider as directed: Write down your questions so you remember to ask them during your visits       If you take a blood thinner, please review the specific instructions from your endoscopist about when you should resume it  These can be found in the Recommendation and Your Medication list sections of this After Visit Summary  Colonoscopy   WHAT YOU NEED TO KNOW:   A colonoscopy is a procedure to examine the inside of your colon (intestine) with a scope  Polyps or tissue growths may have been removed during your colonoscopy  It is normal to feel bloated and to have some abdominal discomfort  You should be passing gas  If you have hemorrhoids or you had polyps removed, you may have a small amount of bleeding  DISCHARGE INSTRUCTIONS:   Seek care immediately if:    You have sudden, severe abdominal pain   You have problems swallowing   You have a large amount of black, sticky bowel movements or blood in your bowel movements   You have sudden trouble breathing   You feel weak, lightheaded, or faint or your heart beats faster than normal for you  Contact your healthcare provider if:    You have a fever and chills   You have nausea or are vomiting   Your abdomen is bloated or feels full and hard   You have abdominal pain   You have black, sticky bowel movements or blood in your bowel movements   You have not had a bowel movement for 3 days after your procedure   You have rash or hives   You have questions or concerns about your procedure  Activity:    Do not lift, strain, or run for 24 hours after your procedure   Rest after your procedure  You have been given medicine to relax you  Do not drive or make important decisions until the day after your procedure  Return to your normal activity as directed   Relieve gas and discomfort from bloating by lying on your right side with a heating pad on your abdomen  You may need to take short walks to help the gas move out  Eat small meals until bloating is relieved    Follow up with your healthcare provider as directed: Write down your questions so you remember to ask them during your visits  If you take a blood thinner, please review the specific instructions from your endoscopist about when you should resume it  These can be found in the Recommendation and Your Medication list sections of this After Visit Summary

## 2021-06-01 NOTE — TELEPHONE ENCOUNTER
Pt calling reporting below  Advised she decrease dosing to BID only  Pt then reports she just took her pramipexole and within 30 minutes starting having spasms in her leg  She would like to discontinue this medication  Asking if she needs to wean off of this? Please advise

## 2021-06-01 NOTE — TELEPHONE ENCOUNTER
Pt made aware  She is now asking if there is an alternative medication she can try instead of the pramipexole?

## 2021-06-01 NOTE — TELEPHONE ENCOUNTER
Received the following [employer] Unzipped message:    Jose R Stephenson  to Eladio Chua MD          10:07 PM  The mirapex increase has given me actual spasms in my right thigh  I also noted pain in my big toes ? Please advise

## 2021-06-01 NOTE — INTERVAL H&P NOTE
H&P reviewed  After examining the patient I find no changes in the patients condition since the H&P had been written      Vitals:    06/01/21 1049   BP: 119/60   Pulse: 73   Resp: 21   Temp: 98 2 °F (36 8 °C)   SpO2: 94%

## 2021-06-01 NOTE — ANESTHESIA PREPROCEDURE EVALUATION
Procedure:  COLONOSCOPY  EGD    Relevant Problems   CARDIO   (+) Atherosclerotic heart disease of native coronary artery without angina pectoris   (+) Chest wall pain   (+) Coronary artery disease with angina pectoris (HCC)   (+) Essential hypertension   (+) Mixed hyperlipidemia      ENDO   (+) Hypothyroidism      GI/HEPATIC   (+) Fatty liver   (+) GERD (gastroesophageal reflux disease)      /RENAL   (+) JESUS (acute kidney injury) ([facility] [address] )      GYN   (+) Breast cancer (HCC)      MUSCULOSKELETAL   (+) Fibromyalgia   (+) Low back pain at multiple sites   (+) Primary osteoarthritis of both knees   (+) Sacroiliitis (HCC)      NEURO/PSYCH   (+) Depressive disorder   (+) Double vision   (+) Fibromyalgia   (+) Generalized anxiety disorder   (+) History of breast cancer   (+) Panic attack      PULMONARY   (+) BRAYDEN on CPAP   (+) Panlobular emphysema (HCC)   (+) Shortness of breath        Physical Exam    Airway      TM Distance: >3 FB  Neck ROM: full     Dental       Cardiovascular  Rhythm: regular,     Pulmonary  Breath sounds clear to auscultation,     Other Findings        Anesthesia Plan  ASA Score- 3     Anesthesia Type- IV sedation with anesthesia with ASA Monitors  Additional Monitors:   Airway Plan:           Plan Factors-Exercise tolerance (METS): <4 METS  Chart reviewed  EKG reviewed  Existing labs reviewed  Patient summary reviewed  Patient is not a current smoker  Induction-     Postoperative Plan-     Informed Consent- Anesthetic plan and risks discussed with patient and spouse  I personally reviewed this patient with the CRNA  Discussed and agreed on the Anesthesia Plan with the CRNA  Ramon Duarte

## 2021-06-02 DIAGNOSIS — G25.81 RESTLESS LEG SYNDROME: Primary | ICD-10-CM

## 2021-06-02 RX ORDER — ROPINIROLE 0.5 MG/1
0.25 TABLET, FILM COATED ORAL
Qty: 30 TABLET | Refills: 0 | Status: SHIPPED | OUTPATIENT
Start: 2021-06-02 | End: 2021-06-29

## 2021-06-02 NOTE — TELEPHONE ENCOUNTER
Ropinirole 0 5 mg was sent to the pharmacy - patient is to start 1/2 tab once at night, then increase to 1/2 evening and then at night; patient may slowly increase her dose by 1/2 tab every 3rd day till 1 tab bid

## 2021-06-17 DIAGNOSIS — C80.1 MALIGNANT (PRIMARY) NEOPLASM, UNSPECIFIED (HCC): Primary | ICD-10-CM

## 2021-06-17 DIAGNOSIS — M51.36 LUMBAR DEGENERATIVE DISC DISEASE: ICD-10-CM

## 2021-06-17 DIAGNOSIS — G60.9 IDIOPATHIC PERIPHERAL NEUROPATHY: ICD-10-CM

## 2021-06-18 ENCOUNTER — TELEPHONE (OUTPATIENT)
Dept: NEUROLOGY | Facility: CLINIC | Age: 66
End: 2021-06-18

## 2021-06-18 NOTE — TELEPHONE ENCOUNTER
Called and advised pt of the below  Pt states that her oncologist ordered MRI-L/C  She is scheduled to get this done this afternoon at [facility]

## 2021-06-18 NOTE — TELEPHONE ENCOUNTER
Regarding: FW: Non-Urgent Medical Question    ----- Message -----  From: Glory eHnderson MD  Sent: 6/17/2021   3:49 PM EDT  To: Gonzalo Raygoza RN, Neurology Pre Certification  Subject: FW: Non-Urgent Medical Question                  Please proceed with PA and scheduling of MRI L-spine WO contrast   ----- Message -----  From: Gonzalo Raygoza RN  Sent: 6/17/2021   2:55 PM EDT  To: Glory Henderson MD  Subject: FW: Non-Urgent Medical Question                    ----- Message -----  From: Brynn Tomlinson MA  Sent: 6/16/2021  11:35 AM EDT  To: Neurology [facility] Clinical Team 3  Subject: Non-Urgent Medical Question                      ----- Message from Brynn Tomlinson MA sent at 6/16/2021 11:35 AM EDT -----       ----- Message from Rosette Enriquez to Glory Henderson MD sent at 6/15/2021  8:16 PM -----   I went to [facility] on Junev4 for the thigh muscle  twitching  They scanned me for a dvt which was negative  Today I  still have the twitching , hip pain  , knee and ankle pain  My chiropractor tested my hip which has good range of motion without pain  I have difficulty walking without pain    I am goingvto try to get in to my PCP this week  My chiropractor said I need a MRI of the  lumbar region  I will send you an update if I get answers  I am struggling to walk

## 2021-06-23 DIAGNOSIS — G60.9 IDIOPATHIC PERIPHERAL NEUROPATHY: ICD-10-CM

## 2021-06-23 RX ORDER — DULOXETIN HYDROCHLORIDE 60 MG/1
CAPSULE, DELAYED RELEASE ORAL
Qty: 90 CAPSULE | Refills: 1 | Status: SHIPPED | OUTPATIENT
Start: 2021-06-23 | End: 2021-12-22

## 2021-06-24 ENCOUNTER — TELEPHONE (OUTPATIENT)
Dept: NEUROLOGY | Facility: CLINIC | Age: 66
End: 2021-06-24

## 2021-06-24 NOTE — TELEPHONE ENCOUNTER
June 21, 2021  Latrice Pinto  to Capo Rocha MD          6:04 PM  My ropinirole cannot be refilled until July  Since my thigh twitching continues should I  go back to mirapex  My pet scan is July 1

## 2021-06-24 NOTE — TELEPHONE ENCOUNTER
Spoke with patient  Advised her that Ropinirole script is now available at [facility]  Patient verbalized understanding and appreciation  Patient advsied she is on her way to the ER due to her hip pain  MRI L-spine results available in chart  Dr Viky Love  - abnormal results on MRI lumbar

## 2021-06-24 NOTE — TELEPHONE ENCOUNTER
MRI was offered by different provider at [facility]  [facility] ER team will be addressing her pain questions, as MRI report brought concern for bony metastasis   Patient is to address this question with primary oncology team

## 2021-06-24 NOTE — TELEPHONE ENCOUNTER
Spoke with patient regarding MyChart message below  Script for Ropinirole 0 5mg - 0 5 tabs daily @ [facility]  I went over sig instructions with patient  Patient states she was taking a whole tablet daily in error  This is why she is out of medication  [address]  Spoke w/Osmar  He ran the script and it is available to be filled today

## 2021-06-26 DIAGNOSIS — G25.81 RESTLESS LEG SYNDROME: ICD-10-CM

## 2021-06-29 RX ORDER — ROPINIROLE 0.5 MG/1
0.25 TABLET, FILM COATED ORAL
Qty: 15 TABLET | Refills: 1 | Status: SHIPPED | OUTPATIENT
Start: 2021-06-29 | End: 2021-08-17 | Stop reason: SDUPTHER

## 2021-07-01 DIAGNOSIS — G60.9 IDIOPATHIC PERIPHERAL NEUROPATHY: ICD-10-CM

## 2021-07-01 RX ORDER — DULOXETIN HYDROCHLORIDE 30 MG/1
CAPSULE, DELAYED RELEASE ORAL
Qty: 90 CAPSULE | Refills: 1 | Status: SHIPPED | OUTPATIENT
Start: 2021-07-01 | End: 2021-12-22

## 2021-07-09 ENCOUNTER — OFFICE VISIT (OUTPATIENT)
Dept: OBGYN CLINIC | Facility: CLINIC | Age: 66
End: 2021-07-09
Payer: MEDICARE

## 2021-07-09 ENCOUNTER — TELEPHONE (OUTPATIENT)
Dept: OBGYN CLINIC | Facility: HOSPITAL | Age: 66
End: 2021-07-09

## 2021-07-09 DIAGNOSIS — M17.0 PRIMARY OSTEOARTHRITIS OF BOTH KNEES: Primary | ICD-10-CM

## 2021-07-09 PROCEDURE — 20610 DRAIN/INJ JOINT/BURSA W/O US: CPT | Performed by: ORTHOPAEDIC SURGERY

## 2021-07-09 PROCEDURE — 99213 OFFICE O/P EST LOW 20 MIN: CPT | Performed by: ORTHOPAEDIC SURGERY

## 2021-07-09 RX ORDER — BUPIVACAINE HYDROCHLORIDE 2.5 MG/ML
4 INJECTION, SOLUTION INFILTRATION; PERINEURAL
Status: COMPLETED | OUTPATIENT
Start: 2021-07-09 | End: 2021-07-09

## 2021-07-09 RX ORDER — METHYLPREDNISOLONE ACETATE 40 MG/ML
2 INJECTION, SUSPENSION INTRA-ARTICULAR; INTRALESIONAL; INTRAMUSCULAR; SOFT TISSUE
Status: COMPLETED | OUTPATIENT
Start: 2021-07-09 | End: 2021-07-09

## 2021-07-09 RX ADMIN — METHYLPREDNISOLONE ACETATE 2 ML: 40 INJECTION, SUSPENSION INTRA-ARTICULAR; INTRALESIONAL; INTRAMUSCULAR; SOFT TISSUE at 11:14

## 2021-07-09 RX ADMIN — BUPIVACAINE HYDROCHLORIDE 4 ML: 2.5 INJECTION, SOLUTION INFILTRATION; PERINEURAL at 11:14

## 2021-07-09 NOTE — PROGRESS NOTES
Assessment/Plan:  Assessment/Plan   Diagnoses and all orders for this visit:    Primary osteoarthritis of both knees  -     Large joint arthrocentesis  -     Large joint arthrocentesis      Patient was offered, and accepted,   Depo-Medrol and Marcaine injection to the  Bilateral knees for relief of pain and inflammation  Patient tolerated treatment well  Should be seen for follow-up in 3 months for re-evaluation and consideration for repeat injection based on exam   Patient is in agreement with this treatment plan  both knees were injected with Depo-Medrol and Marcaine  She tolerated procedure quite well  Return back in 3 months for evaluation  If there is any problems sooner, she will not hesitate to let us know  Apparently, she has some form  Of metastatic disease from her right hip  I did ask the patient to forward all information regarding this  Subjective:   Patient ID: Diana Moore is a 73 y o  female  HPI   Patient presents today for follow-up evaluation of primary osteoarthritis of the bilateral knees  She was last seen in regards to this issue on 3/26/2021, which time she received corticosteroid injections to the bilateral knees, the right greater trochanter, and the right SI joint  On today's presentation she states that she did initially get relief following her injections, but her knee pain has since returned  She describes pain with weight-bearing and deep knee flexion motions  She denies any associated bruising, numbness, tingling, or instability  She has recently been found to have metastatic lesions of the right hip       The following portions of the patient's history were reviewed and updated as appropriate: allergies, current medications, past family history, past medical history, past social history, past surgical history and problem list     Past Medical History:   Diagnosis Date    Anxiety     Breast cancer ([facility] )     CAD (coronary artery disease)     Cancer ([facility] )     Carpal tunnel syndrome     Disease of thyroid gland     Elevated cholesterol     Fibromyalgia     Hypertension     Kidney stone     Melanoma ([address] )     nose    Myocardial infarction (HCC)     Neuropathy     BRAYDEN (obstructive sleep apnea)     cpap    Panic attack      Past Surgical History:   Procedure Laterality Date    BREAST SURGERY Bilateral     CARDIAC SURGERY      cardiac ablation     SECTION      x3    CHOLECYSTECTOMY      CORONARY ANGIOPLASTY WITH STENT PLACEMENT      MASTECTOMY Bilateral     NOSE SURGERY       Family History   Problem Relation Age of Onset    Heart attack Mother     Alcohol abuse Mother     Heart failure Mother      Social History     Socioeconomic History    Marital status: Single     Spouse name: None    Number of children: None    Years of education: None    Highest education level: None   Occupational History    None   Tobacco Use    Smoking status: Never Smoker    Smokeless tobacco: Never Used   Vaping Use    Vaping Use: Never used   Substance and Sexual Activity    Alcohol use: Yes     Comment: beer    Drug use: Yes     Types: Marijuana     Comment: Medical marjuiana    Sexual activity: None   Other Topics Concern    None   Social History Narrative    ** Merged History Encounter **          Social Determinants of Health     Financial Resource Strain:     Difficulty of Paying Living Expenses:    Food Insecurity:     Worried About Running Out of Food in the Last Year:     Ran Out of Food in the Last Year:    Transportation Needs:     Lack of Transportation (Medical):      Lack of Transportation (Non-Medical):    Physical Activity:     Days of Exercise per Week:     Minutes of Exercise per Session:    Stress:     Feeling of Stress :    Social Connections:     Frequency of Communication with Friends and Family:     Frequency of Social Gatherings with Friends and Family:     Attends Alevism Services:     Active Member of Clubs or Organizations:     Attends Club or Organization Meetings:     Marital Status:    Intimate Partner Violence:     Fear of Current or Ex-Partner:     Emotionally Abused:     Physically Abused:     Sexually Abused:        Current Outpatient Medications:     albuterol (2 5 mg/3 mL) 0 083 % nebulizer solution, Inhale 2 5 mg every 6 (six) hours as needed, Disp: , Rfl:     albuterol (PROVENTIL HFA,VENTOLIN HFA) 90 mcg/act inhaler, Inhale 2 puffs every 6 (six) hours as needed for wheezing or shortness of breath, Disp: 1 Inhaler, Rfl: 5    aspirin (ASPIRIN 81) 81 mg EC tablet, aspirin 81 mg tablet,delayed release, Disp: , Rfl:     B Complex Vitamins (B COMPLEX 1 PO), Take 1 tablet by mouth, Disp: , Rfl:     Blood Glucose Monitoring Suppl (ONE TOUCH ULTRA 2) w/Device KIT, Patient to test once daily, Disp: 1 each, Rfl: 0    budesonide-formoterol (Symbicort) 160-4 5 mcg/act inhaler, Inhale 2 puffs 2 (two) times a day, Disp: , Rfl:     calcium carbonate-vitamin D (OSCAL-D) 500 mg-200 units per tablet, Take 1 tablet by mouth 2 (two) times a day with meals, Disp: , Rfl:     clopidogrel (PLAVIX) 75 mg tablet, Take 75 mg by mouth daily, Disp: , Rfl: 3    docusate sodium (COLACE) 100 mg capsule, Take 1 capsule (100 mg total) by mouth daily at bedtime, Disp: 90 capsule, Rfl: 0    DULoxetine (CYMBALTA) 30 mg delayed release capsule, TAKE 1 CAP BY MOUTH DAILY IN THE AM, Disp: 90 capsule, Rfl: 1    DULoxetine (CYMBALTA) 60 mg delayed release capsule, TAKE 1 CAP DAILY BY MOUTH IN THE EVENING, Disp: 90 capsule, Rfl: 1    Evolocumab 140 MG/ML SOAJ, Inject 2 mL under the skin, Disp: , Rfl:     famotidine (PEPCID) 40 MG tablet, Take 40 mg by mouth daily, Disp: , Rfl: 2    gemfibrozil (LOPID) 600 mg tablet, Take 600 mg by mouth 2 (two) times a day, Disp: , Rfl: 3    glucose blood (OneTouch Ultra) test strip, Patient to test once daily, Disp: 100 each, Rfl: 1    Lancets (onetouch ultrasoft) lancets, Patient to test once daily, Disp: 100 each, Rfl: 1    levothyroxine 88 mcg tablet, Take 1 tablet (88 mcg total) by mouth daily, Disp: 90 tablet, Rfl: 0    LORazepam (ATIVAN) 1 mg tablet, Take 1 tablet (1 mg total) by mouth 2 (two) times a day, Disp: 60 tablet, Rfl: 0    melatonin 3 mg, Take 20 mg by mouth , Disp: , Rfl:     metFORMIN (GLUCOPHAGE) 500 mg tablet, Take 1 tablet (500 mg total) by mouth 2 (two) times a day with meals, Disp: 180 tablet, Rfl: 0    metoprolol tartrate (LOPRESSOR) 50 mg tablet, Take 1 tablet (50 mg total) by mouth 2 (two) times a day, Disp: 180 tablet, Rfl: 0    Misc  Devices (GETGO ROLLING WALKER) MISC, Use rolling walker as needed, Disp: 1 each, Rfl: 0    Motegrity 2 MG TABS, TAKE 1 TABLET EVERY DAY BY MOUTH IN THE EVENING, Disp: 90 tablet, Rfl: 1    pantoprazole (PROTONIX) 40 mg tablet, Take 1 tablet (40 mg total) by mouth daily, Disp: 90 tablet, Rfl: 0    pyridoxine (VITAMIN B6) 100 mg tablet, TAKE 1 TABLET BY MOUTH EVERY DAY IN THE MORNING, Disp: 30 tablet, Rfl: 2    Repatha SureClick 615 MG/ML SOAJ, , Disp: , Rfl:     rOPINIRole (REQUIP) 0 5 mg tablet, TAKE 0 5 TABLETS (0 25 MG TOTAL) BY MOUTH DAILY AT BEDTIME, Disp: 15 tablet, Rfl: 1    Spiriva Respimat 2 5 MCG/ACT AERS inhaler, INHALE 2 ACTUATION DAILY  , Disp: , Rfl:     sucralfate (CARAFATE) 1 g tablet, sucralfate 1 gram tablet, Disp: , Rfl:     Allergies   Allergen Reactions    Metoclopramide Other (See Comments)    Nitrofurantoin Other (See Comments)     Very weak  Fell    Isosorbide      Other reaction(s): headache    Pregabalin      Pt states made her feel suicidal     Reglan [Metoclopramide] Other (See Comments)     Flares her neuropathy       Review of Systems   Constitutional: Negative for chills, fever and unexpected weight change  HENT: Negative for hearing loss, nosebleeds and sore throat  Eyes: Negative for pain, redness and visual disturbance  Respiratory: Negative for cough, shortness of breath and wheezing  Cardiovascular: Negative for chest pain, palpitations and leg swelling  Gastrointestinal: Negative for abdominal pain, nausea and vomiting  Endocrine: Negative for polydipsia and polyuria  Genitourinary: Negative for dysuria and hematuria  Musculoskeletal:        As noted in HPI   Skin: Negative for rash and wound  Neurological: Negative for dizziness, numbness and headaches  Psychiatric/Behavioral: Negative for decreased concentration and suicidal ideas  The patient is not nervous/anxious  Objective: There were no vitals taken for this visit  Ortho Exam   Bilateral knees -   Patient ambulates with  antalgic gait pattern  Uses  Single-point cane as assistive device  No anatomical deformity  Skin is warm and dry to touch with no signs of erythema, ecchymosis, infection  No soft tissue swelling, no effusion noted  ROM  0°- 115°  TTP over  Medial joint lines, mildly TTP over lateral joint lines  Flexor and extensor mechanisms intact  Knee is stable to varus and valgus stress  - Lachman's  - Anterior Drawer, - Posterior Drawer  - medial Anthony's, - lateral Anthony's  - Pivot Shift  Patella tracks centrally  with palpable crepitus  Calf compartments are soft and supple  2+ TP and DP pulses with brisk capillary refill to the toes  Sural, saphenous, tibial, superficial and deep peroneal motor and sensory distributions intact  Sensation light touch intact distally    Physical Exam    Imaging:    No new imaging reviewed this visit    Large joint arthrocentesis: R knee  [place] Protocol:  Procedure performed by: AVRIL Banuelos, ATC)  Consent: Verbal consent obtained  Risks and benefits: risks, benefits and alternatives were discussed  Consent given by: patient  Time out: Immediately prior to procedure a "time out" was called to verify the correct patient, procedure, equipment, support staff and site/side marked as required    Timeout called at: 7/9/2021 11:05 AM   Patient understanding: patient states understanding of the procedure being performed  Site marked: the operative site was marked  Patient identity confirmed: verbally with patient    Supporting Documentation  Indications: pain and joint swelling   Procedure Details  Location: knee - R knee  Preparation: Patient was prepped and draped in the usual sterile fashion  Needle size: 22 G  Ultrasound guidance: no  Approach: anterolateral  Medications administered: 4 mL bupivacaine 0 25 %; 2 mL methylPREDNISolone acetate 40 mg/mL    Patient tolerance: patient tolerated the procedure well with no immediate complications  Dressing:  Sterile dressing applied    Large joint arthrocentesis: L knee  Universal Protocol:  Procedure performed by: Magali St, LAT, ATC)  Consent: Verbal consent obtained  Risks and benefits: risks, benefits and alternatives were discussed  Consent given by: patient  Time out: Immediately prior to procedure a "time out" was called to verify the correct patient, procedure, equipment, support staff and site/side marked as required    Timeout called at: 7/9/2021 11:05 AM   Patient understanding: patient states understanding of the procedure being performed  Site marked: the operative site was marked  Patient identity confirmed: verbally with patient    Supporting Documentation  Indications: pain and joint swelling   Procedure Details  Location: knee - L knee  Preparation: Patient was prepped and draped in the usual sterile fashion  Needle size: 22 G  Ultrasound guidance: no  Approach: anterolateral  Medications administered: 4 mL bupivacaine 0 25 %; 2 mL methylPREDNISolone acetate 40 mg/mL    Patient tolerance: patient tolerated the procedure well with no immediate complications  Dressing:  Sterile dressing applied        Scribe Attestation    I,:  Magali St am acting as a scribe while in the presence of the attending physician :       I,:  Beatriz Gotti DO personally performed the services described in this documentation    as scribed in my presence :

## 2021-07-09 NOTE — TELEPHONE ENCOUNTER
Patient sees Dr Meseret Alvarado  Patient is calling because she saw Dr Meseret Alvarado today but she is still having issues with the right knee  She stated it keeps locking up and would like to know what she can do for it  Can she wrap it in an ace bandage?         CB: [phone]

## 2021-07-15 ENCOUNTER — PATIENT MESSAGE (OUTPATIENT)
Dept: FAMILY MEDICINE CLINIC | Facility: CLINIC | Age: 66
End: 2021-07-15

## 2021-07-15 DIAGNOSIS — F41.1 GENERALIZED ANXIETY DISORDER: ICD-10-CM

## 2021-07-16 RX ORDER — LORAZEPAM 1 MG/1
1 TABLET ORAL 2 TIMES DAILY
Qty: 60 TABLET | Refills: 0 | Status: SHIPPED | OUTPATIENT
Start: 2021-07-16 | End: 2021-09-11 | Stop reason: SDUPTHER

## 2021-08-02 DIAGNOSIS — R73.9 HYPERGLYCEMIA: ICD-10-CM

## 2021-08-02 DIAGNOSIS — I25.10 ATHEROSCLEROSIS OF NATIVE CORONARY ARTERY OF NATIVE HEART WITHOUT ANGINA PECTORIS: ICD-10-CM

## 2021-08-02 DIAGNOSIS — K59.01 CONSTIPATION BY DELAYED COLONIC TRANSIT: ICD-10-CM

## 2021-08-02 DIAGNOSIS — I25.119 CORONARY ARTERY DISEASE INVOLVING NATIVE HEART WITH ANGINA PECTORIS, UNSPECIFIED VESSEL OR LESION TYPE (HCC): ICD-10-CM

## 2021-08-02 RX ORDER — LEVOTHYROXINE SODIUM 88 UG/1
TABLET ORAL
Qty: 90 TABLET | Refills: 0 | Status: SHIPPED | OUTPATIENT
Start: 2021-08-02 | End: 2021-10-20

## 2021-08-02 RX ORDER — METOPROLOL TARTRATE 50 MG/1
TABLET, FILM COATED ORAL
Qty: 180 TABLET | Refills: 0 | Status: SHIPPED | OUTPATIENT
Start: 2021-08-02 | End: 2021-10-20

## 2021-08-10 DIAGNOSIS — J43.1 PANLOBULAR EMPHYSEMA (HCC): Primary | ICD-10-CM

## 2021-08-10 DIAGNOSIS — G62.9 PERIPHERAL POLYNEUROPATHY: ICD-10-CM

## 2021-08-10 DIAGNOSIS — C79.51 BONE METASTASES (HCC): ICD-10-CM

## 2021-08-10 DIAGNOSIS — I25.119 CORONARY ARTERY DISEASE WITH ANGINA PECTORIS, UNSPECIFIED VESSEL OR LESION TYPE, UNSPECIFIED WHETHER NATIVE OR TRANSPLANTED HEART (HCC): ICD-10-CM

## 2021-08-17 ENCOUNTER — TELEPHONE (OUTPATIENT)
Dept: NEUROLOGY | Facility: CLINIC | Age: 66
End: 2021-08-17

## 2021-08-17 ENCOUNTER — OFFICE VISIT (OUTPATIENT)
Dept: NEUROLOGY | Facility: CLINIC | Age: 66
End: 2021-08-17
Payer: MEDICARE

## 2021-08-17 VITALS
DIASTOLIC BLOOD PRESSURE: 87 MMHG | BODY MASS INDEX: 32.62 KG/M2 | SYSTOLIC BLOOD PRESSURE: 141 MMHG | HEART RATE: 71 BPM | WEIGHT: 203 LBS | HEIGHT: 66 IN | TEMPERATURE: 97.5 F

## 2021-08-17 DIAGNOSIS — M17.0 PRIMARY OSTEOARTHRITIS OF BOTH KNEES: ICD-10-CM

## 2021-08-17 DIAGNOSIS — G62.9 PERIPHERAL POLYNEUROPATHY: Primary | ICD-10-CM

## 2021-08-17 DIAGNOSIS — M46.1 SACROILIITIS (HCC): ICD-10-CM

## 2021-08-17 DIAGNOSIS — I61.9 CEREBROVASCULAR SMALL VESSEL DISEASE WITH HEMORRHAGE (HCC): ICD-10-CM

## 2021-08-17 DIAGNOSIS — G47.33 OSA (OBSTRUCTIVE SLEEP APNEA): ICD-10-CM

## 2021-08-17 DIAGNOSIS — G25.81 RESTLESS LEG SYNDROME: ICD-10-CM

## 2021-08-17 DIAGNOSIS — G51.4 MYOKYMIA OF RIGHT SIDE OF FACE: ICD-10-CM

## 2021-08-17 DIAGNOSIS — R26.89 BALANCE DISORDER: ICD-10-CM

## 2021-08-17 DIAGNOSIS — G62.0 DRUG-INDUCED PERIPHERAL NEUROPATHY (HCC): ICD-10-CM

## 2021-08-17 PROCEDURE — 99215 OFFICE O/P EST HI 40 MIN: CPT | Performed by: PSYCHIATRY & NEUROLOGY

## 2021-08-17 RX ORDER — MORPHINE SULFATE 15 MG/1
15 TABLET, FILM COATED, EXTENDED RELEASE ORAL 2 TIMES DAILY
COMMUNITY
Start: 2021-08-16 | End: 2021-09-15

## 2021-08-17 RX ORDER — ROPINIROLE 0.5 MG/1
0.5 TABLET, FILM COATED ORAL
Qty: 60 TABLET | Refills: 3 | Status: SHIPPED | OUTPATIENT
Start: 2021-08-17 | End: 2021-09-10 | Stop reason: SDUPTHER

## 2021-08-17 RX ORDER — METHOCARBAMOL 750 MG/1
TABLET, FILM COATED ORAL
COMMUNITY
Start: 2021-07-27 | End: 2021-10-19 | Stop reason: ALTCHOICE

## 2021-08-17 RX ORDER — OXYCODONE HYDROCHLORIDE 5 MG/1
5 TABLET ORAL EVERY 4 HOURS PRN
COMMUNITY
End: 2022-03-05 | Stop reason: ALTCHOICE

## 2021-08-17 RX ORDER — PROCHLORPERAZINE MALEATE 10 MG
TABLET ORAL
COMMUNITY
Start: 2021-07-23 | End: 2022-03-05 | Stop reason: ALTCHOICE

## 2021-08-17 NOTE — PROGRESS NOTES
Patient ID: Nicolette Reilly is a 73 y o  female  Assessment/Plan:           Problem List Items Addressed This Visit        Respiratory    BRAYDEN (obstructive sleep apnea)    Relevant Orders    Ambulatory referral to Sleep Medicine       Cardiovascular and Mediastinum    Cerebrovascular small vessel disease with hemorrhage ([facility] )    Relevant Orders    MRI brain without contrast       Nervous and Auditory    Peripheral polyneuropathy - Primary    Relevant Orders    Wheelchair    MRI brain without contrast    Drug-induced peripheral neuropathy ([facility] )    Relevant Orders    Wheelchair    MRI brain without contrast       Musculoskeletal and Integument    Sacroiliitis ([facility] )    Relevant Orders    Wheelchair    Primary osteoarthritis of both knees    Relevant Orders    Wheelchair       Other    Restless leg syndrome    Relevant Medications    rOPINIRole (REQUIP) 0 5 mg tablet    Other Relevant Orders    Wheelchair    Myokymia of right side of face    Balance disorder          Mrs Wu Ballard has presented for follow-up with on facial spasm and balance disorder; patient was diagnosed with 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, with bone metastasis, patient has completed radiation therapy provided by [facility] radiation oncology team, with worsening ambulatory dysfunction has been reported:  - CTS bilaterally interferes using walker with severe burning sensation precludes from walking, with right hip metastasis; patient will be recommended to have wheelchair available for her ambulation, with wheelchair Clinic evaluation might be considered  - patient will be advised repeating brain imaging in the light of diagnosed breast cancer and worsening headache  - patient is transition care to [address] [place] [address] Team, patient has obstructive sleep apnea as she is using CPAP machine required establishing care with a new team in [facility]    - patient is to follow-up with Neurology for wheelchair evaluation    Subjective: weakness rigtht side of body, headache today rates a 7 and states her feet feels like buring  pt recently dx with cancer on her right hip  HPI  Mrs Natalio Irizarry  Has presented for follow-up on sensory dysfunction in her lower extremities as well as facial spasms  We agreed patient has developed right-sided facial myokymia, with no concern for blepharospasm, patient has complete resolution of her symptoms  Sensory dysfunction lower extremity has progressively get worse with burning his in her feet described, Cymbalta 30 mg in the morning 60 mg at night provides reasonable benefits, patient also takes pramipexole 0 5 mg for presumable restless leg syndrome   Patient is to continue following with oncology team Dr Ramone Alvarado for lung nodule/ metastasis, with malignant neoplasm of right breast     patient is following with Pain Management for lower back issues,  No significant changes has been reported  MRI brain and orbits in May 2020: No evidence of recent infarction, hemorrhage, or mass  Chronic microvascular ischemic changes  Patient was ambulating with a cane, pain in her hands due to carpal tunnel syndrome has been reported; patient described headache with sleep deprived night, no facial asymmetry, no vision loss, no sensory dysfunction in the face has been reported otherwise  The following portions of the patient's history were reviewed and updated as appropriate:   She  has a past medical history of Anxiety, Breast cancer ([address] ), CAD (coronary artery disease), Cancer ([address] ), Carpal tunnel syndrome, Disease of thyroid gland, Elevated cholesterol, Fibromyalgia, Hypertension, Kidney stone, Melanoma ([address] ), Myocardial infarction ([address] ), Neuropathy, BRAYDEN (obstructive sleep apnea), and Panic attack    She   Patient Active Problem List    Diagnosis Date Noted    Balance disorder 08/17/2021    Iron deficiency anemia 06/01/2021    Colon polyps 04/08/2021    Gastroparesis 04/08/2021    GERD (gastroesophageal reflux disease) 04/08/2021    Constipation by delayed colonic transit 04/08/2021    Bloating 04/08/2021    Lactose intolerance 04/08/2021    Gastric polyps 04/08/2021    Gastritis 04/07/2021    Colon cancer screening 04/07/2021    Primary osteoarthritis of both knees 03/26/2021    Shortness of breath 03/12/2021    Thrush 03/12/2021    Bone metastases ([address] ) 03/12/2021    Drug-induced peripheral neuropathy ([address] ) 03/12/2021    JESUS (acute kidney injury) ([address] ) 03/12/2021    Panlobular emphysema ([address] ) 03/12/2021    Obesity, morbid ([address] ) 03/12/2021    Myokymia of right side of face 02/01/2021    Peripheral polyneuropathy 02/01/2021    Hyperglycemia 01/08/2021    Restless leg syndrome 12/30/2020    Trochanteric bursitis of right hip 10/26/2020    Sacroiliitis ([address] ) 10/26/2020    Lower urinary tract infection 05/08/2020    Breast cancer ([address] ) 05/08/2020    Panic attack 05/08/2020    Divergence insufficiency 05/08/2020    Cerebrovascular small vessel disease with hemorrhage ([address] ) 05/08/2020    Disequilibrium syndrome 05/08/2020    Double vision 04/28/2020    Right upper quadrant abdominal pain 09/11/2019    Fatty liver 09/11/2019    Medicare annual wellness visit, subsequent 06/18/2019    Hypothyroidism 06/18/2019    Chest wall pain 03/14/2019    Coronary artery disease with angina pectoris ([address] ) 03/14/2019    Low back pain at multiple sites 03/14/2019    Atherosclerotic heart disease of native coronary artery without angina pectoris 03/14/2018    Depressive disorder 09/25/2016    Essential hypertension 09/25/2016    Fibromyalgia 09/25/2016    Generalized anxiety disorder 09/25/2016    History of breast cancer 09/25/2016    Mixed hyperlipidemia 09/25/2016    BRAYDEN (obstructive sleep apnea) 09/25/2016    Bilateral carpal tunnel syndrome 02/08/2016     She  has a past surgical history that includes Mastectomy (Bilateral);  Breast surgery (Bilateral); Coronary angioplasty with stent; Cholecystectomy;  section; Nose surgery; and Cardiac surgery  Her family history includes Alcohol abuse in her mother; Heart attack in her mother; Heart failure in her mother  She  reports that she has never smoked  She has never used smokeless tobacco  She reports current alcohol use  She reports current drug use  Drug: Marijuana    Current Outpatient Medications   Medication Sig Dispense Refill    albuterol (2 5 mg/3 mL) 0 083 % nebulizer solution Inhale 2 5 mg every 6 (six) hours as needed      albuterol (PROVENTIL HFA,VENTOLIN HFA) 90 mcg/act inhaler Inhale 2 puffs every 6 (six) hours as needed for wheezing or shortness of breath 1 Inhaler 5    aspirin (ASPIRIN 81) 81 mg EC tablet aspirin 81 mg tablet,delayed release      B Complex Vitamins (B COMPLEX 1 PO) Take 1 tablet by mouth      calcium carbonate-vitamin D (OSCAL-D) 500 mg-200 units per tablet Take 1 tablet by mouth 2 (two) times a day with meals      clopidogrel (PLAVIX) 75 mg tablet Take 75 mg by mouth daily  3    docusate sodium (COLACE) 100 mg capsule Take 1 capsule (100 mg total) by mouth daily at bedtime 90 capsule 0    DULoxetine (CYMBALTA) 30 mg delayed release capsule TAKE 1 CAP BY MOUTH DAILY IN THE AM 90 capsule 1    DULoxetine (CYMBALTA) 60 mg delayed release capsule TAKE 1 CAP DAILY BY MOUTH IN THE EVENING 90 capsule 1    Evolocumab 140 MG/ML SOAJ Inject 2 mL under the skin      famotidine (PEPCID) 40 MG tablet Take 40 mg by mouth daily  2    gemfibrozil (LOPID) 600 mg tablet Take 600 mg by mouth 2 (two) times a day  3    levothyroxine 88 mcg tablet TAKE 1 TABLET BY MOUTH EVERY DAY 90 tablet 0    LORazepam (ATIVAN) 1 mg tablet Take 1 tablet (1 mg total) by mouth 2 (two) times a day 60 tablet 0    lubiprostone (AMITIZA) 24 mcg capsule Take 1 capsule (24 mcg total) by mouth 2 (two) times a day with meals 60 capsule 2    melatonin 3 mg Take 20 mg by mouth       metFORMIN (GLUCOPHAGE) 500 mg tablet TAKE 1 TABLET (500 MG TOTAL) BY MOUTH 2 (TWO) TIMES A DAY WITH MEALS 180 tablet 0    metoprolol tartrate (LOPRESSOR) 50 mg tablet TAKE 1 TABLET BY MOUTH TWICE A  tablet 0    Misc  Devices (GETGO ROLLING WALKER) MISC Use rolling walker as needed 1 each 0    morphine (MS CONTIN) 15 mg 12 hr tablet Take 15 mg by mouth 2 (two) times a day      pantoprazole (PROTONIX) 40 mg tablet Take 1 tablet (40 mg total) by mouth daily 90 tablet 0    pyridoxine (VITAMIN B6) 100 mg tablet TAKE 1 TABLET BY MOUTH EVERY DAY IN THE MORNING 30 tablet 2    Repatha SureClick 112 MG/ML SOAJ       rOPINIRole (REQUIP) 0 5 mg tablet Take 1 tablet (0 5 mg total) by mouth daily at bedtime 60 tablet 3    sucralfate (CARAFATE) 1 g tablet sucralfate 1 gram tablet      Blood Glucose Monitoring Suppl (ONE TOUCH ULTRA 2) w/Device KIT Patient to test once daily (Patient not taking: Reported on 8/17/2021) 1 each 0    budesonide-formoterol (Symbicort) 160-4 5 mcg/act inhaler Inhale 2 puffs 2 (two) times a day (Patient not taking: Reported on 8/17/2021)      glucose blood (OneTouch Ultra) test strip Patient to test once daily (Patient not taking: Reported on 8/17/2021) 100 each 1    Lancets (onetouch ultrasoft) lancets Patient to test once daily (Patient not taking: Reported on 8/17/2021) 100 each 1    methocarbamol (ROBAXIN) 750 mg tablet TAKE 1 TABLET (750 MG TOTAL) BY MOUTH 3 (THREE) TIMES A DAY AS NEEDED FOR MUSCLE SPASMS   oxyCODONE (ROXICODONE) 5 mg immediate release tablet Take 5 mg by mouth every 4 (four) hours as needed      prochlorperazine (COMPAZINE) 10 mg tablet TAKE 1 TABLET (10 MG TOTAL) BY MOUTH EVERY 6 (SIX) HOURS AS NEEDED FOR NAUSEA OR VOMITING   Spiriva Respimat 2 5 MCG/ACT AERS inhaler INHALE 2 ACTUATION DAILY  (Patient not taking: Reported on 8/17/2021)       No current facility-administered medications for this visit       Current Outpatient Medications on File Prior to Visit   Medication Sig  albuterol (2 5 mg/3 mL) 0 083 % nebulizer solution Inhale 2 5 mg every 6 (six) hours as needed    albuterol (PROVENTIL HFA,VENTOLIN HFA) 90 mcg/act inhaler Inhale 2 puffs every 6 (six) hours as needed for wheezing or shortness of breath    aspirin (ASPIRIN 81) 81 mg EC tablet aspirin 81 mg tablet,delayed release    B Complex Vitamins (B COMPLEX 1 PO) Take 1 tablet by mouth    calcium carbonate-vitamin D (OSCAL-D) 500 mg-200 units per tablet Take 1 tablet by mouth 2 (two) times a day with meals    clopidogrel (PLAVIX) 75 mg tablet Take 75 mg by mouth daily    docusate sodium (COLACE) 100 mg capsule Take 1 capsule (100 mg total) by mouth daily at bedtime    DULoxetine (CYMBALTA) 30 mg delayed release capsule TAKE 1 CAP BY MOUTH DAILY IN THE AM    DULoxetine (CYMBALTA) 60 mg delayed release capsule TAKE 1 CAP DAILY BY MOUTH IN THE EVENING    Evolocumab 140 MG/ML SOAJ Inject 2 mL under the skin    famotidine (PEPCID) 40 MG tablet Take 40 mg by mouth daily    gemfibrozil (LOPID) 600 mg tablet Take 600 mg by mouth 2 (two) times a day    levothyroxine 88 mcg tablet TAKE 1 TABLET BY MOUTH EVERY DAY    LORazepam (ATIVAN) 1 mg tablet Take 1 tablet (1 mg total) by mouth 2 (two) times a day    lubiprostone (AMITIZA) 24 mcg capsule Take 1 capsule (24 mcg total) by mouth 2 (two) times a day with meals    melatonin 3 mg Take 20 mg by mouth     metFORMIN (GLUCOPHAGE) 500 mg tablet TAKE 1 TABLET (500 MG TOTAL) BY MOUTH 2 (TWO) TIMES A DAY WITH MEALS    metoprolol tartrate (LOPRESSOR) 50 mg tablet TAKE 1 TABLET BY MOUTH TWICE A DAY    Misc   Devices (GETGO ROLLING WALKER) MISC Use rolling walker as needed    morphine (MS CONTIN) 15 mg 12 hr tablet Take 15 mg by mouth 2 (two) times a day    pantoprazole (PROTONIX) 40 mg tablet Take 1 tablet (40 mg total) by mouth daily    pyridoxine (VITAMIN B6) 100 mg tablet TAKE 1 TABLET BY MOUTH EVERY DAY IN THE MORNING    Repatha SureClick 802 MG/ML SOAJ     sucralfate (CARAFATE) 1 g tablet sucralfate 1 gram tablet    [DISCONTINUED] rOPINIRole (REQUIP) 0 5 mg tablet TAKE 0 5 TABLETS (0 25 MG TOTAL) BY MOUTH DAILY AT BEDTIME    Blood Glucose Monitoring Suppl (ONE TOUCH ULTRA 2) w/Device KIT Patient to test once daily (Patient not taking: Reported on 8/17/2021)    budesonide-formoterol (Symbicort) 160-4 5 mcg/act inhaler Inhale 2 puffs 2 (two) times a day (Patient not taking: Reported on 8/17/2021)    glucose blood (OneTouch Ultra) test strip Patient to test once daily (Patient not taking: Reported on 8/17/2021)    Lancets (onetouch ultrasoft) lancets Patient to test once daily (Patient not taking: Reported on 8/17/2021)    methocarbamol (ROBAXIN) 750 mg tablet TAKE 1 TABLET (750 MG TOTAL) BY MOUTH 3 (THREE) TIMES A DAY AS NEEDED FOR MUSCLE SPASMS   oxyCODONE (ROXICODONE) 5 mg immediate release tablet Take 5 mg by mouth every 4 (four) hours as needed    prochlorperazine (COMPAZINE) 10 mg tablet TAKE 1 TABLET (10 MG TOTAL) BY MOUTH EVERY 6 (SIX) HOURS AS NEEDED FOR NAUSEA OR VOMITING   Spiriva Respimat 2 5 MCG/ACT AERS inhaler INHALE 2 ACTUATION DAILY  (Patient not taking: Reported on 8/17/2021)     No current facility-administered medications on file prior to visit  She is allergic to metoclopramide, nitrofurantoin, isosorbide, pregabalin, and reglan [metoclopramide]            Objective:    Blood pressure 141/87, pulse 71, temperature 97 5 °F (36 4 °C), temperature source Skin, height 5' 6" (1 676 m), weight 92 1 kg (203 lb)  Physical Exam    Neurological Exam  CONSTITUTIONAL: NAD, pleasant  NECK: supple, no lymphadenopathy, no thyromegaly, no JVD  CARDIOVASCULAR: RRR, normal S1S2, no murmurs, no rubs  RESP: clear to auscultation bilaterally, no wheezes/rhonchi/rales  ABDOMEN: soft, non tender, non distended  SKIN: no rash or skin lesions  EXTREMITIES: no edema, pulses 2+bilaterally   PSYCH: appropriate mood and affect  NEUROLOGIC COMPREHENSIVE EXAM: Patient is oriented to person, place and time, NAD; appropriate affect  CN II, III, IV, V, VI, VII,VIII,IX,X,XI-XII intact with EOMI, PERRLA, OKN intact, VF grossly intact, fundi poorly visualized secondary to pupillary constriction; symmetric face noted  Motor: 5/5 UE/LE bilateral symmetric; Sensory: intact to light touch and pinprick bilaterally; normal vibration sensation feet bilaterally; Coordination within normal limits on FTN and SHERI testing; DTR: 1/4 through, no Babinski, no clonus  Tandem gait is abnormal  Romberg: positive  ROS:  12 points of review of system was reviewed with the patient and was unremarkable with exception: see HPI  Review of Systems   Constitutional: Negative  Negative for appetite change and fever  HENT: Negative  Negative for hearing loss, tinnitus, trouble swallowing and voice change  Eyes: Negative  Negative for photophobia and pain  Respiratory: Negative  Negative for shortness of breath  Cardiovascular: Negative  Negative for palpitations  Gastrointestinal: Negative  Negative for nausea and vomiting  Endocrine: Negative  Negative for cold intolerance  Genitourinary: Negative  Negative for dysuria, frequency and urgency  Musculoskeletal: Negative  Negative for myalgias and neck pain  Skin: Negative  Negative for rash  Neurological: Positive for weakness (right side of body) and headaches (pain today rate a 7)  Negative for dizziness, tremors, seizures, syncope, facial asymmetry, speech difficulty, light-headedness and numbness  Patient states feet feels like burning  Hematological: Negative  Does not bruise/bleed easily  Psychiatric/Behavioral: Negative  Negative for confusion, hallucinations and sleep disturbance

## 2021-08-18 ENCOUNTER — OFFICE VISIT (OUTPATIENT)
Dept: GASTROENTEROLOGY | Facility: CLINIC | Age: 66
End: 2021-08-18
Payer: MEDICARE

## 2021-08-18 VITALS
BODY MASS INDEX: 32.62 KG/M2 | WEIGHT: 203 LBS | DIASTOLIC BLOOD PRESSURE: 86 MMHG | SYSTOLIC BLOOD PRESSURE: 122 MMHG | HEIGHT: 66 IN | HEART RATE: 66 BPM

## 2021-08-18 DIAGNOSIS — K31.84 GASTROPARESIS: Primary | ICD-10-CM

## 2021-08-18 DIAGNOSIS — K59.01 CONSTIPATION BY DELAYED COLONIC TRANSIT: ICD-10-CM

## 2021-08-18 DIAGNOSIS — Z86.010 HISTORY OF COLON POLYPS: ICD-10-CM

## 2021-08-18 DIAGNOSIS — K21.00 GASTROESOPHAGEAL REFLUX DISEASE WITH ESOPHAGITIS WITHOUT HEMORRHAGE: ICD-10-CM

## 2021-08-18 PROCEDURE — 99214 OFFICE O/P EST MOD 30 MIN: CPT | Performed by: INTERNAL MEDICINE

## 2021-08-18 RX ORDER — DOCUSATE SODIUM 100 MG/1
100 CAPSULE, LIQUID FILLED ORAL
Qty: 180 CAPSULE | Refills: 3 | Status: SHIPPED | OUTPATIENT
Start: 2021-08-18

## 2021-08-18 NOTE — PROGRESS NOTES
[facility] - Outpatient Follow-up Note  Thong Padilla 73 y o  female MRN: [id number]  Encounter: [id number]          ASSESSMENT AND PLAN:      1  Gastroparesis    Status post Botox injection May 1st of this year doing quite well  Bloating has improved  Will do watchful waiting  Some nausea but has been getting radiation for breast metastases to the right hip additionally has been taking intermittent narcotics for pain control    2  Constipation by delayed colonic transit   doing well on docusate sodium and Amitiza  Needs refill docusate  We discussed calcium minutes interplay on bowel habits as well as narcotics  - docusate sodium (COLACE) 100 mg capsule; Take 1 capsule (100 mg total) by mouth daily at bedtime  Dispense: 180 capsule; Refill: 3    3  Gastroesophageal reflux disease with esophagitis without hemorrhage    Control in famotidine 40 mg a day plus having her Botox injection has helped occasionally requires liquid Carafate   Bloating also improved  4  History of colon polyps   colon cancer screening up-to-date  Next colonoscopy June of 2026 she does have a history of multiple colon polyps  She otherwise follow-up in 6 months     ______________________________________________________________________    SUBJECTIVE:    Very pleasant 50-year-old lady with metastatic breast cancer  She now has some lesions in her in her right hip  She is getting  Radiation  This makes her nauseous requiring narcotics which in turn constipate her and sometimes make her more nauseous  Her reflux is under control occasionally needs a supplement famotidine with sucralfate  No melena bright red blood per rectum  Colon cancer screening is up-to-date  REVIEW OF SYSTEMS IS OTHERWISE NEGATIVE        Historical Information   Past Medical History:   Diagnosis Date    Anxiety     [facility]     CAD (coronary artery disease)     Cancer [facility]     Carpal tunnel syndrome     Disease of thyroid gland     Elevated cholesterol     Fibromyalgia     Hypertension     Kidney stone     Melanoma ([address] )     nose    Myocardial infarction (HCC)     Neuropathy     BRAYDEN (obstructive sleep apnea)     cpap    Panic attack      Past Surgical History:   Procedure Laterality Date    BREAST SURGERY Bilateral     CARDIAC SURGERY      cardiac ablation     SECTION      x3    CHOLECYSTECTOMY      CORONARY ANGIOPLASTY WITH STENT PLACEMENT      MASTECTOMY Bilateral     NOSE SURGERY       Social History   Social History     Substance and Sexual Activity   Alcohol Use Yes    Comment: beer     Social History     Substance and Sexual Activity   Drug Use Yes    Types: Marijuana    [facility]     Social History     Tobacco Use   Smoking Status Never Smoker   Smokeless Tobacco Never Used     Family History   Problem Relation Age of Onset    Heart attack Mother     Alcohol abuse Mother     Heart failure Mother        Meds/Allergies       Current Outpatient Medications:     albuterol (2 5 mg/3 mL) 0 083 % nebulizer solution    albuterol (PROVENTIL HFA,VENTOLIN HFA) 90 mcg/act inhaler    aspirin (ASPIRIN 81) 81 mg EC tablet    B Complex Vitamins (B COMPLEX 1 PO)    calcium carbonate-vitamin D (OSCAL-D) 500 mg-200 units per tablet    clopidogrel (PLAVIX) 75 mg tablet    docusate sodium (COLACE) 100 mg capsule    DULoxetine (CYMBALTA) 30 mg delayed release capsule    DULoxetine (CYMBALTA) 60 mg delayed release capsule    Evolocumab 140 MG/ML SOAJ    famotidine (PEPCID) 40 MG tablet    gemfibrozil (LOPID) 600 mg tablet    levothyroxine 88 mcg tablet    LORazepam (ATIVAN) 1 mg tablet    lubiprostone (AMITIZA) 24 mcg capsule    melatonin 3 mg    metFORMIN (GLUCOPHAGE) 500 mg tablet    methocarbamol (ROBAXIN) 750 mg tablet    metoprolol tartrate (LOPRESSOR) 50 mg tablet    Misc   Devices (GETGO ROLLING WALKER) MISC    morphine (MS CONTIN) 15 mg 12 hr tablet   oxyCODONE (ROXICODONE) 5 mg immediate release tablet    pantoprazole (PROTONIX) 40 mg tablet    prochlorperazine (COMPAZINE) 10 mg tablet    pyridoxine (VITAMIN B6) 100 mg tablet    Repatha SureClick 106 MG/ML SOAJ    rOPINIRole (REQUIP) 0 5 mg tablet    sucralfate (CARAFATE) 1 g tablet    Blood Glucose Monitoring Suppl (ONE TOUCH ULTRA 2) w/Device KIT    budesonide-formoterol (Symbicort) 160-4 5 mcg/act inhaler    glucose blood (OneTouch Ultra) test strip    Lancets (onetouch ultrasoft) lancets    Spiriva Respimat 2 5 MCG/ACT AERS inhaler    Allergies   Allergen Reactions    Metoclopramide Other (See Comments)    Nitrofurantoin Other (See Comments)     Very weak  Fell    Isosorbide      Other reaction(s): headache    Pregabalin      Pt states made her feel suicidal     Reglan [Metoclopramide] Other (See Comments)     Flares her neuropathy           Objective     Blood pressure 122/86, pulse 66, height 5' 6" (1 676 m), weight 92 1 kg (203 lb)  Body mass index is 32 77 kg/m²  PHYSICAL EXAM:      General Appearance:   Alert, cooperative, no distress   HEENT:   Normocephalic, atraumatic, anicteric      Neck:  Supple, symmetrical, trachea midline   Lungs:   Clear to auscultation bilaterally; no rales, rhonchi or wheezing; respirations unlabored    Heart[de-identified]   Regular rate and rhythm; no murmur, rub, or gallop  Abdomen:   Soft, non-tender, non-distended; normal bowel sounds; no masses, no organomegaly    Genitalia:   Deferred    Rectal:   Deferred    Extremities:  No cyanosis, clubbing or edema    Pulses:  2+ and symmetric    Skin:  No jaundice, rashes, or lesions    Lymph nodes:  No palpable cervical lymphadenopathy        Lab Results:   No visits with results within 1 Day(s) from this visit     Latest known visit with results is:   Hospital Outpatient Visit on 06/01/2021   Component Date Value    Case Report 06/01/2021                      Value:Surgical Pathology Report Case: [id number]                                   Authorizing Provider:  Erskine Osler, MD           Collected:           06/01/2021 1158              Ordering Location:     [facility] [place] [address]   Received:            06/01/2021 1254                                     Endoscopy                                                                    Pathologist:           Cas Hutchinson MD                                                   Specimens:   A) - Duodenum,  bx duodenum bulb polyp                                                              B) - Stomach, bx antrum r/o h pylori                                                                C) - Stomach, bx gastric polyp                                                                      D) - Colon, descending colon polyp cold snare                                                       E) - Colon, ascending colon polyp cold snare                                                                                  F) - Colon, bx transverse  colon polyp                                                              G) - Colon, transverse colon polyp cold snare                                              Final Diagnosis 06/01/2021                      Value: This result contains rich text formatting which cannot be displayed here   Additional Information 06/01/2021                      Value: This result contains rich text formatting which cannot be displayed here  Ludy Bello Gross Description 06/01/2021                      Value: This result contains rich text formatting which cannot be displayed here  Radiology Results:   No results found

## 2021-08-31 ENCOUNTER — PATIENT MESSAGE (OUTPATIENT)
Dept: GASTROENTEROLOGY | Facility: CLINIC | Age: 66
End: 2021-08-31

## 2021-08-31 DIAGNOSIS — K21.00 GASTROESOPHAGEAL REFLUX DISEASE WITH ESOPHAGITIS WITHOUT HEMORRHAGE: Primary | ICD-10-CM

## 2021-09-01 RX ORDER — FAMOTIDINE 40 MG/1
40 TABLET, FILM COATED ORAL DAILY
Qty: 90 TABLET | Refills: 2 | Status: SHIPPED | OUTPATIENT
Start: 2021-09-01 | End: 2021-09-01

## 2021-09-01 RX ORDER — FAMOTIDINE 40 MG/1
40 TABLET, FILM COATED ORAL DAILY
Qty: 90 TABLET | Refills: 2 | Status: SHIPPED | OUTPATIENT
Start: 2021-09-01

## 2021-09-02 ENCOUNTER — HOSPITAL ENCOUNTER (OUTPATIENT)
Dept: MRI IMAGING | Facility: HOSPITAL | Age: 66
Discharge: HOME | End: 2021-09-02
Attending: PSYCHIATRY & NEUROLOGY
Payer: MEDICARE

## 2021-09-02 DIAGNOSIS — G62.0 DRUG-INDUCED PERIPHERAL NEUROPATHY (HCC): ICD-10-CM

## 2021-09-02 DIAGNOSIS — I61.9 CEREBROVASCULAR SMALL VESSEL DISEASE WITH HEMORRHAGE (HCC): ICD-10-CM

## 2021-09-02 DIAGNOSIS — G62.9 PERIPHERAL POLYNEUROPATHY: ICD-10-CM

## 2021-09-02 PROCEDURE — G1004 CDSM NDSC: HCPCS

## 2021-09-02 PROCEDURE — 70551 MRI BRAIN STEM W/O DYE: CPT

## 2021-09-07 ENCOUNTER — PATIENT MESSAGE (OUTPATIENT)
Dept: NEUROLOGY | Facility: CLINIC | Age: 66
End: 2021-09-07

## 2021-09-08 ENCOUNTER — TELEPHONE (OUTPATIENT)
Dept: NEUROLOGY | Facility: CLINIC | Age: 66
End: 2021-09-08

## 2021-09-08 NOTE — TELEPHONE ENCOUNTER
Called patient  Received voicemail stating phone is not accepting messages at this time  Unable to leave a message  Will try again later  [employer] message sent

## 2021-09-08 NOTE — TELEPHONE ENCOUNTER
----- Message from Stephanie Weller sent at 9/7/2021  9:59 AM EDT -----  Regarding: FW: Prescription Question  Contact: [phone]    ----- Message -----  From: Clement Patterson  Sent: 9/7/2021   9:54 AM EDT  To: Neu[facility]  Subject: Prescription Question                            As discussed in my visit we discussed if the ropinerole dose was working and it is not effective

## 2021-09-08 NOTE — TELEPHONE ENCOUNTER
Attempted to call pt, received message stating pt is not accepting calls at this time  [employer] message sent back to the patient  Closing this encounter at this time  Will await [employer] message response

## 2021-09-09 NOTE — TELEPHONE ENCOUNTER
Spoke w/patient  Advised her of results and recommendations below  Patient verbalized understanding   She will follow up with Oncologist

## 2021-09-09 NOTE — TELEPHONE ENCOUNTER
Patient returned call  She is currently taking Ropinirole 0 5mg - 1 tab daily    Patient takes pramipexole 0 5mg for RLS  8/17/21 LOV:   patient also takes pramipexole 0 5 mg for presumable restless leg syndrome  September 9, 2021  Kendall Novoa  to Ethan Avila MD        11:56 AM  Ropinerol HCL   05  Not very effective  I had bad restless leg at night  Am I supposed to be using mirapex in addition in addition to the ropinerole  When I do I have better results

## 2021-09-09 NOTE — TELEPHONE ENCOUNTER
Patient is to choose between ropinirole vs pramipexole and once chosen , I will increase dose of only one drug to bid or even tid

## 2021-09-09 NOTE — TELEPHONE ENCOUNTER
Spoke w/patient  She states she would like to continue w/Ropinrole  Dr Dias Single - please advise on dose

## 2021-09-09 NOTE — TELEPHONE ENCOUNTER
Patient returned call  She is currently taking Ropinirole 0 5mg - 1 tab daily    Patient also takes pramipexole 0 5mg for RLS  8/17/21 LOV:   patient also takes pramipexole 0 5 mg for presumable restless leg syndrome  Patient states she is not getting relief w/the Ropinirole 0 5gm  She has continued to take Pramipexole 0 5mg  She does experience relief when she takes both medications  Patient wants to confirm if it is ok for her to continue to take both medications? Dr Joseph Owen - please advise

## 2021-09-10 DIAGNOSIS — G25.81 RESTLESS LEG SYNDROME: ICD-10-CM

## 2021-09-10 RX ORDER — ROPINIROLE 0.5 MG/1
TABLET, FILM COATED ORAL
Qty: 60 TABLET | Refills: 3 | Status: SHIPPED | OUTPATIENT
Start: 2021-09-10 | End: 2021-09-14

## 2021-09-10 NOTE — TELEPHONE ENCOUNTER
Ropinirole 0 5 mg should be taken as 2 tabs 1-3 hours before bedtime   May continue increasing dose up to 3 mg/day

## 2021-09-10 NOTE — TELEPHONE ENCOUNTER
Spoke w/patient  Advised her of message below  Patient verbalized understanding  Advised patient to call office if any future questions/concerns

## 2021-09-11 DIAGNOSIS — I25.10 ATHEROSCLEROSIS OF NATIVE CORONARY ARTERY OF NATIVE HEART WITHOUT ANGINA PECTORIS: ICD-10-CM

## 2021-09-11 DIAGNOSIS — F41.1 GENERALIZED ANXIETY DISORDER: ICD-10-CM

## 2021-09-13 RX ORDER — MULTIVITAMIN WITH IRON
100 TABLET ORAL EVERY MORNING
Qty: 30 TABLET | Refills: 0 | Status: SHIPPED | OUTPATIENT
Start: 2021-09-13 | End: 2021-11-03 | Stop reason: SDUPTHER

## 2021-09-14 ENCOUNTER — APPOINTMENT (OUTPATIENT)
Dept: LAB | Facility: HOSPITAL | Age: 66
End: 2021-09-14
Payer: MEDICARE

## 2021-09-14 DIAGNOSIS — E03.9 HYPOTHYROIDISM, UNSPECIFIED TYPE: ICD-10-CM

## 2021-09-14 DIAGNOSIS — G25.81 RESTLESS LEG SYNDROME: Primary | ICD-10-CM

## 2021-09-14 DIAGNOSIS — I61.9 CEREBROVASCULAR SMALL VESSEL DISEASE WITH HEMORRHAGE (HCC): ICD-10-CM

## 2021-09-14 DIAGNOSIS — E08.610 DIABETES MELLITUS DUE TO UNDERLYING CONDITION WITH DIABETIC NEUROPATHIC ARTHROPATHY, WITHOUT LONG-TERM CURRENT USE OF INSULIN (HCC): ICD-10-CM

## 2021-09-14 DIAGNOSIS — Z99.89 OSA ON CPAP: ICD-10-CM

## 2021-09-14 DIAGNOSIS — I25.10 ATHEROSCLEROSIS OF NATIVE CORONARY ARTERY OF NATIVE HEART WITHOUT ANGINA PECTORIS: ICD-10-CM

## 2021-09-14 DIAGNOSIS — I10 ESSENTIAL HYPERTENSION: ICD-10-CM

## 2021-09-14 DIAGNOSIS — M17.0 PRIMARY OSTEOARTHRITIS OF BOTH KNEES: ICD-10-CM

## 2021-09-14 DIAGNOSIS — G47.33 OSA ON CPAP: ICD-10-CM

## 2021-09-14 DIAGNOSIS — G62.9 PERIPHERAL POLYNEUROPATHY: ICD-10-CM

## 2021-09-14 DIAGNOSIS — K59.01 CONSTIPATION BY DELAYED COLONIC TRANSIT: ICD-10-CM

## 2021-09-14 LAB
ALBUMIN SERPL BCP-MCNC: 3.8 G/DL (ref 3.5–5)
ALP SERPL-CCNC: 77 U/L (ref 46–116)
ALT SERPL W P-5'-P-CCNC: 33 U/L (ref 12–78)
ANION GAP SERPL CALCULATED.3IONS-SCNC: 9 MMOL/L (ref 4–13)
AST SERPL W P-5'-P-CCNC: 24 U/L (ref 5–45)
BASOPHILS # BLD AUTO: 0.08 THOUSANDS/ΜL (ref 0–0.1)
BASOPHILS NFR BLD AUTO: 1 % (ref 0–1)
BILIRUB SERPL-MCNC: 0.5 MG/DL (ref 0.2–1)
BUN SERPL-MCNC: 13 MG/DL (ref 5–25)
CALCIUM SERPL-MCNC: 9.6 MG/DL (ref 8.3–10.1)
CHLORIDE SERPL-SCNC: 104 MMOL/L (ref 100–108)
CHOLEST SERPL-MCNC: 115 MG/DL (ref 50–200)
CO2 SERPL-SCNC: 30 MMOL/L (ref 21–32)
CREAT SERPL-MCNC: 0.79 MG/DL (ref 0.6–1.3)
EOSINOPHIL # BLD AUTO: 0.28 THOUSAND/ΜL (ref 0–0.61)
EOSINOPHIL NFR BLD AUTO: 3 % (ref 0–6)
ERYTHROCYTE [DISTWIDTH] IN BLOOD BY AUTOMATED COUNT: 13 % (ref 11.6–15.1)
EST. AVERAGE GLUCOSE BLD GHB EST-MCNC: 120 MG/DL
GFR SERPL CREATININE-BSD FRML MDRD: 79 ML/MIN/1.73SQ M
GLUCOSE P FAST SERPL-MCNC: 114 MG/DL (ref 65–99)
HBA1C MFR BLD: 5.8 %
HCT VFR BLD AUTO: 43.9 % (ref 34.8–46.1)
HDLC SERPL-MCNC: 45 MG/DL
HGB BLD-MCNC: 14.4 G/DL (ref 11.5–15.4)
IMM GRANULOCYTES # BLD AUTO: 0.04 THOUSAND/UL (ref 0–0.2)
IMM GRANULOCYTES NFR BLD AUTO: 0 % (ref 0–2)
LDLC SERPL CALC-MCNC: 40 MG/DL (ref 0–100)
LYMPHOCYTES # BLD AUTO: 2.75 THOUSANDS/ΜL (ref 0.6–4.47)
LYMPHOCYTES NFR BLD AUTO: 29 % (ref 14–44)
MCH RBC QN AUTO: 30.5 PG (ref 26.8–34.3)
MCHC RBC AUTO-ENTMCNC: 32.8 G/DL (ref 31.4–37.4)
MCV RBC AUTO: 93 FL (ref 82–98)
MONOCYTES # BLD AUTO: 0.88 THOUSAND/ΜL (ref 0.17–1.22)
MONOCYTES NFR BLD AUTO: 9 % (ref 4–12)
NEUTROPHILS # BLD AUTO: 5.35 THOUSANDS/ΜL (ref 1.85–7.62)
NEUTS SEG NFR BLD AUTO: 58 % (ref 43–75)
NONHDLC SERPL-MCNC: 70 MG/DL
NRBC BLD AUTO-RTO: 0 /100 WBCS
PLATELET # BLD AUTO: 389 THOUSANDS/UL (ref 149–390)
PMV BLD AUTO: 9.3 FL (ref 8.9–12.7)
POTASSIUM SERPL-SCNC: 4.1 MMOL/L (ref 3.5–5.3)
PROT SERPL-MCNC: 8 G/DL (ref 6.4–8.2)
RBC # BLD AUTO: 4.72 MILLION/UL (ref 3.81–5.12)
SODIUM SERPL-SCNC: 143 MMOL/L (ref 136–145)
TRIGL SERPL-MCNC: 150 MG/DL
TSH SERPL DL<=0.05 MIU/L-ACNC: 2.27 UIU/ML (ref 0.36–3.74)
WBC # BLD AUTO: 9.38 THOUSAND/UL (ref 4.31–10.16)

## 2021-09-14 PROCEDURE — 80053 COMPREHEN METABOLIC PANEL: CPT

## 2021-09-14 PROCEDURE — 36415 COLL VENOUS BLD VENIPUNCTURE: CPT

## 2021-09-14 PROCEDURE — 80061 LIPID PANEL: CPT

## 2021-09-14 PROCEDURE — 85025 COMPLETE CBC W/AUTO DIFF WBC: CPT

## 2021-09-14 PROCEDURE — 84443 ASSAY THYROID STIM HORMONE: CPT

## 2021-09-14 PROCEDURE — 83036 HEMOGLOBIN GLYCOSYLATED A1C: CPT

## 2021-09-14 RX ORDER — LORAZEPAM 1 MG/1
1 TABLET ORAL 2 TIMES DAILY
Qty: 60 TABLET | Refills: 0 | Status: SHIPPED | OUTPATIENT
Start: 2021-09-14 | End: 2021-10-23 | Stop reason: SDUPTHER

## 2021-09-14 RX ORDER — PRAMIPEXOLE DIHYDROCHLORIDE 0.5 MG/1
0.5 TABLET ORAL 3 TIMES DAILY
Qty: 90 TABLET | Refills: 1 | Status: SHIPPED | OUTPATIENT
Start: 2021-09-14 | End: 2021-10-08

## 2021-09-17 ENCOUNTER — OFFICE VISIT (OUTPATIENT)
Dept: FAMILY MEDICINE CLINIC | Facility: CLINIC | Age: 66
End: 2021-09-17
Payer: MEDICARE

## 2021-09-17 VITALS
RESPIRATION RATE: 18 BRPM | HEIGHT: 66 IN | BODY MASS INDEX: 32.11 KG/M2 | HEART RATE: 67 BPM | WEIGHT: 199.8 LBS | DIASTOLIC BLOOD PRESSURE: 60 MMHG | OXYGEN SATURATION: 95 % | SYSTOLIC BLOOD PRESSURE: 118 MMHG | TEMPERATURE: 98.7 F

## 2021-09-17 DIAGNOSIS — C50.919 MALIGNANT NEOPLASM OF FEMALE BREAST, UNSPECIFIED ESTROGEN RECEPTOR STATUS, UNSPECIFIED LATERALITY, UNSPECIFIED SITE OF BREAST (HCC): ICD-10-CM

## 2021-09-17 DIAGNOSIS — K76.0 FATTY LIVER: ICD-10-CM

## 2021-09-17 DIAGNOSIS — E03.9 HYPOTHYROIDISM, UNSPECIFIED TYPE: ICD-10-CM

## 2021-09-17 DIAGNOSIS — J43.1 PANLOBULAR EMPHYSEMA (HCC): ICD-10-CM

## 2021-09-17 DIAGNOSIS — I10 ESSENTIAL HYPERTENSION: ICD-10-CM

## 2021-09-17 DIAGNOSIS — K21.00 GASTROESOPHAGEAL REFLUX DISEASE WITH ESOPHAGITIS WITHOUT HEMORRHAGE: Primary | ICD-10-CM

## 2021-09-17 PROCEDURE — 99214 OFFICE O/P EST MOD 30 MIN: CPT | Performed by: FAMILY MEDICINE

## 2021-09-17 RX ORDER — MORPHINE SULFATE 15 MG/1
15 TABLET, FILM COATED, EXTENDED RELEASE ORAL 2 TIMES DAILY
COMMUNITY
Start: 2021-09-16 | End: 2021-10-16

## 2021-09-17 RX ORDER — MORPHINE SULFATE 15 MG/1
30 TABLET, FILM COATED, EXTENDED RELEASE ORAL
COMMUNITY
Start: 2021-09-16

## 2021-09-17 NOTE — ASSESSMENT & PLAN NOTE
Breast cancer with metastasis patient is followed closely by Hematology-Oncology and will be going for radiation therapy

## 2021-09-17 NOTE — ASSESSMENT & PLAN NOTE
Stable no worsening shortness of breath continue current treatment plan follow-up with Pulmonary Medicine if symptoms change or worsen

## 2021-09-17 NOTE — PROGRESS NOTES
Assessment/Plan:       Problem List Items Addressed This Visit        Digestive    Fatty liver      Monitor reviewed laboratory work liver function tests normal at this time         GERD (gastroesophageal reflux disease) - Primary      Stable continue proton pump inhibitor            Endocrine    Hypothyroidism      Continue with levothyroxine check TSH T4 remain on current dose level            Respiratory    Panlobular emphysema (HCC)      Stable no worsening shortness of breath continue current treatment plan follow-up with Pulmonary Medicine if symptoms change or worsen            Cardiovascular and Mediastinum    Essential hypertension      Hypertension stable continue with current medications            Other    Breast cancer ([address] )      Breast cancer with metastasis patient is followed closely by Hematology-Oncology and will be going for radiation therapy                 Subjective:      Patient ID: Jimmie Lock is a 73 y o  female  Patient is here for follow-up evaluation and discussion regarding her multiple medical problems including metastatic breast cancer she has recently been followed closely by Hematology-Oncology from [facility] and is undergoing radiation treatment      The following portions of the patient's history were reviewed and updated as appropriate: allergies, current medications, past family history, past medical history, past social history, past surgical history and problem list     Review of Systems   Constitutional: Negative for chills, fatigue and fever  HENT: Negative for congestion, nosebleeds, rhinorrhea, sinus pressure and sore throat  Eyes: Negative for discharge and redness  Respiratory: Negative for cough and shortness of breath  Cardiovascular: Negative for chest pain, palpitations and leg swelling  Gastrointestinal: Negative for abdominal pain, blood in stool and nausea  Endocrine: Negative for cold intolerance, heat intolerance and polyuria  Genitourinary: Negative for dysuria and frequency  Musculoskeletal: Negative for arthralgias, back pain and myalgias  Skin: Negative for rash  Neurological: Negative for dizziness, weakness and headaches  Hematological: Negative for adenopathy  Psychiatric/Behavioral: Negative for behavioral problems and sleep disturbance  The patient is not nervous/anxious  Objective:      /60 (BP Location: Left arm, Patient Position: Sitting)   Pulse 67   Temp 98 7 °F (37 1 °C)   Resp 18   Ht 5' 6" (1 676 m)   Wt 90 6 kg (199 lb 12 8 oz)   SpO2 95%   BMI 32 25 kg/m²        Physical Exam  Vitals and nursing note reviewed  Constitutional:       General: She is not in acute distress  Appearance: Normal appearance  She is well-developed and normal weight  HENT:      Head: Normocephalic and atraumatic  Right Ear: Tympanic membrane and external ear normal       Left Ear: Tympanic membrane and external ear normal       Nose: Nose normal       Mouth/Throat:      Mouth: Mucous membranes are moist       Pharynx: Oropharyngeal exudate present  Eyes:      General: No scleral icterus  Right eye: No discharge  Left eye: No discharge  Extraocular Movements: Extraocular movements intact  Conjunctiva/sclera: Conjunctivae normal       Pupils: Pupils are equal, round, and reactive to light  Neck:      Thyroid: No thyromegaly  Vascular: No JVD  Cardiovascular:      Rate and Rhythm: Normal rate and regular rhythm  Pulses: Normal pulses  Heart sounds: Normal heart sounds  No murmur heard  Pulmonary:      Effort: Pulmonary effort is normal       Breath sounds: No wheezing or rales  Chest:      Chest wall: No tenderness  Abdominal:      General: Bowel sounds are normal  There is no distension  Palpations: Abdomen is soft  There is no mass  Tenderness: There is no abdominal tenderness     Musculoskeletal:         General: No tenderness or deformity  Normal range of motion  Cervical back: Normal range of motion  Lymphadenopathy:      Cervical: No cervical adenopathy  Skin:     General: Skin is warm and dry  Capillary Refill: Capillary refill takes less than 2 seconds  Findings: No rash  Neurological:      Mental Status: She is alert and oriented to person, place, and time  Mental status is at baseline  Cranial Nerves: No cranial nerve deficit  Coordination: Coordination normal       Deep Tendon Reflexes: Reflexes are normal and symmetric  Reflexes normal    Psychiatric:         Mood and Affect: Mood normal          Behavior: Behavior normal          Thought Content: Thought content normal          Judgment: Judgment normal           Data:    Laboratory Results: I have personally reviewed the pertinent laboratory results/reports   Radiology/Other Diagnostic Testing Results: I have personally reviewed pertinent reports         Lab Results   Component Value Date    WBC 9 38 09/14/2021    HGB 14 4 09/14/2021    HCT 43 9 09/14/2021    MCV 93 09/14/2021     09/14/2021     Lab Results   Component Value Date    K 4 1 09/14/2021     09/14/2021    CO2 30 09/14/2021    BUN 13 09/14/2021    CREATININE 0 79 09/14/2021    GLUF 114 (H) 09/14/2021    CALCIUM 9 6 09/14/2021    AST 24 09/14/2021    ALT 33 09/14/2021    ALKPHOS 77 09/14/2021    EGFR 79 09/14/2021     Lab Results   Component Value Date    CHOLESTEROL 115 09/14/2021    CHOLESTEROL 148 04/19/2021    CHOLESTEROL 161 06/09/2020     Lab Results   Component Value Date    HDL 45 09/14/2021    HDL 60 04/19/2021    HDL 51 06/09/2020     Lab Results   Component Value Date    LDLCALC 40 09/14/2021    LDLCALC 69 04/19/2021    LDLCALC 80 06/09/2020     Lab Results   Component Value Date    TRIG 150 09/14/2021    TRIG 95 04/19/2021    TRIG 151 (H) 06/09/2020     No results found for: [place], [place]  Lab Results   Component Value Date    CTM6AVNMGHPB 2 275 09/14/2021     Lab Results Component Value Date    [facility] 5 8 (H) 09/14/2021     No results found for: MIGUEL Wang DO

## 2021-10-07 ENCOUNTER — TELEPHONE (OUTPATIENT)
Dept: GASTROENTEROLOGY | Facility: CLINIC | Age: 66
End: 2021-10-07

## 2021-10-08 DIAGNOSIS — G25.81 RESTLESS LEG SYNDROME: ICD-10-CM

## 2021-10-08 RX ORDER — PRAMIPEXOLE DIHYDROCHLORIDE 0.5 MG/1
TABLET ORAL
Qty: 90 TABLET | Refills: 1 | Status: SHIPPED | OUTPATIENT
Start: 2021-10-08 | End: 2021-12-22

## 2021-10-12 ENCOUNTER — TELEPHONE (OUTPATIENT)
Dept: GASTROENTEROLOGY | Facility: CLINIC | Age: 66
End: 2021-10-12

## 2021-10-12 ENCOUNTER — OFFICE VISIT (OUTPATIENT)
Dept: GASTROENTEROLOGY | Facility: CLINIC | Age: 66
End: 2021-10-12
Payer: MEDICARE

## 2021-10-12 VITALS
SYSTOLIC BLOOD PRESSURE: 115 MMHG | DIASTOLIC BLOOD PRESSURE: 83 MMHG | WEIGHT: 200 LBS | BODY MASS INDEX: 32.14 KG/M2 | HEIGHT: 66 IN | HEART RATE: 72 BPM

## 2021-10-12 DIAGNOSIS — K59.00 CONSTIPATION, UNSPECIFIED CONSTIPATION TYPE: ICD-10-CM

## 2021-10-12 DIAGNOSIS — K63.5 POLYP OF COLON, UNSPECIFIED PART OF COLON, UNSPECIFIED TYPE: ICD-10-CM

## 2021-10-12 DIAGNOSIS — K21.00 GASTROESOPHAGEAL REFLUX DISEASE WITH ESOPHAGITIS WITHOUT HEMORRHAGE: Primary | ICD-10-CM

## 2021-10-12 DIAGNOSIS — K31.84 GASTROPARESIS: ICD-10-CM

## 2021-10-12 PROBLEM — Z79.01 LONG TERM (CURRENT) USE OF ANTICOAGULANTS: Status: ACTIVE | Noted: 2020-06-10

## 2021-10-12 PROBLEM — C78.00 LUNG METASTASIS (HCC): Status: ACTIVE | Noted: 2020-06-10

## 2021-10-12 PROBLEM — R26.2 AMBULATORY DYSFUNCTION: Status: ACTIVE | Noted: 2020-06-10

## 2021-10-12 PROBLEM — R91.1 LUNG NODULE: Status: ACTIVE | Noted: 2020-05-10

## 2021-10-12 PROBLEM — C50.519: Status: ACTIVE | Noted: 2020-06-10

## 2021-10-12 PROBLEM — E11.9 TYPE 2 DIABETES MELLITUS (HCC): Status: ACTIVE | Noted: 2021-08-03

## 2021-10-12 PROBLEM — M67.919 DISORDER OF ROTATOR CUFF: Status: ACTIVE | Noted: 2017-02-10

## 2021-10-12 PROBLEM — G62.0 DRUG-INDUCED POLYNEUROPATHY (HCC): Status: ACTIVE | Noted: 2020-06-10

## 2021-10-12 PROBLEM — Z95.5 HISTORY OF CORONARY ARTERY STENT PLACEMENT: Status: ACTIVE | Noted: 2021-01-26

## 2021-10-12 PROBLEM — R55 SYNCOPE: Status: ACTIVE | Noted: 2020-05-15

## 2021-10-12 PROBLEM — C44.311 BASAL CELL CARCINOMA OF SKIN OF NOSE: Status: ACTIVE | Noted: 2020-06-10

## 2021-10-12 PROBLEM — M50.30 DDD (DEGENERATIVE DISC DISEASE), CERVICAL: Status: ACTIVE | Noted: 2017-02-10

## 2021-10-12 PROCEDURE — 99214 OFFICE O/P EST MOD 30 MIN: CPT | Performed by: NURSE PRACTITIONER

## 2021-10-12 RX ORDER — SUCRALFATE 1 G/1
1 TABLET ORAL 4 TIMES DAILY
Qty: 360 TABLET | Refills: 1 | Status: SHIPPED | OUTPATIENT
Start: 2021-10-12 | End: 2022-06-18

## 2021-10-15 ENCOUNTER — OFFICE VISIT (OUTPATIENT)
Dept: OBGYN CLINIC | Facility: CLINIC | Age: 66
End: 2021-10-15
Payer: MEDICARE

## 2021-10-15 VITALS
BODY MASS INDEX: 32.14 KG/M2 | HEART RATE: 81 BPM | HEIGHT: 66 IN | WEIGHT: 200 LBS | DIASTOLIC BLOOD PRESSURE: 76 MMHG | SYSTOLIC BLOOD PRESSURE: 115 MMHG

## 2021-10-15 DIAGNOSIS — M17.12 PRIMARY OSTEOARTHRITIS OF LEFT KNEE: ICD-10-CM

## 2021-10-15 DIAGNOSIS — M17.11 PRIMARY OSTEOARTHRITIS OF RIGHT KNEE: Primary | ICD-10-CM

## 2021-10-15 PROCEDURE — 99213 OFFICE O/P EST LOW 20 MIN: CPT | Performed by: ORTHOPAEDIC SURGERY

## 2021-10-15 PROCEDURE — 20610 DRAIN/INJ JOINT/BURSA W/O US: CPT | Performed by: ORTHOPAEDIC SURGERY

## 2021-10-15 RX ORDER — METHYLPREDNISOLONE ACETATE 40 MG/ML
2 INJECTION, SUSPENSION INTRA-ARTICULAR; INTRALESIONAL; INTRAMUSCULAR; SOFT TISSUE
Status: COMPLETED | OUTPATIENT
Start: 2021-10-15 | End: 2021-10-15

## 2021-10-15 RX ORDER — BUPIVACAINE HYDROCHLORIDE 2.5 MG/ML
4 INJECTION, SOLUTION INFILTRATION; PERINEURAL
Status: COMPLETED | OUTPATIENT
Start: 2021-10-15 | End: 2021-10-15

## 2021-10-15 RX ADMIN — METHYLPREDNISOLONE ACETATE 2 ML: 40 INJECTION, SUSPENSION INTRA-ARTICULAR; INTRALESIONAL; INTRAMUSCULAR; SOFT TISSUE at 11:22

## 2021-10-15 RX ADMIN — BUPIVACAINE HYDROCHLORIDE 4 ML: 2.5 INJECTION, SOLUTION INFILTRATION; PERINEURAL at 11:22

## 2021-10-18 ENCOUNTER — TELEPHONE (OUTPATIENT)
Dept: GASTROENTEROLOGY | Facility: HOSPITAL | Age: 66
End: 2021-10-18

## 2021-10-18 DIAGNOSIS — K59.00 CONSTIPATION, UNSPECIFIED CONSTIPATION TYPE: Primary | ICD-10-CM

## 2021-10-19 ENCOUNTER — ANESTHESIA EVENT (OUTPATIENT)
Dept: GASTROENTEROLOGY | Facility: HOSPITAL | Age: 66
End: 2021-10-19

## 2021-10-19 ENCOUNTER — ANESTHESIA (OUTPATIENT)
Dept: GASTROENTEROLOGY | Facility: HOSPITAL | Age: 66
End: 2021-10-19

## 2021-10-19 ENCOUNTER — HOSPITAL ENCOUNTER (OUTPATIENT)
Dept: GASTROENTEROLOGY | Facility: HOSPITAL | Age: 66
Setting detail: OUTPATIENT SURGERY
Discharge: HOME | End: 2021-10-19
Admitting: INTERNAL MEDICINE
Payer: MEDICARE

## 2021-10-19 VITALS
DIASTOLIC BLOOD PRESSURE: 70 MMHG | RESPIRATION RATE: 14 BRPM | HEIGHT: 66 IN | TEMPERATURE: 97.3 F | BODY MASS INDEX: 31.53 KG/M2 | WEIGHT: 196.19 LBS | SYSTOLIC BLOOD PRESSURE: 136 MMHG | HEART RATE: 73 BPM | OXYGEN SATURATION: 93 %

## 2021-10-19 DIAGNOSIS — K21.9 GASTROESOPHAGEAL REFLUX DISEASE WITHOUT ESOPHAGITIS: ICD-10-CM

## 2021-10-19 DIAGNOSIS — K21.00 GASTROESOPHAGEAL REFLUX DISEASE WITH ESOPHAGITIS WITHOUT HEMORRHAGE: ICD-10-CM

## 2021-10-19 DIAGNOSIS — K31.84 GASTROPARESIS: ICD-10-CM

## 2021-10-19 DIAGNOSIS — R13.10 DYSPHAGIA, UNSPECIFIED TYPE: ICD-10-CM

## 2021-10-19 LAB — GLUCOSE SERPL-MCNC: 93 MG/DL (ref 65–140)

## 2021-10-19 PROCEDURE — 82948 REAGENT STRIP/BLOOD GLUCOSE: CPT

## 2021-10-19 PROCEDURE — 43236 UPPR GI SCOPE W/SUBMUC INJ: CPT | Performed by: INTERNAL MEDICINE

## 2021-10-19 RX ORDER — LIDOCAINE HYDROCHLORIDE 10 MG/ML
INJECTION, SOLUTION EPIDURAL; INFILTRATION; INTRACAUDAL; PERINEURAL AS NEEDED
Status: DISCONTINUED | OUTPATIENT
Start: 2021-10-19 | End: 2021-10-19

## 2021-10-19 RX ORDER — PROPOFOL 10 MG/ML
INJECTION, EMULSION INTRAVENOUS AS NEEDED
Status: DISCONTINUED | OUTPATIENT
Start: 2021-10-19 | End: 2021-10-19

## 2021-10-19 RX ORDER — SODIUM CHLORIDE, SODIUM LACTATE, POTASSIUM CHLORIDE, CALCIUM CHLORIDE 600; 310; 30; 20 MG/100ML; MG/100ML; MG/100ML; MG/100ML
INJECTION, SOLUTION INTRAVENOUS CONTINUOUS PRN
Status: DISCONTINUED | OUTPATIENT
Start: 2021-10-19 | End: 2021-10-19

## 2021-10-19 RX ORDER — PANTOPRAZOLE SODIUM 40 MG/1
40 TABLET, DELAYED RELEASE ORAL DAILY
Qty: 90 TABLET | Refills: 0 | Status: SHIPPED | OUTPATIENT
Start: 2021-10-19 | End: 2022-01-10

## 2021-10-19 RX ADMIN — ONABOTULINUMTOXINA 200 UNITS: 100 INJECTION, POWDER, LYOPHILIZED, FOR SOLUTION INTRADERMAL; INTRAMUSCULAR at 14:41

## 2021-10-19 RX ADMIN — PROPOFOL 150 MG: 10 INJECTION, EMULSION INTRAVENOUS at 14:38

## 2021-10-19 RX ADMIN — SODIUM CHLORIDE, SODIUM LACTATE, POTASSIUM CHLORIDE, AND CALCIUM CHLORIDE: .6; .31; .03; .02 INJECTION, SOLUTION INTRAVENOUS at 14:38

## 2021-10-19 RX ADMIN — LIDOCAINE HYDROCHLORIDE 90 MG: 10 INJECTION, SOLUTION EPIDURAL; INFILTRATION; INTRACAUDAL; PERINEURAL at 14:39

## 2021-10-19 RX ADMIN — PROPOFOL 50 MG: 10 INJECTION, EMULSION INTRAVENOUS at 14:43

## 2021-10-20 DIAGNOSIS — R73.9 HYPERGLYCEMIA: ICD-10-CM

## 2021-10-20 DIAGNOSIS — I25.10 ATHEROSCLEROSIS OF NATIVE CORONARY ARTERY OF NATIVE HEART WITHOUT ANGINA PECTORIS: ICD-10-CM

## 2021-10-20 DIAGNOSIS — K59.01 CONSTIPATION BY DELAYED COLONIC TRANSIT: ICD-10-CM

## 2021-10-20 DIAGNOSIS — I25.119 CORONARY ARTERY DISEASE INVOLVING NATIVE HEART WITH ANGINA PECTORIS, UNSPECIFIED VESSEL OR LESION TYPE (HCC): ICD-10-CM

## 2021-10-20 RX ORDER — LEVOTHYROXINE SODIUM 88 UG/1
TABLET ORAL
Qty: 90 TABLET | Refills: 0 | Status: SHIPPED | OUTPATIENT
Start: 2021-10-20 | End: 2022-01-26

## 2021-10-20 RX ORDER — METOPROLOL TARTRATE 50 MG/1
TABLET, FILM COATED ORAL
Qty: 180 TABLET | Refills: 0 | Status: SHIPPED | OUTPATIENT
Start: 2021-10-20 | End: 2022-02-17 | Stop reason: SDUPTHER

## 2021-10-23 DIAGNOSIS — F41.1 GENERALIZED ANXIETY DISORDER: ICD-10-CM

## 2021-10-26 RX ORDER — LORAZEPAM 1 MG/1
1 TABLET ORAL 2 TIMES DAILY
Qty: 60 TABLET | Refills: 0 | Status: SHIPPED | OUTPATIENT
Start: 2021-10-26 | End: 2021-12-20 | Stop reason: SDUPTHER

## 2021-10-27 ENCOUNTER — RA CDI HCC (OUTPATIENT)
Dept: OTHER | Facility: HOSPITAL | Age: 66
End: 2021-10-27

## 2021-10-27 ENCOUNTER — TELEPHONE (OUTPATIENT)
Dept: GASTROENTEROLOGY | Facility: CLINIC | Age: 66
End: 2021-10-27

## 2021-10-27 DIAGNOSIS — K59.01 CONSTIPATION BY DELAYED COLONIC TRANSIT: ICD-10-CM

## 2021-11-02 DIAGNOSIS — G25.81 RESTLESS LEG SYNDROME: ICD-10-CM

## 2021-11-02 RX ORDER — PRAMIPEXOLE DIHYDROCHLORIDE 0.5 MG/1
TABLET ORAL
Qty: 90 TABLET | Refills: 1 | OUTPATIENT
Start: 2021-11-02

## 2021-11-03 ENCOUNTER — OFFICE VISIT (OUTPATIENT)
Dept: FAMILY MEDICINE CLINIC | Facility: CLINIC | Age: 66
End: 2021-11-03
Payer: MEDICARE

## 2021-11-03 VITALS
RESPIRATION RATE: 18 BRPM | TEMPERATURE: 96.5 F | WEIGHT: 199 LBS | DIASTOLIC BLOOD PRESSURE: 74 MMHG | BODY MASS INDEX: 31.98 KG/M2 | OXYGEN SATURATION: 95 % | HEIGHT: 66 IN | HEART RATE: 71 BPM | SYSTOLIC BLOOD PRESSURE: 134 MMHG

## 2021-11-03 DIAGNOSIS — I25.10 ATHEROSCLEROSIS OF NATIVE CORONARY ARTERY OF NATIVE HEART WITHOUT ANGINA PECTORIS: ICD-10-CM

## 2021-11-03 DIAGNOSIS — E11.9 TYPE 2 DIABETES MELLITUS WITHOUT COMPLICATION, WITHOUT LONG-TERM CURRENT USE OF INSULIN (HCC): Primary | ICD-10-CM

## 2021-11-03 DIAGNOSIS — E03.9 HYPOTHYROIDISM, UNSPECIFIED TYPE: ICD-10-CM

## 2021-11-03 DIAGNOSIS — I10 ESSENTIAL HYPERTENSION: ICD-10-CM

## 2021-11-03 DIAGNOSIS — J43.1 PANLOBULAR EMPHYSEMA (HCC): ICD-10-CM

## 2021-11-03 DIAGNOSIS — Z23 ENCOUNTER FOR IMMUNIZATION: ICD-10-CM

## 2021-11-03 DIAGNOSIS — K21.00 GASTROESOPHAGEAL REFLUX DISEASE WITH ESOPHAGITIS WITHOUT HEMORRHAGE: ICD-10-CM

## 2021-11-03 DIAGNOSIS — I25.119 CORONARY ARTERY DISEASE WITH ANGINA PECTORIS, UNSPECIFIED VESSEL OR LESION TYPE, UNSPECIFIED WHETHER NATIVE OR TRANSPLANTED HEART (HCC): ICD-10-CM

## 2021-11-03 DIAGNOSIS — C78.00 MALIGNANT NEOPLASM METASTATIC TO LUNG, UNSPECIFIED LATERALITY (HCC): ICD-10-CM

## 2021-11-03 DIAGNOSIS — Z00.00 MEDICARE ANNUAL WELLNESS VISIT, SUBSEQUENT: ICD-10-CM

## 2021-11-03 PROCEDURE — 99214 OFFICE O/P EST MOD 30 MIN: CPT | Performed by: FAMILY MEDICINE

## 2021-11-03 PROCEDURE — G0438 PPPS, INITIAL VISIT: HCPCS | Performed by: FAMILY MEDICINE

## 2021-11-03 PROCEDURE — G0008 ADMIN INFLUENZA VIRUS VAC: HCPCS | Performed by: FAMILY MEDICINE

## 2021-11-03 PROCEDURE — 1123F ACP DISCUSS/DSCN MKR DOCD: CPT | Performed by: FAMILY MEDICINE

## 2021-11-03 PROCEDURE — 90662 IIV NO PRSV INCREASED AG IM: CPT | Performed by: FAMILY MEDICINE

## 2021-11-03 RX ORDER — MULTIVITAMIN WITH IRON
100 TABLET ORAL EVERY MORNING
Qty: 30 TABLET | Refills: 0 | Status: SHIPPED | OUTPATIENT
Start: 2021-11-03 | End: 2022-01-12 | Stop reason: SDUPTHER

## 2021-11-03 RX ORDER — B-COMPLEX WITH VITAMIN C
1 TABLET ORAL 2 TIMES DAILY WITH MEALS
Qty: 180 TABLET | Refills: 3 | Status: SHIPPED | OUTPATIENT
Start: 2021-11-03 | End: 2022-01-12 | Stop reason: SDUPTHER

## 2021-11-05 DIAGNOSIS — H91.93 BILATERAL HEARING LOSS, UNSPECIFIED HEARING LOSS TYPE: Primary | ICD-10-CM

## 2021-11-16 ENCOUNTER — OFFICE VISIT (OUTPATIENT)
Dept: SLEEP CENTER | Facility: CLINIC | Age: 66
End: 2021-11-16
Payer: MEDICARE

## 2021-11-16 VITALS
WEIGHT: 201.2 LBS | DIASTOLIC BLOOD PRESSURE: 72 MMHG | BODY MASS INDEX: 32.33 KG/M2 | HEIGHT: 66 IN | HEART RATE: 93 BPM | SYSTOLIC BLOOD PRESSURE: 141 MMHG

## 2021-11-16 DIAGNOSIS — G47.33 OBSTRUCTIVE SLEEP APNEA TREATED WITH CONTINUOUS POSITIVE AIRWAY PRESSURE (CPAP): Primary | ICD-10-CM

## 2021-11-16 DIAGNOSIS — Z99.89 OBSTRUCTIVE SLEEP APNEA TREATED WITH CONTINUOUS POSITIVE AIRWAY PRESSURE (CPAP): Primary | ICD-10-CM

## 2021-11-16 PROCEDURE — 99205 OFFICE O/P NEW HI 60 MIN: CPT | Performed by: NURSE PRACTITIONER

## 2021-11-17 ENCOUNTER — TELEPHONE (OUTPATIENT)
Dept: SLEEP CENTER | Facility: CLINIC | Age: 66
End: 2021-11-17

## 2021-11-30 DIAGNOSIS — E11.9 TYPE 2 DIABETES MELLITUS WITHOUT COMPLICATION, WITHOUT LONG-TERM CURRENT USE OF INSULIN (HCC): ICD-10-CM

## 2021-11-30 DIAGNOSIS — E11.9 TYPE 2 DIABETES MELLITUS WITHOUT COMPLICATION, WITHOUT LONG-TERM CURRENT USE OF INSULIN (HCC): Primary | ICD-10-CM

## 2021-11-30 RX ORDER — BLOOD-GLUCOSE METER
EACH MISCELLANEOUS
Qty: 1 KIT | Refills: 0 | Status: SHIPPED | OUTPATIENT
Start: 2021-11-30

## 2021-11-30 RX ORDER — LANCETS
EACH MISCELLANEOUS
Qty: 100 EACH | Refills: 2 | Status: SHIPPED | OUTPATIENT
Start: 2021-11-30 | End: 2021-11-30 | Stop reason: SDUPTHER

## 2021-11-30 RX ORDER — BLOOD-GLUCOSE METER
EACH MISCELLANEOUS
Qty: 1 KIT | Refills: 0 | Status: SHIPPED | OUTPATIENT
Start: 2021-11-30 | End: 2021-11-30 | Stop reason: SDUPTHER

## 2021-11-30 RX ORDER — BLOOD SUGAR DIAGNOSTIC
STRIP MISCELLANEOUS
Qty: 100 EACH | Refills: 2 | Status: SHIPPED | OUTPATIENT
Start: 2021-11-30 | End: 2022-06-20

## 2021-11-30 RX ORDER — LANCETS
EACH MISCELLANEOUS
Qty: 100 EACH | Refills: 0 | Status: SHIPPED | OUTPATIENT
Start: 2021-11-30

## 2021-12-01 ENCOUNTER — APPOINTMENT (EMERGENCY)
Dept: RADIOLOGY | Facility: HOSPITAL | Age: 66
End: 2021-12-01
Payer: MEDICARE

## 2021-12-01 ENCOUNTER — HOSPITAL ENCOUNTER (EMERGENCY)
Facility: HOSPITAL | Age: 66
Discharge: HOME | End: 2021-12-01
Attending: EMERGENCY MEDICINE | Admitting: EMERGENCY MEDICINE
Payer: MEDICARE

## 2021-12-01 VITALS
RESPIRATION RATE: 18 BRPM | OXYGEN SATURATION: 94 % | TEMPERATURE: 97.6 F | HEART RATE: 82 BPM | DIASTOLIC BLOOD PRESSURE: 92 MMHG | SYSTOLIC BLOOD PRESSURE: 134 MMHG

## 2021-12-01 DIAGNOSIS — E86.0 DEHYDRATION: Primary | ICD-10-CM

## 2021-12-01 DIAGNOSIS — R82.71 BACTERIURIA: ICD-10-CM

## 2021-12-01 DIAGNOSIS — R51.9 HEADACHE: ICD-10-CM

## 2021-12-01 LAB
ANION GAP SERPL CALCULATED.3IONS-SCNC: 4 MMOL/L (ref 4–13)
BACTERIA UR QL AUTO: ABNORMAL /HPF
BASOPHILS # BLD AUTO: 0.08 THOUSANDS/ΜL (ref 0–0.1)
BASOPHILS NFR BLD AUTO: 1 % (ref 0–1)
BILIRUB UR QL STRIP: NEGATIVE
BUN SERPL-MCNC: 21 MG/DL (ref 5–25)
CALCIUM SERPL-MCNC: 9.7 MG/DL (ref 8.3–10.1)
CAOX CRY URNS QL MICRO: ABNORMAL /HPF
CHLORIDE SERPL-SCNC: 105 MMOL/L (ref 100–108)
CLARITY UR: CLEAR
CO2 SERPL-SCNC: 28 MMOL/L (ref 21–32)
COLOR UR: YELLOW
COLOR, POC: YELLOW
CREAT SERPL-MCNC: 0.97 MG/DL (ref 0.6–1.3)
EOSINOPHIL # BLD AUTO: 0.07 THOUSAND/ΜL (ref 0–0.61)
EOSINOPHIL NFR BLD AUTO: 1 % (ref 0–6)
ERYTHROCYTE [DISTWIDTH] IN BLOOD BY AUTOMATED COUNT: 13.2 % (ref 11.6–15.1)
GFR SERPL CREATININE-BSD FRML MDRD: 61 ML/MIN/1.73SQ M
GLUCOSE SERPL-MCNC: 130 MG/DL (ref 65–140)
GLUCOSE UR STRIP-MCNC: NEGATIVE MG/DL
HCT VFR BLD AUTO: 45.5 % (ref 34.8–46.1)
HGB BLD-MCNC: 15 G/DL (ref 11.5–15.4)
HGB UR QL STRIP.AUTO: NEGATIVE
IMM GRANULOCYTES # BLD AUTO: 0.08 THOUSAND/UL (ref 0–0.2)
IMM GRANULOCYTES NFR BLD AUTO: 1 % (ref 0–2)
KETONES UR STRIP-MCNC: NEGATIVE MG/DL
LEUKOCYTE ESTERASE UR QL STRIP: ABNORMAL
LYMPHOCYTES # BLD AUTO: 2.64 THOUSANDS/ΜL (ref 0.6–4.47)
LYMPHOCYTES NFR BLD AUTO: 26 % (ref 14–44)
MCH RBC QN AUTO: 30.8 PG (ref 26.8–34.3)
MCHC RBC AUTO-ENTMCNC: 33 G/DL (ref 31.4–37.4)
MCV RBC AUTO: 93 FL (ref 82–98)
MONOCYTES # BLD AUTO: 0.71 THOUSAND/ΜL (ref 0.17–1.22)
MONOCYTES NFR BLD AUTO: 7 % (ref 4–12)
MUCOUS THREADS UR QL AUTO: ABNORMAL
NEUTROPHILS # BLD AUTO: 6.43 THOUSANDS/ΜL (ref 1.85–7.62)
NEUTS SEG NFR BLD AUTO: 64 % (ref 43–75)
NITRITE UR QL STRIP: NEGATIVE
NON-SQ EPI CELLS URNS QL MICRO: ABNORMAL /HPF
NRBC BLD AUTO-RTO: 0 /100 WBCS
PH UR STRIP.AUTO: 6 [PH] (ref 4.5–8)
PLATELET # BLD AUTO: 386 THOUSANDS/UL (ref 149–390)
PMV BLD AUTO: 8.8 FL (ref 8.9–12.7)
POTASSIUM SERPL-SCNC: 4.3 MMOL/L (ref 3.5–5.3)
PROT UR STRIP-MCNC: NEGATIVE MG/DL
RBC # BLD AUTO: 4.87 MILLION/UL (ref 3.81–5.12)
RBC #/AREA URNS AUTO: ABNORMAL /HPF
SODIUM SERPL-SCNC: 137 MMOL/L (ref 136–145)
SP GR UR STRIP.AUTO: >=1.03 (ref 1–1.03)
UROBILINOGEN UR QL STRIP.AUTO: 0.2 E.U./DL
WBC # BLD AUTO: 10.01 THOUSAND/UL (ref 4.31–10.16)
WBC #/AREA URNS AUTO: ABNORMAL /HPF

## 2021-12-01 PROCEDURE — G1004 CDSM NDSC: HCPCS

## 2021-12-01 PROCEDURE — 96361 HYDRATE IV INFUSION ADD-ON: CPT

## 2021-12-01 PROCEDURE — 96374 THER/PROPH/DIAG INJ IV PUSH: CPT

## 2021-12-01 PROCEDURE — 70450 CT HEAD/BRAIN W/O DYE: CPT

## 2021-12-01 PROCEDURE — 99284 EMERGENCY DEPT VISIT MOD MDM: CPT

## 2021-12-01 PROCEDURE — 85025 COMPLETE CBC W/AUTO DIFF WBC: CPT | Performed by: EMERGENCY MEDICINE

## 2021-12-01 PROCEDURE — 99285 EMERGENCY DEPT VISIT HI MDM: CPT | Performed by: EMERGENCY MEDICINE

## 2021-12-01 PROCEDURE — 36415 COLL VENOUS BLD VENIPUNCTURE: CPT | Performed by: EMERGENCY MEDICINE

## 2021-12-01 PROCEDURE — 81001 URINALYSIS AUTO W/SCOPE: CPT

## 2021-12-01 PROCEDURE — 80048 BASIC METABOLIC PNL TOTAL CA: CPT | Performed by: EMERGENCY MEDICINE

## 2021-12-01 RX ORDER — CEPHALEXIN 500 MG/1
500 CAPSULE ORAL EVERY 12 HOURS SCHEDULED
Qty: 6 CAPSULE | Refills: 0 | Status: SHIPPED | OUTPATIENT
Start: 2021-12-01 | End: 2021-12-04

## 2021-12-01 RX ORDER — KETOROLAC TROMETHAMINE 30 MG/ML
15 INJECTION, SOLUTION INTRAMUSCULAR; INTRAVENOUS ONCE
Status: COMPLETED | OUTPATIENT
Start: 2021-12-01 | End: 2021-12-01

## 2021-12-01 RX ADMIN — SODIUM CHLORIDE 1000 ML: 0.9 INJECTION, SOLUTION INTRAVENOUS at 16:05

## 2021-12-01 RX ADMIN — KETOROLAC TROMETHAMINE 15 MG: 30 INJECTION, SOLUTION INTRAMUSCULAR at 16:06

## 2021-12-01 RX ADMIN — SODIUM CHLORIDE 500 ML: 0.9 INJECTION, SOLUTION INTRAVENOUS at 18:50

## 2021-12-07 ENCOUNTER — TELEMEDICINE (OUTPATIENT)
Dept: FAMILY MEDICINE CLINIC | Facility: CLINIC | Age: 66
End: 2021-12-07
Payer: MEDICARE

## 2021-12-07 VITALS — WEIGHT: 201 LBS | BODY MASS INDEX: 32.3 KG/M2 | TEMPERATURE: 98.7 F | HEIGHT: 66 IN

## 2021-12-07 DIAGNOSIS — G44.209 TENSION-TYPE HEADACHE, NOT INTRACTABLE, UNSPECIFIED CHRONICITY PATTERN: Primary | ICD-10-CM

## 2021-12-07 DIAGNOSIS — R35.0 URINARY FREQUENCY: ICD-10-CM

## 2021-12-07 PROCEDURE — 99213 OFFICE O/P EST LOW 20 MIN: CPT | Performed by: FAMILY MEDICINE

## 2021-12-20 DIAGNOSIS — F41.1 GENERALIZED ANXIETY DISORDER: ICD-10-CM

## 2021-12-20 RX ORDER — LORAZEPAM 1 MG/1
1 TABLET ORAL 2 TIMES DAILY
Qty: 60 TABLET | Refills: 0 | Status: SHIPPED | OUTPATIENT
Start: 2021-12-20 | End: 2022-02-24 | Stop reason: SDUPTHER

## 2021-12-22 DIAGNOSIS — G60.9 IDIOPATHIC PERIPHERAL NEUROPATHY: ICD-10-CM

## 2021-12-22 DIAGNOSIS — G25.81 RESTLESS LEG SYNDROME: ICD-10-CM

## 2021-12-22 RX ORDER — DULOXETIN HYDROCHLORIDE 60 MG/1
CAPSULE, DELAYED RELEASE ORAL
Qty: 90 CAPSULE | Refills: 1 | Status: SHIPPED | OUTPATIENT
Start: 2021-12-22 | End: 2022-03-30

## 2021-12-22 RX ORDER — DULOXETIN HYDROCHLORIDE 30 MG/1
CAPSULE, DELAYED RELEASE ORAL
Qty: 90 CAPSULE | Refills: 1 | Status: SHIPPED | OUTPATIENT
Start: 2021-12-22 | End: 2022-03-30

## 2021-12-22 RX ORDER — PRAMIPEXOLE DIHYDROCHLORIDE 0.5 MG/1
TABLET ORAL
Qty: 90 TABLET | Refills: 1 | Status: SHIPPED | OUTPATIENT
Start: 2021-12-22 | End: 2022-01-16

## 2021-12-25 DIAGNOSIS — R73.9 HYPERGLYCEMIA: ICD-10-CM

## 2021-12-25 DIAGNOSIS — K59.01 CONSTIPATION BY DELAYED COLONIC TRANSIT: ICD-10-CM

## 2021-12-25 DIAGNOSIS — I25.10 ATHEROSCLEROSIS OF NATIVE CORONARY ARTERY OF NATIVE HEART WITHOUT ANGINA PECTORIS: ICD-10-CM

## 2022-01-10 DIAGNOSIS — R13.10 DYSPHAGIA, UNSPECIFIED TYPE: ICD-10-CM

## 2022-01-10 DIAGNOSIS — K31.84 GASTROPARESIS: ICD-10-CM

## 2022-01-10 DIAGNOSIS — K21.9 GASTROESOPHAGEAL REFLUX DISEASE WITHOUT ESOPHAGITIS: ICD-10-CM

## 2022-01-10 RX ORDER — PANTOPRAZOLE SODIUM 40 MG/1
TABLET, DELAYED RELEASE ORAL
Qty: 90 TABLET | Refills: 0 | Status: SHIPPED | OUTPATIENT
Start: 2022-01-10 | End: 2022-03-05 | Stop reason: ALTCHOICE

## 2022-01-12 DIAGNOSIS — K59.01 CONSTIPATION BY DELAYED COLONIC TRANSIT: ICD-10-CM

## 2022-01-12 DIAGNOSIS — I25.119 CORONARY ARTERY DISEASE WITH ANGINA PECTORIS, UNSPECIFIED VESSEL OR LESION TYPE, UNSPECIFIED WHETHER NATIVE OR TRANSPLANTED HEART (HCC): ICD-10-CM

## 2022-01-12 DIAGNOSIS — K21.00 GASTROESOPHAGEAL REFLUX DISEASE WITH ESOPHAGITIS WITHOUT HEMORRHAGE: ICD-10-CM

## 2022-01-12 DIAGNOSIS — E11.9 TYPE 2 DIABETES MELLITUS WITHOUT COMPLICATION, WITHOUT LONG-TERM CURRENT USE OF INSULIN (HCC): ICD-10-CM

## 2022-01-12 DIAGNOSIS — J43.1 PANLOBULAR EMPHYSEMA (HCC): ICD-10-CM

## 2022-01-12 DIAGNOSIS — C78.00 MALIGNANT NEOPLASM METASTATIC TO LUNG, UNSPECIFIED LATERALITY (HCC): ICD-10-CM

## 2022-01-12 DIAGNOSIS — E03.9 HYPOTHYROIDISM, UNSPECIFIED TYPE: ICD-10-CM

## 2022-01-12 DIAGNOSIS — I10 ESSENTIAL HYPERTENSION: ICD-10-CM

## 2022-01-12 DIAGNOSIS — I25.10 ATHEROSCLEROSIS OF NATIVE CORONARY ARTERY OF NATIVE HEART WITHOUT ANGINA PECTORIS: ICD-10-CM

## 2022-01-13 RX ORDER — MULTIVITAMIN WITH IRON
100 TABLET ORAL EVERY MORNING
Qty: 30 TABLET | Refills: 0 | Status: SHIPPED | OUTPATIENT
Start: 2022-01-13 | End: 2022-02-17 | Stop reason: SDUPTHER

## 2022-01-13 RX ORDER — B-COMPLEX WITH VITAMIN C
1 TABLET ORAL 2 TIMES DAILY WITH MEALS
Qty: 180 TABLET | Refills: 0 | Status: SHIPPED | OUTPATIENT
Start: 2022-01-13 | End: 2022-02-11 | Stop reason: SDUPTHER

## 2022-01-15 DIAGNOSIS — G25.81 RESTLESS LEG SYNDROME: ICD-10-CM

## 2022-01-16 RX ORDER — PRAMIPEXOLE DIHYDROCHLORIDE 0.5 MG/1
TABLET ORAL
Qty: 90 TABLET | Refills: 1 | Status: SHIPPED | OUTPATIENT
Start: 2022-01-16 | End: 2022-02-10

## 2022-01-21 ENCOUNTER — OFFICE VISIT (OUTPATIENT)
Dept: OBGYN CLINIC | Facility: CLINIC | Age: 67
End: 2022-01-21
Payer: MEDICARE

## 2022-01-21 VITALS
BODY MASS INDEX: 32.44 KG/M2 | DIASTOLIC BLOOD PRESSURE: 85 MMHG | HEIGHT: 66 IN | HEART RATE: 97 BPM | SYSTOLIC BLOOD PRESSURE: 134 MMHG

## 2022-01-21 DIAGNOSIS — M17.0 PRIMARY OSTEOARTHRITIS OF BOTH KNEES: Primary | ICD-10-CM

## 2022-01-21 PROCEDURE — 99213 OFFICE O/P EST LOW 20 MIN: CPT | Performed by: PHYSICIAN ASSISTANT

## 2022-01-21 PROCEDURE — 20610 DRAIN/INJ JOINT/BURSA W/O US: CPT | Performed by: PHYSICIAN ASSISTANT

## 2022-01-21 RX ORDER — TRIAMCINOLONE ACETONIDE 40 MG/ML
80 INJECTION, SUSPENSION INTRA-ARTICULAR; INTRAMUSCULAR
Status: COMPLETED | OUTPATIENT
Start: 2022-01-21 | End: 2022-01-21

## 2022-01-21 RX ORDER — BUPIVACAINE HYDROCHLORIDE 2.5 MG/ML
4 INJECTION, SOLUTION INFILTRATION; PERINEURAL
Status: COMPLETED | OUTPATIENT
Start: 2022-01-21 | End: 2022-01-21

## 2022-01-21 RX ADMIN — BUPIVACAINE HYDROCHLORIDE 4 ML: 2.5 INJECTION, SOLUTION INFILTRATION; PERINEURAL at 11:36

## 2022-01-21 RX ADMIN — TRIAMCINOLONE ACETONIDE 80 MG: 40 INJECTION, SUSPENSION INTRA-ARTICULAR; INTRAMUSCULAR at 11:36

## 2022-01-21 NOTE — PROGRESS NOTES
ASSESSMENT/PLAN:    Diagnoses and all orders for this visit:    Primary osteoarthritis of both knees    Other orders  -     Large joint arthrocentesis        Both of the patient's knees were injected with Kenalog and Marcaine  She tolerated the injections quite well  She will follow up with our office in 3 months  The patient is acceptable to this plan  Return in about 3 months (around 2022)  Under aseptic technique, both knees were injected with Kenalog and Marcaine  She tolerated procedures quite well  Return back in 3 months evaluation  If her condition changes, she will not hesitate to let us know    _____________________________________________________  CHIEF COMPLAINT:  Chief Complaint   Patient presents with    Left Knee - Follow-up    Right Knee - Follow-up         SUBJECTIVE:  Jaron Mccall is a 73 y o  female who presents to our office for follow-up visit  The patient has a history of primary osteoarthritis of both knees  She would like corticosteroid injections today  She denies any numbness or tingling  She denies any fever or chills      The following portions of the patient's history were reviewed and updated as appropriate: allergies, current medications, past family history, past medical history, past social history, past surgical history and problem list     PAST MEDICAL HISTORY:  Past Medical History:   Diagnosis Date    Anxiety     [facility]     CAD (coronary artery disease)     Cancer [facility]     Carpal tunnel syndrome     Disease of thyroid gland     Elevated cholesterol     Fibromyalgia     Hypertension     Kidney stone     Melanoma ([facility] )     nose    Myocardial infarction ([facility] )     Neuropathy     BRAYDEN (obstructive sleep apnea)     cpap    Panic attack        PAST SURGICAL HISTORY:  Past Surgical History:   Procedure Laterality Date    BREAST SURGERY Bilateral     CARDIAC SURGERY      cardiac ablation     SECTION      x3    CHOLECYSTECTOMY      CORONARY ANGIOPLASTY WITH STENT PLACEMENT      MASTECTOMY Bilateral     NOSE SURGERY         FAMILY HISTORY:  Family History   Problem Relation Age of Onset    Heart attack Mother     Alcohol abuse Mother     Heart failure Mother     Cancer Mother     Cancer Paternal Grandfather        SOCIAL HISTORY:  Social History     Tobacco Use    Smoking status: Never Smoker    Smokeless tobacco: Never Used   Vaping Use    Vaping Use: Never used   Substance Use Topics    Alcohol use: Not Currently     Comment: beer    Drug use: Yes     Types: Marijuana     Comment: Medical reilly       MEDICATIONS:    Current Outpatient Medications:     albuterol (2 5 mg/3 mL) 0 083 % nebulizer solution, Inhale 2 5 mg every 6 (six) hours as needed (Patient not taking: Reported on 11/3/2021), Disp: , Rfl:     albuterol (PROVENTIL HFA,VENTOLIN HFA) 90 mcg/act inhaler, Inhale 2 puffs every 6 (six) hours as needed for wheezing or shortness of breath (Patient not taking: Reported on 11/3/2021), Disp: 1 Inhaler, Rfl: 5    aspirin (ASPIRIN 81) 81 mg EC tablet, aspirin 81 mg tablet,delayed release, Disp: , Rfl:     B Complex Vitamins (B COMPLEX 1 PO), Take 1 tablet by mouth, Disp: , Rfl:     Blood Glucose Monitoring Suppl (ONE TOUCH ULTRA 2) w/Device KIT, Test Blood Sugar Once Daily, Disp: 1 kit, Rfl: 0    budesonide-formoterol (Symbicort) 160-4 5 mcg/act inhaler, Inhale 2 puffs 2 (two) times a day  (Patient not taking: Reported on 11/3/2021), Disp: , Rfl:     calcium carbonate-vitamin D (OSCAL-D) 500 mg-200 units per tablet, Take 1 tablet by mouth 2 (two) times a day with meals, Disp: 180 tablet, Rfl: 0    clopidogrel (PLAVIX) 75 mg tablet, Take 75 mg by mouth daily, Disp: , Rfl: 3    docusate sodium (COLACE) 100 mg capsule, Take 1 capsule (100 mg total) by mouth daily at bedtime, Disp: 180 capsule, Rfl: 3    DULoxetine (CYMBALTA) 30 mg delayed release capsule, TAKE 1 CAP BY MOUTH DAILY IN THE AM, Disp: 90 capsule, Rfl: 1    DULoxetine (CYMBALTA) 60 mg delayed release capsule, TAKE 1 CAP DAILY BY MOUTH IN THE EVENING, Disp: 90 capsule, Rfl: 1    Evolocumab 140 MG/ML SOAJ, Inject 2 mL under the skin, Disp: , Rfl:     famotidine (PEPCID) 40 MG tablet, Take 1 tablet (40 mg total) by mouth daily, Disp: 90 tablet, Rfl: 2    gemfibrozil (LOPID) 600 mg tablet, Take 600 mg by mouth 2 (two) times a day, Disp: , Rfl: 3    glucose blood (OneTouch Ultra) test strip, Test Blood Sugar Once Daily, Disp: 100 each, Rfl: 2    Lancets (onetouch ultrasoft) lancets, Patient to test once daily, Disp: 100 each, Rfl: 1    Lancets (onetouch ultrasoft) lancets, Test Blood Sugar Once Daily, Disp: 100 each, Rfl: 0    levothyroxine 88 mcg tablet, TAKE 1 TABLET BY MOUTH EVERY DAY, Disp: 90 tablet, Rfl: 0    LORazepam (ATIVAN) 1 mg tablet, Take 1 tablet (1 mg total) by mouth 2 (two) times a day, Disp: 60 tablet, Rfl: 0    lubiprostone (AMITIZA) 24 mcg capsule, TAKE 1 CAPSULE (24 MCG TOTAL) BY MOUTH 2 (TWO) TIMES A DAY WITH MEALS, Disp: 60 capsule, Rfl: 2    melatonin 3 mg, Take 20 mg by mouth , Disp: , Rfl:     metFORMIN (GLUCOPHAGE) 500 mg tablet, Take 1 tablet (500 mg total) by mouth 2 (two) times a day with meals, Disp: 180 tablet, Rfl: 0    metoprolol tartrate (LOPRESSOR) 50 mg tablet, TAKE 1 TABLET BY MOUTH TWICE A DAY, Disp: 180 tablet, Rfl: 0    Misc   Devices (GETGO ROLLING WALKER) MISC, Use rolling walker as needed, Disp: 1 each, Rfl: 0    morphine (MS CONTIN) 15 mg 12 hr tablet, , Disp: , Rfl:     oxyCODONE (ROXICODONE) 5 mg immediate release tablet, Take 5 mg by mouth every 4 (four) hours as needed (Patient not taking: Reported on 11/3/2021), Disp: , Rfl:     pantoprazole (PROTONIX) 40 mg tablet, TAKE 1 TABLET BY MOUTH EVERY DAY, Disp: 90 tablet, Rfl: 0    pramipexole (MIRAPEX) 0 5 mg tablet, TAKE 1 TABLET BY MOUTH IN THE EVENING AND 1 TABLET AT BEDTIME FOR 7 DAYS , THEN 1 TAB 3 TIMES A DAY, Disp: 90 tablet, Rfl: 1   prochlorperazine (COMPAZINE) 10 mg tablet, TAKE 1 TABLET (10 MG TOTAL) BY MOUTH EVERY 6 (SIX) HOURS AS NEEDED FOR NAUSEA OR VOMITING , Disp: , Rfl:     Prucalopride Succinate 2 MG TABS, Take 2 mg by mouth daily, Disp: 90 tablet, Rfl: 2    pyridoxine (VITAMIN B6) 100 mg tablet, Take 1 tablet (100 mg total) by mouth every morning, Disp: 30 tablet, Rfl: 0    Repatha SureClick 585 MG/ML SOAJ, , Disp: , Rfl:     Spiriva Respimat 2 5 MCG/ACT AERS inhaler, INHALE 2 ACTUATION DAILY  (Patient not taking: Reported on 11/3/2021), Disp: , Rfl:     sucralfate (CARAFATE) 1 g tablet, Take 1 tablet (1 g total) by mouth 4 (four) times a day Dissolved pill in 10 mL water then swallow, Disp: 360 tablet, Rfl: 1    ALLERGIES:  Allergies   Allergen Reactions    Metoclopramide Other (See Comments)    Nitrofurantoin Other (See Comments)     Very weak  Fell    Isosorbide      Other reaction(s): headache    Pregabalin      Pt states made her feel suicidal     Reglan [Metoclopramide] Other (See Comments)     Flares her neuropathy       ROS:  Review of Systems     Constitutional: Negative for fatigue, fever or loss of appetite  HENT: Negative  Respiratory: Negative for shortness of breath, dyspnea  Cardiovascular: Negative for chest pain/tightness  Gastrointestinal: Negative for abdominal pain, N/V  Endocrine: Negative for cold/heat intolerance, unexplained weight loss/gain  Genitourinary: Negative for flank pain, dysuria, hematuria  Musculoskeletal: Positive for arthralgia   Skin: Negative for rash  Neurological: Negative for numbness or tingling  Psychiatric/Behavioral: Negative for agitation  _____________________________________________________  PHYSICAL EXAMINATION:    Blood pressure 134/85, pulse 97, height 5' 6" (1 676 m)  Constitutional: Oriented to person, place, and time  Appears well-developed and well-nourished  No distress  HENT:   Head: Normocephalic     Eyes: Conjunctivae are normal  Right eye exhibits no discharge  Left eye exhibits no discharge  No scleral icterus  Cardiovascular: Normal rate  Pulmonary/Chest: Effort normal    Neurological: Alert and oriented to person, place, and time  Skin: Skin is warm and dry  No rash noted  Not diaphoretic  No erythema  No pallor  Psychiatric: Normal mood and affect  Behavior is normal  Judgment and thought content normal       MUSCULOSKELETAL EXAMINATION:   Physical Exam  Ortho Exam    Bilateral lower extremities are neurovascularly intact  Toes are pink and mobile   Compartments are soft  No warmth, erythema or ecchymosis  ROM of knees are from 5-115 degrees  Negative Lachman, drawer or pivot shift  No medial instability  Medial joint line tenderness, slight lateral joint line tenderness  Patellofemoral crepitation  Objective:  BP Readings from Last 1 Encounters:   01/21/22 134/85      Wt Readings from Last 1 Encounters:   12/07/21 91 2 kg (201 lb)        BMI:   Estimated body mass index is 32 44 kg/m² as calculated from the following:    Height as of this encounter: 5' 6" (1 676 m)  Weight as of 12/7/21: 91 2 kg (201 lb)        PROCEDURES PERFORMED:  Large joint arthrocentesis: bilateral knee  Universal Protocol:  Risks and benefits: risks, benefits and alternatives were discussed  Consent given by: patient  Patient understanding: patient states understanding of the procedure being performed  Site marked: the operative site was marked  Supporting Documentation  Indications: pain   Procedure Details  Location: knee - bilateral knee  Preparation: Patient was prepped and draped in the usual sterile fashion  Needle size: 22 G  Ultrasound guidance: no  Approach: lateral    Medications (Right): 4 mL bupivacaine 0 25 %; 80 mg triamcinolone acetonide 40 mg/mLMedications (Left): 4 mL bupivacaine 0 25 %; 80 mg triamcinolone acetonide 40 mg/mL   Patient tolerance: patient tolerated the procedure well with no immediate complications  Dressing:  Sterile dressing puma Posadas PA-C

## 2022-01-26 DIAGNOSIS — R73.9 HYPERGLYCEMIA: ICD-10-CM

## 2022-01-26 DIAGNOSIS — K59.01 CONSTIPATION BY DELAYED COLONIC TRANSIT: ICD-10-CM

## 2022-01-26 DIAGNOSIS — I25.10 ATHEROSCLEROSIS OF NATIVE CORONARY ARTERY OF NATIVE HEART WITHOUT ANGINA PECTORIS: ICD-10-CM

## 2022-01-26 RX ORDER — LEVOTHYROXINE SODIUM 88 UG/1
TABLET ORAL
Qty: 90 TABLET | Refills: 0 | Status: SHIPPED | OUTPATIENT
Start: 2022-01-26 | End: 2022-02-17 | Stop reason: SDUPTHER

## 2022-02-08 DIAGNOSIS — G25.81 RESTLESS LEG SYNDROME: ICD-10-CM

## 2022-02-10 ENCOUNTER — TELEPHONE (OUTPATIENT)
Dept: NEUROLOGY | Facility: CLINIC | Age: 67
End: 2022-02-10

## 2022-02-10 RX ORDER — PRAMIPEXOLE DIHYDROCHLORIDE 0.5 MG/1
TABLET ORAL
Qty: 90 TABLET | Refills: 1 | Status: SHIPPED | OUTPATIENT
Start: 2022-02-10 | End: 2022-03-06

## 2022-02-10 NOTE — TELEPHONE ENCOUNTER
----- Message from Charissa Restrepo sent at 2/10/2022  8:02 AM EST -----  Regarding: FW: Recalling words    ----- Message -----  From: Iman Steen  Sent: 2/9/2022   7:05 PM EST  To: Neurology [place] [facility]  Subject: Recalling words                                  I have been having trouble recalling names of objects like glove box etc   Also difficulty with swallowing especially cold drinks ( no ice for me ) also at times meats  What should I  do a virtual visit or inperson visit ? I recently had a CT scan at [facility] with no evidence of disease  JAN 24

## 2022-02-10 NOTE — TELEPHONE ENCOUNTER
Reviewed last office note and recent encounters  I do not see that we have discussed these issues with pt  Called pt  She reports it hurts when she swallows  Swallowing anything cold causes a spasm  Notes sometimes pieces of meat are hard to swallow  This has been happening for a month now  Pt reports she saw her gastroenterologist about this and had EGD, states she was told everything was fine  Pt also reports she is having trouble remembering the names for things at times  Feels that her mind goes blank  Sometimes feels like her left leg is dragging when she walks as well- this occurs about 2x per week  Both complaints started a few weeks ago  Pt denies any further complaints or other stroke like symptoms  Did discuss with pt that if she is having trouble swallowing or has new/ worsening neurologic complains ER evaluation may be needed  Pt is also asking when she should f/u with neurology  Please advise on pt's complaints and f/u appt  [phone]  Okay to leave detailed message

## 2022-02-11 DIAGNOSIS — K21.00 GASTROESOPHAGEAL REFLUX DISEASE WITH ESOPHAGITIS WITHOUT HEMORRHAGE: ICD-10-CM

## 2022-02-11 DIAGNOSIS — J43.1 PANLOBULAR EMPHYSEMA (HCC): ICD-10-CM

## 2022-02-11 DIAGNOSIS — E11.9 TYPE 2 DIABETES MELLITUS WITHOUT COMPLICATION, WITHOUT LONG-TERM CURRENT USE OF INSULIN (HCC): ICD-10-CM

## 2022-02-11 DIAGNOSIS — E03.9 HYPOTHYROIDISM, UNSPECIFIED TYPE: ICD-10-CM

## 2022-02-11 DIAGNOSIS — C78.00 MALIGNANT NEOPLASM METASTATIC TO LUNG, UNSPECIFIED LATERALITY (HCC): ICD-10-CM

## 2022-02-11 DIAGNOSIS — I25.10 ATHEROSCLEROSIS OF NATIVE CORONARY ARTERY OF NATIVE HEART WITHOUT ANGINA PECTORIS: ICD-10-CM

## 2022-02-11 DIAGNOSIS — I10 ESSENTIAL HYPERTENSION: ICD-10-CM

## 2022-02-11 DIAGNOSIS — I25.119 CORONARY ARTERY DISEASE WITH ANGINA PECTORIS, UNSPECIFIED VESSEL OR LESION TYPE, UNSPECIFIED WHETHER NATIVE OR TRANSPLANTED HEART (HCC): ICD-10-CM

## 2022-02-11 RX ORDER — B-COMPLEX WITH VITAMIN C
1 TABLET ORAL 2 TIMES DAILY WITH MEALS
Qty: 180 TABLET | Refills: 0 | Status: SHIPPED | OUTPATIENT
Start: 2022-02-11 | End: 2022-04-26

## 2022-02-11 NOTE — TELEPHONE ENCOUNTER
Patient was evaluated by provider in August 2021 - please offer office visit with Nessa Hitchcock vs Evaristo President; vs LYNNETTE or other AP team in [facility]

## 2022-02-15 ENCOUNTER — OFFICE VISIT (OUTPATIENT)
Dept: NEUROLOGY | Facility: CLINIC | Age: 67
End: 2022-02-15
Payer: MEDICARE

## 2022-02-15 ENCOUNTER — TELEPHONE (OUTPATIENT)
Dept: NEUROLOGY | Facility: CLINIC | Age: 67
End: 2022-02-15

## 2022-02-15 VITALS
TEMPERATURE: 96.7 F | RESPIRATION RATE: 18 BRPM | HEART RATE: 90 BPM | BODY MASS INDEX: 32.18 KG/M2 | DIASTOLIC BLOOD PRESSURE: 85 MMHG | HEIGHT: 67 IN | SYSTOLIC BLOOD PRESSURE: 138 MMHG | WEIGHT: 205 LBS

## 2022-02-15 DIAGNOSIS — R41.3 MEMORY LOSS: ICD-10-CM

## 2022-02-15 DIAGNOSIS — E53.1 VITAMIN B6 DEFICIENCY: ICD-10-CM

## 2022-02-15 DIAGNOSIS — R53.83 FATIGUE, UNSPECIFIED TYPE: Primary | ICD-10-CM

## 2022-02-15 DIAGNOSIS — E55.9 VITAMIN D DEFICIENCY: ICD-10-CM

## 2022-02-15 DIAGNOSIS — M79.604 RIGHT LEG PAIN: ICD-10-CM

## 2022-02-15 PROCEDURE — 99214 OFFICE O/P EST MOD 30 MIN: CPT | Performed by: NURSE PRACTITIONER

## 2022-02-15 NOTE — TELEPHONE ENCOUNTER
Scheduled patient for f/u appt on 2/15/22 @ 3:45 pm   Confirmed PCP, insurance and demographics      Clerical Team - fyi

## 2022-02-15 NOTE — PATIENT INSTRUCTIONS
Continue care with other providers  Speak with cardiology regarding your recent increase in cholesterol levels  Labs ordered for recent fatigue- you may need to restart your vitamins  Xray ordered for the leg pain  Start speech therapy to help with the memory  Try to stay active at home, eat healthy like we spoke about   I will reach out to sleep medicine team to make sure everything is ok with the cpap  Let us know of any new symptoms  Follow up 3 months

## 2022-02-15 NOTE — PROGRESS NOTES
Patient ID: Susan Shell is a 73 y o  female  Assessment/Plan:  Patient instructions:  Continue care with other providers  Speak with cardiology regarding your recent increase in cholesterol levels  Labs ordered for recent fatigue- you may need to restart your vitamins  Xray ordered for the leg pain  Start speech therapy to help with the memory  Try to stay active at home, eat healthy like we spoke about   I will reach out to sleep medicine team to make sure everything is ok with the cpap  Let us know of any new symptoms  Follow up 3 months       Diagnoses and all orders for this visit:    Fatigue, unspecified type  -     Vitamin D 25 hydroxy; Future  -     Vitamin B12; Future  -     Vitamin B6; Future  -     Folate; Future    Right leg pain  -     XR tibia fibula 2 vw right; Future    Memory loss  -     Vitamin D 25 hydroxy; Future  -     Vitamin B12; Future  -     Vitamin B6; Future  -     Folate; Future  -     Ambulatory Referral to Speech Therapy; Future    Vitamin B6 deficiency  -     Vitamin B6; Future    Vitamin D deficiency  -     Vitamin D 25 hydroxy; Future      Subjective:    AYSE Avila is a 73year old right handed female with past medical history of cancer (breast with metastases- lung, bone (most recent scan stable)) , cad (asa/plavix), rls, neuropathy, jaxon, oa, gerd, cts, hyperlipidemia (repatha) who presents to the office today with complaints of recent fatigue and increase in memory difficulty  No family history of memory problems  She also mentions difficulty swallowing, but this is a chronic problem treated by GI with EGD botox and improved as long as she avoids certain foods (especially cold foods/peanut butter)  She states for the past 3 weeks she has been sleeping more and waking up tired  She has had no change in her CPAP (will check compliance with sleep medicine team)  She states her appetite is ok  She denies fevers; states sometimes with night sweats   She has noticed pain in her right shin area lately; no injury; no muscle tenderness/swelling/rash  She denies headaches  She has stopped a lot of her previous vitamins  She also has not been very active  She is independent in adl's/iadl's including driving, bill paying  She likes to do games/puzzles and she really enjoys gardening  She does stay social with her  and her in a boating club  She states neuropathy/rls controlled with cymbalta/mirapex (lyrica caused suicidal thoughts and gabapentin weight gain in past)  The following portions of the patient's history were reviewed and updated as appropriate: allergies, current medications, past family history, past medical history, past social history, past surgical history and problem list          Objective:    Blood pressure 138/85, pulse 90, temperature (!) 96 7 °F (35 9 °C), temperature source Temporal, resp  rate 18, height 5' 7" (1 702 m), weight 93 kg (205 lb)  Physical Exam  Vitals reviewed  Constitutional:       General: She is not in acute distress  Appearance: She is obese  She is not ill-appearing or diaphoretic  HENT:      Head: Normocephalic  Right Ear: External ear normal       Left Ear: External ear normal       Mouth/Throat:      Mouth: Mucous membranes are moist       Pharynx: Oropharynx is clear  Eyes:      General:         Right eye: No discharge  Left eye: No discharge  Extraocular Movements: Extraocular movements intact  Pupils: Pupils are equal, round, and reactive to light  Cardiovascular:      Pulses: Normal pulses  Heart sounds: Normal heart sounds  Pulmonary:      Effort: Pulmonary effort is normal    Abdominal:      General: There is distension  Musculoskeletal:      Cervical back: Normal range of motion  No tenderness  Right lower leg: No edema  Left lower leg: No edema          Legs:       Comments: Tenderness present; no ecchymosis/signs of injury  No calf tenderness   Skin:     General: Skin is warm and dry  Capillary Refill: Capillary refill takes less than 2 seconds  Neurological:      Mental Status: She is alert  Mental status is at baseline  Coordination: Romberg sign positive  Deep Tendon Reflexes: Strength normal    Psychiatric:         Mood and Affect: Mood normal          Speech: Speech normal          Neurological Exam  Mental Status  Alert  Oriented to person, place and time  Speech is normal  Language is fluent with no aphasia  [place] 24/30- most difficulty with delayed recall- scanned into chart  Cranial Nerves  CN II: Right visual acuity: counts fingers  Left visual acuity: counts fingers  CN III, IV, VI: Extraocular movements intact bilaterally  Pupils equal round and reactive to light bilaterally  CN V: Facial sensation is normal   CN VII: Full and symmetric facial movement  CN VIII: Hearing is normal   CN IX, X: Palate elevates symmetrically  CN XI: Shoulder shrug strength is normal   CN XII: Tongue midline without atrophy or fasciculations  Motor  Normal muscle bulk throughout  Strength is 5/5 throughout all four extremities  Sensory  Light touch is normal in upper and lower extremities  Coordination  Right: Rapid alternating movement normal   Left: Rapid alternating movement normal     Gait  Casual gait is normal including stance, stride, and arm swing  Romberg is present  Able to rise from chair without using arms  ROS:    Review of Systems   Constitutional: Negative  Negative for appetite change and fever  HENT: Positive for trouble swallowing  Negative for hearing loss, tinnitus and voice change  Eyes: Negative  Negative for photophobia and pain  Respiratory: Negative  Negative for shortness of breath  Cardiovascular: Negative  Negative for palpitations  Gastrointestinal: Negative  Negative for nausea and vomiting  Endocrine: Negative  Negative for cold intolerance  Genitourinary: Negative    Negative for dysuria, frequency and urgency  Musculoskeletal: Negative  Negative for myalgias and neck pain  Skin: Negative  Negative for rash  Neurological: Positive for dizziness  Negative for tremors, seizures, syncope, facial asymmetry, speech difficulty, weakness, light-headedness, numbness and headaches  Off balanced, memory issues   Hematological: Negative  Does not bruise/bleed easily  Psychiatric/Behavioral: Negative  Negative for confusion, hallucinations and sleep disturbance       ROS was reviewed and updated as appropriate

## 2022-02-17 ENCOUNTER — APPOINTMENT (OUTPATIENT)
Dept: LAB | Facility: HOSPITAL | Age: 67
End: 2022-02-17
Payer: MEDICARE

## 2022-02-17 ENCOUNTER — OFFICE VISIT (OUTPATIENT)
Dept: FAMILY MEDICINE CLINIC | Facility: CLINIC | Age: 67
End: 2022-02-17
Payer: MEDICARE

## 2022-02-17 VITALS
OXYGEN SATURATION: 95 % | DIASTOLIC BLOOD PRESSURE: 70 MMHG | RESPIRATION RATE: 18 BRPM | TEMPERATURE: 98.7 F | BODY MASS INDEX: 31.86 KG/M2 | HEART RATE: 90 BPM | SYSTOLIC BLOOD PRESSURE: 120 MMHG | HEIGHT: 67 IN | WEIGHT: 203 LBS

## 2022-02-17 DIAGNOSIS — J43.1 PANLOBULAR EMPHYSEMA (HCC): Primary | ICD-10-CM

## 2022-02-17 DIAGNOSIS — R73.9 HYPERGLYCEMIA: ICD-10-CM

## 2022-02-17 DIAGNOSIS — I10 ESSENTIAL HYPERTENSION: ICD-10-CM

## 2022-02-17 DIAGNOSIS — K59.01 CONSTIPATION BY DELAYED COLONIC TRANSIT: ICD-10-CM

## 2022-02-17 DIAGNOSIS — E03.9 HYPOTHYROIDISM, UNSPECIFIED TYPE: ICD-10-CM

## 2022-02-17 DIAGNOSIS — E08.610 DIABETES MELLITUS DUE TO UNDERLYING CONDITION WITH DIABETIC NEUROPATHIC ARTHROPATHY, WITHOUT LONG-TERM CURRENT USE OF INSULIN (HCC): ICD-10-CM

## 2022-02-17 DIAGNOSIS — C78.00 MALIGNANT NEOPLASM METASTATIC TO LUNG, UNSPECIFIED LATERALITY (HCC): ICD-10-CM

## 2022-02-17 DIAGNOSIS — G62.0 DRUG-INDUCED PERIPHERAL NEUROPATHY (HCC): ICD-10-CM

## 2022-02-17 DIAGNOSIS — I25.10 ATHEROSCLEROSIS OF NATIVE CORONARY ARTERY OF NATIVE HEART WITHOUT ANGINA PECTORIS: ICD-10-CM

## 2022-02-17 DIAGNOSIS — M46.1 SACROILIITIS (HCC): ICD-10-CM

## 2022-02-17 DIAGNOSIS — I25.119 CORONARY ARTERY DISEASE INVOLVING NATIVE HEART WITH ANGINA PECTORIS, UNSPECIFIED VESSEL OR LESION TYPE (HCC): ICD-10-CM

## 2022-02-17 DIAGNOSIS — F11.20 CONTINUOUS OPIOID DEPENDENCE (HCC): ICD-10-CM

## 2022-02-17 DIAGNOSIS — J43.1 PANLOBULAR EMPHYSEMA (HCC): ICD-10-CM

## 2022-02-17 DIAGNOSIS — E66.01 OBESITY, MORBID (HCC): ICD-10-CM

## 2022-02-17 DIAGNOSIS — I25.119 CORONARY ARTERY DISEASE INVOLVING NATIVE HEART WITH ANGINA PECTORIS, UNSPECIFIED VESSEL OR LESION TYPE (HCC): Primary | ICD-10-CM

## 2022-02-17 PROCEDURE — 86664 EPSTEIN-BARR NUCLEAR ANTIGEN: CPT

## 2022-02-17 PROCEDURE — 99214 OFFICE O/P EST MOD 30 MIN: CPT | Performed by: FAMILY MEDICINE

## 2022-02-17 PROCEDURE — 86663 EPSTEIN-BARR ANTIBODY: CPT

## 2022-02-17 PROCEDURE — 36415 COLL VENOUS BLD VENIPUNCTURE: CPT

## 2022-02-17 PROCEDURE — 86665 EPSTEIN-BARR CAPSID VCA: CPT

## 2022-02-17 RX ORDER — METOPROLOL TARTRATE 50 MG/1
50 TABLET, FILM COATED ORAL 2 TIMES DAILY
Qty: 180 TABLET | Refills: 0 | Status: SHIPPED | OUTPATIENT
Start: 2022-02-17 | End: 2022-03-15 | Stop reason: SDUPTHER

## 2022-02-17 RX ORDER — METOPROLOL TARTRATE 50 MG/1
50 TABLET, FILM COATED ORAL 2 TIMES DAILY
Qty: 180 TABLET | Refills: 1 | Status: SHIPPED | OUTPATIENT
Start: 2022-02-17 | End: 2022-03-15 | Stop reason: SDUPTHER

## 2022-02-17 RX ORDER — MULTIVITAMIN WITH IRON
100 TABLET ORAL EVERY MORNING
Qty: 30 TABLET | Refills: 0 | Status: SHIPPED | OUTPATIENT
Start: 2022-02-17 | End: 2022-04-04

## 2022-02-17 RX ORDER — LEVOTHYROXINE SODIUM 88 UG/1
88 TABLET ORAL DAILY
Qty: 90 TABLET | Refills: 0 | Status: SHIPPED | OUTPATIENT
Start: 2022-02-17

## 2022-02-17 RX ORDER — LEVOTHYROXINE SODIUM 88 UG/1
88 TABLET ORAL DAILY
Qty: 90 TABLET | Refills: 1 | Status: SHIPPED | OUTPATIENT
Start: 2022-02-17 | End: 2022-07-11

## 2022-02-17 NOTE — ASSESSMENT & PLAN NOTE
No worsening chest pain patient admits to worsening fatigue and somnolence follow-up with Cardiology continue same medications

## 2022-02-17 NOTE — PROGRESS NOTES
Assessment/Plan:       Problem List Items Addressed This Visit        Endocrine    Hypothyroidism     Hypothyroidism stable continue levothyroxine 88 micrograms follow-up TSH T4 as scheduled         Relevant Medications    metoprolol tartrate (LOPRESSOR) 50 mg tablet    levothyroxine 88 mcg tablet    Other Relevant Orders    EBV, Chronic/Active Infection       Respiratory    Panlobular emphysema (HCC) - Primary     No worsening shortness of breath continue to use current medications add inhaler if needed         Relevant Orders    EBV, Chronic/Active Infection    Lung metastasis (HCC)       Cardiovascular and Mediastinum    Atherosclerotic heart disease of native coronary artery without angina pectoris     No worsening chest pain patient admits to worsening fatigue and somnolence follow-up with Cardiology continue same medications         Relevant Medications    metoprolol tartrate (LOPRESSOR) 50 mg tablet    levothyroxine 88 mcg tablet    pyridoxine (VITAMIN B6) 100 mg tablet    Other Relevant Orders    EBV, Chronic/Active Infection    Essential hypertension     Hypertension under stable control continue current medication regimen         Relevant Medications    metoprolol tartrate (LOPRESSOR) 50 mg tablet    Other Relevant Orders    EBV, Chronic/Active Infection    Coronary artery disease with angina pectoris (HCC)    Relevant Medications    metoprolol tartrate (LOPRESSOR) 50 mg tablet    Other Relevant Orders    EBV, Chronic/Active Infection       Nervous and Auditory    Drug-induced peripheral neuropathy (HCC)       Musculoskeletal and Integument    Sacroiliitis (HCC)       Other    Hyperglycemia    Relevant Medications    levothyroxine 88 mcg tablet    Other Relevant Orders    EBV, Chronic/Active Infection    Obesity, morbid ([address] )      Other Visit Diagnoses     Constipation by delayed colonic transit        Relevant Medications    levothyroxine 88 mcg tablet    Other Relevant Orders    EBV, Chronic/Active Infection    Continuous opioid dependence ([facility] )        Diabetes mellitus due to underlying condition with diabetic neuropathic arthropathy, without long-term current use of insulin ([facility] )                Subjective:      Patient ID: Toño Guzman is a 73 y o  female  Excessive fatigue and somnolence over the last 2 weeks with sweats mild sore throat      The following portions of the patient's history were reviewed and updated as appropriate: allergies, current medications, past family history, past medical history, past social history, past surgical history and problem list     Review of Systems   Constitutional: Positive for fatigue  Negative for chills and fever  HENT: Negative for congestion, nosebleeds, rhinorrhea, sinus pressure and sore throat  Eyes: Negative for discharge and redness  Respiratory: Positive for shortness of breath  Negative for cough  Cardiovascular: Negative for chest pain, palpitations and leg swelling  Gastrointestinal: Negative for abdominal pain, blood in stool and nausea  Endocrine: Negative for cold intolerance, heat intolerance and polyuria  Genitourinary: Negative for dysuria and frequency  Musculoskeletal: Negative for arthralgias, back pain and myalgias  Skin: Negative for rash  Neurological: Negative for dizziness, weakness and headaches  Hematological: Negative for adenopathy  Psychiatric/Behavioral: Negative for behavioral problems and sleep disturbance  The patient is not nervous/anxious  Objective:      /70 (BP Location: Left arm, Patient Position: Sitting)   Pulse 90   Temp 98 7 °F (37 1 °C)   Resp 18   Ht 5' 7" (1 702 m)   Wt 92 1 kg (203 lb)   SpO2 95%   BMI 31 79 kg/m²        Physical Exam  Vitals and nursing note reviewed  Constitutional:       General: She is not in acute distress  Appearance: Normal appearance  She is well-developed  HENT:      Head: Normocephalic and atraumatic        Right Ear: Tympanic membrane, ear canal and external ear normal       Left Ear: Tympanic membrane, ear canal and external ear normal       Nose: Nose normal       Mouth/Throat:      Mouth: Mucous membranes are moist       Pharynx: Oropharynx is clear  No oropharyngeal exudate  Eyes:      General: No scleral icterus  Right eye: No discharge  Left eye: No discharge  Extraocular Movements: Extraocular movements intact  Conjunctiva/sclera: Conjunctivae normal       Pupils: Pupils are equal, round, and reactive to light  Neck:      Thyroid: No thyromegaly  Vascular: No JVD  Cardiovascular:      Rate and Rhythm: Normal rate and regular rhythm  Pulses: Normal pulses  Heart sounds: Normal heart sounds  No murmur heard  Pulmonary:      Effort: Pulmonary effort is normal       Breath sounds: No wheezing or rales  Chest:      Chest wall: No tenderness  Abdominal:      General: Bowel sounds are normal  There is no distension  Palpations: Abdomen is soft  There is no mass  Tenderness: There is no abdominal tenderness  Musculoskeletal:         General: No tenderness or deformity  Normal range of motion  Cervical back: Normal range of motion  Lymphadenopathy:      Cervical: No cervical adenopathy  Skin:     General: Skin is warm and dry  Findings: No rash  Neurological:      General: No focal deficit present  Mental Status: She is alert and oriented to person, place, and time  Mental status is at baseline  Cranial Nerves: No cranial nerve deficit  Coordination: Coordination normal       Deep Tendon Reflexes: Reflexes are normal and symmetric  Reflexes normal    Psychiatric:         Mood and Affect: Mood normal          Behavior: Behavior normal          Thought Content:  Thought content normal          Judgment: Judgment normal           Data:    Laboratory Results: I have personally reviewed the pertinent laboratory results/reports   Radiology/Other Diagnostic Testing Results: I have personally reviewed pertinent reports         Lab Results   Component Value Date    WBC 10 01 12/01/2021    HGB 15 0 12/01/2021    HCT 45 5 12/01/2021    MCV 93 12/01/2021     12/01/2021     Lab Results   Component Value Date    K 4 3 12/01/2021     12/01/2021    CO2 28 12/01/2021    BUN 21 12/01/2021    CREATININE 0 97 12/01/2021    GLUF 114 (H) 09/14/2021    CALCIUM 9 7 12/01/2021    AST 24 09/14/2021    ALT 33 09/14/2021    ALKPHOS 77 09/14/2021    EGFR 61 12/01/2021     Lab Results   Component Value Date    CHOLESTEROL 115 09/14/2021    CHOLESTEROL 148 04/19/2021    CHOLESTEROL 161 06/09/2020     Lab Results   Component Value Date    HDL 45 09/14/2021    HDL 60 04/19/2021    HDL 51 06/09/2020     Lab Results   Component Value Date    LDLCALC 40 09/14/2021    LDLCALC 69 04/19/2021    LDLCALC 80 06/09/2020     Lab Results   Component Value Date    TRIG 150 09/14/2021    TRIG 95 04/19/2021    TRIG 151 (H) 06/09/2020     No results found for: [place], [place]  Lab Results   Component Value Date    AEG1KRZPDYPQ 2 275 09/14/2021     Lab Results   Component Value Date    HGBA1C 5 8 (H) 09/14/2021     No results found for: MIGUEL Schafer DO

## 2022-02-17 NOTE — ASSESSMENT & PLAN NOTE
Constant fatigue daytime and follow-up with sleep specialist if needed for now may need to adjust other medications as well as she has several medications which can cause excessive somnolence

## 2022-02-18 LAB
EBV NA IGG SER IA-ACNC: >600 U/ML (ref 0–17.9)
EBV VCA IGG SER IA-ACNC: >600 U/ML (ref 0–17.9)
EBV VCA IGM SER IA-ACNC: <36 U/ML (ref 0–35.9)
INTERPRETATION: ABNORMAL

## 2022-02-20 ENCOUNTER — PATIENT MESSAGE (OUTPATIENT)
Dept: NEUROLOGY | Facility: CLINIC | Age: 67
End: 2022-02-20

## 2022-02-21 ENCOUNTER — TELEPHONE (OUTPATIENT)
Dept: NEUROLOGY | Facility: CLINIC | Age: 67
End: 2022-02-21

## 2022-02-21 NOTE — TELEPHONE ENCOUNTER
----- Message from Joni Thomas sent at 2/21/2022  7:06 AM EST -----  Regarding: FW: Gricel Naidu    ----- Message -----  From: Beatris Leblanc  Sent: 2/20/2022   6:07 PM EST  To: [facility]  Subject: Abel Muñozone I  had a test for Gricel Naidu and the results have come through

## 2022-02-23 ENCOUNTER — APPOINTMENT (OUTPATIENT)
Dept: LAB | Facility: HOSPITAL | Age: 67
End: 2022-02-23
Payer: MEDICARE

## 2022-02-23 ENCOUNTER — HOSPITAL ENCOUNTER (OUTPATIENT)
Dept: RADIOLOGY | Facility: HOSPITAL | Age: 67
Discharge: HOME | End: 2022-02-23
Payer: MEDICARE

## 2022-02-23 DIAGNOSIS — K76.0 FATTY LIVER: ICD-10-CM

## 2022-02-23 DIAGNOSIS — E11.9 TYPE 2 DIABETES MELLITUS WITHOUT COMPLICATION, WITHOUT LONG-TERM CURRENT USE OF INSULIN (HCC): ICD-10-CM

## 2022-02-23 DIAGNOSIS — M79.604 RIGHT LEG PAIN: ICD-10-CM

## 2022-02-23 DIAGNOSIS — R53.83 FATIGUE, UNSPECIFIED TYPE: ICD-10-CM

## 2022-02-23 DIAGNOSIS — K31.84 GASTROPARESIS: ICD-10-CM

## 2022-02-23 DIAGNOSIS — Z20.822 EXPOSURE TO COVID-19 VIRUS: Primary | ICD-10-CM

## 2022-02-23 DIAGNOSIS — K76.0 FATTY LIVER: Primary | ICD-10-CM

## 2022-02-23 DIAGNOSIS — E53.1 VITAMIN B6 DEFICIENCY: ICD-10-CM

## 2022-02-23 DIAGNOSIS — R41.3 MEMORY LOSS: ICD-10-CM

## 2022-02-23 DIAGNOSIS — E55.9 VITAMIN D DEFICIENCY: ICD-10-CM

## 2022-02-23 LAB
25(OH)D3 SERPL-MCNC: 30.9 NG/ML (ref 30–100)
ALBUMIN SERPL BCP-MCNC: 3.5 G/DL (ref 3.5–5)
ALP SERPL-CCNC: 61 U/L (ref 46–116)
ALT SERPL W P-5'-P-CCNC: 34 U/L (ref 12–78)
ANION GAP SERPL CALCULATED.3IONS-SCNC: 8 MMOL/L (ref 4–13)
AST SERPL W P-5'-P-CCNC: 15 U/L (ref 5–45)
BACTERIA UR QL AUTO: ABNORMAL /HPF
BASOPHILS # BLD AUTO: 0.07 THOUSANDS/ΜL (ref 0–0.1)
BASOPHILS NFR BLD AUTO: 1 % (ref 0–1)
BILIRUB SERPL-MCNC: 0.33 MG/DL (ref 0.2–1)
BILIRUB UR QL STRIP: NEGATIVE
BUN SERPL-MCNC: 22 MG/DL (ref 5–25)
CALCIUM SERPL-MCNC: 9.4 MG/DL (ref 8.3–10.1)
CAOX CRY URNS QL MICRO: ABNORMAL /HPF
CHLORIDE SERPL-SCNC: 101 MMOL/L (ref 100–108)
CLARITY UR: CLEAR
CO2 SERPL-SCNC: 30 MMOL/L (ref 21–32)
COLOR UR: YELLOW
CREAT SERPL-MCNC: 0.79 MG/DL (ref 0.6–1.3)
CREAT UR-MCNC: 119 MG/DL
CRP SERPL QL: <3 MG/L
EOSINOPHIL # BLD AUTO: 0.1 THOUSAND/ΜL (ref 0–0.61)
EOSINOPHIL NFR BLD AUTO: 1 % (ref 0–6)
ERYTHROCYTE [DISTWIDTH] IN BLOOD BY AUTOMATED COUNT: 12.9 % (ref 11.6–15.1)
FOLATE SERPL-MCNC: 9.9 NG/ML (ref 3.1–17.5)
GFR SERPL CREATININE-BSD FRML MDRD: 78 ML/MIN/1.73SQ M
GLUCOSE SERPL-MCNC: 112 MG/DL (ref 65–140)
GLUCOSE UR STRIP-MCNC: NEGATIVE MG/DL
HCT VFR BLD AUTO: 45.8 % (ref 34.8–46.1)
HGB BLD-MCNC: 15.5 G/DL (ref 11.5–15.4)
HGB UR QL STRIP.AUTO: ABNORMAL
IMM GRANULOCYTES # BLD AUTO: 0.11 THOUSAND/UL (ref 0–0.2)
IMM GRANULOCYTES NFR BLD AUTO: 1 % (ref 0–2)
KETONES UR STRIP-MCNC: NEGATIVE MG/DL
LEUKOCYTE ESTERASE UR QL STRIP: ABNORMAL
LYMPHOCYTES # BLD AUTO: 2.76 THOUSANDS/ΜL (ref 0.6–4.47)
LYMPHOCYTES NFR BLD AUTO: 26 % (ref 14–44)
MCH RBC QN AUTO: 31.8 PG (ref 26.8–34.3)
MCHC RBC AUTO-ENTMCNC: 33.8 G/DL (ref 31.4–37.4)
MCV RBC AUTO: 94 FL (ref 82–98)
MICROALBUMIN UR-MCNC: 19.9 MG/L (ref 0–20)
MICROALBUMIN/CREAT 24H UR: 17 MG/G CREATININE (ref 0–30)
MONOCYTES # BLD AUTO: 0.84 THOUSAND/ΜL (ref 0.17–1.22)
MONOCYTES NFR BLD AUTO: 8 % (ref 4–12)
NEUTROPHILS # BLD AUTO: 6.58 THOUSANDS/ΜL (ref 1.85–7.62)
NEUTS SEG NFR BLD AUTO: 63 % (ref 43–75)
NITRITE UR QL STRIP: NEGATIVE
NON-SQ EPI CELLS URNS QL MICRO: ABNORMAL /HPF
NRBC BLD AUTO-RTO: 0 /100 WBCS
PH UR STRIP.AUTO: 6 [PH]
PLATELET # BLD AUTO: 377 THOUSANDS/UL (ref 149–390)
PMV BLD AUTO: 9 FL (ref 8.9–12.7)
POTASSIUM SERPL-SCNC: 4.1 MMOL/L (ref 3.5–5.3)
PROT SERPL-MCNC: 7.9 G/DL (ref 6.4–8.2)
PROT UR STRIP-MCNC: NEGATIVE MG/DL
RBC # BLD AUTO: 4.87 MILLION/UL (ref 3.81–5.12)
RBC #/AREA URNS AUTO: ABNORMAL /HPF
SODIUM SERPL-SCNC: 139 MMOL/L (ref 136–145)
SP GR UR STRIP.AUTO: 1.02 (ref 1–1.03)
UROBILINOGEN UR QL STRIP.AUTO: 1 E.U./DL
VIT B12 SERPL-MCNC: 589 PG/ML (ref 100–900)
WBC # BLD AUTO: 10.46 THOUSAND/UL (ref 4.31–10.16)
WBC #/AREA URNS AUTO: ABNORMAL /HPF

## 2022-02-23 PROCEDURE — 86140 C-REACTIVE PROTEIN: CPT

## 2022-02-23 PROCEDURE — 82746 ASSAY OF FOLIC ACID SERUM: CPT

## 2022-02-23 PROCEDURE — U0005 INFEC AGEN DETEC AMPLI PROBE: HCPCS | Performed by: FAMILY MEDICINE

## 2022-02-23 PROCEDURE — U0003 INFECTIOUS AGENT DETECTION BY NUCLEIC ACID (DNA OR RNA); SEVERE ACUTE RESPIRATORY SYNDROME CORONAVIRUS 2 (SARS-COV-2) (CORONAVIRUS DISEASE [COVID-19]), AMPLIFIED PROBE TECHNIQUE, MAKING USE OF HIGH THROUGHPUT TECHNOLOGIES AS DESCRIBED BY CMS-2020-01-R: HCPCS | Performed by: FAMILY MEDICINE

## 2022-02-23 PROCEDURE — 82306 VITAMIN D 25 HYDROXY: CPT

## 2022-02-23 PROCEDURE — 82570 ASSAY OF URINE CREATININE: CPT | Performed by: FAMILY MEDICINE

## 2022-02-23 PROCEDURE — 81001 URINALYSIS AUTO W/SCOPE: CPT

## 2022-02-23 PROCEDURE — 73590 X-RAY EXAM OF LOWER LEG: CPT

## 2022-02-23 PROCEDURE — 85025 COMPLETE CBC W/AUTO DIFF WBC: CPT

## 2022-02-23 PROCEDURE — 80053 COMPREHEN METABOLIC PANEL: CPT

## 2022-02-23 PROCEDURE — 82607 VITAMIN B-12: CPT

## 2022-02-23 PROCEDURE — 82043 UR ALBUMIN QUANTITATIVE: CPT | Performed by: FAMILY MEDICINE

## 2022-02-23 PROCEDURE — 84207 ASSAY OF VITAMIN B-6: CPT

## 2022-02-23 PROCEDURE — 36415 COLL VENOUS BLD VENIPUNCTURE: CPT

## 2022-02-24 DIAGNOSIS — F41.1 GENERALIZED ANXIETY DISORDER: ICD-10-CM

## 2022-02-24 DIAGNOSIS — N39.0 LOWER URINARY TRACT INFECTION: Primary | ICD-10-CM

## 2022-02-24 RX ORDER — AZITHROMYCIN 250 MG/1
TABLET, FILM COATED ORAL
Qty: 6 TABLET | Refills: 0 | Status: SHIPPED | OUTPATIENT
Start: 2022-02-24 | End: 2022-03-01

## 2022-02-24 RX ORDER — LORAZEPAM 1 MG/1
1 TABLET ORAL 2 TIMES DAILY
Qty: 60 TABLET | Refills: 0 | Status: SHIPPED | OUTPATIENT
Start: 2022-02-24 | End: 2022-04-04 | Stop reason: SDUPTHER

## 2022-02-28 DIAGNOSIS — R31.9 HEMATURIA, UNSPECIFIED TYPE: Primary | ICD-10-CM

## 2022-03-01 ENCOUNTER — APPOINTMENT (OUTPATIENT)
Dept: LAB | Facility: HOSPITAL | Age: 67
End: 2022-03-01
Payer: MEDICARE

## 2022-03-01 DIAGNOSIS — F32.A DEPRESSIVE DISORDER: Primary | ICD-10-CM

## 2022-03-01 DIAGNOSIS — R31.9 HEMATURIA, UNSPECIFIED TYPE: ICD-10-CM

## 2022-03-01 DIAGNOSIS — E03.9 HYPOTHYROIDISM, UNSPECIFIED TYPE: ICD-10-CM

## 2022-03-01 DIAGNOSIS — N17.9 AKI (ACUTE KIDNEY INJURY) (HCC): ICD-10-CM

## 2022-03-01 DIAGNOSIS — F32.A DEPRESSIVE DISORDER: ICD-10-CM

## 2022-03-01 LAB
BACTERIA UR QL AUTO: ABNORMAL /HPF
BILIRUB UR QL STRIP: NEGATIVE
CAOX CRY URNS QL MICRO: ABNORMAL /HPF
CLARITY UR: CLEAR
COLOR UR: YELLOW
GLUCOSE UR STRIP-MCNC: NEGATIVE MG/DL
HGB UR QL STRIP.AUTO: NEGATIVE
KETONES UR STRIP-MCNC: NEGATIVE MG/DL
LEUKOCYTE ESTERASE UR QL STRIP: ABNORMAL
MUCOUS THREADS UR QL AUTO: ABNORMAL
NITRITE UR QL STRIP: NEGATIVE
NON-SQ EPI CELLS URNS QL MICRO: ABNORMAL /HPF
PH UR STRIP.AUTO: 6 [PH]
PROT UR STRIP-MCNC: NEGATIVE MG/DL
RBC #/AREA URNS AUTO: ABNORMAL /HPF
SP GR UR STRIP.AUTO: 1.02 (ref 1–1.03)
TSH SERPL DL<=0.05 MIU/L-ACNC: 2.81 UIU/ML (ref 0.36–3.74)
UROBILINOGEN UR QL STRIP.AUTO: 0.2 E.U./DL
VIT B6 SERPL-MCNC: 73.5 UG/L (ref 2–32.8)
WBC #/AREA URNS AUTO: ABNORMAL /HPF

## 2022-03-01 PROCEDURE — 81001 URINALYSIS AUTO W/SCOPE: CPT

## 2022-03-01 PROCEDURE — 84443 ASSAY THYROID STIM HORMONE: CPT

## 2022-03-01 PROCEDURE — 36415 COLL VENOUS BLD VENIPUNCTURE: CPT

## 2022-03-02 ENCOUNTER — RA CDI HCC (OUTPATIENT)
Dept: OTHER | Facility: HOSPITAL | Age: 67
End: 2022-03-02

## 2022-03-02 NOTE — PROGRESS NOTES
[facility]  coding opportunities       Chart reviewed, no opportunity found: CHART REVIEWED, NO OPPORTUNITY FOUND                        Patients insurance company: [employer]

## 2022-03-04 ENCOUNTER — HOSPITAL ENCOUNTER (OUTPATIENT)
Dept: ULTRASOUND IMAGING | Facility: HOSPITAL | Age: 67
Discharge: HOME | End: 2022-03-04
Payer: MEDICARE

## 2022-03-04 DIAGNOSIS — R31.9 HEMATURIA, UNSPECIFIED TYPE: ICD-10-CM

## 2022-03-04 PROCEDURE — 76770 US EXAM ABDO BACK WALL COMP: CPT

## 2022-03-05 ENCOUNTER — HOSPITAL ENCOUNTER (EMERGENCY)
Facility: HOSPITAL | Age: 67
Discharge: HOME | End: 2022-03-05
Attending: EMERGENCY MEDICINE | Admitting: EMERGENCY MEDICINE
Payer: MEDICARE

## 2022-03-05 ENCOUNTER — APPOINTMENT (EMERGENCY)
Dept: CT IMAGING | Facility: HOSPITAL | Age: 67
End: 2022-03-05
Payer: MEDICARE

## 2022-03-05 VITALS
WEIGHT: 207.23 LBS | TEMPERATURE: 98.1 F | DIASTOLIC BLOOD PRESSURE: 85 MMHG | BODY MASS INDEX: 32.53 KG/M2 | HEIGHT: 67 IN | RESPIRATION RATE: 20 BRPM | HEART RATE: 65 BPM | OXYGEN SATURATION: 95 % | SYSTOLIC BLOOD PRESSURE: 142 MMHG

## 2022-03-05 DIAGNOSIS — R73.9 HYPERGLYCEMIA: ICD-10-CM

## 2022-03-05 DIAGNOSIS — K59.01 CONSTIPATION BY DELAYED COLONIC TRANSIT: ICD-10-CM

## 2022-03-05 DIAGNOSIS — I25.10 ATHEROSCLEROSIS OF NATIVE CORONARY ARTERY OF NATIVE HEART WITHOUT ANGINA PECTORIS: ICD-10-CM

## 2022-03-05 DIAGNOSIS — R93.5 ABNORMAL CT OF THE ABDOMEN: ICD-10-CM

## 2022-03-05 DIAGNOSIS — N30.00 ACUTE CYSTITIS WITHOUT HEMATURIA: Primary | ICD-10-CM

## 2022-03-05 DIAGNOSIS — N20.0 MULTIPLE KIDNEY STONES: ICD-10-CM

## 2022-03-05 DIAGNOSIS — I25.119 CORONARY ARTERY DISEASE INVOLVING NATIVE HEART WITH ANGINA PECTORIS, UNSPECIFIED VESSEL OR LESION TYPE (HCC): ICD-10-CM

## 2022-03-05 DIAGNOSIS — R53.83 FATIGUE: ICD-10-CM

## 2022-03-05 DIAGNOSIS — G25.81 RESTLESS LEG SYNDROME: ICD-10-CM

## 2022-03-05 LAB
ANION GAP SERPL CALCULATED.3IONS-SCNC: 8 MMOL/L (ref 4–13)
BACTERIA UR QL AUTO: ABNORMAL /HPF
BASOPHILS # BLD AUTO: 0.07 THOUSANDS/ΜL (ref 0–0.1)
BASOPHILS NFR BLD AUTO: 1 % (ref 0–1)
BILIRUB UR QL STRIP: NEGATIVE
BUN SERPL-MCNC: 24 MG/DL (ref 5–25)
CALCIUM SERPL-MCNC: 8.8 MG/DL (ref 8.3–10.1)
CAOX CRY URNS QL MICRO: ABNORMAL /HPF
CHLORIDE SERPL-SCNC: 103 MMOL/L (ref 100–108)
CLARITY UR: CLEAR
CO2 SERPL-SCNC: 28 MMOL/L (ref 21–32)
COLOR UR: YELLOW
CREAT SERPL-MCNC: 0.85 MG/DL (ref 0.6–1.3)
EOSINOPHIL # BLD AUTO: 0.07 THOUSAND/ΜL (ref 0–0.61)
EOSINOPHIL NFR BLD AUTO: 1 % (ref 0–6)
ERYTHROCYTE [DISTWIDTH] IN BLOOD BY AUTOMATED COUNT: 12.7 % (ref 11.6–15.1)
GFR SERPL CREATININE-BSD FRML MDRD: 72 ML/MIN/1.73SQ M
GLUCOSE SERPL-MCNC: 109 MG/DL (ref 65–140)
GLUCOSE UR STRIP-MCNC: NEGATIVE MG/DL
HCT VFR BLD AUTO: 41.4 % (ref 34.8–46.1)
HGB BLD-MCNC: 14.4 G/DL (ref 11.5–15.4)
HGB UR QL STRIP.AUTO: NEGATIVE
IMM GRANULOCYTES # BLD AUTO: 0.06 THOUSAND/UL (ref 0–0.2)
IMM GRANULOCYTES NFR BLD AUTO: 1 % (ref 0–2)
KETONES UR STRIP-MCNC: NEGATIVE MG/DL
LEUKOCYTE ESTERASE UR QL STRIP: ABNORMAL
LYMPHOCYTES # BLD AUTO: 2.2 THOUSANDS/ΜL (ref 0.6–4.47)
LYMPHOCYTES NFR BLD AUTO: 22 % (ref 14–44)
MCH RBC QN AUTO: 31.4 PG (ref 26.8–34.3)
MCHC RBC AUTO-ENTMCNC: 34.8 G/DL (ref 31.4–37.4)
MCV RBC AUTO: 90 FL (ref 82–98)
MONOCYTES # BLD AUTO: 0.86 THOUSAND/ΜL (ref 0.17–1.22)
MONOCYTES NFR BLD AUTO: 9 % (ref 4–12)
NEUTROPHILS # BLD AUTO: 6.86 THOUSANDS/ΜL (ref 1.85–7.62)
NEUTS SEG NFR BLD AUTO: 66 % (ref 43–75)
NITRITE UR QL STRIP: NEGATIVE
NON-SQ EPI CELLS URNS QL MICRO: ABNORMAL /HPF
NRBC BLD AUTO-RTO: 0 /100 WBCS
PH UR STRIP.AUTO: 7 [PH] (ref 4.5–8)
PLATELET # BLD AUTO: 384 THOUSANDS/UL (ref 149–390)
PMV BLD AUTO: 8.8 FL (ref 8.9–12.7)
POTASSIUM SERPL-SCNC: 4.2 MMOL/L (ref 3.5–5.3)
PROT UR STRIP-MCNC: NEGATIVE MG/DL
RBC # BLD AUTO: 4.58 MILLION/UL (ref 3.81–5.12)
RBC #/AREA URNS AUTO: ABNORMAL /HPF
SODIUM SERPL-SCNC: 139 MMOL/L (ref 136–145)
SP GR UR STRIP.AUTO: 1.02 (ref 1–1.03)
UROBILINOGEN UR QL STRIP.AUTO: 1 E.U./DL
WBC # BLD AUTO: 10.12 THOUSAND/UL (ref 4.31–10.16)
WBC #/AREA URNS AUTO: ABNORMAL /HPF

## 2022-03-05 PROCEDURE — 85025 COMPLETE CBC W/AUTO DIFF WBC: CPT | Performed by: NURSE PRACTITIONER

## 2022-03-05 PROCEDURE — 80048 BASIC METABOLIC PNL TOTAL CA: CPT | Performed by: NURSE PRACTITIONER

## 2022-03-05 PROCEDURE — 87086 URINE CULTURE/COLONY COUNT: CPT

## 2022-03-05 PROCEDURE — 96361 HYDRATE IV INFUSION ADD-ON: CPT

## 2022-03-05 PROCEDURE — 99284 EMERGENCY DEPT VISIT MOD MDM: CPT

## 2022-03-05 PROCEDURE — 96365 THER/PROPH/DIAG IV INF INIT: CPT

## 2022-03-05 PROCEDURE — 81001 URINALYSIS AUTO W/SCOPE: CPT

## 2022-03-05 PROCEDURE — 99284 EMERGENCY DEPT VISIT MOD MDM: CPT | Performed by: NURSE PRACTITIONER

## 2022-03-05 PROCEDURE — 36415 COLL VENOUS BLD VENIPUNCTURE: CPT | Performed by: NURSE PRACTITIONER

## 2022-03-05 PROCEDURE — G1004 CDSM NDSC: HCPCS

## 2022-03-05 PROCEDURE — 74176 CT ABD & PELVIS W/O CONTRAST: CPT

## 2022-03-05 RX ORDER — CEPHALEXIN 500 MG/1
500 CAPSULE ORAL EVERY 12 HOURS SCHEDULED
Qty: 14 CAPSULE | Refills: 0 | Status: SHIPPED | OUTPATIENT
Start: 2022-03-05 | End: 2022-03-12

## 2022-03-05 RX ORDER — CEPHALEXIN 500 MG/1
500 CAPSULE ORAL EVERY 12 HOURS SCHEDULED
Qty: 14 CAPSULE | Refills: 0 | Status: SHIPPED | OUTPATIENT
Start: 2022-03-05 | End: 2022-03-05 | Stop reason: SDUPTHER

## 2022-03-05 RX ORDER — SODIUM CHLORIDE 9 MG/ML
125 INJECTION, SOLUTION INTRAVENOUS CONTINUOUS
Status: DISCONTINUED | OUTPATIENT
Start: 2022-03-05 | End: 2022-03-05 | Stop reason: HOSPADM

## 2022-03-05 RX ADMIN — CEFTRIAXONE 1000 MG: 1 INJECTION, POWDER, FOR SOLUTION INTRAMUSCULAR; INTRAVENOUS at 15:35

## 2022-03-05 RX ADMIN — SODIUM CHLORIDE 125 ML/HR: 0.9 INJECTION, SOLUTION INTRAVENOUS at 12:35

## 2022-03-05 NOTE — DISCHARGE INSTRUCTIONS
You have a urinary tract infection and have been prescribed cephalexin for infection  You are to avoid caffeine  You are to call urology on Monday for follow up appointment  She is to return to the ED if  fever, worsening symptoms  Follow up with your PCP  You have a urine culture pending - you will be notified if antibiotic needs changed  You have an abnormal CT - you have multiple kidney stones, an area on the right ischium that should be monitored as you did state you had that area treated for cancer  - follow up with your PCP for this abnormal CT scan

## 2022-03-05 NOTE — ED PROVIDER NOTES
History  Chief Complaint   Patient presents with    Difficulty Urinating     Difficulty urinating, fatigue for 1 week  Oupatient ultrasound yesterday with diagnosis of bladder stone  This is a 73year old female who states has a PMH of kidney stones and over the last few days she has had difficulty urinating and fatigue  She states she had an ultrasound yesterday and there was a stone in the bladder  She states that has no urologist   She denies back pain  She feels like she is bloated but states this is chronic  TSH 3/1  2 8  Urine 3/1 abnormal however did not reflex to needed culture        History provided by:  Patient   used: No    Difficulty Urinating  Pain quality:  Unable to specify  Pain severity:  No pain  Onset quality:  Gradual  Duration:  1 week  Recent urinary tract infections: no    Worsened by:  Nothing  Ineffective treatments:  None tried  Urinary symptoms: hesitancy and incontinence    Risk factors: hx of urolithiasis        Prior to Admission Medications   Prescriptions Last Dose Informant Patient Reported? Taking? B Complex Vitamins (B COMPLEX 1 PO)  Self Yes No   Sig: Take 1 tablet by mouth   Blood Glucose Monitoring Suppl (ONE TOUCH ULTRA 2) w/Device KIT  Self No No   Sig: Test Blood Sugar Once Daily   DULoxetine (CYMBALTA) 30 mg delayed release capsule  Self No No   Sig: TAKE 1 CAP BY MOUTH DAILY IN THE AM   DULoxetine (CYMBALTA) 60 mg delayed release capsule  Self No No   Sig: TAKE 1 CAP DAILY BY MOUTH IN THE EVENING   Evolocumab 140 MG/ML SOAJ  Self Yes No   Sig: Inject 2 mL under the skin   LORazepam (ATIVAN) 1 mg tablet   No No   Sig: Take 1 tablet (1 mg total) by mouth 2 (two) times a day   Lancets (onetouch ultrasoft) lancets  Self No No   Sig: Patient to test once daily   Lancets (onetouch ultrasoft) lancets  Self No No   Sig: Test Blood Sugar Once Daily   Misc   Devices (GETGO ROLLING WALKER) MISC  Self No No   Sig: Use rolling walker as needed Prucalopride Succinate 2 MG TABS  Self No No   Sig: Take 2 mg by mouth daily   Repatha SureClick 299 MG/ML SOAJ  Self Yes No   aspirin (ASPIRIN 81) 81 mg EC tablet  Self Yes No   Sig: aspirin 81 mg tablet,delayed release   calcium carbonate-vitamin D (OSCAL-D) 500 mg-200 units per tablet  Self No No   Sig: Take 1 tablet by mouth 2 (two) times a day with meals   clopidogrel (PLAVIX) 75 mg tablet  Self Yes No   Sig: Take 75 mg by mouth daily   docusate sodium (COLACE) 100 mg capsule  Self No No   Sig: Take 1 capsule (100 mg total) by mouth daily at bedtime   famotidine (PEPCID) 40 MG tablet  Self No No   Sig: Take 1 tablet (40 mg total) by mouth daily   gemfibrozil (LOPID) 600 mg tablet  Self Yes No   Sig: Take 600 mg by mouth 2 (two) times a day   glucose blood (OneTouch Ultra) test strip  Self No No   Sig: Test Blood Sugar Once Daily   levothyroxine 88 mcg tablet   No No   Sig: Take 1 tablet (88 mcg total) by mouth daily   levothyroxine 88 mcg tablet   No No   Sig: Take 1 tablet (88 mcg total) by mouth daily   lubiprostone (AMITIZA) 24 mcg capsule  Self No No   Sig: TAKE 1 CAPSULE (24 MCG TOTAL) BY MOUTH 2 (TWO) TIMES A DAY WITH MEALS   melatonin 3 mg  Self Yes No   Sig: Take 20 mg by mouth    metFORMIN (GLUCOPHAGE) 500 mg tablet  Self No No   Sig: Take 1 tablet (500 mg total) by mouth 2 (two) times a day with meals   metoprolol tartrate (LOPRESSOR) 50 mg tablet   No No   Sig: Take 1 tablet (50 mg total) by mouth 2 (two) times a day   metoprolol tartrate (LOPRESSOR) 50 mg tablet   No No   Sig: Take 1 tablet (50 mg total) by mouth 2 (two) times a day   morphine (MS CONTIN) 15 mg 12 hr tablet  Self Yes No   pramipexole (MIRAPEX) 0 5 mg tablet  Self No No   Sig: TAKE 1 TABLET BY MOUTH IN THE EVENING AND 1 TABLET AT BEDTIME FOR 7 DAYS , THEN 1 TAB 3 TIMES A DAY   pyridoxine (VITAMIN B6) 100 mg tablet   No No   Sig: Take 1 tablet (100 mg total) by mouth every morning   pyridoxine (VITAMIN B6) 100 mg tablet   No No Sig: Take 1 tablet (100 mg total) by mouth every morning   sucralfate (CARAFATE) 1 g tablet   No No   Sig: Take 1 tablet (1 g total) by mouth 4 (four) times a day Dissolved pill in 10 mL water then swallow      Facility-Administered Medications: None       Past Medical History:   Diagnosis Date    Anxiety     [facility] cancer ([facility] )     CAD (coronary artery disease)     Cancer ([place] [address] )     Carpal tunnel syndrome     Disease of thyroid gland     Elevated cholesterol     Fibromyalgia     Hypertension     Kidney stone     Melanoma ([place] [address] )     nose    Myocardial infarction (HCC)     Neuropathy     BRAYDEN (obstructive sleep apnea)     cpap    Panic attack        Past Surgical History:   Procedure Laterality Date    BREAST SURGERY Bilateral     CARDIAC SURGERY      cardiac ablation     SECTION      x3    CHOLECYSTECTOMY      CORONARY ANGIOPLASTY WITH STENT PLACEMENT      MASTECTOMY Bilateral     NOSE SURGERY         Family History   Problem Relation Age of Onset    Heart attack Mother     Alcohol abuse Mother     Heart failure Mother     Cancer Mother     Cancer Paternal Grandfather      I have reviewed and agree with the history as documented  E-Cigarette/Vaping    E-Cigarette Use Never User      E-Cigarette/Vaping Substances    Nicotine No     THC No     CBD No     Flavoring No     Other No     Unknown No      Social History     Tobacco Use    Smoking status: Never Smoker    Smokeless tobacco: Never Used   Vaping Use    Vaping Use: Never used   Substance Use Topics    Alcohol use: Not Currently     Comment: beer    Drug use: Yes     Types: Marijuana     Comment: Medical marjuiana       Review of Systems   Constitutional: Positive for fatigue  HENT: Negative  Eyes: Negative  Respiratory: Negative  Cardiovascular: Negative  Endocrine: Negative  Genitourinary: Positive for dysuria  Musculoskeletal: Negative  Skin: Negative  Allergic/Immunologic: Negative  Neurological: Negative  Hematological: Negative  Psychiatric/Behavioral: Negative  Physical Exam  Physical Exam  Vitals and nursing note reviewed  Constitutional:       General: She is not in acute distress  Appearance: Normal appearance  She is obese  She is not ill-appearing, toxic-appearing or diaphoretic  HENT:      Head: Normocephalic and atraumatic  Mouth/Throat:      Mouth: Mucous membranes are moist    Eyes:      Extraocular Movements: Extraocular movements intact  Cardiovascular:      Rate and Rhythm: Normal rate and regular rhythm  Pulses: Normal pulses  Heart sounds: Normal heart sounds  Pulmonary:      Effort: Pulmonary effort is normal  No respiratory distress  Breath sounds: Normal breath sounds  No stridor  No wheezing, rhonchi or rales  Chest:      Chest wall: No tenderness  Abdominal:      General: There is no distension  Palpations: Abdomen is soft  Tenderness: There is no abdominal tenderness  There is no right CVA tenderness or left CVA tenderness  Musculoskeletal:         General: Normal range of motion  Cervical back: Normal range of motion  Skin:     General: Skin is warm and dry  Capillary Refill: Capillary refill takes less than 2 seconds  Neurological:      General: No focal deficit present  Mental Status: She is alert and oriented to person, place, and time  Psychiatric:         Mood and Affect: Mood normal          Behavior: Behavior normal          Thought Content:  Thought content normal          Judgment: Judgment normal          Vital Signs  ED Triage Vitals [03/05/22 1201]   Temperature Pulse Respirations Blood Pressure SpO2   98 1 °F (36 7 °C) 69 20 134/70 97 %      Temp Source Heart Rate Source Patient Position - Orthostatic VS BP Location FiO2 (%)   Oral Monitor Lying Left arm --      Pain Score       7           Vitals:    03/05/22 1201 03/05/22 1431 03/05/22 1535   BP: 134/70 128/76 142/85 Pulse: 69 76 65   Patient Position - Orthostatic VS: Lying Lying Sitting         Visual Acuity      ED Medications  Medications   sodium chloride 0 9 % infusion (125 mL/hr Intravenous New Bag 3/5/22 1235)   ceftriaxone (ROCEPHIN) 1 g/50 mL in dextrose IVPB (1,000 mg Intravenous New Bag 3/5/22 1535)       Diagnostic Studies  Results Reviewed     Procedure Component Value Units Date/Time    Urine Microscopic [[id number]]  (Abnormal) Collected: 03/05/22 1251    Lab Status: Final result Specimen: Urine Updated: 03/05/22 1330     RBC, UA 2-4 /hpf      WBC, UA 10-20 /hpf      Epithelial Cells Moderate /hpf      Bacteria, UA Innumerable /hpf      Ca Oxalate Erica, UA Occasional /hpf     Urine culture [[id number]] Collected: 03/05/22 1251    Lab Status:  In process Specimen: Urine Updated: 03/05/22 2293    Basic metabolic panel [[id number]] Collected: 03/05/22 1232    Lab Status: Final result Specimen: Blood from Arm, Left Updated: 03/05/22 1255     Sodium 139 mmol/L      Potassium 4 2 mmol/L      Chloride 103 mmol/L      CO2 28 mmol/L      ANION GAP 8 mmol/L      BUN 24 mg/dL      Creatinine 0 85 mg/dL      Glucose 109 mg/dL      Calcium 8 8 mg/dL      eGFR 72 ml/min/1 73sq m     Narrative:      [place] guidelines for Chronic Kidney Disease (CKD):     Stage 1 with normal or high GFR (GFR > 90 mL/min/1 73 square meters)    Stage 2 Mild CKD (GFR = 60-89 mL/min/1 73 square meters)    Stage 3A Moderate CKD (GFR = 45-59 mL/min/1 73 square meters)    Stage 3B Moderate CKD (GFR = 30-44 mL/min/1 73 square meters)    Stage 4 Severe CKD (GFR = 15-29 mL/min/1 73 square meters)    Stage 5 End Stage CKD (GFR <15 mL/min/1 73 square meters)  Note: GFR calculation is accurate only with a steady state creatinine    Urine Macroscopic, POC [[id number]]  (Abnormal) Collected: 03/05/22 1251    Lab Status: Final result Specimen: Urine Updated: 03/05/22 1253     Color, UA Yellow     Clarity, UA Clear     pH, UA 7 0 Leukocytes, UA Small     Nitrite, UA Negative     Protein, UA Negative mg/dl      Glucose, UA Negative mg/dl      Ketones, UA Negative mg/dl      Urobilinogen, UA 1 0 E U /dl      Bilirubin, UA Negative     Blood, UA Negative     Specific Gravity, UA 1 025    Narrative:      CLINITEK RESULT    CBC and differential [[id number]]  (Abnormal) Collected: 03/05/22 1232    Lab Status: Final result Specimen: Blood from Arm, Left Updated: 03/05/22 1243     WBC 10 12 Thousand/uL      RBC 4 58 Million/uL      Hemoglobin 14 4 g/dL      Hematocrit 41 4 %      MCV 90 fL      MCH 31 4 pg      MCHC 34 8 g/dL      RDW 12 7 %      MPV 8 8 fL      Platelets 586 Thousands/uL      nRBC 0 /100 WBCs      Neutrophils Relative 66 %      Immat GRANS % 1 %      Lymphocytes Relative 22 %      Monocytes Relative 9 %      Eosinophils Relative 1 %      Basophils Relative 1 %      Neutrophils Absolute 6 86 Thousands/µL      Immature Grans Absolute 0 06 Thousand/uL      Lymphocytes Absolute 2 20 Thousands/µL      Monocytes Absolute 0 86 Thousand/µL      Eosinophils Absolute 0 07 Thousand/µL      Basophils Absolute 0 07 Thousands/µL                  CT renal stone study abdomen pelvis without contrast   Final Result by Sayra John MD (03/05 1411)      Tiny bilateral nonobstructing kidney stones and tiny bladder stone  No hydronephrosis  Indeterminate right iliac bone lesion which is new from the prior CT of February 2019  Suggest nonemergent follow-up MRI of the bony pelvis with contrast for further assessment  12 mm low-density left adrenal nodule is likely just a small adenoma  Given slight differences in plane of measurement from the prior CT, no substantial change  Fatty liver      The study was marked in EPIC for immediate notification           Workstation performed: [id number]                    Procedures  Procedures         ED Course  ED Course as of 03/05/22 1644   Sat Mar 05, 2022   1240 Pt has 40 ml in bladder pre void  1259 CBC and BMP unremarkable  [address] scan results reviewed and discussed with pt  Pt states 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 Hakan Moctezuma Will order rocephin and discuss with SLIM due to multiple stones and UTI -risk of increasing illness  1457 Will discharge due to non obstructing stones and prescribe po abx and follow up with urology  Pt informed and verbalizes understanding  /85 (BP Location: Left arm)   Pulse 65   Temp 98 1 °F (36 7 °C) (Oral)   Resp 20   Ht 5' 7" (1 702 m)   Wt 94 kg (207 lb 3 7 oz)   SpO2 95%   BMI 32 46 kg/m²                             SBIRT 22yo+      Most Recent Value   SBIRT (22 yo +)    In order to provide better care to our patients, we are screening all of our patients for alcohol and drug use  Would it be okay to ask you these screening questions?  No Filed at: 03/05/2022 1220                    MDM  Number of Diagnoses or Management Options  Abnormal CT of the abdomen  Acute cystitis without hematuria  Fatigue  Multiple kidney stones  Diagnosis management comments: Fatigue  Abnormal kidney US 3/4 with bladder stone 4mm     DDX:  UTI, kidney stone with urinary infection    Plan  Labs  Urine   IV  IVF   CT stone protocol  NPO  Monitor reassess        Amount and/or Complexity of Data Reviewed  Clinical lab tests: ordered and reviewed  Tests in the radiology section of CPT®: ordered and reviewed  Review and summarize past medical records: yes        Disposition  Final diagnoses:   Acute cystitis without hematuria   Multiple kidney stones   Fatigue   Abnormal CT of the abdomen     Time reflects when diagnosis was documented in both MDM as applicable and the Disposition within this note     Time User Action Codes Description Comment    3/5/2022  2:58 PM Yessenia Green Add [N30 00] Acute cystitis without hematuria     3/5/2022  2:58 PM Byron Barba [N20 0] Multiple kidney stones     3/5/2022  2:58 PM Belmar Grills Add [R53 83] Fatigue     3/5/2022  2:59 PM Belmar Grills Add [R93 5] Abnormal CT of the abdomen       ED Disposition     ED Disposition Condition Date/Time Comment    Discharge Stable Sat Mar 5, 2022  3:39 PM [place]stow Quant discharge to home/self care  Follow-up Information     Follow up With Specialties Details Why Contact Info Additional Information    Stephen Navarro,  Family Medicine Schedule an appointment as soon as possible for a visit in 2 days  [address]  [address] [facility] Emergency Department Emergency Medicine  If symptoms worsen [facility] [id number]  [address] [place] [address] Emergency Department, [address] [place] [address] , [place], [place], [address] [facility] Urology [facility] Urology Schedule an appointment as soon as possible for a visit in 2 days  [facility] [id number]  [address] [place] [address] For Urology [facility], [address] [place] [address], [id number] [phone]          Current Discharge Medication List      START taking these medications    Details   cephalexin (KEFLEX) 500 mg capsule Take 1 capsule (500 mg total) by mouth every 12 (twelve) hours for 7 days  Qty: 14 capsule, Refills: 0    Associated Diagnoses: Acute cystitis without hematuria         CONTINUE these medications which have NOT CHANGED    Details   aspirin (ASPIRIN 81) 81 mg EC tablet aspirin 81 mg tablet,delayed release      B Complex Vitamins (B COMPLEX 1 PO) Take 1 tablet by mouth      Blood Glucose Monitoring Suppl (ONE TOUCH ULTRA 2) w/Device KIT Test Blood Sugar Once Daily  Qty: 1 kit, Refills: 0    Associated Diagnoses: Type 2 diabetes mellitus without complication, without long-term current use of insulin (HCC)      calcium carbonate-vitamin D (OSCAL-D) 500 mg-200 units per tablet Take 1 tablet by mouth 2 (two) times a day with meals  Qty: 180 tablet, Refills: 0    Associated Diagnoses: Atherosclerosis of native coronary artery of native heart without angina pectoris; Type 2 diabetes mellitus without complication, without long-term current use of insulin ([facility] ); Hypothyroidism, unspecified type; Malignant neoplasm metastatic to lung, unspecified laterality ([facility] ); Gastroesophageal reflux disease with esophagitis without hemorrhage; Essential hypertension; Coronary artery disease with angina pectoris, unspecified vessel or lesion type, unspecified whether native or transplanted heart ([facility] ); Panlobular emphysema (HCC)      clopidogrel (PLAVIX) 75 mg tablet Take 75 mg by mouth daily  Refills: 3      docusate sodium (COLACE) 100 mg capsule Take 1 capsule (100 mg total) by mouth daily at bedtime  Qty: 180 capsule, Refills: 3    Associated Diagnoses: Constipation by delayed colonic transit      !! DULoxetine (CYMBALTA) 30 mg delayed release capsule TAKE 1 CAP BY MOUTH DAILY IN THE AM  Qty: 90 capsule, Refills: 1    Associated Diagnoses: Idiopathic peripheral neuropathy      !! DULoxetine (CYMBALTA) 60 mg delayed release capsule TAKE 1 CAP DAILY BY MOUTH IN THE EVENING  Qty: 90 capsule, Refills: 1    Associated Diagnoses: Idiopathic peripheral neuropathy      !! Evolocumab 140 MG/ML SOAJ Inject 2 mL under the skin      famotidine (PEPCID) 40 MG tablet Take 1 tablet (40 mg total) by mouth daily  Qty: 90 tablet, Refills: 2    Associated Diagnoses: Gastroesophageal reflux disease with esophagitis without hemorrhage      gemfibrozil (LOPID) 600 mg tablet Take 600 mg by mouth 2 (two) times a day  Refills: 3      glucose blood (OneTouch Ultra) test strip Test Blood Sugar Once Daily  Qty: 100 each, Refills: 2    Associated Diagnoses: Type 2 diabetes mellitus without complication, without long-term current use of insulin ([facility] )      ! ! Lancets (onetouch ultrasoft) lancets Patient to test once daily  Qty: 100 each, Refills: 1    Associated Diagnoses: Hyperglycemia      !! Lancets (onetouch ultrasoft) lancets Test Blood Sugar Once Daily  Qty: 100 each, Refills: 0    Associated Diagnoses: Type 2 diabetes mellitus without complication, without long-term current use of insulin ([facility] )      ! ! levothyroxine 88 mcg tablet Take 1 tablet (88 mcg total) by mouth daily  Qty: 90 tablet, Refills: 0    Associated Diagnoses: Constipation by delayed colonic transit; Hyperglycemia; Atherosclerosis of native coronary artery of native heart without angina pectoris      !! levothyroxine 88 mcg tablet Take 1 tablet (88 mcg total) by mouth daily  Qty: 90 tablet, Refills: 1    Associated Diagnoses: Constipation by delayed colonic transit; Hyperglycemia; Atherosclerosis of native coronary artery of native heart without angina pectoris      LORazepam (ATIVAN) 1 mg tablet Take 1 tablet (1 mg total) by mouth 2 (two) times a day  Qty: 60 tablet, Refills: 0    Associated Diagnoses: Generalized anxiety disorder      lubiprostone (AMITIZA) 24 mcg capsule TAKE 1 CAPSULE (24 MCG TOTAL) BY MOUTH 2 (TWO) TIMES A DAY WITH MEALS  Qty: 60 capsule, Refills: 2    Associated Diagnoses: Constipation by delayed colonic transit      melatonin 3 mg Take 20 mg by mouth       metFORMIN (GLUCOPHAGE) 500 mg tablet Take 1 tablet (500 mg total) by mouth 2 (two) times a day with meals  Qty: 180 tablet, Refills: 0    Associated Diagnoses: Constipation by delayed colonic transit; Hyperglycemia; Atherosclerosis of native coronary artery of native heart without angina pectoris      !! metoprolol tartrate (LOPRESSOR) 50 mg tablet Take 1 tablet (50 mg total) by mouth 2 (two) times a day  Qty: 180 tablet, Refills: 0    Associated Diagnoses: Coronary artery disease involving native heart with angina pectoris, unspecified vessel or lesion type [facility])      ! ! metoprolol tartrate (LOPRESSOR) 50 mg tablet Take 1 tablet (50 mg total) by mouth 2 (two) times a day  Qty: 180 tablet, Refills: 1    Associated Diagnoses: Coronary artery disease involving native heart with angina pectoris, unspecified vessel or lesion type ([facility] )      Misc  Devices (GETGO ROLLING WALKER) MISC Use rolling walker as needed  Qty: 1 each, Refills: 0    Associated Diagnoses: Bilateral low back pain, unspecified chronicity, with sciatica presence unspecified      morphine (MS CONTIN) 15 mg 12 hr tablet       pramipexole (MIRAPEX) 0 5 mg tablet TAKE 1 TABLET BY MOUTH IN THE EVENING AND 1 TABLET AT BEDTIME FOR 7 DAYS , THEN 1 TAB 3 TIMES A DAY  Qty: 90 tablet, Refills: 1    Associated Diagnoses: Restless leg syndrome      Prucalopride Succinate 2 MG TABS Take 2 mg by mouth daily  Qty: 90 tablet, Refills: 2    Associated Diagnoses: Constipation, unspecified constipation type      ! ! pyridoxine (VITAMIN B6) 100 mg tablet Take 1 tablet (100 mg total) by mouth every morning  Qty: 30 tablet, Refills: 0    Associated Diagnoses: Atherosclerosis of native coronary artery of native heart without angina pectoris      ! ! pyridoxine (VITAMIN B6) 100 mg tablet Take 1 tablet (100 mg total) by mouth every morning  Qty: 30 tablet, Refills: 0    Associated Diagnoses: Atherosclerosis of native coronary artery of native heart without angina pectoris      !! Repatha SureClick 833 MG/ML SOAJ       sucralfate (CARAFATE) 1 g tablet Take 1 tablet (1 g total) by mouth 4 (four) times a day Dissolved pill in 10 mL water then swallow  Qty: 360 tablet, Refills: 1    Associated Diagnoses: Gastroesophageal reflux disease with esophagitis without hemorrhage; Gastroparesis       ! ! - Potential duplicate medications found  Please discuss with provider  No discharge procedures on file      PDMP Review     None          ED Provider  Electronically Signed by           Estrada Tejada  03/05/22 9237

## 2022-03-06 RX ORDER — PRAMIPEXOLE DIHYDROCHLORIDE 0.5 MG/1
TABLET ORAL
Qty: 90 TABLET | Refills: 1 | Status: SHIPPED | OUTPATIENT
Start: 2022-03-06 | End: 2022-03-09

## 2022-03-07 LAB — BACTERIA UR CULT: NORMAL

## 2022-03-08 ENCOUNTER — OFFICE VISIT (OUTPATIENT)
Dept: FAMILY MEDICINE CLINIC | Facility: CLINIC | Age: 67
End: 2022-03-08
Payer: MEDICARE

## 2022-03-08 VITALS
BODY MASS INDEX: 31.8 KG/M2 | HEIGHT: 67 IN | RESPIRATION RATE: 18 BRPM | HEART RATE: 70 BPM | TEMPERATURE: 97.2 F | WEIGHT: 202.6 LBS | SYSTOLIC BLOOD PRESSURE: 122 MMHG | DIASTOLIC BLOOD PRESSURE: 80 MMHG | OXYGEN SATURATION: 95 %

## 2022-03-08 DIAGNOSIS — K76.0 FATTY LIVER: ICD-10-CM

## 2022-03-08 DIAGNOSIS — N20.0 KIDNEY STONES: ICD-10-CM

## 2022-03-08 DIAGNOSIS — E66.01 OBESITY, MORBID (HCC): ICD-10-CM

## 2022-03-08 DIAGNOSIS — N39.0 LOWER URINARY TRACT INFECTION: Primary | ICD-10-CM

## 2022-03-08 DIAGNOSIS — I10 ESSENTIAL HYPERTENSION: ICD-10-CM

## 2022-03-08 PROCEDURE — 99214 OFFICE O/P EST MOD 30 MIN: CPT | Performed by: FAMILY MEDICINE

## 2022-03-08 NOTE — ASSESSMENT & PLAN NOTE
Follow-up liver function testing along with other laboratory work continue on low-fat diet work on weight reduction

## 2022-03-08 NOTE — ASSESSMENT & PLAN NOTE
Cystitis urinary tract infection continuing on Keflex and recommend follow-up with Urology in light of multiple kidney stones and history of kidney stones

## 2022-03-08 NOTE — PROGRESS NOTES
Assessment/Plan:       Problem List Items Addressed This Visit        Digestive    Fatty liver     Follow-up liver function testing along with other laboratory work continue on low-fat diet work on weight reduction         Relevant Orders    CBC and differential    Comprehensive metabolic panel    UA (URINE) with reflex to Scope       Cardiovascular and Mediastinum    Essential hypertension     Stable on current medication regimen no change follow-up with good hydration avoidance of sodium         Relevant Orders    CBC and differential    Comprehensive metabolic panel    UA (URINE) with reflex to Scope       Genitourinary    Lower urinary tract infection - Primary     Cystitis urinary tract infection continuing on Keflex and recommend follow-up with Urology in light of multiple kidney stones and history of kidney stones         Relevant Orders    CBC and differential    Comprehensive metabolic panel    UA (URINE) with reflex to Scope    Kidney stones     Referral in follow-up with Urology         Relevant Orders    CBC and differential    Comprehensive metabolic panel    UA (URINE) with reflex to Scope    Ambulatory Referral to Urology       Other    Obesity, morbid ([facility] [address] )            Subjective:      Patient ID: Jaylene Plunkett is a 73 y o  female  Follow up kidney stones and abdominal pain/cystitis      The following portions of the patient's history were reviewed and updated as appropriate: allergies, current medications, past family history, past medical history, past social history, past surgical history and problem list     Review of Systems   Constitutional: Positive for fatigue  Negative for chills and fever  HENT: Negative for congestion, nosebleeds, rhinorrhea, sinus pressure and sore throat  Eyes: Negative for discharge and redness  Respiratory: Negative for cough and shortness of breath  Cardiovascular: Negative for chest pain, palpitations and leg swelling     Gastrointestinal: Positive for abdominal pain  Negative for blood in stool and nausea  Endocrine: Negative for cold intolerance, heat intolerance and polyuria  Genitourinary: Positive for flank pain, frequency and hematuria  Negative for dysuria  Musculoskeletal: Negative for arthralgias, back pain and myalgias  Skin: Negative for rash  Neurological: Negative for dizziness, weakness and headaches  Hematological: Negative for adenopathy  Psychiatric/Behavioral: Negative for behavioral problems and sleep disturbance  The patient is not nervous/anxious  Objective:      /80 (BP Location: Left arm, Patient Position: Sitting)   Pulse 70   Temp (!) 97 2 °F (36 2 °C)   Resp 18   Ht 5' 7" (1 702 m)   Wt 91 9 kg (202 lb 9 6 oz)   SpO2 95%   BMI 31 73 kg/m²        Physical Exam  Vitals and nursing note reviewed  Constitutional:       General: She is not in acute distress  Appearance: Normal appearance  She is well-developed and normal weight  HENT:      Head: Normocephalic and atraumatic  Right Ear: Tympanic membrane, ear canal and external ear normal       Left Ear: Tympanic membrane, ear canal and external ear normal       Nose: Nose normal       Mouth/Throat:      Mouth: Mucous membranes are moist       Pharynx: No oropharyngeal exudate  Eyes:      General: No scleral icterus  Right eye: No discharge  Left eye: No discharge  Conjunctiva/sclera: Conjunctivae normal       Pupils: Pupils are equal, round, and reactive to light  Neck:      Thyroid: No thyromegaly  Vascular: No JVD  Cardiovascular:      Rate and Rhythm: Normal rate and regular rhythm  Pulses: Normal pulses  Heart sounds: Normal heart sounds  No murmur heard  Pulmonary:      Effort: Pulmonary effort is normal       Breath sounds: No wheezing or rales  Chest:      Chest wall: No tenderness  Abdominal:      General: Bowel sounds are normal  There is no distension  Palpations: Abdomen is soft  There is no mass  Tenderness: There is no abdominal tenderness  Musculoskeletal:         General: No tenderness or deformity  Normal range of motion  Cervical back: Normal range of motion  Lymphadenopathy:      Cervical: No cervical adenopathy  Skin:     General: Skin is warm and dry  Capillary Refill: Capillary refill takes less than 2 seconds  Findings: No rash  Neurological:      General: No focal deficit present  Mental Status: She is alert and oriented to person, place, and time  Mental status is at baseline  Cranial Nerves: No cranial nerve deficit  Coordination: Coordination normal       Deep Tendon Reflexes: Reflexes are normal and symmetric  Reflexes normal    Psychiatric:         Mood and Affect: Mood normal          Behavior: Behavior normal          Thought Content: Thought content normal          Judgment: Judgment normal           Data:    Laboratory Results: I have personally reviewed the pertinent laboratory results/reports   Radiology/Other Diagnostic Testing Results: I have personally reviewed pertinent reports         Lab Results   Component Value Date    WBC 10 12 03/05/2022    HGB 14 4 03/05/2022    HCT 41 4 03/05/2022    MCV 90 03/05/2022     03/05/2022     Lab Results   Component Value Date    K 4 2 03/05/2022     03/05/2022    CO2 28 03/05/2022    BUN 24 03/05/2022    CREATININE 0 85 03/05/2022    GLUF 114 (H) 09/14/2021    CALCIUM 8 8 03/05/2022    AST 15 02/23/2022    ALT 34 02/23/2022    ALKPHOS 61 02/23/2022    EGFR 72 03/05/2022     Lab Results   Component Value Date    CHOLESTEROL 115 09/14/2021    CHOLESTEROL 148 04/19/2021    CHOLESTEROL 161 06/09/2020     Lab Results   Component Value Date    HDL 45 09/14/2021    HDL 60 04/19/2021    HDL 51 06/09/2020     Lab Results   Component Value Date    LDLCALC 40 09/14/2021    LDLCALC 69 04/19/2021    LDLCALC 80 06/09/2020     Lab Results   Component Value Date    TRIG 150 09/14/2021 TRIG 95 04/19/2021    TRIG 151 (H) 06/09/2020     No results found for: [place], [place]  Lab Results   Component Value Date    [id number] 2 806 03/01/2022     Lab Results   Component Value Date    HGBA1C 5 8 (H) 09/14/2021     No results found for: MIGUEL Franklin, DO

## 2022-03-09 DIAGNOSIS — G25.81 RESTLESS LEG SYNDROME: ICD-10-CM

## 2022-03-09 DIAGNOSIS — K76.0 FATTY LIVER: Primary | ICD-10-CM

## 2022-03-09 DIAGNOSIS — N39.0 LOWER URINARY TRACT INFECTION: ICD-10-CM

## 2022-03-09 RX ORDER — PRAMIPEXOLE DIHYDROCHLORIDE 0.5 MG/1
TABLET ORAL
Qty: 90 TABLET | Refills: 1 | Status: SHIPPED | OUTPATIENT
Start: 2022-03-09 | End: 2022-04-05

## 2022-03-14 ENCOUNTER — APPOINTMENT (OUTPATIENT)
Dept: LAB | Facility: HOSPITAL | Age: 67
End: 2022-03-14
Payer: MEDICARE

## 2022-03-14 DIAGNOSIS — K59.01 CONSTIPATION BY DELAYED COLONIC TRANSIT: Primary | ICD-10-CM

## 2022-03-14 DIAGNOSIS — N39.0 LOWER URINARY TRACT INFECTION: ICD-10-CM

## 2022-03-14 DIAGNOSIS — K59.01 CONSTIPATION BY DELAYED COLONIC TRANSIT: ICD-10-CM

## 2022-03-14 DIAGNOSIS — K76.0 FATTY LIVER: ICD-10-CM

## 2022-03-14 LAB
BACTERIA UR QL AUTO: ABNORMAL /HPF
BASOPHILS # BLD AUTO: 0.06 THOUSANDS/ΜL (ref 0–0.1)
BASOPHILS NFR BLD AUTO: 1 % (ref 0–1)
BILIRUB UR QL STRIP: NEGATIVE
CAOX CRY URNS QL MICRO: ABNORMAL /HPF
CLARITY UR: ABNORMAL
COLOR UR: YELLOW
EOSINOPHIL # BLD AUTO: 0.1 THOUSAND/ΜL (ref 0–0.61)
EOSINOPHIL NFR BLD AUTO: 1 % (ref 0–6)
ERYTHROCYTE [DISTWIDTH] IN BLOOD BY AUTOMATED COUNT: 13.2 % (ref 11.6–15.1)
GLUCOSE UR STRIP-MCNC: NEGATIVE MG/DL
HCT VFR BLD AUTO: 44.9 % (ref 34.8–46.1)
HGB BLD-MCNC: 14.4 G/DL (ref 11.5–15.4)
HGB UR QL STRIP.AUTO: NEGATIVE
IMM GRANULOCYTES # BLD AUTO: 0.07 THOUSAND/UL (ref 0–0.2)
IMM GRANULOCYTES NFR BLD AUTO: 1 % (ref 0–2)
KETONES UR STRIP-MCNC: NEGATIVE MG/DL
LEUKOCYTE ESTERASE UR QL STRIP: ABNORMAL
LYMPHOCYTES # BLD AUTO: 2.48 THOUSANDS/ΜL (ref 0.6–4.47)
LYMPHOCYTES NFR BLD AUTO: 29 % (ref 14–44)
MCH RBC QN AUTO: 30.1 PG (ref 26.8–34.3)
MCHC RBC AUTO-ENTMCNC: 32.1 G/DL (ref 31.4–37.4)
MCV RBC AUTO: 94 FL (ref 82–98)
MONOCYTES # BLD AUTO: 0.72 THOUSAND/ΜL (ref 0.17–1.22)
MONOCYTES NFR BLD AUTO: 9 % (ref 4–12)
NEUTROPHILS # BLD AUTO: 5.08 THOUSANDS/ΜL (ref 1.85–7.62)
NEUTS SEG NFR BLD AUTO: 59 % (ref 43–75)
NITRITE UR QL STRIP: NEGATIVE
NON-SQ EPI CELLS URNS QL MICRO: ABNORMAL /HPF
NRBC BLD AUTO-RTO: 0 /100 WBCS
PH UR STRIP.AUTO: 6 [PH]
PLATELET # BLD AUTO: 391 THOUSANDS/UL (ref 149–390)
PMV BLD AUTO: 9.2 FL (ref 8.9–12.7)
PROT UR STRIP-MCNC: NEGATIVE MG/DL
RBC # BLD AUTO: 4.78 MILLION/UL (ref 3.81–5.12)
RBC #/AREA URNS AUTO: ABNORMAL /HPF
SP GR UR STRIP.AUTO: 1.02 (ref 1–1.03)
UROBILINOGEN UR QL STRIP.AUTO: 1 E.U./DL
WBC # BLD AUTO: 8.51 THOUSAND/UL (ref 4.31–10.16)
WBC #/AREA URNS AUTO: ABNORMAL /HPF

## 2022-03-14 PROCEDURE — 81001 URINALYSIS AUTO W/SCOPE: CPT

## 2022-03-14 PROCEDURE — 36415 COLL VENOUS BLD VENIPUNCTURE: CPT

## 2022-03-14 PROCEDURE — 85025 COMPLETE CBC W/AUTO DIFF WBC: CPT

## 2022-03-14 PROCEDURE — 87086 URINE CULTURE/COLONY COUNT: CPT

## 2022-03-15 ENCOUNTER — HOSPITAL ENCOUNTER (EMERGENCY)
Facility: HOSPITAL | Age: 67
Discharge: HOME | End: 2022-03-15
Attending: EMERGENCY MEDICINE | Admitting: EMERGENCY MEDICINE
Payer: MEDICARE

## 2022-03-15 ENCOUNTER — APPOINTMENT (EMERGENCY)
Dept: CT IMAGING | Facility: HOSPITAL | Age: 67
End: 2022-03-15
Payer: MEDICARE

## 2022-03-15 VITALS
HEART RATE: 84 BPM | TEMPERATURE: 98.1 F | SYSTOLIC BLOOD PRESSURE: 122 MMHG | DIASTOLIC BLOOD PRESSURE: 83 MMHG | RESPIRATION RATE: 18 BRPM | OXYGEN SATURATION: 94 % | WEIGHT: 204.37 LBS | BODY MASS INDEX: 32.01 KG/M2

## 2022-03-15 DIAGNOSIS — I25.119 CORONARY ARTERY DISEASE INVOLVING NATIVE HEART WITH ANGINA PECTORIS, UNSPECIFIED VESSEL OR LESION TYPE (HCC): ICD-10-CM

## 2022-03-15 DIAGNOSIS — R30.0 DYSURIA: Primary | ICD-10-CM

## 2022-03-15 DIAGNOSIS — C79.9 METASTATIC DISEASE (HCC): ICD-10-CM

## 2022-03-15 LAB
ALBUMIN SERPL BCP-MCNC: 3.5 G/DL (ref 3.5–5)
ALP SERPL-CCNC: 61 U/L (ref 46–116)
ALT SERPL W P-5'-P-CCNC: 30 U/L (ref 12–78)
ANION GAP SERPL CALCULATED.3IONS-SCNC: 6 MMOL/L (ref 4–13)
AST SERPL W P-5'-P-CCNC: 16 U/L (ref 5–45)
BACTERIA UR CULT: NORMAL
BACTERIA UR QL AUTO: ABNORMAL /HPF
BASOPHILS # BLD AUTO: 0.06 THOUSANDS/ΜL (ref 0–0.1)
BASOPHILS NFR BLD AUTO: 1 % (ref 0–1)
BILIRUB SERPL-MCNC: 0.43 MG/DL (ref 0.2–1)
BILIRUB UR QL STRIP: NEGATIVE
BUN SERPL-MCNC: 22 MG/DL (ref 5–25)
CALCIUM SERPL-MCNC: 9.6 MG/DL (ref 8.3–10.1)
CHLORIDE SERPL-SCNC: 103 MMOL/L (ref 100–108)
CLARITY UR: CLEAR
CO2 SERPL-SCNC: 31 MMOL/L (ref 21–32)
COLOR UR: YELLOW
CREAT SERPL-MCNC: 0.88 MG/DL (ref 0.6–1.3)
EOSINOPHIL # BLD AUTO: 0.1 THOUSAND/ΜL (ref 0–0.61)
EOSINOPHIL NFR BLD AUTO: 1 % (ref 0–6)
ERYTHROCYTE [DISTWIDTH] IN BLOOD BY AUTOMATED COUNT: 13.1 % (ref 11.6–15.1)
GFR SERPL CREATININE-BSD FRML MDRD: 69 ML/MIN/1.73SQ M
GLUCOSE SERPL-MCNC: 118 MG/DL (ref 65–140)
GLUCOSE UR STRIP-MCNC: NEGATIVE MG/DL
HCT VFR BLD AUTO: 41.1 % (ref 34.8–46.1)
HGB BLD-MCNC: 14.2 G/DL (ref 11.5–15.4)
HGB UR QL STRIP.AUTO: NEGATIVE
IMM GRANULOCYTES # BLD AUTO: 0.07 THOUSAND/UL (ref 0–0.2)
IMM GRANULOCYTES NFR BLD AUTO: 1 % (ref 0–2)
KETONES UR STRIP-MCNC: NEGATIVE MG/DL
LEUKOCYTE ESTERASE UR QL STRIP: ABNORMAL
LYMPHOCYTES # BLD AUTO: 2.2 THOUSANDS/ΜL (ref 0.6–4.47)
LYMPHOCYTES NFR BLD AUTO: 23 % (ref 14–44)
MCH RBC QN AUTO: 31.4 PG (ref 26.8–34.3)
MCHC RBC AUTO-ENTMCNC: 34.5 G/DL (ref 31.4–37.4)
MCV RBC AUTO: 91 FL (ref 82–98)
MONOCYTES # BLD AUTO: 0.82 THOUSAND/ΜL (ref 0.17–1.22)
MONOCYTES NFR BLD AUTO: 9 % (ref 4–12)
NEUTROPHILS # BLD AUTO: 6.4 THOUSANDS/ΜL (ref 1.85–7.62)
NEUTS SEG NFR BLD AUTO: 65 % (ref 43–75)
NITRITE UR QL STRIP: NEGATIVE
NON-SQ EPI CELLS URNS QL MICRO: ABNORMAL /HPF
NRBC BLD AUTO-RTO: 0 /100 WBCS
PH UR STRIP.AUTO: 7 [PH] (ref 4.5–8)
PLATELET # BLD AUTO: 329 THOUSANDS/UL (ref 149–390)
PMV BLD AUTO: 8.7 FL (ref 8.9–12.7)
POTASSIUM SERPL-SCNC: 4.5 MMOL/L (ref 3.5–5.3)
PROT SERPL-MCNC: 7.7 G/DL (ref 6.4–8.2)
PROT UR STRIP-MCNC: NEGATIVE MG/DL
RBC # BLD AUTO: 4.52 MILLION/UL (ref 3.81–5.12)
RBC #/AREA URNS AUTO: ABNORMAL /HPF
SODIUM SERPL-SCNC: 140 MMOL/L (ref 136–145)
SP GR UR STRIP.AUTO: 1.02 (ref 1–1.03)
UROBILINOGEN UR QL STRIP.AUTO: 1 E.U./DL
WBC # BLD AUTO: 9.65 THOUSAND/UL (ref 4.31–10.16)
WBC #/AREA URNS AUTO: ABNORMAL /HPF

## 2022-03-15 PROCEDURE — 81001 URINALYSIS AUTO W/SCOPE: CPT

## 2022-03-15 PROCEDURE — 99284 EMERGENCY DEPT VISIT MOD MDM: CPT

## 2022-03-15 PROCEDURE — 96361 HYDRATE IV INFUSION ADD-ON: CPT

## 2022-03-15 PROCEDURE — 74176 CT ABD & PELVIS W/O CONTRAST: CPT

## 2022-03-15 PROCEDURE — 96374 THER/PROPH/DIAG INJ IV PUSH: CPT

## 2022-03-15 PROCEDURE — 85025 COMPLETE CBC W/AUTO DIFF WBC: CPT | Performed by: EMERGENCY MEDICINE

## 2022-03-15 PROCEDURE — 36415 COLL VENOUS BLD VENIPUNCTURE: CPT | Performed by: EMERGENCY MEDICINE

## 2022-03-15 PROCEDURE — 96375 TX/PRO/DX INJ NEW DRUG ADDON: CPT

## 2022-03-15 PROCEDURE — 80053 COMPREHEN METABOLIC PANEL: CPT | Performed by: EMERGENCY MEDICINE

## 2022-03-15 PROCEDURE — 87086 URINE CULTURE/COLONY COUNT: CPT

## 2022-03-15 PROCEDURE — 99284 EMERGENCY DEPT VISIT MOD MDM: CPT | Performed by: EMERGENCY MEDICINE

## 2022-03-15 RX ORDER — ONDANSETRON 2 MG/ML
4 INJECTION INTRAMUSCULAR; INTRAVENOUS ONCE
Status: COMPLETED | OUTPATIENT
Start: 2022-03-15 | End: 2022-03-15

## 2022-03-15 RX ORDER — KETOROLAC TROMETHAMINE 30 MG/ML
15 INJECTION, SOLUTION INTRAMUSCULAR; INTRAVENOUS ONCE
Status: COMPLETED | OUTPATIENT
Start: 2022-03-15 | End: 2022-03-15

## 2022-03-15 RX ORDER — METOPROLOL TARTRATE 50 MG/1
50 TABLET, FILM COATED ORAL 2 TIMES DAILY
Qty: 180 TABLET | Refills: 1 | Status: SHIPPED | OUTPATIENT
Start: 2022-03-15

## 2022-03-15 RX ADMIN — SODIUM CHLORIDE 1000 ML: 0.9 INJECTION, SOLUTION INTRAVENOUS at 13:18

## 2022-03-15 RX ADMIN — ONDANSETRON 4 MG: 2 INJECTION INTRAMUSCULAR; INTRAVENOUS at 13:18

## 2022-03-15 RX ADMIN — KETOROLAC TROMETHAMINE 15 MG: 30 INJECTION, SOLUTION INTRAMUSCULAR at 13:18

## 2022-03-15 NOTE — TELEPHONE ENCOUNTER
Refill removed  Previously sent to pharmacy   Receipt confirmed by pharmacy (3/14/2022 12:15 PM EDT)

## 2022-03-15 NOTE — ED PROVIDER NOTES
Pt Name: Jordan Byrd  MRN: [id number]  [place] 1955  Age/Sex: 73 y o  female  Date of evaluation: 3/15/2022  PCP: Dami Estrada, [address] [place] [address]    Chief Complaint   Patient presents with    Possible UTI     pt reports burning with urination and increaed frequency; patient repots was seen at PCP and was placed on antibiotcs but states that symptoms have not getting any better         HPI    Tana Keane presents to the Emergency Department complaining of urinary problems  She was here and had CT showing stones including a bladder stone  She has just completed a course of abx and still feels that she has urgency and difficulty urinating at times  She has made an outpatient appointment to see urology but the appointment isn't for more an another week away          HPI      Past Medical and Surgical History    Past Medical History:   Diagnosis Date    Anxiety     Breast cancer ([facility] [address] )     CAD (coronary artery disease)     Cancer ([facility] [address] )     Carpal tunnel syndrome     Disease of thyroid gland     Elevated cholesterol     Fibromyalgia     Hypertension     Kidney stone     [facility]     nose    Myocardial infarction (HCC)     Neuropathy     BRAYDEN (obstructive sleep apnea)     cpap    Panic attack        Past Surgical History:   Procedure Laterality Date    BREAST SURGERY Bilateral     CARDIAC SURGERY      cardiac ablation     SECTION      x3    CHOLECYSTECTOMY      CORONARY ANGIOPLASTY WITH STENT PLACEMENT      MASTECTOMY Bilateral     NOSE SURGERY         Family History   Problem Relation Age of Onset    Heart attack Mother     Alcohol abuse Mother     Heart failure Mother     Cancer Mother     Cancer Paternal Grandfather        Social History     Tobacco Use    Smoking status: Never Smoker    Smokeless tobacco: Never Used   Vaping Use    Vaping Use: Never used   Substance Use Topics    Alcohol use: Not Currently     Comment: beer    Drug use: Yes     Types: Marijuana Comment: Medical marjuiana           Allergies    Allergies   Allergen Reactions    Metoclopramide Other (See Comments)    Nitrofurantoin Other (See Comments)     Very weak  Fell    Isosorbide      Other reaction(s): headache    Pregabalin      Pt states made her feel suicidal     Reglan [Metoclopramide] Other (See Comments)     Flares her neuropathy    Statins Myalgia       Home Medications    Prior to Admission medications    Medication Sig Start Date End Date Taking?  Authorizing Provider   aspirin (ASPIRIN 81) 81 mg EC tablet aspirin 81 mg tablet,delayed release    Historical Provider, MD   B Complex Vitamins (B COMPLEX 1 PO) Take 1 tablet by mouth    Historical Provider, MD   Blood Glucose Monitoring Suppl (ONE TOUCH ULTRA 2) w/Device KIT Test Blood Sugar Once Daily 11/30/21   Dain Can DO   Calcium Carb-Cholecalciferol (Oyster Shell Calcium w/D) 500-200 MG-UNIT TABS No Take by mouth 2 (two) times a day with meals 3/5/22   Historical Provider, MD   calcium carbonate-vitamin D (OSCAL-D) 500 mg-200 units per tablet Take 1 tablet by mouth 2 (two) times a day with meals 2/11/22   Dain Can DO   clopidogrel (PLAVIX) 75 mg tablet Take 75 mg by mouth daily 10/23/18   Historical Provider, MD   docusate sodium (COLACE) 100 mg capsule Take 1 capsule (100 mg total) by mouth daily at bedtime 8/18/21   William Angel MD   DULoxetine (CYMBALTA) 30 mg delayed release capsule TAKE 1 CAP BY MOUTH DAILY IN THE AM 12/22/21   Deloris Jon PA-C   DULoxetine (CYMBALTA) 60 mg delayed release capsule TAKE 1 CAP DAILY BY MOUTH IN THE EVENING 12/22/21   Deloris Jon PA-C   Evolocumab 140 MG/ML SOAJ Inject 2 mL under the skin 10/25/18   Historical Provider, MD   famotidine (PEPCID) 40 MG tablet Take 1 tablet (40 mg total) by mouth daily 9/1/21   NICOLE Renee   gemfibrozil (LOPID) 600 mg tablet Take 600 mg by mouth 2 (two) times a day 10/18/18   Historical Provider, MD   glucose blood (OneTouch Ultra) test strip Test Blood Sugar Once Daily 11/30/21   Sapphire Hood DO   Lancets (onetouch ultrasoft) lancets Patient to test once daily 3/12/21   Sapphire Hood DO   Lancets (onetouch ultrasoft) lancets Test Blood Sugar Once Daily 11/30/21   Sapphire Hood DO   levothyroxine 88 mcg tablet Take 1 tablet (88 mcg total) by mouth daily 2/17/22   Sapphire Hood DO   levothyroxine 88 mcg tablet Take 1 tablet (88 mcg total) by mouth daily 2/17/22   Prince Roseannekeila Howard DO   LORazepam (ATIVAN) 1 mg tablet Take 1 tablet (1 mg total) by mouth 2 (two) times a day 2/24/22   Sapphire Hood DO   lubiprostone (AMITIZA) 24 mcg capsule Take 1 capsule (24 mcg total) by mouth 2 (two) times a day with meals 3/14/22   NICOLE Siegel   melatonin 3 mg Take 20 mg by mouth     Historical Provider, MD   metFORMIN (GLUCOPHAGE) 500 mg tablet TAKE 1 TABLET BY MOUTH TWICE A DAY WITH MEALS 3/7/22   William Howard DO   metoprolol tartrate (LOPRESSOR) 50 mg tablet Take 1 tablet (50 mg total) by mouth 2 (two) times a day 3/15/22   Sapphire Hood DO   Misc   Devices ([facility]) MISC Use rolling walker as needed 3/18/19   Sapphire Hood DO   morphine (MS CONTIN) 15 mg 12 hr tablet  9/16/21   Historical Provider, MD   pramipexole (MIRAPEX) 0 5 mg tablet TAKE 1 TABLET BY MOUTH IN THE EVENING AND 1 TABLET AT BEDTIME FOR 7 DAYS , THEN 1 TAB 3 TIMES A DAY 3/9/22   Abilio Watkins MD   Prucalopride Succinate 2 MG TABS Take 2 mg by mouth daily 10/18/21   WPS NICOLE Lobo   pyridoxine (VITAMIN B6) 100 mg tablet Take 1 tablet (100 mg total) by mouth every morning 2/17/22   Sapphire Hood DO   pyridoxine (VITAMIN B6) 100 mg tablet Take 1 tablet (100 mg total) by mouth every morning 2/17/22   Delsie DO Rajat Hood 143 MG/ML SOAJ  3/25/21   Historical Provider, MD   sucralfate (CARAFATE) 1 g tablet Take 1 tablet (1 g total) by mouth 4 (four) times a day Dissolved pill in 10 mL water then swallow 10/12/21 3/15/22  NICOLE Siegel   metoprolol tartrate (LOPRESSOR) 25 mg tablet TAKE 1 TABLET BY MOUTH TWICE A DAY 3/7/22 3/15/22  William Howard,    metoprolol tartrate (LOPRESSOR) 50 mg tablet Take 1 tablet (50 mg total) by mouth 2 (two) times a day 2/17/22 3/15/22  Kate Peguero, DO   metoprolol tartrate (LOPRESSOR) 50 mg tablet Take 1 tablet (50 mg total) by mouth 2 (two) times a day 2/17/22 3/15/22  Kate Peguero, DO           Review of Systems    Review of Systems   Constitutional: Negative for chills and fever  HENT: Negative for ear pain and sore throat  Eyes: Negative for pain and visual disturbance  Respiratory: Negative for cough and shortness of breath  Cardiovascular: Negative for chest pain and palpitations  Gastrointestinal: Positive for nausea  Negative for abdominal pain and vomiting  Genitourinary: Positive for flank pain and urgency  Negative for dysuria and hematuria  Musculoskeletal: Negative for arthralgias and back pain  Skin: Negative for color change and rash  Neurological: Negative for seizures and syncope  All other systems reviewed and are negative  Physical Exam      ED Triage Vitals   Temperature Pulse Respirations Blood Pressure SpO2   03/15/22 1121 03/15/22 1121 03/15/22 1121 03/15/22 1121 03/15/22 1121   98 1 °F (36 7 °C) 79 20 161/92 96 %      Temp Source Heart Rate Source Patient Position - Orthostatic VS BP Location FiO2 (%)   03/15/22 1121 03/15/22 1303 03/15/22 1303 03/15/22 1303 --   Oral Monitor Lying Left arm       Pain Score       03/15/22 1303       7               Physical Exam  Vitals and nursing note reviewed  Constitutional:       General: She is not in acute distress  Appearance: She is well-developed  HENT:      Head: Normocephalic and atraumatic  Eyes:      Conjunctiva/sclera: Conjunctivae normal    Cardiovascular:      Rate and Rhythm: Normal rate and regular rhythm        Heart sounds: No murmur heard       Pulmonary:      Effort: Pulmonary effort is normal  No respiratory distress  Breath sounds: Normal breath sounds  Abdominal:      Palpations: Abdomen is soft  Tenderness: There is no abdominal tenderness  Musculoskeletal:      Cervical back: Neck supple  Skin:     General: Skin is warm and dry  Neurological:      Mental Status: She is alert  Assessment and Plan    Toño Guzman is a 73 y o  female who presents with urinary complaints  Physical examination otherwise unremarkable  Differential diagnosis (not completely inclusive) includes UTI/ renal colic/ other  Plan will be to perform diagnostic testing and treat symptomatically        MDM    Diagnostic Results        Labs:    Results for orders placed or performed during the hospital encounter of 03/15/22   Urine Microscopic   Result Value Ref Range    RBC, UA 2-4 None Seen, 2-4 /hpf    WBC, UA 10-20 (A) None Seen, 2-4, 5-60 /hpf    Epithelial Cells Moderate (A) None Seen, Occasional /hpf    Bacteria, UA Moderate (A) None Seen, Occasional /hpf   CBC and differential   Result Value Ref Range    WBC 9 65 4 31 - 10 16 Thousand/uL    RBC 4 52 3 81 - 5 12 Million/uL    Hemoglobin 14 2 11 5 - 15 4 g/dL    Hematocrit 41 1 34 8 - 46 1 %    MCV 91 82 - 98 fL    MCH 31 4 26 8 - 34 3 pg    MCHC 34 5 31 4 - 37 4 g/dL    RDW 13 1 11 6 - 15 1 %    MPV 8 7 (L) 8 9 - 12 7 fL    Platelets 373 999 - 175 Thousands/uL    nRBC 0 /100 WBCs    Neutrophils Relative 65 43 - 75 %    Immat GRANS % 1 0 - 2 %    Lymphocytes Relative 23 14 - 44 %    Monocytes Relative 9 4 - 12 %    Eosinophils Relative 1 0 - 6 %    Basophils Relative 1 0 - 1 %    Neutrophils Absolute 6 40 1 85 - 7 62 Thousands/µL    Immature Grans Absolute 0 07 0 00 - 0 20 Thousand/uL    Lymphocytes Absolute 2 20 0 60 - 4 47 Thousands/µL    Monocytes Absolute 0 82 0 17 - 1 22 Thousand/µL    Eosinophils Absolute 0 10 0 00 - 0 61 Thousand/µL    Basophils Absolute 0 06 0 00 - 0 10 Thousands/µL   Comprehensive metabolic panel   Result Value Ref Range    Sodium 140 136 - 145 mmol/L    Potassium 4 5 3 5 - 5 3 mmol/L    Chloride 103 100 - 108 mmol/L    CO2 31 21 - 32 mmol/L    ANION GAP 6 4 - 13 mmol/L    BUN 22 5 - 25 mg/dL    Creatinine 0 88 0 60 - 1 30 mg/dL    Glucose 118 65 - 140 mg/dL    Calcium 9 6 8 3 - 10 1 mg/dL    AST 16 5 - 45 U/L    ALT 30 12 - 78 U/L    Alkaline Phosphatase 61 46 - 116 U/L    Total Protein 7 7 6 4 - 8 2 g/dL    Albumin 3 5 3 5 - 5 0 g/dL    Total Bilirubin 0 43 0 20 - 1 00 mg/dL    eGFR 69 ml/min/1 73sq m   Urine Macroscopic, POC   Result Value Ref Range    Color, UA Yellow     Clarity, UA Clear     pH, UA 7 0 4 5 - 8 0    Leukocytes, UA Small (A) Negative    Nitrite, UA Negative Negative    Protein, UA Negative Negative mg/dl    Glucose, UA Negative Negative mg/dl    Ketones, UA Negative Negative mg/dl    Urobilinogen, UA 1 0 0 2, 1 0 E U /dl E U /dl    Bilirubin, UA Negative Negative    Blood, UA Negative Negative    Specific Gravity, UA 1 025 1 003 - 1 030       All labs reviewed and utilized in the medical decision making process    Radiology:    CT renal stone study abdomen pelvis without contrast   Final Result      1  Punctate bilateral renal calculi  No obstructive uropathy  2   Hepatic steatosis  3   Again noted multiple sclerotic lesions in the right iliac bone and acetabulum in keeping with known metastasis in this patient with history of breast carcinoma  4   Borderline enlarged right external iliac node measuring 10 x 10 mm  Workstation performed: [id number]             All radiology studies independently viewed by me and interpreted by the radiologist     Procedure    Procedures      ED Course of Care and Re-Assessments    I had a long discussion with the patient regarding her symptoms and her CT findings  She knows about her right sided pelvic lesions and she has had radiation to that area a few years ago    She does follow up closely about her cancer  Medications   sodium chloride 0 9 % bolus 1,000 mL (0 mL Intravenous Stopped 3/15/22 1611)   ondansetron (ZOFRAN) injection 4 mg (4 mg Intravenous Given 3/15/22 1318)   ketorolac (TORADOL) injection 15 mg (15 mg Intravenous Given 3/15/22 1318)           FINAL IMPRESSION    Final diagnoses:   Dysuria   Metastatic disease ([address] )         DISPOSITION/PLAN      Time reflects when diagnosis was documented in both MDM as applicable and the Disposition within this note     Time User Action Codes Description Comment    3/15/2022  3:33 PM Judy Jacobs [R30 0] Dysuria     3/15/2022  3:34 PM Judy Jacobs [C79 9] Metastatic disease [facility])       ED Disposition     ED Disposition Condition Date/Time Comment    Discharge Stable Tue Mar 15, 2022  3:33 PM Mayford Lanes discharge to home/self care  Follow-up Information     Follow up With Specialties Details Why Contact Info Additional Information    Mary Sofia DO Family Medicine   [address]  [phone] [place] [address] For Urology [facility] Urology Schedule an appointment as soon as possible for a visit   [facility] Timbo Gilbert [phone]  [address] [place] [address] For Urology [address], [address] [phone]            PATIENT REFERRED TO:    Mary Sofia DO  [address]  [phone] [place] [address] For Urology [facility]  [address] [phone]  Schedule an appointment as soon as possible for a visit         DISCHARGE MEDICATIONS:    Discharge Medication List as of 3/15/2022  3:48 PM      CONTINUE these medications which have NOT CHANGED    Details   aspirin (ASPIRIN 81) 81 mg EC tablet aspirin 81 mg tablet,delayed release, Historical Med      B Complex Vitamins (B COMPLEX 1 PO) Take 1 tablet by mouth, Historical Med      Blood Glucose Monitoring Suppl (ONE TOUCH ULTRA 2) w/Device KIT Test Blood Sugar Once Daily, Normal      Calcium Carb-Cholecalciferol (Oyster Shell Calcium w/D) 500-200 MG-UNIT TABS No Take by mouth 2 (two) times a day with meals, Starting Sat 3/5/2022, Historical Med      calcium carbonate-vitamin D (OSCAL-D) 500 mg-200 units per tablet Take 1 tablet by mouth 2 (two) times a day with meals, Starting Fri 2/11/2022, Normal      clopidogrel (PLAVIX) 75 mg tablet Take 75 mg by mouth daily, Starting Tue 10/23/2018, Historical Med      docusate sodium (COLACE) 100 mg capsule Take 1 capsule (100 mg total) by mouth daily at bedtime, Starting Wed 8/18/2021, Normal      !! DULoxetine (CYMBALTA) 30 mg delayed release capsule TAKE 1 CAP BY MOUTH DAILY IN THE AM, Normal      !! DULoxetine (CYMBALTA) 60 mg delayed release capsule TAKE 1 CAP DAILY BY MOUTH IN THE EVENING, Normal      !! Evolocumab 140 MG/ML SOAJ Inject 2 mL under the skin, Starting Thu 10/25/2018, Historical Med      famotidine (PEPCID) 40 MG tablet Take 1 tablet (40 mg total) by mouth daily, Starting Wed 9/1/2021, Normal      gemfibrozil (LOPID) 600 mg tablet Take 600 mg by mouth 2 (two) times a day, Starting Thu 10/18/2018, Historical Med      glucose blood (OneTouch Ultra) test strip Test Blood Sugar Once Daily, Normal      !! Lancets (onetouch ultrasoft) lancets Patient to test once daily, Normal      !!  Lancets (onetouch ultrasoft) lancets Test Blood Sugar Once Daily, Normal      !! levothyroxine 88 mcg tablet Take 1 tablet (88 mcg total) by mouth daily, Starting Thu 2/17/2022, Normal      !! levothyroxine 88 mcg tablet Take 1 tablet (88 mcg total) by mouth daily, Starting Thu 2/17/2022, Normal      LORazepam (ATIVAN) 1 mg tablet Take 1 tablet (1 mg total) by mouth 2 (two) times a day, Starting Thu 2/24/2022, Normal      lubiprostone (AMITIZA) 24 mcg capsule Take 1 capsule (24 mcg total) by mouth 2 (two) times a day with meals, Starting Mon 3/14/2022, Normal      melatonin 3 mg Take 20 mg by mouth , Historical Med      metFORMIN (GLUCOPHAGE) 500 mg tablet TAKE 1 TABLET BY MOUTH TWICE A DAY WITH MEALS, Normal      metoprolol tartrate (LOPRESSOR) 50 mg tablet Take 1 tablet (50 mg total) by mouth 2 (two) times a day, Starting Tue 3/15/2022, Normal      Misc  Devices ([employer] ROLLING WALKER) MISC Use rolling walker as needed, Print      morphine (MS CONTIN) 15 mg 12 hr tablet Starting Thu 9/16/2021, Historical Med      pramipexole (MIRAPEX) 0 5 mg tablet TAKE 1 TABLET BY MOUTH IN THE EVENING AND 1 TABLET AT BEDTIME FOR 7 DAYS , THEN 1 TAB 3 TIMES A DAY, Normal      Prucalopride Succinate 2 MG TABS Take 2 mg by mouth daily, Starting Mon 10/18/2021, Normal      !! pyridoxine (VITAMIN B6) 100 mg tablet Take 1 tablet (100 mg total) by mouth every morning, Starting Thu 2/17/2022, Normal      !! pyridoxine (VITAMIN B6) 100 mg tablet Take 1 tablet (100 mg total) by mouth every morning, Starting Thu 2/17/2022, Normal      !! Repatha SureClick 637 MG/ML SOAJ Starting Thu 3/25/2021, Historical Med      sucralfate (CARAFATE) 1 g tablet Take 1 tablet (1 g total) by mouth 4 (four) times a day Dissolved pill in 10 mL water then swallow, Starting Tue 10/12/2021, Until Tue 3/15/2022, Normal       !! - Potential duplicate medications found  Please discuss with provider  No discharge procedures on file           Ignacio Calloway, [address] [facility], DO  03/15/22 7119

## 2022-03-16 LAB — BACTERIA UR CULT: NORMAL

## 2022-03-22 ENCOUNTER — APPOINTMENT (EMERGENCY)
Dept: RADIOLOGY | Facility: HOSPITAL | Age: 67
End: 2022-03-22
Payer: MEDICARE

## 2022-03-22 ENCOUNTER — APPOINTMENT (EMERGENCY)
Dept: CT IMAGING | Facility: HOSPITAL | Age: 67
End: 2022-03-22
Payer: MEDICARE

## 2022-03-22 ENCOUNTER — HOSPITAL ENCOUNTER (EMERGENCY)
Facility: HOSPITAL | Age: 67
Discharge: HOME | End: 2022-03-22
Attending: EMERGENCY MEDICINE
Payer: MEDICARE

## 2022-03-22 VITALS
TEMPERATURE: 98.5 F | OXYGEN SATURATION: 93 % | RESPIRATION RATE: 18 BRPM | SYSTOLIC BLOOD PRESSURE: 112 MMHG | DIASTOLIC BLOOD PRESSURE: 60 MMHG | HEART RATE: 65 BPM

## 2022-03-22 DIAGNOSIS — S70.02XA CONTUSION OF LEFT HIP, INITIAL ENCOUNTER: ICD-10-CM

## 2022-03-22 DIAGNOSIS — S20.219A CHEST WALL CONTUSION: ICD-10-CM
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
## 2022-03-22 DIAGNOSIS — S09.90XA MINOR HEAD INJURY, INITIAL ENCOUNTER: ICD-10-CM

## 2022-03-22 LAB
ALBUMIN SERPL BCP-MCNC: 3.4 G/DL (ref 3.5–5)
ALP SERPL-CCNC: 67 U/L (ref 46–116)
ALT SERPL W P-5'-P-CCNC: 26 U/L (ref 12–78)
ANION GAP SERPL CALCULATED.3IONS-SCNC: 7 MMOL/L (ref 4–13)
AST SERPL W P-5'-P-CCNC: 13 U/L (ref 5–45)
BASOPHILS # BLD AUTO: 0.06 THOUSANDS/ΜL (ref 0–0.1)
BASOPHILS NFR BLD AUTO: 1 % (ref 0–1)
BILIRUB SERPL-MCNC: 0.43 MG/DL (ref 0.2–1)
BUN SERPL-MCNC: 23 MG/DL (ref 5–25)
CALCIUM ALBUM COR SERPL-MCNC: 10.1 MG/DL (ref 8.3–10.1)
CALCIUM SERPL-MCNC: 9.6 MG/DL (ref 8.3–10.1)
CHLORIDE SERPL-SCNC: 104 MMOL/L (ref 100–108)
CO2 SERPL-SCNC: 30 MMOL/L (ref 21–32)
CREAT SERPL-MCNC: 0.87 MG/DL (ref 0.6–1.3)
EOSINOPHIL # BLD AUTO: 0.13 THOUSAND/ΜL (ref 0–0.61)
EOSINOPHIL NFR BLD AUTO: 1 % (ref 0–6)
ERYTHROCYTE [DISTWIDTH] IN BLOOD BY AUTOMATED COUNT: 13.4 % (ref 11.6–15.1)
GFR SERPL CREATININE-BSD FRML MDRD: 70 ML/MIN/1.73SQ M
GLUCOSE SERPL-MCNC: 122 MG/DL (ref 65–140)
HCT VFR BLD AUTO: 43.5 % (ref 34.8–46.1)
HGB BLD-MCNC: 14 G/DL (ref 11.5–15.4)
IMM GRANULOCYTES # BLD AUTO: 0.07 THOUSAND/UL (ref 0–0.2)
IMM GRANULOCYTES NFR BLD AUTO: 1 % (ref 0–2)
LYMPHOCYTES # BLD AUTO: 2.72 THOUSANDS/ΜL (ref 0.6–4.47)
LYMPHOCYTES NFR BLD AUTO: 29 % (ref 14–44)
MCH RBC QN AUTO: 30.1 PG (ref 26.8–34.3)
MCHC RBC AUTO-ENTMCNC: 32.2 G/DL (ref 31.4–37.4)
MCV RBC AUTO: 94 FL (ref 82–98)
MONOCYTES # BLD AUTO: 0.92 THOUSAND/ΜL (ref 0.17–1.22)
MONOCYTES NFR BLD AUTO: 10 % (ref 4–12)
NEUTROPHILS # BLD AUTO: 5.44 THOUSANDS/ΜL (ref 1.85–7.62)
NEUTS SEG NFR BLD AUTO: 58 % (ref 43–75)
NRBC BLD AUTO-RTO: 0 /100 WBCS
PLATELET # BLD AUTO: 359 THOUSANDS/UL (ref 149–390)
PMV BLD AUTO: 8.8 FL (ref 8.9–12.7)
POTASSIUM SERPL-SCNC: 5.2 MMOL/L (ref 3.5–5.3)
PROT SERPL-MCNC: 7.8 G/DL (ref 6.4–8.2)
RBC # BLD AUTO: 4.65 MILLION/UL (ref 3.81–5.12)
SODIUM SERPL-SCNC: 141 MMOL/L (ref 136–145)
WBC # BLD AUTO: 9.34 THOUSAND/UL (ref 4.31–10.16)

## 2022-03-22 PROCEDURE — 99284 EMERGENCY DEPT VISIT MOD MDM: CPT | Performed by: EMERGENCY MEDICINE

## 2022-03-22 PROCEDURE — 73502 X-RAY EXAM HIP UNI 2-3 VIEWS: CPT

## 2022-03-22 PROCEDURE — 71101 X-RAY EXAM UNILAT RIBS/CHEST: CPT

## 2022-03-22 PROCEDURE — 70450 CT HEAD/BRAIN W/O DYE: CPT

## 2022-03-22 PROCEDURE — 85025 COMPLETE CBC W/AUTO DIFF WBC: CPT | Performed by: EMERGENCY MEDICINE

## 2022-03-22 PROCEDURE — 36415 COLL VENOUS BLD VENIPUNCTURE: CPT | Performed by: EMERGENCY MEDICINE

## 2022-03-22 PROCEDURE — 99284 EMERGENCY DEPT VISIT MOD MDM: CPT

## 2022-03-22 PROCEDURE — 80053 COMPREHEN METABOLIC PANEL: CPT | Performed by: EMERGENCY MEDICINE

## 2022-03-22 RX ORDER — ACETAMINOPHEN 325 MG/1
650 TABLET ORAL ONCE
Status: COMPLETED | OUTPATIENT
Start: 2022-03-22 | End: 2022-03-22

## 2022-03-22 RX ADMIN — ACETAMINOPHEN 650 MG: 325 TABLET ORAL at 17:10

## 2022-03-22 NOTE — DISCHARGE INSTRUCTIONS
Head Injury   WHAT YOU NEED TO KNOW:   A head injury is most often caused by a blow to the head  This may occur from a fall, bicycle injury, sports injury, being struck in the head, or a motor vehicle accident  DISCHARGE INSTRUCTIONS:   Call 911 or have someone else call for any of the following: You cannot be woken  You have a seizure  You stop responding to others or you faint  You have blurry or double vision  Your speech becomes slurred or confused  You have arm or leg weakness, loss of feeling, or new problems with coordination  Your pupils are larger than usual or one pupil is a different size than the other  You have blood or clear fluid coming out of your ears or nose  Return to the emergency department if:   You have repeated or forceful vomiting  You feel confused  Your headache gets worse or becomes severe  You or someone caring for you notices that you are harder to wake than usual   Contact your healthcare provider if:   Your symptoms last longer than 6 weeks after the injury  Self-care:   Rest  or do quiet activities for 24 to 48 hours  Limit your time watching TV, using the computer, or doing tasks that require a lot of thinking  Slowly return to your normal activities as directed  Do not play sports or do activities that may cause you to get hit in the head  Ask your healthcare provider when you can return to sports  Apply ice  on your head for 15 to 20 minutes every hour or as directed  Use an ice pack, or put crushed ice in a plastic bag  Cover it with a towel before you apply it to your skin  Ice helps prevent tissue damage and decreases swelling and pain  Bruised or fracture Ribs  WHAT YOU NEED TO KNOW:   A rib contusion is a bruise on one or more of your ribs  DISCHARGE INSTRUCTIONS:   Return to the emergency department if:   You have increased chest pain  You have shortness of breath  You start to cough up blood       Your pain does not improve with pain medicine  Contact your healthcare provider if:   You have a cough  You have a fever  You have questions or concerns about your condition or care  Medicines: You may need any of the following:  NSAIDs , Take your medicine as directed  Contact your healthcare provider if you think your medicine is not helping or if you have side effects  Tell him of her if you are allergic to any medicine  Keep a list of the medicines, vitamins, and herbs you take  Include the amounts, and when and why you take them  Bring the list or the pill bottles to follow-up visits  Carry your medicine list with you in case of an emergency  Deep breathing: To help prevent pneumonia, take 10 deep breaths every hour, even when you wake up during the night  Brace your ribs with your hands or a pillow while you take deep breaths or cough  This will help decrease your pain  Rest:  Rest your ribs to decrease swelling and allow the injury to heal faster  Avoid activities that may cause more pain or damage to your ribs  As your pain decreases, begin movements slowly  Ice:  Ice helps decrease swelling and pain  Ice may also help prevent tissue damage  Use an ice pack or put crushed ice in a plastic bag  Cover it with a towel and place it on your bruised area for 15 to 20 minutes every hour as directed

## 2022-03-22 NOTE — ED PROVIDER NOTES
History  Chief Complaint   Patient presents with   Jacqui Courser Fall     pt went to pick something up off floor and fell  hit head  c/o L side head pain, arm and hip pain  + thinners  72 yo F brought to ED by her  after falling in her kitchen  She was bending over to pick something up and proceeded to fall all the way forward taking impact against some steps and the vacuum clean, on her forehead and L temple area, L forearm, and L hip  She denies LOC  She got herself on hands and knees, and her  assisted her the rest of the way up  History provided by:  Patient      Prior to Admission Medications   Prescriptions Last Dose Informant Patient Reported? Taking? B Complex Vitamins (B COMPLEX 1 PO)  Self Yes No   Sig: Take 1 tablet by mouth   Blood Glucose Monitoring Suppl (ONE TOUCH ULTRA 2) w/Device KIT  Self No No   Sig: Test Blood Sugar Once Daily   Calcium Carb-Cholecalciferol (Oyster Shell Calcium w/D) 500-200 MG-UNIT TABS No   Yes No   Sig: Take by mouth 2 (two) times a day with meals   DULoxetine (CYMBALTA) 30 mg delayed release capsule  Self No No   Sig: TAKE 1 CAP BY MOUTH DAILY IN THE AM   DULoxetine (CYMBALTA) 60 mg delayed release capsule  Self No No   Sig: TAKE 1 CAP DAILY BY MOUTH IN THE EVENING   Evolocumab 140 MG/ML SOAJ  Self Yes No   Sig: Inject 2 mL under the skin   LORazepam (ATIVAN) 1 mg tablet   No No   Sig: Take 1 tablet (1 mg total) by mouth 2 (two) times a day   Lancets (onetouch ultrasoft) lancets  Self No No   Sig: Patient to test once daily   Lancets (onetouch ultrasoft) lancets  Self No No   Sig: Test Blood Sugar Once Daily   Misc   Devices (GETGO ROLLING WALKER) MISC  Self No No   Sig: Use rolling walker as needed   Prucalopride Succinate 2 MG TABS  Self No No   Sig: Take 2 mg by mouth daily   Repatha SureClick 000 MG/ML SOAJ  Self Yes No   aspirin (ASPIRIN 81) 81 mg EC tablet  Self Yes No   Sig: aspirin 81 mg tablet,delayed release   calcium carbonate-vitamin D (OSCAL-D) 500 mg-200 units per tablet  Self No No   Sig: Take 1 tablet by mouth 2 (two) times a day with meals   clopidogrel (PLAVIX) 75 mg tablet  Self Yes No   Sig: Take 75 mg by mouth daily   docusate sodium (COLACE) 100 mg capsule  Self No No   Sig: Take 1 capsule (100 mg total) by mouth daily at bedtime   famotidine (PEPCID) 40 MG tablet  Self No No   Sig: Take 1 tablet (40 mg total) by mouth daily   gemfibrozil (LOPID) 600 mg tablet  Self Yes No   Sig: Take 600 mg by mouth 2 (two) times a day   glucose blood (OneTouch Ultra) test strip  Self No No   Sig: Test Blood Sugar Once Daily   levothyroxine 88 mcg tablet   No No   Sig: Take 1 tablet (88 mcg total) by mouth daily   levothyroxine 88 mcg tablet   No No   Sig: Take 1 tablet (88 mcg total) by mouth daily   lubiprostone (AMITIZA) 24 mcg capsule   No No   Sig: Take 1 capsule (24 mcg total) by mouth 2 (two) times a day with meals   melatonin 3 mg  Self Yes No   Sig: Take 20 mg by mouth    metFORMIN (GLUCOPHAGE) 500 mg tablet   No No   Sig: TAKE 1 TABLET BY MOUTH TWICE A DAY WITH MEALS   metoprolol tartrate (LOPRESSOR) 50 mg tablet   No No   Sig: Take 1 tablet (50 mg total) by mouth 2 (two) times a day   morphine (MS CONTIN) 15 mg 12 hr tablet  Self Yes No   pramipexole (MIRAPEX) 0 5 mg tablet   No No   Sig: TAKE 1 TABLET BY MOUTH IN THE EVENING AND 1 TABLET AT BEDTIME FOR 7 DAYS , THEN 1 TAB 3 TIMES A DAY   pyridoxine (VITAMIN B6) 100 mg tablet   No No   Sig: Take 1 tablet (100 mg total) by mouth every morning   pyridoxine (VITAMIN B6) 100 mg tablet   No No   Sig: Take 1 tablet (100 mg total) by mouth every morning   sucralfate (CARAFATE) 1 g tablet   No No   Sig: Take 1 tablet (1 g total) by mouth 4 (four) times a day Dissolved pill in 10 mL water then swallow      Facility-Administered Medications: None       Past Medical History:   Diagnosis Date    Anxiety     [facility] [address] [facility]     CAD (coronary artery disease)     Cancer (HCC)     Carpal tunnel syndrome     Disease of thyroid gland     Elevated cholesterol     Fibromyalgia     Hypertension     Kidney stone     Melanoma ([facility] [address] )     nose    Myocardial infarction (HCC)     Neuropathy     BRAYDEN (obstructive sleep apnea)     cpap    Panic attack        Past Surgical History:   Procedure Laterality Date    BREAST SURGERY Bilateral     CARDIAC SURGERY      cardiac ablation     SECTION      x3    CHOLECYSTECTOMY      CORONARY ANGIOPLASTY WITH STENT PLACEMENT      MASTECTOMY Bilateral     NOSE SURGERY         Family History   Problem Relation Age of Onset    Heart attack Mother     Alcohol abuse Mother     Heart failure Mother     Cancer Mother     Cancer Paternal Grandfather      I have reviewed and agree with the history as documented  E-Cigarette/Vaping    E-Cigarette Use Never User      E-Cigarette/Vaping Substances    Nicotine No     THC No     CBD No     Flavoring No     Other No     Unknown No      Social History     Tobacco Use    Smoking status: Never Smoker    Smokeless tobacco: Never Used   Vaping Use    Vaping Use: Never used   Substance Use Topics    Alcohol use: Not Currently     Comment: beer    Drug use: Not Currently     Types: Marijuana     Comment: Medical marjuiana       Review of Systems   Constitutional: Negative for appetite change, chills and fever  HENT: Negative for sore throat  Respiratory: Negative for cough, shortness of breath and wheezing  Cardiovascular: Negative for chest pain and palpitations  Gastrointestinal: Negative for abdominal pain, diarrhea, nausea and vomiting  Genitourinary: Negative for dysuria and hematuria  Musculoskeletal: Negative for neck pain  Skin: Negative for rash  Neurological: Negative for dizziness, weakness and headaches  Psychiatric/Behavioral: Negative for suicidal ideas  All other systems reviewed and are negative  Physical Exam  Physical Exam  Vitals and nursing note reviewed     Constitutional: Appearance: She is well-developed  She is not toxic-appearing or diaphoretic  HENT:      Head: Normocephalic and atraumatic  Right Ear: Tympanic membrane and external ear normal       Left Ear: Tympanic membrane and external ear normal       Nose: Nose normal    Eyes:      Conjunctiva/sclera: Conjunctivae normal       Pupils: Pupils are equal, round, and reactive to light  Neck:      Meningeal: Brudzinski's sign and Kernig's sign absent  Cardiovascular:      Rate and Rhythm: Normal rate and regular rhythm  Pulses: Normal pulses  Heart sounds: Normal heart sounds  No murmur heard  Pulmonary:      Effort: Pulmonary effort is normal  No tachypnea or respiratory distress  Breath sounds: Normal breath sounds  No wheezing  Chest:      Chest wall: Tenderness (left chest wall) present  No crepitus  Abdominal:      General: Bowel sounds are normal  There is no distension  Palpations: Abdomen is soft  Abdomen is not rigid  Tenderness: There is no abdominal tenderness  There is no guarding or rebound  Musculoskeletal:      Cervical back: Full passive range of motion without pain, normal range of motion and neck supple  Left hip: No deformity or crepitus  Decreased range of motion  Right lower leg: No swelling  Left lower leg: No swelling  Lymphadenopathy:      Cervical: No cervical adenopathy  Skin:     General: Skin is warm and dry  Coloration: Skin is not pale  Findings: No rash  Neurological:      Mental Status: She is alert and oriented to person, place, and time  GCS: GCS eye subscore is 4  GCS verbal subscore is 5  GCS motor subscore is 6  Cranial Nerves: No cranial nerve deficit  Sensory: No sensory deficit  Coordination: Coordination normal       Gait: Gait normal       Deep Tendon Reflexes: Reflexes are normal and symmetric     Psychiatric:         Speech: Speech normal          Behavior: Behavior normal          Thought Content:  Thought content normal          Judgment: Judgment normal          Vital Signs  ED Triage Vitals   Temperature Pulse Respirations Blood Pressure SpO2   03/22/22 1511 03/22/22 1511 03/22/22 1511 03/22/22 1511 03/22/22 1511   98 5 °F (36 9 °C) 75 17 118/75 94 %      Temp Source Heart Rate Source Patient Position - Orthostatic VS BP Location FiO2 (%)   03/22/22 1511 03/22/22 1511 03/22/22 1511 03/22/22 1511 --   Oral Monitor Sitting Left arm       Pain Score       03/22/22 1657       6           Vitals:    03/22/22 1511 03/22/22 1657   BP: 118/75 112/60   Pulse: 75 65   Patient Position - Orthostatic VS: Sitting Lying         Visual Acuity      ED Medications  Medications   acetaminophen (TYLENOL) tablet 650 mg (650 mg Oral Given 3/22/22 1710)       Diagnostic Studies  Results Reviewed     Procedure Component Value Units Date/Time    Comprehensive metabolic panel [[id number]]  (Abnormal) Collected: 03/22/22 1716    Lab Status: Final result Specimen: Blood from Arm, Left Updated: 03/22/22 1738     Sodium 141 mmol/L      Potassium 5 2 mmol/L      Chloride 104 mmol/L      CO2 30 mmol/L      ANION GAP 7 mmol/L      BUN 23 mg/dL      Creatinine 0 87 mg/dL      Glucose 122 mg/dL      Calcium 9 6 mg/dL      Corrected Calcium 10 1 mg/dL      AST 13 U/L      ALT 26 U/L      Alkaline Phosphatase 67 U/L      Total Protein 7 8 g/dL      Albumin 3 4 g/dL      Total Bilirubin 0 43 mg/dL      eGFR 70 ml/min/1 73sq m     Narrative:      [place] guidelines for Chronic Kidney Disease (CKD):     Stage 1 with normal or high GFR (GFR > 90 mL/min/1 73 square meters)    Stage 2 Mild CKD (GFR = 60-89 mL/min/1 73 square meters)    Stage 3A Moderate CKD (GFR = 45-59 mL/min/1 73 square meters)    Stage 3B Moderate CKD (GFR = 30-44 mL/min/1 73 square meters)    Stage 4 Severe CKD (GFR = 15-29 mL/min/1 73 square meters)    Stage 5 End Stage CKD (GFR <15 mL/min/1 73 square meters)  Note: GFR calculation is accurate only with a steady state creatinine    CBC and differential [[id number]]  (Abnormal) Collected: 03/22/22 1716    Lab Status: Final result Specimen: Blood from Arm, Left Updated: 03/22/22 1722     WBC 9 34 Thousand/uL      RBC 4 65 Million/uL      Hemoglobin 14 0 g/dL      Hematocrit 43 5 %      MCV 94 fL      MCH 30 1 pg      MCHC 32 2 g/dL      RDW 13 4 %      MPV 8 8 fL      Platelets 930 Thousands/uL      nRBC 0 /100 WBCs      Neutrophils Relative 58 %      Immat GRANS % 1 %      Lymphocytes Relative 29 %      Monocytes Relative 10 %      Eosinophils Relative 1 %      Basophils Relative 1 %      Neutrophils Absolute 5 44 Thousands/µL      Immature Grans Absolute 0 07 Thousand/uL      Lymphocytes Absolute 2 72 Thousands/µL      Monocytes Absolute 0 92 Thousand/µL      Eosinophils Absolute 0 13 Thousand/µL      Basophils Absolute 0 06 Thousands/µL                  XR ribs with pa chest min 3 views LEFT   ED Interpretation by Panda Cunningham MD (03/22 1701)   No effusion, no PTX, no displaced rib fracture identified      CT head without contrast   Final Result by Guera Sharp DO (03/22 1613)      No acute intracranial abnormality  Workstation performed: [id number]         XR hip/pelv 2-3 vws left    (Results Pending)              Procedures  Procedures         ED Course  ED Course as of 03/22/22 1825   Tue Mar 22, 2022   1527 She declined anything for pain, because she already takes morphine twice daily for chronic pain   1623 CT head without contrast  No acute findings   1623 XR hip/pelv 2-3 vws left  No fracture, no dislocation   1630 She told she has been having some recent fatigue and drowsiness, and enlarged lymph nodes    I will check some labs to see if any abnormalities might have contributed to her accidental fall      1700 XR ribs with pa chest min 3 views LEFT  No effusion, no PTX, no displaced rib fracture identified   1809 Reviewed results with patient at bedside and updated on the plan  ED labs are reassuring  Discussed return precautions for mild head injury and chest wall injury, possible rib injury                                             MDM    Disposition  Final diagnoses:   Fall, initial encounter   Minor head injury, initial encounter   Chest wall contusion   Contusion of left hip, initial encounter     Time reflects when diagnosis was documented in both MDM as applicable and the Disposition within this note     Time User Action Codes Description Comment    3/22/2022  6:09 PM Tomy Grim Add [I28  XXXA] Fall, initial encounter     3/22/2022  6:10 PM Tomy Grim Add [[id number]] Minor head injury, initial encounter     3/22/2022  6:10 PM Roly, [address] Chest wall contusion     3/22/2022  6:10 PM Tomy Grim Add [[id number]] Contusion of left hip, initial encounter       ED Disposition     ED Disposition Condition Date/Time Comment    Discharge Good Tue Mar 22, 2022  6:09 PM Charles Lerma discharge to home/self care  Follow-up Information     Follow up With Specialties Details Why Contact Chioma Patel,  Family Medicine Schedule an appointment as soon as possible for a visit  As needed [address]  [address]  [address] [place] [address]  [phone]            Discharge Medication List as of 3/22/2022  6:10 PM      CONTINUE these medications which have NOT CHANGED    Details   aspirin (ASPIRIN 81) 81 mg EC tablet aspirin 81 mg tablet,delayed release, Historical Med      B Complex Vitamins (B COMPLEX 1 PO) Take 1 tablet by mouth, Historical Med      Blood Glucose Monitoring Suppl (ONE TOUCH ULTRA 2) w/Device KIT Test Blood Sugar Once Daily, Normal      Calcium Carb-Cholecalciferol (Oyster Shell Calcium w/D) 500-200 MG-UNIT TABS No Take by mouth 2 (two) times a day with meals, Starting Sat 3/5/2022, Historical Med      calcium carbonate-vitamin D (OSCAL-D) 500 mg-200 units per tablet Take 1 tablet by mouth 2 (two) times a day with meals, Starting Fri 2/11/2022, Normal      clopidogrel (PLAVIX) 75 mg tablet Take 75 mg by mouth daily, Starting Tue 10/23/2018, Historical Med      docusate sodium (COLACE) 100 mg capsule Take 1 capsule (100 mg total) by mouth daily at bedtime, Starting Wed 8/18/2021, Normal      !! DULoxetine (CYMBALTA) 30 mg delayed release capsule TAKE 1 CAP BY MOUTH DAILY IN THE AM, Normal      !! DULoxetine (CYMBALTA) 60 mg delayed release capsule TAKE 1 CAP DAILY BY MOUTH IN THE EVENING, Normal      !! Evolocumab 140 MG/ML SOAJ Inject 2 mL under the skin, Starting Thu 10/25/2018, Historical Med      famotidine (PEPCID) 40 MG tablet Take 1 tablet (40 mg total) by mouth daily, Starting Wed 9/1/2021, Normal      gemfibrozil (LOPID) 600 mg tablet Take 600 mg by mouth 2 (two) times a day, Starting Thu 10/18/2018, Historical Med      glucose blood (OneTouch Ultra) test strip Test Blood Sugar Once Daily, Normal      !! Lancets (onetouch ultrasoft) lancets Patient to test once daily, Normal      !! Lancets (onetouch ultrasoft) lancets Test Blood Sugar Once Daily, Normal      !! levothyroxine 88 mcg tablet Take 1 tablet (88 mcg total) by mouth daily, Starting Thu 2/17/2022, Normal      !! levothyroxine 88 mcg tablet Take 1 tablet (88 mcg total) by mouth daily, Starting Thu 2/17/2022, Normal      LORazepam (ATIVAN) 1 mg tablet Take 1 tablet (1 mg total) by mouth 2 (two) times a day, Starting Thu 2/24/2022, Normal      lubiprostone (AMITIZA) 24 mcg capsule Take 1 capsule (24 mcg total) by mouth 2 (two) times a day with meals, Starting Mon 3/14/2022, Normal      melatonin 3 mg Take 20 mg by mouth , Historical Med      metFORMIN (GLUCOPHAGE) 500 mg tablet TAKE 1 TABLET BY MOUTH TWICE A DAY WITH MEALS, Normal      metoprolol tartrate (LOPRESSOR) 50 mg tablet Take 1 tablet (50 mg total) by mouth 2 (two) times a day, Starting Tue 3/15/2022, Normal      Misc   Devices (GETGO ROLLING WALKER) MISC Use rolling walker as needed, Print      morphine (MS CONTIN) 15 mg 12 hr tablet Starting Thu 9/16/2021, Historical Med      pramipexole (MIRAPEX) 0 5 mg tablet TAKE 1 TABLET BY MOUTH IN THE EVENING AND 1 TABLET AT BEDTIME FOR 7 DAYS , THEN 1 TAB 3 TIMES A DAY, Normal      Prucalopride Succinate 2 MG TABS Take 2 mg by mouth daily, Starting Mon 10/18/2021, Normal      !! pyridoxine (VITAMIN B6) 100 mg tablet Take 1 tablet (100 mg total) by mouth every morning, Starting Thu 2/17/2022, Normal      !! pyridoxine (VITAMIN B6) 100 mg tablet Take 1 tablet (100 mg total) by mouth every morning, Starting Thu 2/17/2022, Normal      !! Repatha SureClick 018 MG/ML SOAJ Starting Thu 3/25/2021, Historical Med      sucralfate (CARAFATE) 1 g tablet Take 1 tablet (1 g total) by mouth 4 (four) times a day Dissolved pill in 10 mL water then swallow, Starting Tue 10/12/2021, Until Tue 3/15/2022, Normal       !! - Potential duplicate medications found  Please discuss with provider  No discharge procedures on file      PDMP Review     None          ED Provider  Electronically Signed by           Topher Magaña MD  03/22/22 3762

## 2022-03-24 ENCOUNTER — OFFICE VISIT (OUTPATIENT)
Dept: UROLOGY | Facility: CLINIC | Age: 67
End: 2022-03-24
Payer: MEDICARE

## 2022-03-24 VITALS
HEIGHT: 67 IN | DIASTOLIC BLOOD PRESSURE: 60 MMHG | HEART RATE: 76 BPM | BODY MASS INDEX: 32.02 KG/M2 | SYSTOLIC BLOOD PRESSURE: 118 MMHG | WEIGHT: 204 LBS

## 2022-03-24 DIAGNOSIS — R29.6 REPEATED FALLS: ICD-10-CM

## 2022-03-24 DIAGNOSIS — R26.89 POOR BALANCE: Primary | ICD-10-CM

## 2022-03-24 DIAGNOSIS — N20.0 KIDNEY STONES: Primary | ICD-10-CM

## 2022-03-24 PROCEDURE — 99204 OFFICE O/P NEW MOD 45 MIN: CPT | Performed by: UROLOGY

## 2022-03-24 NOTE — PROGRESS NOTES
3/24/2022    Janel Birmingham  1955  [id number]        Assessment  Flank pain, urinary symptoms transiently  Likely passed calculus    Plan  Patient had symptoms consistent with UTI, however urine cultures were negative  Right flank pain intermittently as well or use toward stone  She does have punctate stones in the right kidney currently without hydronephrosis  My suggestion was that she may have passed a stone  Clearly was small based on the scan showing 1 to 2 mm stone in the bladder, followed by stone passage  Her symptoms have since completely resolved  I think the most likely explanation is passed stone  She can continue to observe for any recurrent symptoms, however the absence of symptoms, no further urologic follow-up is necessary  She will continue follow-up with Oncology and has full body CT scans at least every 6 months  I think it is reasonable to re-evaluate her kidney at the next scan  I do not see any reason to order additional imaging as result  Patient is comfortable, all questions answered, she agrees with the plan  History of Present Illness  Eller Krabbe is a 73 y o  female with metastatic breast cancer  She has history of bilateral mastectomy  She has had recurrent metastases treated with radiation therapy and chemotherapy at different times and locations  She is followed by Oncology at [facility]   Over the last month she developed intermittent right flank pain which became severe at times  She also developed significant urinary symptoms including urgency, frequency, inability to void at times of urge  Multiple urine cultures were negative for infection  She had 2 CT scans  The first scan showed normal kidneys without hydronephrosis but there was a tiny calcification within the bladder  On the second scan, there is no longer calcification of the bladder  Reviewed the CT scans in the office today              Review of Systems  Review of Systems Constitutional: Negative  HENT: Negative  Respiratory: Negative  Cardiovascular: Negative  Gastrointestinal: Negative  Genitourinary:        As per HPI   Musculoskeletal: Negative  Skin: Negative  Neurological: Negative  Hematological: Negative            Past Medical History  Past Medical History:   Diagnosis Date    Anxiety     [email or web address]     CAD (coronary artery disease)     Cancer ([email or web address] 75 )     Carpal tunnel syndrome     Disease of thyroid gland     Elevated cholesterol     Fibromyalgia     Hypertension     Kidney stone     Melanoma ([email or web address] 75 )     nose    Myocardial infarction (HCC)     Neuropathy     BRAYDEN (obstructive sleep apnea)     cpap    Panic attack        Past Social History  Past Surgical History:   Procedure Laterality Date    BREAST SURGERY Bilateral     CARDIAC SURGERY      cardiac ablation     SECTION      x3    CHOLECYSTECTOMY      CORONARY ANGIOPLASTY WITH STENT PLACEMENT      MASTECTOMY Bilateral     NOSE SURGERY         Past Family History  Family History   Problem Relation Age of Onset    Heart attack Mother     Alcohol abuse Mother     Heart failure Mother     Cancer Mother     Cancer Paternal Grandfather        Past Social history  Social History     Socioeconomic History    Marital status: Single     Spouse name: Not on file    Number of children: Not on file    Years of education: Not on file    Highest education level: Not on file   Occupational History    Not on file   Tobacco Use    Smoking status: Never Smoker    Smokeless tobacco: Never Used   Vaping Use    Vaping Use: Never used   Substance and Sexual Activity    Alcohol use: Not Currently     Comment: beer    Drug use: Not Currently     Types: Marijuana     Comment: Medical marjuiana    Sexual activity: Not on file   Other Topics Concern    Not on file   Social History Narrative    ** Merged History Encounter **          Social Determinants of Health     Financial Resource Strain: Not on file   Food Insecurity: Not on file   Transportation Needs: Not on file   Physical Activity: Not on file   Stress: Not on file   Social Connections: Not on file   Intimate Partner Violence: Not on file   Housing Stability: Not on file     Social History     Tobacco Use   Smoking Status Never Smoker   Smokeless Tobacco Never Used       Current Medications  Current Outpatient Medications   Medication Sig Dispense Refill    aspirin (ASPIRIN 81) 81 mg EC tablet aspirin 81 mg tablet,delayed release      B Complex Vitamins (B COMPLEX 1 PO) Take 1 tablet by mouth      Blood Glucose Monitoring Suppl (ONE TOUCH ULTRA 2) w/Device KIT Test Blood Sugar Once Daily 1 kit 0    Calcium Carb-Cholecalciferol (Oyster Shell Calcium w/D) 500-200 MG-UNIT TABS No Take by mouth 2 (two) times a day with meals      calcium carbonate-vitamin D (OSCAL-D) 500 mg-200 units per tablet Take 1 tablet by mouth 2 (two) times a day with meals 180 tablet 0    clopidogrel (PLAVIX) 75 mg tablet Take 75 mg by mouth daily  3    docusate sodium (COLACE) 100 mg capsule Take 1 capsule (100 mg total) by mouth daily at bedtime 180 capsule 3    DULoxetine (CYMBALTA) 30 mg delayed release capsule TAKE 1 CAP BY MOUTH DAILY IN THE AM 90 capsule 1    DULoxetine (CYMBALTA) 60 mg delayed release capsule TAKE 1 CAP DAILY BY MOUTH IN THE EVENING 90 capsule 1    Evolocumab 140 MG/ML SOAJ Inject 2 mL under the skin      famotidine (PEPCID) 40 MG tablet Take 1 tablet (40 mg total) by mouth daily 90 tablet 2    gemfibrozil (LOPID) 600 mg tablet Take 600 mg by mouth 2 (two) times a day  3    glucose blood (OneTouch Ultra) test strip Test Blood Sugar Once Daily 100 each 2    Lancets (onetouch ultrasoft) lancets Patient to test once daily 100 each 1    Lancets (onetouch ultrasoft) lancets Test Blood Sugar Once Daily 100 each 0    levothyroxine 88 mcg tablet Take 1 tablet (88 mcg total) by mouth daily 90 tablet 0    LORazepam (ATIVAN) 1 mg tablet Take 1 tablet (1 mg total) by mouth 2 (two) times a day 60 tablet 0    lubiprostone (AMITIZA) 24 mcg capsule Take 1 capsule (24 mcg total) by mouth 2 (two) times a day with meals 60 capsule 2    melatonin 3 mg Take 20 mg by mouth       metFORMIN (GLUCOPHAGE) 500 mg tablet TAKE 1 TABLET BY MOUTH TWICE A DAY WITH MEALS 180 tablet 0    metoprolol tartrate (LOPRESSOR) 50 mg tablet Take 1 tablet (50 mg total) by mouth 2 (two) times a day 180 tablet 1    Misc  Devices (GETGO ROLLING WALKER) MISC Use rolling walker as needed 1 each 0    morphine (MS CONTIN) 15 mg 12 hr tablet       pramipexole (MIRAPEX) 0 5 mg tablet TAKE 1 TABLET BY MOUTH IN THE EVENING AND 1 TABLET AT BEDTIME FOR 7 DAYS , THEN 1 TAB 3 TIMES A DAY 90 tablet 1    Prucalopride Succinate 2 MG TABS Take 2 mg by mouth daily 90 tablet 2    pyridoxine (VITAMIN B6) 100 mg tablet Take 1 tablet (100 mg total) by mouth every morning 30 tablet 0    Repatha SureClick 158 MG/ML SOAJ       sucralfate (CARAFATE) 1 g tablet Take 1 tablet (1 g total) by mouth 4 (four) times a day Dissolved pill in 10 mL water then swallow 360 tablet 1    levothyroxine 88 mcg tablet Take 1 tablet (88 mcg total) by mouth daily 90 tablet 1    pyridoxine (VITAMIN B6) 100 mg tablet Take 1 tablet (100 mg total) by mouth every morning (Patient not taking: Reported on 3/24/2022 ) 30 tablet 0     No current facility-administered medications for this visit  Allergies  Allergies   Allergen Reactions    Metoclopramide Other (See Comments)    Nitrofurantoin Other (See Comments)     Very weak  Fell    Isosorbide      Other reaction(s): headache    Pregabalin      Pt states made her feel suicidal     Reglan [Metoclopramide] Other (See Comments)     Flares her neuropathy    Statins Myalgia       Past Medical History, Social History, Family History, medications and allergies were reviewed      Vitals  Vitals:    03/24/22 1538   BP: 118/60   BP Location: Left arm   Patient Position: Sitting   Cuff Size: Adult   Pulse: 76   Weight: 92 5 kg (204 lb)   Height: 5' 7" (1 702 m)       Physical Exam  Physical Exam  Vitals reviewed  Constitutional:       Appearance: She is well-developed  HENT:      Head: Normocephalic and atraumatic  Eyes:      Conjunctiva/sclera: Conjunctivae normal    Cardiovascular:      Rate and Rhythm: Normal rate  Pulmonary:      Effort: Pulmonary effort is normal    Chest:      Comments: Status post bilateral mastectomy  Abdominal:      Palpations: Abdomen is soft  Genitourinary:     Comments: No CVAT  Musculoskeletal:         General: Normal range of motion  Skin:     General: Skin is warm and dry  Neurological:      Mental Status: She is alert and oriented to person, place, and time     Psychiatric:         Mood and Affect: Mood normal            Results  No results found for: PSA  Lab Results   Component Value Date    CALCIUM 9 6 03/22/2022    K 5 2 03/22/2022    CO2 30 03/22/2022     03/22/2022    BUN 23 03/22/2022    CREATININE 0 87 03/22/2022     Lab Results   Component Value Date    WBC 9 34 03/22/2022    HGB 14 0 03/22/2022    HCT 43 5 03/22/2022    MCV 94 03/22/2022     03/22/2022

## 2022-03-30 DIAGNOSIS — G60.9 IDIOPATHIC PERIPHERAL NEUROPATHY: ICD-10-CM

## 2022-03-30 DIAGNOSIS — K59.01 CONSTIPATION BY DELAYED COLONIC TRANSIT: ICD-10-CM

## 2022-03-30 DIAGNOSIS — I25.10 ATHEROSCLEROSIS OF NATIVE CORONARY ARTERY OF NATIVE HEART WITHOUT ANGINA PECTORIS: ICD-10-CM

## 2022-03-30 DIAGNOSIS — R73.9 HYPERGLYCEMIA: ICD-10-CM

## 2022-03-30 RX ORDER — DULOXETIN HYDROCHLORIDE 60 MG/1
CAPSULE, DELAYED RELEASE ORAL
Qty: 90 CAPSULE | Refills: 1 | Status: SHIPPED | OUTPATIENT
Start: 2022-03-30

## 2022-03-30 RX ORDER — DULOXETIN HYDROCHLORIDE 30 MG/1
CAPSULE, DELAYED RELEASE ORAL
Qty: 90 CAPSULE | Refills: 1 | Status: SHIPPED | OUTPATIENT
Start: 2022-03-30

## 2022-03-31 ENCOUNTER — HOSPITAL ENCOUNTER (EMERGENCY)
Facility: HOSPITAL | Age: 67
Discharge: HOME | End: 2022-03-31
Attending: EMERGENCY MEDICINE | Admitting: EMERGENCY MEDICINE
Payer: MEDICARE

## 2022-03-31 ENCOUNTER — APPOINTMENT (EMERGENCY)
Dept: CT IMAGING | Facility: HOSPITAL | Age: 67
End: 2022-03-31
Payer: MEDICARE

## 2022-03-31 ENCOUNTER — TELEPHONE (OUTPATIENT)
Dept: NEUROLOGY | Facility: CLINIC | Age: 67
End: 2022-03-31

## 2022-03-31 VITALS
BODY MASS INDEX: 31.8 KG/M2 | HEART RATE: 74 BPM | WEIGHT: 203.04 LBS | SYSTOLIC BLOOD PRESSURE: 121 MMHG | OXYGEN SATURATION: 90 % | DIASTOLIC BLOOD PRESSURE: 82 MMHG | RESPIRATION RATE: 18 BRPM | TEMPERATURE: 98.2 F

## 2022-03-31 DIAGNOSIS — R91.1 PULMONARY NODULE: ICD-10-CM

## 2022-03-31 DIAGNOSIS — M54.9 BACK PAIN: Primary | ICD-10-CM

## 2022-03-31 LAB
ALBUMIN SERPL BCP-MCNC: 3.6 G/DL (ref 3.5–5)
ALP SERPL-CCNC: 72 U/L (ref 46–116)
ALT SERPL W P-5'-P-CCNC: 34 U/L (ref 12–78)
ANION GAP SERPL CALCULATED.3IONS-SCNC: 10 MMOL/L (ref 4–13)
AST SERPL W P-5'-P-CCNC: 18 U/L (ref 5–45)
BASOPHILS # BLD AUTO: 0.05 THOUSANDS/ΜL (ref 0–0.1)
BASOPHILS NFR BLD AUTO: 1 % (ref 0–1)
BILIRUB SERPL-MCNC: 0.33 MG/DL (ref 0.2–1)
BILIRUB UR QL STRIP: NEGATIVE
BUN SERPL-MCNC: 20 MG/DL (ref 5–25)
CALCIUM SERPL-MCNC: 9.6 MG/DL (ref 8.3–10.1)
CHLORIDE SERPL-SCNC: 103 MMOL/L (ref 100–108)
CLARITY UR: CLEAR
CO2 SERPL-SCNC: 28 MMOL/L (ref 21–32)
COLOR UR: YELLOW
CREAT SERPL-MCNC: 1.05 MG/DL (ref 0.6–1.3)
EOSINOPHIL # BLD AUTO: 0.05 THOUSAND/ΜL (ref 0–0.61)
EOSINOPHIL NFR BLD AUTO: 1 % (ref 0–6)
ERYTHROCYTE [DISTWIDTH] IN BLOOD BY AUTOMATED COUNT: 13.1 % (ref 11.6–15.1)
GFR SERPL CREATININE-BSD FRML MDRD: 55 ML/MIN/1.73SQ M
GLUCOSE SERPL-MCNC: 157 MG/DL (ref 65–140)
GLUCOSE UR STRIP-MCNC: NEGATIVE MG/DL
HCT VFR BLD AUTO: 44 % (ref 34.8–46.1)
HGB BLD-MCNC: 14.5 G/DL (ref 11.5–15.4)
HGB UR QL STRIP.AUTO: NEGATIVE
IMM GRANULOCYTES # BLD AUTO: 0.07 THOUSAND/UL (ref 0–0.2)
IMM GRANULOCYTES NFR BLD AUTO: 1 % (ref 0–2)
KETONES UR STRIP-MCNC: ABNORMAL MG/DL
LEUKOCYTE ESTERASE UR QL STRIP: NEGATIVE
LYMPHOCYTES # BLD AUTO: 1.56 THOUSANDS/ΜL (ref 0.6–4.47)
LYMPHOCYTES NFR BLD AUTO: 16 % (ref 14–44)
MCH RBC QN AUTO: 30.7 PG (ref 26.8–34.3)
MCHC RBC AUTO-ENTMCNC: 33 G/DL (ref 31.4–37.4)
MCV RBC AUTO: 93 FL (ref 82–98)
MONOCYTES # BLD AUTO: 0.4 THOUSAND/ΜL (ref 0.17–1.22)
MONOCYTES NFR BLD AUTO: 4 % (ref 4–12)
NEUTROPHILS # BLD AUTO: 7.93 THOUSANDS/ΜL (ref 1.85–7.62)
NEUTS SEG NFR BLD AUTO: 77 % (ref 43–75)
NITRITE UR QL STRIP: NEGATIVE
NRBC BLD AUTO-RTO: 0 /100 WBCS
PH UR STRIP.AUTO: 6 [PH]
PLATELET # BLD AUTO: 396 THOUSANDS/UL (ref 149–390)
PMV BLD AUTO: 8.8 FL (ref 8.9–12.7)
POTASSIUM SERPL-SCNC: 4.5 MMOL/L (ref 3.5–5.3)
PROT SERPL-MCNC: 8.4 G/DL (ref 6.4–8.2)
PROT UR STRIP-MCNC: NEGATIVE MG/DL
RBC # BLD AUTO: 4.73 MILLION/UL (ref 3.81–5.12)
SODIUM SERPL-SCNC: 141 MMOL/L (ref 136–145)
SP GR UR STRIP.AUTO: 1.02 (ref 1–1.03)
UROBILINOGEN UR QL STRIP.AUTO: 0.2 E.U./DL
WBC # BLD AUTO: 10.06 THOUSAND/UL (ref 4.31–10.16)

## 2022-03-31 PROCEDURE — 99285 EMERGENCY DEPT VISIT HI MDM: CPT | Performed by: EMERGENCY MEDICINE

## 2022-03-31 PROCEDURE — 81003 URINALYSIS AUTO W/O SCOPE: CPT | Performed by: EMERGENCY MEDICINE

## 2022-03-31 PROCEDURE — 74177 CT ABD & PELVIS W/CONTRAST: CPT

## 2022-03-31 PROCEDURE — 99284 EMERGENCY DEPT VISIT MOD MDM: CPT

## 2022-03-31 PROCEDURE — 85025 COMPLETE CBC W/AUTO DIFF WBC: CPT | Performed by: EMERGENCY MEDICINE

## 2022-03-31 PROCEDURE — 36415 COLL VENOUS BLD VENIPUNCTURE: CPT | Performed by: EMERGENCY MEDICINE

## 2022-03-31 PROCEDURE — 80053 COMPREHEN METABOLIC PANEL: CPT | Performed by: EMERGENCY MEDICINE

## 2022-03-31 PROCEDURE — 96374 THER/PROPH/DIAG INJ IV PUSH: CPT

## 2022-03-31 PROCEDURE — 71275 CT ANGIOGRAPHY CHEST: CPT

## 2022-03-31 RX ORDER — MORPHINE SULFATE 4 MG/ML
4 INJECTION, SOLUTION INTRAMUSCULAR; INTRAVENOUS ONCE
Status: COMPLETED | OUTPATIENT
Start: 2022-03-31 | End: 2022-03-31

## 2022-03-31 RX ORDER — METHOCARBAMOL 500 MG/1
500 TABLET, FILM COATED ORAL 2 TIMES DAILY
Qty: 10 TABLET | Refills: 0 | Status: SHIPPED | OUTPATIENT
Start: 2022-03-31 | End: 2022-04-04

## 2022-03-31 RX ADMIN — MORPHINE SULFATE 4 MG: 4 INJECTION INTRAVENOUS at 15:23

## 2022-03-31 RX ADMIN — IOHEXOL 100 ML: 350 INJECTION, SOLUTION INTRAVENOUS at 16:46

## 2022-03-31 NOTE — ED CARE HANDOFF
Emergency Department Sign Out Note        Sign out and transfer of care from Dr Kathleen Corado  See Separate Emergency Department note  The patient, Jose Ochoa, was evaluated by the previous provider for back pain  Workup Completed:  Please see previous provider's note for details  ED Course / Workup Pending (followup):                                  ED Course as of 04/01/22 0033   Thu Mar 31, 2022   1209 Patient received in sign-out for follow-up of CT results which I reviewed at this time:    IMPRESSION:     Chest:  No PE identified      New solitary subcentimeter right middle lobe pulmonary nodule compared to 2017  Given the history of malignancy, follow-up CT is recommended in 3-6 months      Abdomen and pelvis:  No definite acute finding  Mildly thickened appendix without secondary signs of appendicitis  Recommend clinical correlation with clinical exam and laboratory values      Otherwise no acute change compared to prior study from approximately 2 weeks ago  1900 CT thoracic spine results reviewed:    FINDINGS:     ALIGNMENT: Preserved alignment      VERTEBRAL BODIES:   Similar, mild, anterior wedge compression fractures at T7 and T8  No retropulsion or suspicious lytic or blastic bone lesion      No acute fracture or new bone lesion      DEGENERATIVE CHANGES: Similar mild loss of midthoracic disc height with vacuum disc from T8-T9 through T10-T11  No evidence of new large disc herniation or high-grade stenosis      PREVERTEBRAL AND PARASPINAL SOFT TISSUES:  No acute findings  Please see today's CT chest, abdomen and pelvis report        IMPRESSION:     No acute change compared to 3/15/2022    1904 Patient informed about CT findings including lung nodule, appendiceal thickening, degenerative changes in spine, patient examined, no RLQ tenderness  Advised oral hydration for ketones in urine  Stable for d/c, follow up with PCP       Procedures  MDM        Disposition  Final diagnoses:   Back pain   Pulmonary nodule     Time reflects when diagnosis was documented in both MDM as applicable and the Disposition within this note     Time User Action Codes Description Comment    3/31/2022  7:13 PM Jason Langston, [address] [M54 9] Back pain     3/31/2022  7:14 PM Daniel Jacobs [R91 1] Pulmonary nodule       ED Disposition     ED Disposition Condition Date/Time Comment    Discharge Stable Thu Mar 31, 2022  7:13 PM Michael Newsome discharge to home/self care  Follow-up Information     Follow up With Specialties Details Why Contact Info Additional Information    Sheldon Curran DO Family Medicine Schedule an appointment as soon as possible for a visit   [address]  [address] [facility] Emergency Department Emergency Medicine  If symptoms worsen [facility] [id number]  [address] [place] [address] Emergency Department, [address] [place] [address] , [address] [place] [address], [address] [place] [address] SEE YOUR DOCTOR FOR LUNG NODULE FOLLOW UP           Discharge Medication List as of 3/31/2022  7:15 PM      CONTINUE these medications which have NOT CHANGED    Details   aspirin (ASPIRIN 81) 81 mg EC tablet aspirin 81 mg tablet,delayed release, Historical Med      B Complex Vitamins (B COMPLEX 1 PO) Take 1 tablet by mouth, Historical Med      Blood Glucose Monitoring Suppl (ONE TOUCH ULTRA 2) w/Device KIT Test Blood Sugar Once Daily, Normal      Calcium Carb-Cholecalciferol (Oyster Shell Calcium w/D) 500-200 MG-UNIT TABS No Take by mouth 2 (two) times a day with meals, Starting Sat 3/5/2022, Historical Med      calcium carbonate-vitamin D (OSCAL-D) 500 mg-200 units per tablet Take 1 tablet by mouth 2 (two) times a day with meals, Starting Fri 2/11/2022, Normal      clopidogrel (PLAVIX) 75 mg tablet Take 75 mg by mouth daily, Starting Tue 10/23/2018, Historical Med      docusate sodium (COLACE) 100 mg capsule Take 1 capsule (100 mg total) by mouth daily at bedtime, Starting Wed 8/18/2021, Normal      !! DULoxetine (CYMBALTA) 30 mg delayed release capsule TAKE 1 CAP BY MOUTH DAILY IN THE AM, Normal      !! DULoxetine (CYMBALTA) 60 mg delayed release capsule TAKE 1 CAP DAILY BY MOUTH IN THE EVENING, Normal      !! Evolocumab 140 MG/ML SOAJ Inject 2 mL under the skin, Starting Thu 10/25/2018, Historical Med      famotidine (PEPCID) 40 MG tablet Take 1 tablet (40 mg total) by mouth daily, Starting Wed 9/1/2021, Normal      gemfibrozil (LOPID) 600 mg tablet Take 600 mg by mouth 2 (two) times a day, Starting Thu 10/18/2018, Historical Med      glucose blood (OneTouch Ultra) test strip Test Blood Sugar Once Daily, Normal      !! Lancets (onetouch ultrasoft) lancets Patient to test once daily, Normal      !! Lancets (onetouch ultrasoft) lancets Test Blood Sugar Once Daily, Normal      !! levothyroxine 88 mcg tablet Take 1 tablet (88 mcg total) by mouth daily, Starting Thu 2/17/2022, Normal      !! levothyroxine 88 mcg tablet Take 1 tablet (88 mcg total) by mouth daily, Starting Thu 2/17/2022, Normal      LORazepam (ATIVAN) 1 mg tablet Take 1 tablet (1 mg total) by mouth 2 (two) times a day, Starting Thu 2/24/2022, Normal      lubiprostone (AMITIZA) 24 mcg capsule Take 1 capsule (24 mcg total) by mouth 2 (two) times a day with meals, Starting Mon 3/14/2022, Normal      melatonin 3 mg Take 20 mg by mouth , Historical Med      metFORMIN (GLUCOPHAGE) 500 mg tablet TAKE 1 TABLET BY MOUTH TWICE A DAY WITH MEALS, Normal      metoprolol tartrate (LOPRESSOR) 50 mg tablet Take 1 tablet (50 mg total) by mouth 2 (two) times a day, Starting Tue 3/15/2022, Normal      Misc   Devices (GETGO ROLLING WALKER) MISC Use rolling walker as needed, Print      morphine (MS CONTIN) 15 mg 12 hr tablet Starting Thu 9/16/2021, Historical Med      pramipexole (MIRAPEX) 0 5 mg tablet TAKE 1 TABLET BY MOUTH IN THE EVENING AND 1 TABLET AT BEDTIME FOR 7 DAYS , THEN 1 TAB 3 TIMES A DAY, Normal      Prucalopride Succinate 2 MG TABS Take 2 mg by mouth daily, Starting Mon 10/18/2021, Normal      !! pyridoxine (VITAMIN B6) 100 mg tablet Take 1 tablet (100 mg total) by mouth every morning, Starting Thu 2/17/2022, Normal      !! pyridoxine (VITAMIN B6) 100 mg tablet Take 1 tablet (100 mg total) by mouth every morning, Starting Thu 2/17/2022, Normal      !! Repatha SureClick 754 MG/ML SOAJ Starting Thu 3/25/2021, Historical Med      sucralfate (CARAFATE) 1 g tablet Take 1 tablet (1 g total) by mouth 4 (four) times a day Dissolved pill in 10 mL water then swallow, Starting Tue 10/12/2021, Until Thu 3/31/2022, Normal       !! - Potential duplicate medications found  Please discuss with provider  No discharge procedures on file         ED Provider  Electronically Signed by     Delmi Calhoun MD  04/01/22 2540

## 2022-03-31 NOTE — TELEPHONE ENCOUNTER
Reminder appt call     Patient is scheduled on 4/4/2022 @ 830 am with an arrival time  815 am in the [place] location with Dr Robert Strauss  Patient confirmed appt

## 2022-03-31 NOTE — ED PROVIDER NOTES
Pt Name: Yari Yen  MRN: [id number]  [place] 1955  Age/Sex: 73 y o  female  Date of evaluation: 3/31/2022  PCP: Adam Richards, [address] [place] [address]    Chief Complaint   Patient presents with    Back Pain     patient had a fall last week and was examined  patient states back pain worsening  no appointment available at pcp         HPI    Jose [place] presents to the Emergency Department complaining of back pain after a fall  She has a walker but doesn't use it at home  She had a recent fall and was seen here for evaluation  She wasn't found to have any injuries but at home, she fell again  She has been taking morphine at home for pain  HPI      Past Medical and Surgical History    Past Medical History:   Diagnosis Date    Anxiety     Breast cancer ([facility] )     CAD (coronary artery disease)     Cancer ([facility] )     Carpal tunnel syndrome     Disease of thyroid gland     Elevated cholesterol     Fibromyalgia     Hypertension     Kidney stone     Melanoma ([facility] )     nose    Myocardial infarction (HCC)     Neuropathy     BRAYDEN (obstructive sleep apnea)     cpap    Panic attack        Past Surgical History:   Procedure Laterality Date    BREAST SURGERY Bilateral     CARDIAC SURGERY      cardiac ablation     SECTION      x3    CHOLECYSTECTOMY      CORONARY ANGIOPLASTY WITH STENT PLACEMENT      MASTECTOMY Bilateral     NOSE SURGERY         Family History   Problem Relation Age of Onset    Heart attack Mother     Alcohol abuse Mother     Heart failure Mother     Cancer Mother     Cancer Paternal Grandfather        Social History     Tobacco Use    Smoking status: Never Smoker    Smokeless tobacco: Never Used   Vaping Use    Vaping Use: Never used   Substance Use Topics    Alcohol use: Not Currently     Comment: beer    Drug use: Not Currently     Types: Marijuana     Comment: Medical marjuiana               Allergies    Allergies   Allergen Reactions    Metoclopramide Other (See Comments)    Nitrofurantoin Other (See Comments)     Very weak  Fell    Isosorbide      Other reaction(s): headache    Pregabalin      Pt states made her feel suicidal     Reglan [Metoclopramide] Other (See Comments)     Flares her neuropathy    Statins Myalgia       Home Medications    Prior to Admission medications    Medication Sig Start Date End Date Taking?  Authorizing Provider   aspirin (ASPIRIN 81) 81 mg EC tablet aspirin 81 mg tablet,delayed release    Historical Provider, MD   B Complex Vitamins (B COMPLEX 1 PO) Take 1 tablet by mouth    Historical Provider, MD   Blood Glucose Monitoring Suppl (ONE TOUCH ULTRA 2) w/Device KIT Test Blood Sugar Once Daily 11/30/21   [place], DO   Calcium Carb-Cholecalciferol (Oyster Shell Calcium w/D) 500-200 MG-UNIT TABS No Take by mouth 2 (two) times a day with meals 3/5/22   Historical Provider, MD   calcium carbonate-vitamin D (OSCAL-D) 500 mg-200 units per tablet Take 1 tablet by mouth 2 (two) times a day with meals 2/11/22   [place], DO   clopidogrel (PLAVIX) 75 mg tablet Take 75 mg by mouth daily 10/23/18   Historical Provider, MD   docusate sodium (COLACE) 100 mg capsule Take 1 capsule (100 mg total) by mouth daily at bedtime 8/18/21   Marcus Leroy MD   DULoxetine (CYMBALTA) 30 mg delayed release capsule TAKE 1 CAP BY MOUTH DAILY IN THE AM 3/30/22   NICOLE Dorsey   DULoxetine (CYMBALTA) 60 mg delayed release capsule TAKE 1 CAP DAILY BY MOUTH IN THE EVENING 3/30/22   NICOLE Dorsey   Evolocumab 140 MG/ML SOAJ Inject 2 mL under the skin 10/25/18   Historical Provider, MD   famotidine (PEPCID) 40 MG tablet Take 1 tablet (40 mg total) by mouth daily 9/1/21   NICOLE Leyva   gemfibrozil (LOPID) 600 mg tablet Take 600 mg by mouth 2 (two) times a day 10/18/18   Historical Provider, MD   glucose blood (OneTouch Ultra) test strip Test Blood Sugar Once Daily 11/30/21   [facility]   Lancets (onetouch ultrasoft) lancets Patient to test once daily 3/12/21   [facility], DO   Lancets (onetouch ultrasoft) lancets Test Blood Sugar Once Daily 11/30/21   [place], DO   levothyroxine 88 mcg tablet Take 1 tablet (88 mcg total) by mouth daily 2/17/22   [place], DO   levothyroxine 88 mcg tablet Take 1 tablet (88 mcg total) by mouth daily 2/17/22   [facility], DO   LORazepam (ATIVAN) 1 mg tablet Take 1 tablet (1 mg total) by mouth 2 (two) times a day 2/24/22   [facility] [place], DO   lubiprostone (AMITIZA) 24 mcg capsule Take 1 capsule (24 mcg total) by mouth 2 (two) times a day with meals 3/14/22   NICOLE Siegel   melatonin 3 mg Take 20 mg by mouth     Historical Provider, MD   metFORMIN (GLUCOPHAGE) 500 mg tablet TAKE 1 TABLET BY MOUTH TWICE A DAY WITH MEALS 3/30/22   William Howard,    metoprolol tartrate (LOPRESSOR) 50 mg tablet Take 1 tablet (50 mg total) by mouth 2 (two) times a day 3/15/22   [facility] [place], DO   Misc   Devices ([facility]) MISC Use rolling walker as needed 3/18/19   [facility] [place], DO   morphine (MS CONTIN) 15 mg 12 hr tablet  9/16/21   Historical Provider, MD   pramipexole (MIRAPEX) 0 5 mg tablet TAKE 1 TABLET BY MOUTH IN THE EVENING AND 1 TABLET AT BEDTIME FOR 7 DAYS , THEN 1 TAB 3 TIMES A DAY 3/9/22   Elzbieta Pardo MD   Prucalopride Succinate 2 MG TABS Take 2 mg by mouth daily 10/18/21   [employer] Resources[employer]ICOLE   pyridoxine (VITAMIN B6) 100 mg tablet Take 1 tablet (100 mg total) by mouth every morning 2/17/22   [facility], DO   pyridoxine (VITAMIN B6) 100 mg tablet Take 1 tablet (100 mg total) by mouth every morning  Patient not taking: Reported on 3/24/2022  2/17/22   [facility] DO   Repatha SureClick 119 MG/ML SOAJ  3/25/21   Historical Provider, MD   sucralfate (CARAFATE) 1 g tablet Take 1 tablet (1 g total) by mouth 4 (four) times a day Dissolved pill in 10 mL water then swallow 10/12/21 3/24/22  NICOLE Rico           Review of Systems    Review of Systems   Constitutional: Negative for chills and fever  HENT: Negative for ear pain and sore throat  Eyes: Negative for pain and visual disturbance  Respiratory: Negative for cough and shortness of breath  Cardiovascular: Negative for chest pain and palpitations  Gastrointestinal: Negative for abdominal pain and vomiting  Genitourinary: Negative for dysuria and hematuria  Musculoskeletal: Positive for back pain and gait problem  Negative for arthralgias  Skin: Negative for color change and rash  Neurological: Negative for seizures and syncope  All other systems reviewed and are negative  Physical Exam      ED Triage Vitals   Temperature Pulse Respirations Blood Pressure SpO2   03/31/22 1423 03/31/22 1423 03/31/22 1423 03/31/22 1423 03/31/22 1423   98 2 °F (36 8 °C) 95 16 146/81 99 %      Temp Source Heart Rate Source Patient Position - Orthostatic VS BP Location FiO2 (%)   03/31/22 1423 03/31/22 1423 03/31/22 1631 03/31/22 1631 --   Oral Monitor Lying Left arm       Pain Score       03/31/22 1523       8               Physical Exam  Vitals and nursing note reviewed  Constitutional:       General: She is not in acute distress  Appearance: She is well-developed  HENT:      Head: Normocephalic and atraumatic  Eyes:      Conjunctiva/sclera: Conjunctivae normal    Cardiovascular:      Rate and Rhythm: Normal rate and regular rhythm  Heart sounds: No murmur heard  Pulmonary:      Effort: Pulmonary effort is normal  No respiratory distress  Breath sounds: Normal breath sounds  Abdominal:      Palpations: Abdomen is soft  Tenderness: There is no abdominal tenderness  Musculoskeletal:      Cervical back: Normal and neck supple  Thoracic back: Tenderness present  Lumbar back: Tenderness present  Back:    Skin:     General: Skin is warm and dry  Neurological:      Mental Status: She is alert                  Assessment and Maulik Hatch is a 73 y o  female who presents with back pain and frequent falls at home  Physical examination remarkable for tenderness but there is no external evidence of trauma    Differential diagnosis (not completely inclusive) includes fracture  Plan will be to perform diagnostic testing and treat symptomatically        MDM    Diagnostic Results      Labs:    Results for orders placed or performed during the hospital encounter of 03/31/22   CBC and differential   Result Value Ref Range    WBC 10 06 4 31 - 10 16 Thousand/uL    RBC 4 73 3 81 - 5 12 Million/uL    Hemoglobin 14 5 11 5 - 15 4 g/dL    Hematocrit 44 0 34 8 - 46 1 %    MCV 93 82 - 98 fL    MCH 30 7 26 8 - 34 3 pg    MCHC 33 0 31 4 - 37 4 g/dL    RDW 13 1 11 6 - 15 1 %    MPV 8 8 (L) 8 9 - 12 7 fL    Platelets 023 (H) 164 - 390 Thousands/uL    nRBC 0 /100 WBCs    Neutrophils Relative 77 (H) 43 - 75 %    Immat GRANS % 1 0 - 2 %    Lymphocytes Relative 16 14 - 44 %    Monocytes Relative 4 4 - 12 %    Eosinophils Relative 1 0 - 6 %    Basophils Relative 1 0 - 1 %    Neutrophils Absolute 7 93 (H) 1 85 - 7 62 Thousands/µL    Immature Grans Absolute 0 07 0 00 - 0 20 Thousand/uL    Lymphocytes Absolute 1 56 0 60 - 4 47 Thousands/µL    Monocytes Absolute 0 40 0 17 - 1 22 Thousand/µL    Eosinophils Absolute 0 05 0 00 - 0 61 Thousand/µL    Basophils Absolute 0 05 0 00 - 0 10 Thousands/µL   Comprehensive metabolic panel   Result Value Ref Range    Sodium 141 136 - 145 mmol/L    Potassium 4 5 3 5 - 5 3 mmol/L    Chloride 103 100 - 108 mmol/L    CO2 28 21 - 32 mmol/L    ANION GAP 10 4 - 13 mmol/L    BUN 20 5 - 25 mg/dL    Creatinine 1 05 0 60 - 1 30 mg/dL    Glucose 157 (H) 65 - 140 mg/dL    Calcium 9 6 8 3 - 10 1 mg/dL    AST 18 5 - 45 U/L    ALT 34 12 - 78 U/L    Alkaline Phosphatase 72 46 - 116 U/L    Total Protein 8 4 (H) 6 4 - 8 2 g/dL    Albumin 3 6 3 5 - 5 0 g/dL    Total Bilirubin 0 33 0 20 - 1 00 mg/dL    eGFR 55 ml/min/1 73sq m   UA w Reflex to Microscopic w Reflex to Culture    Specimen: Urine, Clean Catch   Result Value Ref Range    Color, UA Yellow     Clarity, UA Clear     Specific Gravity, UA 1 025 1 003 - 1 030    pH, UA 6 0 4 5, 5 0, 5 5, 6 0, 6 5, 7 0, 7 5, 8 0    Leukocytes, UA Negative Negative    Nitrite, UA Negative Negative    Protein, UA Negative Negative mg/dl    Glucose, UA Negative Negative mg/dl    Ketones, UA 15 (1+) (A) Negative mg/dl    Urobilinogen, UA 0 2 0 2, 1 0 E U /dl E U /dl    Bilirubin, UA Negative Negative    Blood, UA Negative Negative       All labs reviewed and utilized in the medical decision making process    Radiology:    PE Study with CT Abdomen and Pelvis with contrast   Final Result      Chest:   No PE identified  New solitary subcentimeter right middle lobe pulmonary nodule compared to 2017  Given the history of malignancy, follow-up CT is recommended in 3-6 months  Abdomen and pelvis:   No definite acute finding  Mildly thickened appendix without secondary signs of appendicitis  Recommend clinical correlation with clinical exam and laboratory values  Otherwise no acute change compared to prior study from approximately 2 weeks ago  Workstation performed: [id number]         CT recon only thoracolumbar   Final Result      No acute change compared to 3/15/2022  Similar, nonemergent findings above              Workstation performed: [id number]             All radiology studies independently viewed by me and interpreted by the radiologist     Procedure    Procedures      ED Course of Care and Re-Assessments        Medications   morphine (PF) 4 mg/mL injection 4 mg (4 mg Intravenous Given 3/31/22 1523)   iohexol (OMNIPAQUE) 350 MG/ML injection (SINGLE-DOSE) 100 mL (100 mL Intravenous Given 3/31/22 1646)           FINAL IMPRESSION    Final diagnoses:   Back pain   Pulmonary nodule         DISPOSITION/PLAN      Time reflects when diagnosis was documented in both MDM as applicable and the Disposition within this note     Time User Action Codes Description Comment    3/31/2022  7:13 PM Arias Guillaume Add [M54 9] Back pain     3/31/2022  7:14 PM Arias Guillaume Add [R91 1] Pulmonary nodule       ED Disposition     ED Disposition Condition Date/Time Comment    Discharge Stable Thu Mar 31, 2022  7:13 PM Ibeth Bhakta discharge to home/self care  Follow-up Information     Follow up With Specialties Details Why Contact Info Additional Information    Pamela Almeida DO Family Medicine Schedule an appointment as soon as possible for a visit   [address]  [address] [facility] Emergency Medicine  If symptoms worsen [facility] [id number]  [address] [facility] [place] Emergency Department, [address] [place] [address], [address] [place] [address] SEE YOUR DOCTOR FOR LUNG NODULE FOLLOW UP  PATIENT REFERRED TO:    Pamela Almeida DO  [address]  [address] [facility]  [phone]    Schedule an appointment as soon as possible for a visit       [address] [place] [address] Emergency Department  [facility] [id number] [phone]    If symptoms worsen      PLEASE SEE YOUR DOCTOR FOR LUNG NODULE FOLLOW UP           DISCHARGE MEDICATIONS:    Discharge Medication List as of 3/31/2022  7:15 PM      CONTINUE these medications which have NOT CHANGED    Details   aspirin (ASPIRIN 81) 81 mg EC tablet aspirin 81 mg tablet,delayed release, Historical Med      B Complex Vitamins (B COMPLEX 1 PO) Take 1 tablet by mouth, Historical Med      Blood Glucose Monitoring Suppl (ONE TOUCH ULTRA 2) w/Device KIT Test Blood Sugar Once Daily, Normal      Calcium Carb-Cholecalciferol (Oyster Shell Calcium w/D) 500-200 MG-UNIT TABS No Take by mouth 2 (two) times a day with meals, Starting Sat 3/5/2022, Historical Med      calcium carbonate-vitamin D (OSCAL-D) 500 mg-200 units per tablet Take 1 tablet by mouth 2 (two) times a day with meals, Starting Fri 2/11/2022, Normal      clopidogrel (PLAVIX) 75 mg tablet Take 75 mg by mouth daily, Starting Tue 10/23/2018, Historical Med      docusate sodium (COLACE) 100 mg capsule Take 1 capsule (100 mg total) by mouth daily at bedtime, Starting Wed 8/18/2021, Normal      !! DULoxetine (CYMBALTA) 30 mg delayed release capsule TAKE 1 CAP BY MOUTH DAILY IN THE AM, Normal      !! DULoxetine (CYMBALTA) 60 mg delayed release capsule TAKE 1 CAP DAILY BY MOUTH IN THE EVENING, Normal      !! Evolocumab 140 MG/ML SOAJ Inject 2 mL under the skin, Starting Thu 10/25/2018, Historical Med      famotidine (PEPCID) 40 MG tablet Take 1 tablet (40 mg total) by mouth daily, Starting Wed 9/1/2021, Normal      gemfibrozil (LOPID) 600 mg tablet Take 600 mg by mouth 2 (two) times a day, Starting Thu 10/18/2018, Historical Med      glucose blood (OneTouch Ultra) test strip Test Blood Sugar Once Daily, Normal      !! Lancets (onetouch ultrasoft) lancets Patient to test once daily, Normal      !!  Lancets (onetouch ultrasoft) lancets Test Blood Sugar Once Daily, Normal      !! levothyroxine 88 mcg tablet Take 1 tablet (88 mcg total) by mouth daily, Starting Thu 2/17/2022, Normal      !! levothyroxine 88 mcg tablet Take 1 tablet (88 mcg total) by mouth daily, Starting Thu 2/17/2022, Normal      LORazepam (ATIVAN) 1 mg tablet Take 1 tablet (1 mg total) by mouth 2 (two) times a day, Starting Thu 2/24/2022, Normal      lubiprostone (AMITIZA) 24 mcg capsule Take 1 capsule (24 mcg total) by mouth 2 (two) times a day with meals, Starting Mon 3/14/2022, Normal      melatonin 3 mg Take 20 mg by mouth , Historical Med      metFORMIN (GLUCOPHAGE) 500 mg tablet TAKE 1 TABLET BY MOUTH TWICE A DAY WITH MEALS, Normal      metoprolol tartrate (LOPRESSOR) 50 mg tablet Take 1 tablet (50 mg total) by mouth 2 (two) times a day, Starting Tue 3/15/2022, Normal Misc  Devices ([facility]) MISC Use rolling walker as needed, Print      morphine (MS CONTIN) 15 mg 12 hr tablet Starting Thu 9/16/2021, Historical Med      pramipexole (MIRAPEX) 0 5 mg tablet TAKE 1 TABLET BY MOUTH IN THE EVENING AND 1 TABLET AT BEDTIME FOR 7 DAYS , THEN 1 TAB 3 TIMES A DAY, Normal      Prucalopride Succinate 2 MG TABS Take 2 mg by mouth daily, Starting Mon 10/18/2021, Normal      !! pyridoxine (VITAMIN B6) 100 mg tablet Take 1 tablet (100 mg total) by mouth every morning, Starting Thu 2/17/2022, Normal      !! pyridoxine (VITAMIN B6) 100 mg tablet Take 1 tablet (100 mg total) by mouth every morning, Starting Thu 2/17/2022, Normal      !! Repatha SureClick 566 MG/ML SOAJ Starting Thu 3/25/2021, Historical Med      sucralfate (CARAFATE) 1 g tablet Take 1 tablet (1 g total) by mouth 4 (four) times a day Dissolved pill in 10 mL water then swallow, Starting Tue 10/12/2021, Until Thu 3/31/2022, Normal       !! - Potential duplicate medications found  Please discuss with provider  No discharge procedures on file           Eliana Alvarez, [facility], DO  04/01/22 2049

## 2022-03-31 NOTE — ED NOTES
Discharge instructions reviewed with patient  Patient verbalized understanding with no further questions at this time       Jaquelin Garland RN  03/31/22 1924

## 2022-03-31 NOTE — CASE MANAGEMENT
Case Management ED Progress Note    Patient name Cathie Campos  Location ED 10/ED 10 MRN [id number]  : 1955 Date 3/31/2022        OBJECTIVE:  Predictive Model Details         91% Factor Value    Risk of Hospital Admission or ED Visit Model Number of ED Visits 5+     Has Medicaid Yes     Is in Relationship No     Has COPD Yes     Has Anemia Yes     Has Medicare Yes     Has Depression Yes     Has Diabetes Yes     Has Chronic Liver Disease Yes     Has PCP Yes            Chief Complaint: Back pain   Patient Class: Emergency  Preferred Pharmacy:   [address], [address] [facility] [address] [id number]  [address]  Phone: [phone] Fax: [phone]    Primary Care Provider: Adam Richards DO    Primary Insurance: MEDICARE  Secondary Insurance: [address] [place] [address]    ED Progress Note:    Met patient at bedside  Patient was alert and oriented  Patient complains of back pain, self referred  Patient lives with her significant other in a 2 story residence with 4 steps to reach main entrance, bedroom and bathroom located on the second floor with 8 steps to reach  Patient follows with [facility] Neurology and PCP, onco;ogy with [facility]  Patient uses [facility] on [address] [place], [place]  Patient is independent with all ADL'S, has a SPC and a walking stick  Patient denies any use of illicit drugs/alcohol  Patient denies any psychiatric diagnosis  Case consulted with attending, CT Scan is pending and too premature for a discharge disposition  CM/SW to follow

## 2022-04-04 ENCOUNTER — OFFICE VISIT (OUTPATIENT)
Dept: NEUROLOGY | Facility: CLINIC | Age: 67
End: 2022-04-04
Payer: MEDICARE

## 2022-04-04 VITALS
WEIGHT: 204 LBS | TEMPERATURE: 94.5 F | BODY MASS INDEX: 32.02 KG/M2 | HEART RATE: 102 BPM | HEIGHT: 67 IN | DIASTOLIC BLOOD PRESSURE: 88 MMHG | SYSTOLIC BLOOD PRESSURE: 134 MMHG

## 2022-04-04 DIAGNOSIS — G56.03 BILATERAL CARPAL TUNNEL SYNDROME: ICD-10-CM

## 2022-04-04 DIAGNOSIS — C78.00 MALIGNANT NEOPLASM METASTATIC TO LUNG, UNSPECIFIED LATERALITY (HCC): ICD-10-CM

## 2022-04-04 DIAGNOSIS — R26.89 BALANCE DISORDER: ICD-10-CM

## 2022-04-04 DIAGNOSIS — G62.0 DRUG-INDUCED PERIPHERAL NEUROPATHY (HCC): Primary | ICD-10-CM

## 2022-04-04 DIAGNOSIS — C79.51 BONE METASTASES (HCC): ICD-10-CM

## 2022-04-04 DIAGNOSIS — F41.1 GENERALIZED ANXIETY DISORDER: ICD-10-CM

## 2022-04-04 DIAGNOSIS — E87.8 DISEQUILIBRIUM SYNDROME: ICD-10-CM

## 2022-04-04 DIAGNOSIS — R26.2 AMBULATORY DYSFUNCTION: ICD-10-CM

## 2022-04-04 DIAGNOSIS — C50.919 MALIGNANT NEOPLASM OF FEMALE BREAST, UNSPECIFIED ESTROGEN RECEPTOR STATUS, UNSPECIFIED LATERALITY, UNSPECIFIED SITE OF BREAST (HCC): ICD-10-CM

## 2022-04-04 PROCEDURE — 99215 OFFICE O/P EST HI 40 MIN: CPT | Performed by: PSYCHIATRY & NEUROLOGY

## 2022-04-04 RX ORDER — PERPHENAZINE 16 MG
TABLET ORAL 2 TIMES DAILY
COMMUNITY

## 2022-04-04 RX ORDER — METHOCARBAMOL 750 MG/1
750 TABLET, FILM COATED ORAL EVERY 8 HOURS SCHEDULED
Qty: 90 TABLET | Refills: 5 | Status: SHIPPED | OUTPATIENT
Start: 2022-04-04 | End: 2022-05-05 | Stop reason: SDUPTHER

## 2022-04-04 NOTE — PROGRESS NOTES
[facility] MULTIPLE SCLEROSIS CENTER  PATIENT:  Pancho Harding  MRN:  [id number]  :  1955  DATE OF SERVICE:  2022    Assessment/Plan:           Problem List Items Addressed This Visit        Respiratory    Lung metastasis ([facility] )       Nervous and Auditory    Bilateral carpal tunnel syndrome    Disequilibrium syndrome    Relevant Medications    methocarbamol (ROBAXIN) 750 mg tablet    Drug-induced peripheral neuropathy (HCC) - Primary       Musculoskeletal and Integument    Bone metastases ([facility] )       Other    Breast cancer ([facility] )    Balance disorder    Relevant Orders    Ambulatory Referral to Physical Therapy    Ambulatory dysfunction    Relevant Medications    methocarbamol (ROBAXIN) 750 mg tablet    Other Relevant Orders    Ambulatory Referral to Physical Therapy         Mrs Dangelo Mendoza has presented the [facility] multiple sclerosis Center for follow-up on facial spasm and balance disorder  Patient describes several falls recently requiring several relation in the emergency department, no new focal neurological dysfunction has been reported  - ER evaluation was consistent with CT scan of the brain with normal intracranial pathology has been description, patient is to continue clopidogrel 75 mg daily  Brain imaging completed in 2021 suggested patient has no brain metastasis with exception of skull bone metastasis  - CT scan of cervical origin was completed and patient also have degenerative changes at the T8-T9 through T10-T11, no acute changes compared to similar imaging week prior;  - patient was advised to establish her care with [facility] to work on balance and generalized body stenting  Patient was also offered physical therapy/aquatic therapy;  - patient is to discontinue B6 due to neurotoxic properties;  - patient will increase her dose of medical to 750 milligrams 3 times daily;     Patient ambulates without assistive devices, no focal weakness has been noted  Patient is to follow with [facility] within 6 months    Subjective: Mrs Baldemar Miller presented for follow-up on sensory dysfunction in her lower extremities as well as facial spasms  Today's complaints are: dizziness, weakness all over body, headaches and fall on 03/22/2022  HPI  Mrs Rubens Orellana has presented for follow-up with on facial spasm and balance disorder;   Patient was diagnosed with 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, with bone metastasis, patient has completed radiation therapy provided by [facility] radiation oncology team, with worsening ambulatory dysfunction has been reported  Today's complaints are: dizziness, weakness all over body, headaches and fall on 03/22/2022  Patient described having muscle spasm has been progressively getting worse as dose of methocarbamol was decreased to 500 mg twice daily  Patient stated she fell couple times, her balance has been significantly deteorated and she is planning to establish her care with [facility] team     CTS bilaterally interferes using walker with severe burning sensation precludes from walking, with right hip metastasis;         Patient initially developed right-sided facial myokymia, with no concern for blepharospasm, patient has complete resolution of her symptoms during follow-up evaluation  Sensory dysfunction lower extremity has progressively get worse with burning his in her feet described, Cymbalta 30 mg in the morning 60 mg at night provides reasonable benefits, patient also takes pramipexole 0 5 mg for presumable restless leg syndrome        Patient is to continue following with oncology team Dr Paco Hernandez for lung nodule/ metastasis, with malignant neoplasm of right breast     patient is following with Pain Management for lower back issues,  No significant changes has been reported     MRI brain and orbits in May 2020: No evidence of recent infarction, hemorrhage, or mass  Chronic microvascular ischemic changes  MRI brain 9/2021: White matter changes suggestive of chronic microangiopathy  No acute intracranial pathology     15 mm focus of marrow signal abnormality in the right frontal bone  Light of the history of bone metastasis elsewhere, a calvarial metastasis should be considered  The following portions of the patient's history were reviewed and updated as appropriate:   She  has a past medical history of Anxiety, Breast cancer ([address] ), CAD (coronary artery disease), Cancer ([address] ), Carpal tunnel syndrome, Disease of thyroid gland, Elevated cholesterol, Fibromyalgia, Hypertension, Kidney stone, Melanoma ([address] ), Myocardial infarction ([address] ), Neuropathy, BRAYDEN (obstructive sleep apnea), and Panic attack    She   Patient Active Problem List    Diagnosis Date Noted    Kidney stones 03/08/2022    Tension type headache 12/07/2021    Urinary frequency 12/07/2021    Balance disorder 08/17/2021    Type 2 diabetes mellitus ([address] ) 08/03/2021    Iron deficiency anemia 06/01/2021    Colon polyps 04/08/2021    Gastroparesis 04/08/2021    GERD (gastroesophageal reflux disease) 04/08/2021    Constipation 04/08/2021    Bloating 04/08/2021    Lactose intolerance 04/08/2021    Gastric polyps 04/08/2021    Gastritis 04/07/2021    Colon cancer screening 04/07/2021    Primary osteoarthritis of both knees 03/26/2021    Shortness of breath 03/12/2021    Thrush 03/12/2021    Bone metastases ([facility] ) 03/12/2021    Drug-induced peripheral neuropathy ([facility] ) 03/12/2021    JESUS (acute kidney injury) ([facility] ) 03/12/2021    Panlobular emphysema ([address] ) 03/12/2021    Obesity, morbid ([address] ) 03/12/2021    Myokymia of right side of face 02/01/2021    Peripheral polyneuropathy 02/01/2021    History of coronary artery stent placement 01/26/2021    Hyperglycemia 01/08/2021    Restless leg syndrome 12/30/2020    Trochanteric bursitis of right hip 10/26/2020    Sacroiliitis ([address] ) 10/26/2020    Malignant neoplasm of lower-outer quadrant of unspecified female breast ([facility] ) 06/10/2020    [facility] 06/10/2020    Long term (current) use of anticoagulants 06/10/2020    Drug-induced polyneuropathy ([facility] ) 06/10/2020    Basal cell carcinoma of skin of nose 06/10/2020    Ambulatory dysfunction 06/10/2020    Syncope 05/15/2020    Lung nodule 05/10/2020    Lower urinary tract infection 2020    Breast cancer [facility] 2020    Panic attack 2020    Divergence insufficiency 2020    Cerebrovascular small vessel disease with hemorrhage ([facility] ) 2020    Disequilibrium syndrome 2020    Double vision 2020    Right upper quadrant abdominal pain 2019    Fatty liver 2019    Medicare annual wellness visit, subsequent 2019    Hypothyroidism 2019    Chest wall pain 2019    Coronary artery disease with angina pectoris ([facility] ) 2019    Low back pain at multiple sites 2019    Atherosclerotic heart disease of native coronary artery without angina pectoris 2018    Disorder of rotator cuff 02/10/2017    DDD (degenerative disc disease), cervical 02/10/2017    Depressive disorder 2016    Essential hypertension 2016    Fibromyalgia 2016    Generalized anxiety disorder 2016    History of breast cancer 2016    Mixed hyperlipidemia 2016    BRAYDEN (obstructive sleep apnea) 2016    Bilateral carpal tunnel syndrome 2016     She  has a past surgical history that includes Mastectomy (Bilateral); Breast surgery (Bilateral); Coronary angioplasty with stent; Cholecystectomy;  section; Nose surgery; and Cardiac surgery  Her family history includes Alcohol abuse in her mother; Cancer in her mother and paternal grandfather; Heart attack in her mother; Heart failure in her mother  She  reports that she has never smoked   She has never used smokeless tobacco  She reports previous alcohol use  She reports previous drug use  Drug: Marijuana    Current Outpatient Medications   Medication Sig Dispense Refill    Alpha-Lipoic Acid 600 MG CAPS Take by mouth 2 (two) times a day      aspirin (ASPIRIN 81) 81 mg EC tablet aspirin 81 mg tablet,delayed release      B Complex Vitamins (B COMPLEX 1 PO) Take 1 tablet by mouth      Blood Glucose Monitoring Suppl (ONE TOUCH ULTRA 2) w/Device KIT Test Blood Sugar Once Daily 1 kit 0    Calcium Carb-Cholecalciferol (Oyster Shell Calcium w/D) 500-200 MG-UNIT TABS No Take by mouth 2 (two) times a day with meals      calcium carbonate-vitamin D (OSCAL-D) 500 mg-200 units per tablet Take 1 tablet by mouth 2 (two) times a day with meals 180 tablet 0    clopidogrel (PLAVIX) 75 mg tablet Take 75 mg by mouth daily  3    diphenhydrAMINE-APAP, sleep, (TYLENOL PM EXTRA STRENGTH PO) Take by mouth 2 tablets hs      docusate sodium (COLACE) 100 mg capsule Take 1 capsule (100 mg total) by mouth daily at bedtime 180 capsule 3    DULoxetine (CYMBALTA) 30 mg delayed release capsule TAKE 1 CAP BY MOUTH DAILY IN THE AM 90 capsule 1    DULoxetine (CYMBALTA) 60 mg delayed release capsule TAKE 1 CAP DAILY BY MOUTH IN THE EVENING 90 capsule 1    famotidine (PEPCID) 40 MG tablet Take 1 tablet (40 mg total) by mouth daily 90 tablet 2    gemfibrozil (LOPID) 600 mg tablet Take 600 mg by mouth 2 (two) times a day  3    glucose blood (OneTouch Ultra) test strip Test Blood Sugar Once Daily 100 each 2    Lancets (onetouch ultrasoft) lancets Patient to test once daily 100 each 1    Lancets (onetouch ultrasoft) lancets Test Blood Sugar Once Daily 100 each 0    levothyroxine 88 mcg tablet Take 1 tablet (88 mcg total) by mouth daily 90 tablet 0    LORazepam (ATIVAN) 1 mg tablet Take 1 tablet (1 mg total) by mouth 2 (two) times a day 60 tablet 0    lubiprostone (AMITIZA) 24 mcg capsule Take 1 capsule (24 mcg total) by mouth 2 (two) times a day with meals 60 capsule 2    melatonin 3 mg Take 20 mg by mouth       metFORMIN (GLUCOPHAGE) 500 mg tablet TAKE 1 TABLET BY MOUTH TWICE A DAY WITH MEALS 180 tablet 0    metoprolol tartrate (LOPRESSOR) 50 mg tablet Take 1 tablet (50 mg total) by mouth 2 (two) times a day 180 tablet 1    Misc  Devices (GETGO ROLLING WALKER) MISC Use rolling walker as needed 1 each 0    morphine (MS CONTIN) 15 mg 12 hr tablet BID       pramipexole (MIRAPEX) 0 5 mg tablet TAKE 1 TABLET BY MOUTH IN THE EVENING AND 1 TABLET AT BEDTIME FOR 7 DAYS , THEN 1 TAB 3 TIMES A DAY 90 tablet 1    Prucalopride Succinate 2 MG TABS Take 2 mg by mouth daily (Patient taking differently: Take 2 mg by mouth daily PRN ) 90 tablet 2    Repatha SureClick 688 MG/ML SOAJ       Evolocumab 140 MG/ML SOAJ Inject 2 mL under the skin      levothyroxine 88 mcg tablet Take 1 tablet (88 mcg total) by mouth daily 90 tablet 1    methocarbamol (ROBAXIN) 750 mg tablet Take 1 tablet (750 mg total) by mouth every 8 (eight) hours 90 tablet 5    sucralfate (CARAFATE) 1 g tablet Take 1 tablet (1 g total) by mouth 4 (four) times a day Dissolved pill in 10 mL water then swallow 360 tablet 1     No current facility-administered medications for this visit       Current Outpatient Medications on File Prior to Visit   Medication Sig    Alpha-Lipoic Acid 600 MG CAPS Take by mouth 2 (two) times a day    aspirin (ASPIRIN 81) 81 mg EC tablet aspirin 81 mg tablet,delayed release    B Complex Vitamins (B COMPLEX 1 PO) Take 1 tablet by mouth    Blood Glucose Monitoring Suppl (ONE TOUCH ULTRA 2) w/Device KIT Test Blood Sugar Once Daily    Calcium Carb-Cholecalciferol (Oyster Shell Calcium w/D) 500-200 MG-UNIT TABS No Take by mouth 2 (two) times a day with meals    calcium carbonate-vitamin D (OSCAL-D) 500 mg-200 units per tablet Take 1 tablet by mouth 2 (two) times a day with meals    clopidogrel (PLAVIX) 75 mg tablet Take 75 mg by mouth daily    diphenhydrAMINE-APAP, sleep, (TYLENOL PM EXTRA STRENGTH PO) Take by mouth 2 tablets hs    docusate sodium (COLACE) 100 mg capsule Take 1 capsule (100 mg total) by mouth daily at bedtime    DULoxetine (CYMBALTA) 30 mg delayed release capsule TAKE 1 CAP BY MOUTH DAILY IN THE AM    DULoxetine (CYMBALTA) 60 mg delayed release capsule TAKE 1 CAP DAILY BY MOUTH IN THE EVENING    famotidine (PEPCID) 40 MG tablet Take 1 tablet (40 mg total) by mouth daily    gemfibrozil (LOPID) 600 mg tablet Take 600 mg by mouth 2 (two) times a day    glucose blood (OneTouch Ultra) test strip Test Blood Sugar Once Daily    Lancets (onetouch ultrasoft) lancets Patient to test once daily    Lancets (onetouch ultrasoft) lancets Test Blood Sugar Once Daily    levothyroxine 88 mcg tablet Take 1 tablet (88 mcg total) by mouth daily    LORazepam (ATIVAN) 1 mg tablet Take 1 tablet (1 mg total) by mouth 2 (two) times a day    lubiprostone (AMITIZA) 24 mcg capsule Take 1 capsule (24 mcg total) by mouth 2 (two) times a day with meals    melatonin 3 mg Take 20 mg by mouth     metFORMIN (GLUCOPHAGE) 500 mg tablet TAKE 1 TABLET BY MOUTH TWICE A DAY WITH MEALS    metoprolol tartrate (LOPRESSOR) 50 mg tablet Take 1 tablet (50 mg total) by mouth 2 (two) times a day    Misc   Devices (GETGO ROLLING WALKER) MISC Use rolling walker as needed    morphine (MS CONTIN) 15 mg 12 hr tablet BID     pramipexole (MIRAPEX) 0 5 mg tablet TAKE 1 TABLET BY MOUTH IN THE EVENING AND 1 TABLET AT BEDTIME FOR 7 DAYS , THEN 1 TAB 3 TIMES A DAY    Prucalopride Succinate 2 MG TABS Take 2 mg by mouth daily (Patient taking differently: Take 2 mg by mouth daily PRN )    Repatha SureClick 130 MG/ML SOAJ     [DISCONTINUED] methocarbamol (ROBAXIN) 500 mg tablet Take 1 tablet (500 mg total) by mouth 2 (two) times a day    [DISCONTINUED] pyridoxine (VITAMIN B6) 100 mg tablet Take 1 tablet (100 mg total) by mouth every morning    Evolocumab 140 MG/ML SOAJ Inject 2 mL under the skin    levothyroxine 88 mcg tablet Take 1 tablet (88 mcg total) by mouth daily    sucralfate (CARAFATE) 1 g tablet Take 1 tablet (1 g total) by mouth 4 (four) times a day Dissolved pill in 10 mL water then swallow    [DISCONTINUED] pyridoxine (VITAMIN B6) 100 mg tablet Take 1 tablet (100 mg total) by mouth every morning (Patient not taking: Reported on 3/24/2022 )     No current facility-administered medications on file prior to visit  She is allergic to metoclopramide, nitrofurantoin, isosorbide, pregabalin, reglan [metoclopramide], and statins            Objective:    Blood pressure 134/88, pulse 102, temperature (!) 94 5 °F (34 7 °C), temperature source Skin, height 5' 7" (1 702 m), weight 92 5 kg (204 lb)  Physical Exam    Neurological Exam  CONSTITUTIONAL: NAD, pleasant  NECK: supple, no lymphadenopathy, no thyromegaly, no JVD  CARDIOVASCULAR: RRR, normal S1S2, no murmurs, no rubs  RESP: clear to auscultation bilaterally, no wheezes/rhonchi/rales  ABDOMEN: soft, non tender, non distended  SKIN: no rash or skin lesions  EXTREMITIES: no edema, pulses 2+bilaterally  PSYCH: appropriate mood and affect  NEUROLOGIC COMPREHENSIVE EXAM: Patient is oriented to person, place and time, NAD; appropriate affect  CN II, III, IV, V, VI, VII,VIII,IX,X,XI-XII intact with EOMI, PERRLA, OKN intact, VF grossly intact, fundi poorly visualized secondary to pupillary constriction; symmetric face noted  Motor: 5/5 UE/LE bilateral symmetric; Sensory: intact to light touch and pinprick bilaterally; normal vibration sensation feet bilaterally; Coordination within normal limits on FTN and SHERI testing; DTR: 2/4 through, no Babinski, no clonus  Tandem gait is intact  Romberg: absent  ROS:  12 points of review of system was reviewed with the patient and was unremarkable with exception: see HPI  Review of Systems   Constitutional: Negative  Negative for appetite change and fever  HENT: Negative  Negative for hearing loss, tinnitus, trouble swallowing and voice change      Eyes: Negative  Negative for photophobia and pain  Respiratory: Negative  Negative for shortness of breath  Cardiovascular: Negative  Negative for palpitations  Gastrointestinal: Negative  Negative for nausea and vomiting  Endocrine: Negative  Negative for cold intolerance  Genitourinary: Negative  Negative for dysuria, frequency and urgency  Musculoskeletal: Negative  Negative for myalgias and neck pain  Skin: Negative  Negative for rash  Neurological: Positive for dizziness, weakness (all over body) and headaches  Negative for tremors, seizures, syncope, facial asymmetry, speech difficulty, light-headedness and numbness  Fall on 3/22/2022    Hematological: Negative  Does not bruise/bleed easily  Psychiatric/Behavioral: Negative  Negative for confusion, hallucinations and sleep disturbance

## 2022-04-05 DIAGNOSIS — G25.81 RESTLESS LEG SYNDROME: ICD-10-CM

## 2022-04-05 RX ORDER — PRAMIPEXOLE DIHYDROCHLORIDE 0.5 MG/1
TABLET ORAL
Qty: 90 TABLET | Refills: 1 | Status: SHIPPED | OUTPATIENT
Start: 2022-04-05 | End: 2022-05-11

## 2022-04-05 RX ORDER — LORAZEPAM 1 MG/1
1 TABLET ORAL 2 TIMES DAILY
Qty: 60 TABLET | Refills: 0 | Status: SHIPPED | OUTPATIENT
Start: 2022-04-05 | End: 2022-06-13 | Stop reason: SDUPTHER

## 2022-04-22 DIAGNOSIS — G62.0 DRUG-INDUCED PERIPHERAL NEUROPATHY (HCC): ICD-10-CM

## 2022-04-22 DIAGNOSIS — C78.00 MALIGNANT NEOPLASM METASTATIC TO LUNG, UNSPECIFIED LATERALITY (HCC): Primary | ICD-10-CM

## 2022-04-22 DIAGNOSIS — C79.51 BONE METASTASES (HCC): ICD-10-CM

## 2022-04-25 ENCOUNTER — OFFICE VISIT (OUTPATIENT)
Dept: OBGYN CLINIC | Facility: CLINIC | Age: 67
End: 2022-04-25
Payer: MEDICARE

## 2022-04-25 VITALS
DIASTOLIC BLOOD PRESSURE: 78 MMHG | SYSTOLIC BLOOD PRESSURE: 119 MMHG | BODY MASS INDEX: 32.18 KG/M2 | HEIGHT: 67 IN | HEART RATE: 91 BPM | WEIGHT: 205 LBS

## 2022-04-25 DIAGNOSIS — M17.0 PRIMARY OSTEOARTHRITIS OF BOTH KNEES: ICD-10-CM

## 2022-04-25 DIAGNOSIS — M25.512 LEFT SHOULDER PAIN, UNSPECIFIED CHRONICITY: Primary | ICD-10-CM

## 2022-04-25 DIAGNOSIS — M75.42 IMPINGEMENT SYNDROME OF LEFT SHOULDER: ICD-10-CM

## 2022-04-25 PROCEDURE — 99213 OFFICE O/P EST LOW 20 MIN: CPT | Performed by: PHYSICIAN ASSISTANT

## 2022-04-25 PROCEDURE — 20610 DRAIN/INJ JOINT/BURSA W/O US: CPT | Performed by: PHYSICIAN ASSISTANT

## 2022-04-25 RX ORDER — OXYCODONE HYDROCHLORIDE 5 MG/1
TABLET ORAL
COMMUNITY
Start: 2022-04-19

## 2022-04-25 RX ORDER — BUPIVACAINE HYDROCHLORIDE 2.5 MG/ML
4 INJECTION, SOLUTION INFILTRATION; PERINEURAL
Status: COMPLETED | OUTPATIENT
Start: 2022-04-25 | End: 2022-04-25

## 2022-04-25 RX ORDER — TRIAMCINOLONE ACETONIDE 40 MG/ML
80 INJECTION, SUSPENSION INTRA-ARTICULAR; INTRAMUSCULAR
Status: COMPLETED | OUTPATIENT
Start: 2022-04-25 | End: 2022-04-25

## 2022-04-25 RX ADMIN — TRIAMCINOLONE ACETONIDE 80 MG: 40 INJECTION, SUSPENSION INTRA-ARTICULAR; INTRAMUSCULAR at 13:50

## 2022-04-25 RX ADMIN — BUPIVACAINE HYDROCHLORIDE 4 ML: 2.5 INJECTION, SOLUTION INFILTRATION; PERINEURAL at 13:50

## 2022-04-25 NOTE — PROGRESS NOTES
ASSESSMENT/PLAN:    Diagnoses and all orders for this visit:    Left shoulder pain, unspecified chronicity  -     XR shoulder 2+ vw left; Future    Impingement syndrome of left shoulder    Primary osteoarthritis of both knees    Other orders  -     oxyCODONE (ROXICODONE) 5 immediate release tablet; PLEASE SEE ATTACHED FOR DETAILED DIRECTIONS  -     Large joint arthrocentesis: bilateral knee  -     Large joint arthrocentesis: L subacromial bursa        X-rays of the patient's left shoulder are negative for any fractures or dislocations  There is a small subacromial spur present  Possible treatment options were discussed with the patient she would like corticosteroid injections  The patient's left shoulder and bilateral knees were injected with Kenalog and Marcaine  She tolerated the injections quite well  She will follow up with our office once we get approval for viscosupplementation  The patient is acceptable to this plan  Return for Viscosupplementation  The patient has impingement of her left shoulder DJD of bilateral knees  All 3 structures were injected with Kenalog and Marcaine  She tolerated procedures quite well  Return back in 3 months evaluation or sooner if she gets approved for viscosupplementation  If her condition changes, she will not hesitate to let us know      _____________________________________________________  CHIEF COMPLAINT:  Chief Complaint   Patient presents with    Left Knee - Follow-up    Right Knee - Follow-up         SUBJECTIVE:  Farrah eVlasquez is a 73 y o  female who presents to our office complaining of bilateral knee pain and left shoulder pain  The patient has received corticosteroid injections in the past for primary osteoarthritis of both knees  She has also received viscosupplementation  She would like to resume Visco injections  She also complains of left shoulder pain today  She denies any numbness or tingling  She denies any fever or chills      The following portions of the patient's history were reviewed and updated as appropriate: allergies, current medications, past family history, past medical history, past social history, past surgical history and problem list     PAST MEDICAL HISTORY:  Past Medical History:   Diagnosis Date    Anxiety     Breast cancer ([facility] )     CAD (coronary artery disease)     Cancer ([facility] )     Carpal tunnel syndrome     Disease of thyroid gland     Elevated cholesterol     Fibromyalgia     Hypertension     Kidney stone     [facility]     nose    Myocardial infarction ([facility] )     Neuropathy     BRAYDEN (obstructive sleep apnea)     cpap    Panic attack        PAST SURGICAL HISTORY:  Past Surgical History:   Procedure Laterality Date    BREAST SURGERY Bilateral     CARDIAC SURGERY      cardiac ablation     SECTION      x3    CHOLECYSTECTOMY      CORONARY ANGIOPLASTY WITH STENT PLACEMENT      MASTECTOMY Bilateral     NOSE SURGERY         FAMILY HISTORY:  Family History   Problem Relation Age of Onset    Heart attack Mother     Alcohol abuse Mother     Heart failure Mother     Cancer Mother     Cancer Paternal Grandfather        SOCIAL HISTORY:  Social History     Tobacco Use    Smoking status: Never Smoker    Smokeless tobacco: Never Used   Vaping Use    Vaping Use: Never used   Substance Use Topics    Alcohol use: Not Currently     Comment: beer    Drug use: Not Currently     Types: Marijuana     Comment: Medical reilly       MEDICATIONS:    Current Outpatient Medications:     Alpha-Lipoic Acid 600 MG CAPS, Take by mouth 2 (two) times a day, Disp: , Rfl:     aspirin (ASPIRIN 81) 81 mg EC tablet, aspirin 81 mg tablet,delayed release, Disp: , Rfl:     B Complex Vitamins (B COMPLEX 1 PO), Take 1 tablet by mouth, Disp: , Rfl:     Blood Glucose Monitoring Suppl (ONE TOUCH ULTRA 2) w/Device KIT, Test Blood Sugar Once Daily, Disp: 1 kit, Rfl: 0    Calcium Carb-Cholecalciferol (Oyster Shell Calcium w/D) 500-200 MG-UNIT TABS No, Take by mouth 2 (two) times a day with meals, Disp: , Rfl:     calcium carbonate-vitamin D (OSCAL-D) 500 mg-200 units per tablet, Take 1 tablet by mouth 2 (two) times a day with meals, Disp: 180 tablet, Rfl: 0    clopidogrel (PLAVIX) 75 mg tablet, Take 75 mg by mouth daily, Disp: , Rfl: 3    diphenhydrAMINE-APAP, sleep, (TYLENOL PM EXTRA STRENGTH PO), Take by mouth 2 tablets hs, Disp: , Rfl:     docusate sodium (COLACE) 100 mg capsule, Take 1 capsule (100 mg total) by mouth daily at bedtime, Disp: 180 capsule, Rfl: 3    DULoxetine (CYMBALTA) 30 mg delayed release capsule, TAKE 1 CAP BY MOUTH DAILY IN THE AM, Disp: 90 capsule, Rfl: 1    DULoxetine (CYMBALTA) 60 mg delayed release capsule, TAKE 1 CAP DAILY BY MOUTH IN THE EVENING, Disp: 90 capsule, Rfl: 1    Evolocumab 140 MG/ML SOAJ, Inject 2 mL under the skin, Disp: , Rfl:     famotidine (PEPCID) 40 MG tablet, Take 1 tablet (40 mg total) by mouth daily, Disp: 90 tablet, Rfl: 2    gemfibrozil (LOPID) 600 mg tablet, Take 600 mg by mouth 2 (two) times a day, Disp: , Rfl: 3    glucose blood (OneTouch Ultra) test strip, Test Blood Sugar Once Daily, Disp: 100 each, Rfl: 2    Lancets (onetouch ultrasoft) lancets, Patient to test once daily, Disp: 100 each, Rfl: 1    Lancets (onetouch ultrasoft) lancets, Test Blood Sugar Once Daily, Disp: 100 each, Rfl: 0    levothyroxine 88 mcg tablet, Take 1 tablet (88 mcg total) by mouth daily, Disp: 90 tablet, Rfl: 0    LORazepam (ATIVAN) 1 mg tablet, Take 1 tablet (1 mg total) by mouth 2 (two) times a day, Disp: 60 tablet, Rfl: 0    lubiprostone (AMITIZA) 24 mcg capsule, Take 1 capsule (24 mcg total) by mouth 2 (two) times a day with meals, Disp: 60 capsule, Rfl: 2    melatonin 3 mg, Take 20 mg by mouth , Disp: , Rfl:     metFORMIN (GLUCOPHAGE) 500 mg tablet, TAKE 1 TABLET BY MOUTH TWICE A DAY WITH MEALS, Disp: 180 tablet, Rfl: 0    methocarbamol (ROBAXIN) 750 mg tablet, Take 1 tablet (750 mg total) by mouth every 8 (eight) hours, Disp: 90 tablet, Rfl: 5    metoprolol tartrate (LOPRESSOR) 50 mg tablet, Take 1 tablet (50 mg total) by mouth 2 (two) times a day, Disp: 180 tablet, Rfl: 1    Misc  Devices (GETGO ROLLING WALKER) MISC, Use rolling walker as needed, Disp: 1 each, Rfl: 0    morphine (MS CONTIN) 15 mg 12 hr tablet, BID , Disp: , Rfl:     oxyCODONE (ROXICODONE) 5 immediate release tablet, PLEASE SEE ATTACHED FOR DETAILED DIRECTIONS, Disp: , Rfl:     pramipexole (MIRAPEX) 0 5 mg tablet, TAKE 1 TABLET BY MOUTH IN THE EVENING AND 1 TABLET AT BEDTIME FOR 7 DAYS , THEN 1 TAB 3 TIMES A DAY, Disp: 90 tablet, Rfl: 1    Prucalopride Succinate 2 MG TABS, Take 2 mg by mouth daily (Patient taking differently: Take 2 mg by mouth daily PRN ), Disp: 90 tablet, Rfl: 2    Repatha SureClick 884 MG/ML SOAJ, , Disp: , Rfl:     levothyroxine 88 mcg tablet, Take 1 tablet (88 mcg total) by mouth daily, Disp: 90 tablet, Rfl: 1    sucralfate (CARAFATE) 1 g tablet, Take 1 tablet (1 g total) by mouth 4 (four) times a day Dissolved pill in 10 mL water then swallow, Disp: 360 tablet, Rfl: 1    ALLERGIES:  Allergies   Allergen Reactions    Metoclopramide Other (See Comments)    Nitrofurantoin Other (See Comments)     Very weak  Fell    Isosorbide      Other reaction(s): headache    Pregabalin      Pt states made her feel suicidal     Reglan [Metoclopramide] Other (See Comments)     Flares her neuropathy    Statins Myalgia       ROS:  Review of Systems     Constitutional: Negative for fatigue, fever or loss of appetite  HENT: Negative  Respiratory: Negative for shortness of breath, dyspnea  Cardiovascular: Negative for chest pain/tightness  Gastrointestinal: Negative for abdominal pain, N/V  Endocrine: Negative for cold/heat intolerance, unexplained weight loss/gain  Genitourinary: Negative for flank pain, dysuria, hematuria     Musculoskeletal: Positive for arthralgia   Skin: Negative for rash     Neurological: Negative for numbness or tingling  Psychiatric/Behavioral: Negative for agitation  _____________________________________________________  PHYSICAL EXAMINATION:    Blood pressure 119/78, pulse 91, height 5' 7" (1 702 m), weight 93 kg (205 lb)  Constitutional: Oriented to person, place, and time  Appears well-developed and well-nourished  No distress  HENT:   Head: Normocephalic  Eyes: Conjunctivae are normal  Right eye exhibits no discharge  Left eye exhibits no discharge  No scleral icterus  Cardiovascular: Normal rate  Pulmonary/Chest: Effort normal    Neurological: Alert and oriented to person, place, and time  Skin: Skin is warm and dry  No rash noted  Not diaphoretic  No erythema  No pallor  Psychiatric: Normal mood and affect  Behavior is normal  Judgment and thought content normal       MUSCULOSKELETAL EXAMINATION:   Physical Exam  Ortho Exam      Bilateral lower extremities are neurovascularly intact  Toes are pink and mobile   Compartments are soft  No warmth, erythema or ecchymosis  ROM of knees are from 5-115 degrees  Negative Lachman, drawer or pivot shift  No medial instability  Medial joint line tenderness, slight lateral joint line tenderness  Patellofemoral crepitation    Left upper extremity is neurovascularly intact  Fingers are pink and mobile  Compartments are soft  Signs of impingement  Range of motion of the shoulder is from 0-170 degrees of forward flexion and abduction  Rotator cuff strength 5/5  Brisk cap refill  Sensation intact      Objective:  BP Readings from Last 1 Encounters:   04/25/22 119/78      Wt Readings from Last 1 Encounters:   04/25/22 93 kg (205 lb)        BMI:   Estimated body mass index is 32 11 kg/m² as calculated from the following:    Height as of this encounter: 5' 7" (1 702 m)  Weight as of this encounter: 93 kg (205 lb)        PROCEDURES PERFORMED:  Large joint arthrocentesis: bilateral knee  Universal Protocol:  Consent: Verbal consent obtained    Risks and benefits: risks, benefits and alternatives were discussed  Consent given by: patient  Patient understanding: patient states understanding of the procedure being performed  Site marked: the operative site was marked  Supporting Documentation  Indications: pain   Procedure Details  Location: knee - bilateral knee  Preparation: Patient was prepped and draped in the usual sterile fashion  Needle size: 22 G  Ultrasound guidance: no  Approach: lateral    Medications (Right): 4 mL bupivacaine 0 25 %; 80 mg triamcinolone acetonide 40 mg/mLMedications (Left): 4 mL bupivacaine 0 25 %; 80 mg triamcinolone acetonide 40 mg/mL   Patient tolerance: patient tolerated the procedure well with no immediate complications  Dressing:  Sterile dressing applied    Large joint arthrocentesis: L subacromial bursa  Universal Protocol:  Risks and benefits: risks, benefits and alternatives were discussed  Consent given by: patient  Patient understanding: patient states understanding of the procedure being performed  Site marked: the operative site was marked  Patient identity confirmed: verbally with patient    Supporting Documentation  Indications: pain   Procedure Details  Location: shoulder - L subacromial bursa  Preparation: Patient was prepped and draped in the usual sterile fashion  Needle size: 22 G  Ultrasound guidance: no  Approach: lateral  Medications administered: 4 mL bupivacaine 0 25 %; 80 mg triamcinolone acetonide 40 mg/mL    Patient tolerance: patient tolerated the procedure well with no immediate complications  Dressing:  Sterile dressing applied            Scribe Attestation    I,:  Latrice Gordillo PA-C am acting as a scribe while in the presence of the attending physician :       I,:  Desiree Richmond DO personally performed the services described in this documentation    as scribed in my presence :

## 2022-04-26 DIAGNOSIS — I25.119 CORONARY ARTERY DISEASE WITH ANGINA PECTORIS, UNSPECIFIED VESSEL OR LESION TYPE, UNSPECIFIED WHETHER NATIVE OR TRANSPLANTED HEART (HCC): ICD-10-CM

## 2022-04-26 DIAGNOSIS — K21.00 GASTROESOPHAGEAL REFLUX DISEASE WITH ESOPHAGITIS WITHOUT HEMORRHAGE: ICD-10-CM

## 2022-04-26 DIAGNOSIS — J43.1 PANLOBULAR EMPHYSEMA (HCC): ICD-10-CM

## 2022-04-26 DIAGNOSIS — C78.00 MALIGNANT NEOPLASM METASTATIC TO LUNG, UNSPECIFIED LATERALITY (HCC): ICD-10-CM

## 2022-04-26 DIAGNOSIS — I25.10 ATHEROSCLEROSIS OF NATIVE CORONARY ARTERY OF NATIVE HEART WITHOUT ANGINA PECTORIS: ICD-10-CM

## 2022-04-26 DIAGNOSIS — E03.9 HYPOTHYROIDISM, UNSPECIFIED TYPE: ICD-10-CM

## 2022-04-26 DIAGNOSIS — I10 ESSENTIAL HYPERTENSION: ICD-10-CM

## 2022-04-26 DIAGNOSIS — E11.9 TYPE 2 DIABETES MELLITUS WITHOUT COMPLICATION, WITHOUT LONG-TERM CURRENT USE OF INSULIN (HCC): ICD-10-CM

## 2022-05-05 ENCOUNTER — TELEMEDICINE (OUTPATIENT)
Dept: FAMILY MEDICINE CLINIC | Facility: CLINIC | Age: 67
End: 2022-05-05
Payer: MEDICARE

## 2022-05-05 DIAGNOSIS — J43.1 PANLOBULAR EMPHYSEMA (HCC): ICD-10-CM

## 2022-05-05 DIAGNOSIS — E87.8 DISEQUILIBRIUM SYNDROME: ICD-10-CM

## 2022-05-05 DIAGNOSIS — Z13.820 ENCOUNTER FOR OSTEOPOROSIS SCREENING IN ASYMPTOMATIC POSTMENOPAUSAL PATIENT: Primary | ICD-10-CM

## 2022-05-05 DIAGNOSIS — R26.2 AMBULATORY DYSFUNCTION: ICD-10-CM

## 2022-05-05 DIAGNOSIS — Z78.0 ENCOUNTER FOR OSTEOPOROSIS SCREENING IN ASYMPTOMATIC POSTMENOPAUSAL PATIENT: Primary | ICD-10-CM

## 2022-05-05 DIAGNOSIS — K59.01 CONSTIPATION BY DELAYED COLONIC TRANSIT: ICD-10-CM

## 2022-05-05 DIAGNOSIS — I25.10 ATHEROSCLEROSIS OF NATIVE CORONARY ARTERY OF NATIVE HEART WITHOUT ANGINA PECTORIS: ICD-10-CM

## 2022-05-05 DIAGNOSIS — U07.1 COVID-19: ICD-10-CM

## 2022-05-05 DIAGNOSIS — R73.9 HYPERGLYCEMIA: ICD-10-CM

## 2022-05-05 PROCEDURE — 99213 OFFICE O/P EST LOW 20 MIN: CPT | Performed by: FAMILY MEDICINE

## 2022-05-05 RX ORDER — METHOCARBAMOL 750 MG/1
750 TABLET, FILM COATED ORAL EVERY 8 HOURS SCHEDULED
Qty: 90 TABLET | Refills: 5 | Status: SHIPPED | OUTPATIENT
Start: 2022-05-05 | End: 2022-05-08 | Stop reason: SDUPTHER

## 2022-05-05 NOTE — PROGRESS NOTES
Virtual Regular Visit    Verification of patient location:    Patient is located in the following state in which I hold an active license [place]      Assessment/Plan:    Problem List Items Addressed This Visit        Cardiovascular and Mediastinum    Atherosclerotic heart disease of native coronary artery without angina pectoris       Nervous and Auditory    Disequilibrium syndrome       Other    Hyperglycemia    Ambulatory dysfunction    COVID-19     COVID-19 infection picked up from her daughter patient has had both vaccines along with a booster she has upper respiratory symptoms along with a headache in light of her history of breast cancer and multiple medical problems she would like to try the paxlovid           Other Visit Diagnoses     Encounter for osteoporosis screening in asymptomatic postmenopausal patient    -  Primary    Constipation by delayed colonic transit                   Reason for visit is   Chief Complaint   Patient presents with    COVID-19     Pt is COVID positive     Urinary Incontinence        Encounter provider Adam Richards DO    Provider located at [place] [id number] [place] [address]  [phone]      Recent Visits  No visits were found meeting these conditions  Showing recent visits within past 7 days and meeting all other requirements  Today's Visits  Date Type Provider Dept   05/05/22 Telemedicine Adam Richards DO [address] today's visits and meeting all other requirements  Future Appointments  No visits were found meeting these conditions  Showing future appointments within next 150 days and meeting all other requirements       The patient was identified by name and date of birth  Lanny Watt was informed that this is a telemedicine visit and that the visit is being conducted through [facility] and patient was informed this is a secure, HIPAA-complaint platform  She agrees to proceed  Lance Meneses My office door was closed  No one else was in the room  She acknowledged consent and understanding of privacy and security of the video platform  The patient has agreed to participate and understands they can discontinue the visit at any time  Patient is aware this is a billable service  Subjective  Dominique Devries is a 73 y o  female          Presents for positive COVID infection cough headache sore throat congestion       Past Medical History:   Diagnosis Date    Anxiety     [facility]     CAD (coronary artery disease)     [facility] ([facility] )     Carpal tunnel syndrome     Disease of thyroid gland     Elevated cholesterol     Fibromyalgia     Hypertension     Kidney stone     [facility]     nose    Myocardial infarction (HCC)     Neuropathy     BRAYDEN (obstructive sleep apnea)     cpap    Panic attack        Past Surgical History:   Procedure Laterality Date    BREAST SURGERY Bilateral     CARDIAC SURGERY      cardiac ablation     SECTION      x3    CHOLECYSTECTOMY      CORONARY ANGIOPLASTY WITH STENT PLACEMENT      LYMPH NODE BIOPSY Right 2022    MASTECTOMY Bilateral     NOSE SURGERY         Current Outpatient Medications   Medication Sig Dispense Refill    Alpha-Lipoic Acid 600 MG CAPS Take by mouth 2 (two) times a day      aspirin (ASPIRIN 81) 81 mg EC tablet aspirin 81 mg tablet,delayed release      B Complex Vitamins (B COMPLEX 1 PO) Take 1 tablet by mouth      Blood Glucose Monitoring Suppl (ONE TOUCH ULTRA 2) w/Device KIT Test Blood Sugar Once Daily 1 kit 0    clopidogrel (PLAVIX) 75 mg tablet Take 75 mg by mouth daily  3    diphenhydrAMINE-APAP, sleep, (TYLENOL PM EXTRA STRENGTH PO) Take by mouth 2 tablets hs      docusate sodium (COLACE) 100 mg capsule Take 1 capsule (100 mg total) by mouth daily at bedtime 180 capsule 3    DULoxetine (CYMBALTA) 30 mg delayed release capsule TAKE 1 CAP BY MOUTH DAILY IN THE AM 90 capsule 1    DULoxetine (CYMBALTA) 60 mg delayed release capsule TAKE 1 CAP DAILY BY MOUTH IN THE EVENING 90 capsule 1    Evolocumab 140 MG/ML SOAJ Inject 2 mL under the skin      famotidine (PEPCID) 40 MG tablet Take 1 tablet (40 mg total) by mouth daily 90 tablet 2    glucose blood (OneTouch Ultra) test strip Test Blood Sugar Once Daily 100 each 2    Lancets (onetouch ultrasoft) lancets Patient to test once daily 100 each 1    Lancets (onetouch ultrasoft) lancets Test Blood Sugar Once Daily 100 each 0    levothyroxine 88 mcg tablet Take 1 tablet (88 mcg total) by mouth daily 90 tablet 0    levothyroxine 88 mcg tablet Take 1 tablet (88 mcg total) by mouth daily 90 tablet 1    LORazepam (ATIVAN) 1 mg tablet Take 1 tablet (1 mg total) by mouth 2 (two) times a day 60 tablet 0    lubiprostone (AMITIZA) 24 mcg capsule Take 1 capsule (24 mcg total) by mouth 2 (two) times a day with meals 60 capsule 2    melatonin 3 mg Take 20 mg by mouth       metFORMIN (GLUCOPHAGE) 500 mg tablet TAKE 1 TABLET BY MOUTH TWICE A DAY WITH MEALS 180 tablet 0    methocarbamol (ROBAXIN) 750 mg tablet Take 1 tablet (750 mg total) by mouth every 8 (eight) hours 90 tablet 5    metoprolol tartrate (LOPRESSOR) 50 mg tablet Take 1 tablet (50 mg total) by mouth 2 (two) times a day 180 tablet 1    Misc   Devices (GETGO ROLLING WALKER) MISC Use rolling walker as needed 1 each 0    morphine (MS CONTIN) 15 mg 12 hr tablet 30 mg BID       oxyCODONE (ROXICODONE) 5 immediate release tablet PLEASE SEE ATTACHED FOR DETAILED DIRECTIONS      pramipexole (MIRAPEX) 0 5 mg tablet TAKE 1 TABLET BY MOUTH IN THE EVENING AND 1 TABLET AT BEDTIME FOR 7 DAYS , THEN 1 TAB 3 TIMES A DAY 90 tablet 1    Prucalopride Succinate 2 MG TABS Take 2 mg by mouth daily (Patient taking differently: Take 2 mg by mouth daily PRN ) 90 tablet 2    Repatha SureClick 465 MG/ML SOAJ       sucralfate (CARAFATE) 1 g tablet Take 1 tablet (1 g total) by mouth 4 (four) times a day Dissolved pill in 10 mL water then swallow 360 tablet 1    Calcium Carb-Cholecalciferol (Oyster Shell Calcium w/D) 500-200 MG-UNIT TABS No Take by mouth 2 (two) times a day with meals (Patient not taking: Reported on 5/5/2022 )      Calcium Carb-Cholecalciferol (Oyster Shell Calcium w/D) 500-200 MG-UNIT TABS No TAKE 1 TABLET BY MOUTH TWICE A DAY WITH MEALS (Patient not taking: Reported on 5/5/2022) 180 tablet 0    gemfibrozil (LOPID) 600 mg tablet Take 600 mg by mouth 2 (two) times a day (Patient not taking: Reported on 5/5/2022 )  3     No current facility-administered medications for this visit  Allergies   Allergen Reactions    Metoclopramide Other (See Comments)    Nitrofurantoin Other (See Comments)     Very weak  Fell    Isosorbide      Other reaction(s): headache    Pamidronate Other (See Comments), Confusion and Fever     Developed multiple side effects of drug including fever, tachycardia, and lethargy     Pregabalin      Pt states made her feel suicidal     Reglan [Metoclopramide] Other (See Comments)     Flares her neuropathy    Statins Myalgia       Review of Systems   Constitutional: Negative for chills, fatigue and fever  HENT: Positive for congestion and sore throat  Negative for nosebleeds, rhinorrhea and sinus pressure  Eyes: Negative for discharge and redness  Respiratory: Negative for cough and shortness of breath  Cardiovascular: Negative for chest pain, palpitations and leg swelling  Gastrointestinal: Negative for abdominal pain, blood in stool and nausea  Endocrine: Negative for cold intolerance, heat intolerance and polyuria  Genitourinary: Negative for dysuria and frequency  Musculoskeletal: Negative for arthralgias, back pain and myalgias  Skin: Negative for rash  Neurological: Positive for headaches  Negative for dizziness and weakness  Hematological: Negative for adenopathy  Psychiatric/Behavioral: Negative for behavioral problems and sleep disturbance   The patient is not nervous/anxious  Video Exam    There were no vitals filed for this visit  Physical Exam  Vitals and nursing note reviewed  Constitutional:       General: She is not in acute distress  Appearance: Normal appearance  She is well-developed and normal weight  HENT:      Head: Normocephalic and atraumatic  Right Ear: External ear normal       Left Ear: External ear normal       Nose: Nose normal       Mouth/Throat:      Mouth: Mucous membranes are moist       Pharynx: No oropharyngeal exudate  Eyes:      General: No scleral icterus  Right eye: No discharge  Left eye: No discharge  Conjunctiva/sclera: Conjunctivae normal       Pupils: Pupils are equal, round, and reactive to light  Neck:      Thyroid: No thyromegaly  Vascular: No JVD  Cardiovascular:      Rate and Rhythm: Normal rate and regular rhythm  Heart sounds: Normal heart sounds  No murmur heard  Pulmonary:      Effort: Pulmonary effort is normal       Breath sounds: No wheezing or rales  Chest:      Chest wall: No tenderness  Abdominal:      General: Bowel sounds are normal  There is no distension  Palpations: Abdomen is soft  There is no mass  Tenderness: There is no abdominal tenderness  Musculoskeletal:         General: No tenderness or deformity  Normal range of motion  Cervical back: Normal range of motion  Lymphadenopathy:      Cervical: No cervical adenopathy  Skin:     General: Skin is warm and dry  Findings: No rash  Neurological:      General: No focal deficit present  Mental Status: She is alert and oriented to person, place, and time  Cranial Nerves: No cranial nerve deficit  Coordination: Coordination normal       Deep Tendon Reflexes: Reflexes are normal and symmetric  Reflexes normal    Psychiatric:         Mood and Affect: Mood normal          Behavior: Behavior normal          Thought Content:  Thought content normal          Judgment: Judgment normal           I spent 22 minutes directly with the patient during this visit    Annmarie verbally agrees to participate in [employer]  Pt is aware that [employer] could be limited without vital signs or the ability to perform a full hands-on physical exam  R[address] Rout understands she or the provider may request at any time to terminate the video visit and request the patient to seek care or treatment in person

## 2022-05-08 DIAGNOSIS — E87.8 DISEQUILIBRIUM SYNDROME: ICD-10-CM

## 2022-05-08 DIAGNOSIS — R26.2 AMBULATORY DYSFUNCTION: ICD-10-CM

## 2022-05-09 ENCOUNTER — TELEMEDICINE (OUTPATIENT)
Dept: FAMILY MEDICINE CLINIC | Facility: CLINIC | Age: 67
End: 2022-05-09
Payer: MEDICARE

## 2022-05-09 VITALS — TEMPERATURE: 98.6 F

## 2022-05-09 DIAGNOSIS — U07.1 COVID-19: Primary | ICD-10-CM

## 2022-05-09 PROCEDURE — 99213 OFFICE O/P EST LOW 20 MIN: CPT | Performed by: FAMILY MEDICINE

## 2022-05-09 RX ORDER — METHOCARBAMOL 750 MG/1
750 TABLET, FILM COATED ORAL EVERY 8 HOURS SCHEDULED
Qty: 90 TABLET | Refills: 0 | Status: SHIPPED | OUTPATIENT
Start: 2022-05-09 | End: 2022-06-08

## 2022-05-09 NOTE — PROGRESS NOTES
Virtual Regular Visit    Verification of patient location:    Patient is located in the following state in which I hold an active license [place]      Assessment/Plan:    Problem List Items Addressed This Visit        Other    COVID-19 - Primary     Overall patient is starting to improve she has had and congestion dry cough no loss of taste or smell and otherwise mild headache she is using DayQuil and NyQuil and beginning to recover and I would like to follow-up with her 1 more time at the end of this week                    Reason for visit is   Chief Complaint   Patient presents with    COVID-19     had all vaccines    Nasal Congestion    Cough    Fatigue    Virtual Regular Visit        Encounter provider Cherie Iqbal DO    Provider located at [address] [address] [phone]      Recent Visits  Date Type Provider Dept   05/05/22 Telemedicine Cherie Iqbal DO [address] recent visits within past 7 days and meeting all other requirements  Today's Visits  Date Type Provider Dept   05/09/22 Telemedicine Cherie Iqbal DO [address] today's visits and meeting all other requirements  Future Appointments  No visits were found meeting these conditions  Showing future appointments within next 150 days and meeting all other requirements       The patient was identified by name and date of birth  Willy Castellanos was informed that this is a telemedicine visit and that the visit is being conducted through [facility] and patient was informed this is a secure, HIPAA-complaint platform  She agrees to proceed     My office door was closed  No one else was in the room  She acknowledged consent and understanding of privacy and security of the video platform  The patient has agreed to participate and understands they can discontinue the visit at any time  Patient is aware this is a billable service  Subjective  Zacarias Dotson is a 73 y o  female          COVID follow-up appointment       Past Medical History:   Diagnosis Date    Anxiety     [facility] cancer [facility]     CAD (coronary artery disease)     Cancer ([facility]     Carpal tunnel syndrome     Disease of thyroid gland     Elevated cholesterol     Fibromyalgia     Hypertension     Kidney stone     [facility]     nose    Myocardial infarction (HCC)     Neuropathy     BRAYDEN (obstructive sleep apnea)     cpap    Panic attack        Past Surgical History:   Procedure Laterality Date    BREAST SURGERY Bilateral     CARDIAC SURGERY      cardiac ablation     SECTION      x3    CHOLECYSTECTOMY      CORONARY ANGIOPLASTY WITH STENT PLACEMENT      LYMPH NODE BIOPSY Right 2022    MASTECTOMY Bilateral     NOSE SURGERY         Current Outpatient Medications   Medication Sig Dispense Refill    Alpha-Lipoic Acid 600 MG CAPS Take by mouth 2 (two) times a day      aspirin (ASPIRIN 81) 81 mg EC tablet aspirin 81 mg tablet,delayed release      B Complex Vitamins (B COMPLEX 1 PO) Take 1 tablet by mouth      Blood Glucose Monitoring Suppl (ONE TOUCH ULTRA 2) w/Device KIT Test Blood Sugar Once Daily 1 kit 0    Calcium Carb-Cholecalciferol (Oyster Shell Calcium w/D) 500-200 MG-UNIT TABS No Take by mouth 2 (two) times a day with meals (Patient not taking: Reported on 2022 )      Calcium Carb-Cholecalciferol (Oyster Shell Calcium w/D) 500-200 MG-UNIT TABS No TAKE 1 TABLET BY MOUTH TWICE A DAY WITH MEALS (Patient not taking: Reported on 2022) 180 tablet 0    clopidogrel (PLAVIX) 75 mg tablet Take 75 mg by mouth daily  3    diphenhydrAMINE-APAP, sleep, (TYLENOL PM EXTRA STRENGTH PO) Take by mouth 2 tablets hs      docusate sodium (COLACE) 100 mg capsule Take 1 capsule (100 mg total) by mouth daily at bedtime 180 capsule 3    DULoxetine (CYMBALTA) 30 mg delayed release capsule TAKE 1 CAP BY MOUTH DAILY IN THE AM 90 capsule 1    DULoxetine (CYMBALTA) 60 mg delayed release capsule TAKE 1 CAP DAILY BY MOUTH IN THE EVENING 90 capsule 1    Evolocumab 140 MG/ML SOAJ Inject 2 mL under the skin      famotidine (PEPCID) 40 MG tablet Take 1 tablet (40 mg total) by mouth daily 90 tablet 2    gemfibrozil (LOPID) 600 mg tablet Take 600 mg by mouth 2 (two) times a day (Patient not taking: Reported on 5/5/2022 )  3    glucose blood (OneTouch Ultra) test strip Test Blood Sugar Once Daily 100 each 2    Lancets (onetouch ultrasoft) lancets Patient to test once daily 100 each 1    Lancets (onetouch ultrasoft) lancets Test Blood Sugar Once Daily 100 each 0    levothyroxine 88 mcg tablet Take 1 tablet (88 mcg total) by mouth daily 90 tablet 0    levothyroxine 88 mcg tablet Take 1 tablet (88 mcg total) by mouth daily 90 tablet 1    LORazepam (ATIVAN) 1 mg tablet Take 1 tablet (1 mg total) by mouth 2 (two) times a day 60 tablet 0    lubiprostone (AMITIZA) 24 mcg capsule Take 1 capsule (24 mcg total) by mouth 2 (two) times a day with meals 60 capsule 2    melatonin 3 mg Take 20 mg by mouth       metFORMIN (GLUCOPHAGE) 500 mg tablet Take 1 tablet (500 mg total) by mouth 2 (two) times a day with meals 180 tablet 0    methocarbamol (ROBAXIN) 750 mg tablet Take 1 tablet (750 mg total) by mouth every 8 (eight) hours 90 tablet 5    metoprolol tartrate (LOPRESSOR) 50 mg tablet Take 1 tablet (50 mg total) by mouth 2 (two) times a day 180 tablet 1    Misc   Devices (GETGO ROLLING WALKER) MISC Use rolling walker as needed 1 each 0    molnupiravir 200 mg capsule Take 4 capsules (800 mg total) by mouth every 12 (twelve) hours for 5 days 40 capsule 0    morphine (MS CONTIN) 15 mg 12 hr tablet 30 mg BID       oxyCODONE (ROXICODONE) 5 immediate release tablet PLEASE SEE ATTACHED FOR DETAILED DIRECTIONS      pramipexole (MIRAPEX) 0 5 mg tablet TAKE 1 TABLET BY MOUTH IN THE EVENING AND 1 TABLET AT BEDTIME FOR 7 DAYS , THEN 1 TAB 3 TIMES A DAY 90 tablet 1    Prucalopride Succinate 2 MG TABS Take 2 mg by mouth daily (Patient taking differently: Take 2 mg by mouth daily PRN ) 90 tablet 2    Repatha SureClick 340 MG/ML SOAJ       sucralfate (CARAFATE) 1 g tablet Take 1 tablet (1 g total) by mouth 4 (four) times a day Dissolved pill in 10 mL water then swallow 360 tablet 1     No current facility-administered medications for this visit  Allergies   Allergen Reactions    Metoclopramide Other (See Comments)    Nitrofurantoin Other (See Comments)     Very weak  Fell    Isosorbide      Other reaction(s): headache    Pamidronate Other (See Comments), Confusion and Fever     Developed multiple side effects of drug including fever, tachycardia, and lethargy     Pregabalin      Pt states made her feel suicidal     Reglan [Metoclopramide] Other (See Comments)     Flares her neuropathy    Statins Myalgia       Review of Systems   Constitutional: Negative for chills, fatigue and fever  HENT: Positive for congestion  Negative for nosebleeds, rhinorrhea, sinus pressure and sore throat  Eyes: Negative for discharge and redness  Respiratory: Positive for cough  Negative for shortness of breath  Cardiovascular: Negative for chest pain, palpitations and leg swelling  Gastrointestinal: Negative for abdominal pain, blood in stool and nausea  Endocrine: Negative for cold intolerance, heat intolerance and polyuria  Genitourinary: Negative for dysuria and frequency  Musculoskeletal: Negative for arthralgias, back pain and myalgias  Skin: Negative for rash  Neurological: Positive for headaches  Negative for dizziness and weakness  Hematological: Negative for adenopathy  Psychiatric/Behavioral: Negative for behavioral problems and sleep disturbance  The patient is not nervous/anxious  Video Exam    Vitals:    05/09/22 0808   Temp: 98 6 °F (37 °C)       Physical Exam  Vitals and nursing note reviewed     Constitutional:       General: She is not in acute distress  Appearance: Normal appearance  She is well-developed  HENT:      Head: Normocephalic and atraumatic  Right Ear: External ear normal       Left Ear: External ear normal       Nose: Nose normal       Mouth/Throat:      Pharynx: No oropharyngeal exudate  Eyes:      General: No scleral icterus  Right eye: No discharge  Left eye: No discharge  Conjunctiva/sclera: Conjunctivae normal       Pupils: Pupils are equal, round, and reactive to light  Neck:      Thyroid: No thyromegaly  Vascular: No JVD  Cardiovascular:      Rate and Rhythm: Normal rate and regular rhythm  Heart sounds: Normal heart sounds  No murmur heard  Pulmonary:      Effort: Pulmonary effort is normal       Breath sounds: No wheezing or rales  Chest:      Chest wall: No tenderness  Abdominal:      General: Bowel sounds are normal  There is no distension  Palpations: Abdomen is soft  There is no mass  Tenderness: There is no abdominal tenderness  Musculoskeletal:         General: No tenderness or deformity  Normal range of motion  Cervical back: Normal range of motion  Lymphadenopathy:      Cervical: No cervical adenopathy  Skin:     General: Skin is warm and dry  Findings: No rash  Neurological:      General: No focal deficit present  Mental Status: She is alert and oriented to person, place, and time  Cranial Nerves: No cranial nerve deficit  Coordination: Coordination normal       Deep Tendon Reflexes: Reflexes are normal and symmetric  Reflexes normal    Psychiatric:         Mood and Affect: Mood normal          Behavior: Behavior normal          Thought Content: Thought content normal          Judgment: Judgment normal           I spent 22 minutes directly with the patient during this visit    Annmarie verbally agrees to participate in [facility]   Pt is aware that Virtual Care Services could be limited without vital signs or the ability to perform a full hands-on physical exam  [place] understands she or the provider may request at any time to terminate the video visit and request the patient to seek care or treatment in person

## 2022-05-09 NOTE — ASSESSMENT & PLAN NOTE
Overall patient is starting to improve she has had and congestion dry cough no loss of taste or smell and otherwise mild headache she is using DayQuil and NyQuil and beginning to recover and I would like to follow-up with her 1 more time at the end of this week

## 2022-05-10 DIAGNOSIS — G25.81 RESTLESS LEG SYNDROME: ICD-10-CM

## 2022-05-11 RX ORDER — PRAMIPEXOLE DIHYDROCHLORIDE 0.5 MG/1
TABLET ORAL
Qty: 90 TABLET | Refills: 1 | Status: SHIPPED | OUTPATIENT
Start: 2022-05-11 | End: 2022-06-06

## 2022-05-20 DIAGNOSIS — C78.00 MALIGNANT NEOPLASM METASTATIC TO LUNG, UNSPECIFIED LATERALITY (HCC): ICD-10-CM

## 2022-05-20 DIAGNOSIS — K76.0 FATTY LIVER: ICD-10-CM

## 2022-05-20 DIAGNOSIS — K31.84 GASTROPARESIS: Primary | ICD-10-CM

## 2022-05-20 RX ORDER — DIAPER,BRIEF,ADULT, DISPOSABLE
EACH MISCELLANEOUS
Qty: 30 TABLET | Refills: 2 | Status: SHIPPED | OUTPATIENT
Start: 2022-05-20

## 2022-06-04 DIAGNOSIS — G25.81 RESTLESS LEG SYNDROME: ICD-10-CM

## 2022-06-06 RX ORDER — PRAMIPEXOLE DIHYDROCHLORIDE 0.5 MG/1
TABLET ORAL
Qty: 270 TABLET | Refills: 1 | Status: SHIPPED | OUTPATIENT
Start: 2022-06-06

## 2022-06-08 DIAGNOSIS — E87.8 DISEQUILIBRIUM SYNDROME: ICD-10-CM

## 2022-06-08 DIAGNOSIS — R26.2 AMBULATORY DYSFUNCTION: ICD-10-CM

## 2022-06-08 RX ORDER — METHOCARBAMOL 750 MG/1
750 TABLET, FILM COATED ORAL EVERY 8 HOURS SCHEDULED
Qty: 90 TABLET | Refills: 0 | Status: SHIPPED | OUTPATIENT
Start: 2022-06-08 | End: 2022-06-20

## 2022-06-09 ENCOUNTER — TELEMEDICINE (OUTPATIENT)
Dept: FAMILY MEDICINE CLINIC | Facility: CLINIC | Age: 67
End: 2022-06-09

## 2022-06-09 DIAGNOSIS — Z85.3 HISTORY OF BREAST CANCER: ICD-10-CM

## 2022-06-09 DIAGNOSIS — I61.9 CEREBROVASCULAR SMALL VESSEL DISEASE WITH HEMORRHAGE (HCC): ICD-10-CM

## 2022-06-09 DIAGNOSIS — E11.9 TYPE 2 DIABETES MELLITUS WITHOUT COMPLICATION, WITHOUT LONG-TERM CURRENT USE OF INSULIN (HCC): Primary | ICD-10-CM

## 2022-06-09 DIAGNOSIS — R13.19 ESOPHAGEAL DYSPHAGIA: ICD-10-CM

## 2022-06-09 DIAGNOSIS — I25.119 CORONARY ARTERY DISEASE WITH ANGINA PECTORIS, UNSPECIFIED VESSEL OR LESION TYPE, UNSPECIFIED WHETHER NATIVE OR TRANSPLANTED HEART (HCC): ICD-10-CM

## 2022-06-09 DIAGNOSIS — J43.1 PANLOBULAR EMPHYSEMA (HCC): ICD-10-CM

## 2022-06-09 DIAGNOSIS — E03.9 HYPOTHYROIDISM, UNSPECIFIED TYPE: ICD-10-CM

## 2022-06-09 PROCEDURE — 99214 OFFICE O/P EST MOD 30 MIN: CPT | Performed by: FAMILY MEDICINE

## 2022-06-09 NOTE — ASSESSMENT & PLAN NOTE
Lab Results   Component Value Date    HGBA1C 6 3 (H) 04/06/2022   Stable with current diet and medication regimen patient is monitoring her blood glucose her A1c last done as at 6 3 no change in current treatment plan follow-up in 3 months

## 2022-06-09 NOTE — ASSESSMENT & PLAN NOTE
Dysphagia in the esophageal phase according to patient she has difficulty swallowing cold liquids at different foods she has to eat slower and smaller meals she will follow-up with Gastroenterology today

## 2022-06-09 NOTE — ASSESSMENT & PLAN NOTE
Stable no worsening shortness of breath continue with current treatment plan follow-up with Pulmonary Medicine

## 2022-06-09 NOTE — ASSESSMENT & PLAN NOTE
Stable angina no chest pain or shortness of breath worsening at this time continue same medication regimen maintain good lipid profile and hypertensive control

## 2022-06-09 NOTE — PROGRESS NOTES
Virtual Regular Visit    Verification of patient location:    Patient is located in the following state in which I hold an active license [place]      Assessment/Plan:    Problem List Items Addressed This Visit        Digestive    Esophageal dysphagia     Dysphagia in the esophageal phase according to patient she has difficulty swallowing cold liquids at different foods she has to eat slower and smaller meals she will follow-up with Gastroenterology today              Endocrine    Hypothyroidism     Reviewed labs TSH T4 continue levothyroxine 88 mcg repeat laboratory work in 3 months           Relevant Orders    CBC and differential    Comprehensive metabolic panel    Lipid panel    TSH, 3rd generation with Free T4 reflex    Hemoglobin A1C    Type 2 diabetes mellitus ([facility] ) - Primary       Lab Results   Component Value Date    HGBA1C 6 3 (H) 04/06/2022   Stable with current diet and medication regimen patient is monitoring her blood glucose her A1c last done as at 6 3 no change in current treatment plan follow-up in 3 months           Relevant Orders    CBC and differential    Comprehensive metabolic panel    Lipid panel    TSH, 3rd generation with Free T4 reflex    Hemoglobin A1C       Respiratory    Panlobular emphysema (HCC)     Stable no worsening shortness of breath continue with current treatment plan follow-up with Pulmonary Medicine              Cardiovascular and Mediastinum    Coronary artery disease with angina pectoris (HCC)     Stable angina no chest pain or shortness of breath worsening at this time continue same medication regimen maintain good lipid profile and hypertensive control           Cerebrovascular small vessel disease with hemorrhage (HCC)     Stable no neurologic deficits no change in medication regimen follow-up neurology yearly              Other    History of breast cancer     Patient's history of breast cancer stable followed by her specialist and monitored every 6 months Reason for visit is   Chief Complaint   Patient presents with    Follow-up     3 months     Virtual Regular Visit        Encounter provider Greg Garner DO    Provider located at [facility]  [address] [address]  [phone]      Recent Visits  No visits were found meeting these conditions  Showing recent visits within past 7 days and meeting all other requirements  Today's Visits  Date Type Provider Dept   06/09/22 Telemedicine Greg Garner DO [address] today's visits and meeting all other requirements  Future Appointments  No visits were found meeting these conditions  Showing future appointments within next 150 days and meeting all other requirements       The patient was identified by name and date of birth  Salvador Carbajal was informed that this is a telemedicine visit and that the visit is being conducted through [address] and patient was informed this is a secure, HIPAA-complaint platform  She agrees to proceed     My office door was closed  No one else was in the room  She acknowledged consent and understanding of privacy and security of the video platform  The patient has agreed to participate and understands they can discontinue the visit at any time  Patient is aware this is a billable service  Subjective  Salvador Carbajal is a 78 y o  female          Presents for general checkup and review of health concerns lab work       Past Medical History:   Diagnosis Date    Anxiety     Breast cancer ([address] )     CAD (coronary artery disease)     Cancer ([address] )     Carpal tunnel syndrome     Disease of thyroid gland     Elevated cholesterol     Fibromyalgia     Hypertension     Kidney stone     Melanoma ([address] )     nose    Myocardial infarction ([address] )     Neuropathy     BRAYDEN (obstructive sleep apnea)     cpap    Panic attack        Past Surgical History:   Procedure Laterality Date    BREAST SURGERY Bilateral     CARDIAC SURGERY      cardiac ablation     SECTION      x3    CHOLECYSTECTOMY      CORONARY ANGIOPLASTY WITH STENT PLACEMENT      LYMPH NODE BIOPSY Right 2022    MASTECTOMY Bilateral     NOSE SURGERY         Current Outpatient Medications   Medication Sig Dispense Refill    Alpha-Lipoic Acid 600 MG CAPS Take by mouth 2 (two) times a day      aspirin (ASPIRIN 81) 81 mg EC tablet aspirin 81 mg tablet,delayed release      B Complex Vitamins (B COMPLEX 1 PO) Take 1 tablet by mouth      Blood Glucose Monitoring Suppl (ONE TOUCH ULTRA 2) w/Device KIT Test Blood Sugar Once Daily 1 kit 0    Calcium Carb-Cholecalciferol (Oyster Shell Calcium w/D) 500-200 MG-UNIT TABS No Take by mouth 2 (two) times a day with meals (Patient not taking: Reported on 2022 )      Calcium Carb-Cholecalciferol (Oyster Shell Calcium w/D) 500-200 MG-UNIT TABS No TAKE 1 TABLET BY MOUTH TWICE A DAY WITH MEALS (Patient not taking: Reported on 2022) 180 tablet 0    clopidogrel (PLAVIX) 75 mg tablet Take 75 mg by mouth daily  3    [employer] B6 100 MG tablet TAKE 1 TABLET BY MOUTH EVERY DAY IN THE MORNING 30 tablet 2    diphenhydrAMINE-APAP, sleep, (TYLENOL PM EXTRA STRENGTH PO) Take by mouth 2 tablets hs      docusate sodium (COLACE) 100 mg capsule Take 1 capsule (100 mg total) by mouth daily at bedtime 180 capsule 3    DULoxetine (CYMBALTA) 30 mg delayed release capsule TAKE 1 CAP BY MOUTH DAILY IN THE AM 90 capsule 1    DULoxetine (CYMBALTA) 60 mg delayed release capsule TAKE 1 CAP DAILY BY MOUTH IN THE EVENING 90 capsule 1    Evolocumab 140 MG/ML SOAJ Inject 2 mL under the skin      famotidine (PEPCID) 40 MG tablet Take 1 tablet (40 mg total) by mouth daily 90 tablet 2    gemfibrozil (LOPID) 600 mg tablet Take 600 mg by mouth 2 (two) times a day (Patient not taking: Reported on 2022 )  3    glucose blood (OneTouch Ultra) test strip Test Blood Sugar Once Daily 100 each 2    Lancets (onetouch ultrasoft) lancets Patient to test once daily 100 each 1    Lancets (onetouch ultrasoft) lancets Test Blood Sugar Once Daily 100 each 0    levothyroxine 88 mcg tablet Take 1 tablet (88 mcg total) by mouth daily 90 tablet 0    levothyroxine 88 mcg tablet Take 1 tablet (88 mcg total) by mouth daily 90 tablet 1    LORazepam (ATIVAN) 1 mg tablet Take 1 tablet (1 mg total) by mouth 2 (two) times a day 60 tablet 0    lubiprostone (AMITIZA) 24 mcg capsule Take 1 capsule (24 mcg total) by mouth 2 (two) times a day with meals 60 capsule 2    melatonin 3 mg Take 20 mg by mouth       metFORMIN (GLUCOPHAGE) 500 mg tablet Take 1 tablet (500 mg total) by mouth 2 (two) times a day with meals 180 tablet 0    methocarbamol (ROBAXIN) 750 mg tablet TAKE 1 TABLET (750 MG TOTAL) BY MOUTH EVERY 8 (EIGHT) HOURS 90 tablet 0    metoprolol tartrate (LOPRESSOR) 50 mg tablet Take 1 tablet (50 mg total) by mouth 2 (two) times a day 180 tablet 1    Misc  Devices (GETGO ROLLING WALKER) MISC Use rolling walker as needed 1 each 0    morphine (MS CONTIN) 15 mg 12 hr tablet 30 mg BID       oxyCODONE (ROXICODONE) 5 immediate release tablet PLEASE SEE ATTACHED FOR DETAILED DIRECTIONS      pramipexole (MIRAPEX) 0 5 mg tablet TAKE 1 TABLET BY MOUTH IN THE EVENING AND 1 TABLET AT BEDTIME FOR 7 DAYS , THEN 1 TAB 3 TIMES A  tablet 1    Prucalopride Succinate 2 MG TABS Take 2 mg by mouth daily (Patient taking differently: Take 2 mg by mouth daily PRN ) 90 tablet 2    Repatha SureClick 290 MG/ML SOAJ       sucralfate (CARAFATE) 1 g tablet Take 1 tablet (1 g total) by mouth 4 (four) times a day Dissolved pill in 10 mL water then swallow 360 tablet 1     No current facility-administered medications for this visit  Allergies   Allergen Reactions    Metoclopramide Other (See Comments)    Nitrofurantoin Other (See Comments)     Very weak   Fell    Isosorbide      Other reaction(s): headache    Pamidronate Other (See Comments), Confusion and Fever     Developed multiple side effects of drug including fever, tachycardia, and lethargy     Pregabalin      Pt states made her feel suicidal     Reglan [Metoclopramide] Other (See Comments)     Flares her neuropathy    Statins Myalgia       Review of Systems   Constitutional: Negative for chills, fatigue and fever  HENT: Positive for rhinorrhea  Negative for congestion, nosebleeds, sinus pressure and sore throat  Eyes: Negative for discharge and redness  Respiratory: Negative for cough and shortness of breath  Cardiovascular: Negative for chest pain, palpitations and leg swelling  Gastrointestinal: Negative for abdominal pain, blood in stool and nausea  Endocrine: Negative for cold intolerance, heat intolerance and polyuria  Genitourinary: Negative for dysuria and frequency  Musculoskeletal: Negative for arthralgias, back pain and myalgias  Skin: Negative for rash  Neurological: Positive for weakness and headaches  Negative for dizziness  Hematological: Negative for adenopathy  Psychiatric/Behavioral: Negative for behavioral problems and sleep disturbance  The patient is not nervous/anxious  Video Exam    There were no vitals filed for this visit  Physical Exam  Vitals and nursing note reviewed  Constitutional:       General: She is not in acute distress  Appearance: Normal appearance  She is well-developed and normal weight  HENT:      Head: Normocephalic and atraumatic  Right Ear: Tympanic membrane, ear canal and external ear normal       Left Ear: Tympanic membrane, ear canal and external ear normal       Nose: Nose normal       Mouth/Throat:      Mouth: Mucous membranes are moist       Pharynx: Oropharynx is clear  No oropharyngeal exudate  Eyes:      General: No scleral icterus  Right eye: No discharge  Left eye: No discharge  Extraocular Movements: Extraocular movements intact        Conjunctiva/sclera: Conjunctivae normal       Pupils: Pupils are equal, round, and reactive to light  Neck:      Thyroid: No thyromegaly  Vascular: No JVD  Cardiovascular:      Rate and Rhythm: Normal rate and regular rhythm  Pulses: Normal pulses  Heart sounds: Normal heart sounds  No murmur heard  Pulmonary:      Effort: Pulmonary effort is normal       Breath sounds: No wheezing or rales  Chest:      Chest wall: No tenderness  Abdominal:      General: Bowel sounds are normal  There is no distension  Palpations: Abdomen is soft  There is no mass  Tenderness: There is no abdominal tenderness  Musculoskeletal:         General: No tenderness or deformity  Normal range of motion  Cervical back: Normal range of motion  Lymphadenopathy:      Cervical: No cervical adenopathy  Skin:     General: Skin is warm and dry  Capillary Refill: Capillary refill takes less than 2 seconds  Findings: No rash  Neurological:      General: No focal deficit present  Mental Status: She is alert and oriented to person, place, and time  Mental status is at baseline  Cranial Nerves: No cranial nerve deficit  Coordination: Coordination normal       Deep Tendon Reflexes: Reflexes are normal and symmetric  Reflexes normal    Psychiatric:         Mood and Affect: Mood normal          Behavior: Behavior normal          Thought Content: Thought content normal          Judgment: Judgment normal           I spent 36 minutes with patient today in which greater than 50% of the time was spent in counseling/coordination of care regarding discussed labs and recent MRI brain, dyphagia now seeing Gastroenterology    VIRTUAL VISIT [facility] verbally agrees to participate in [employer]   Pt is aware that [employer] could be limited without vital signs or the ability to perform a full hands-on physical exam  [place] understands she or the provider may request at any time to terminate the video visit and request the patient to seek care or treatment in person

## 2022-06-13 ENCOUNTER — TELEPHONE (OUTPATIENT)
Dept: OBGYN CLINIC | Facility: HOSPITAL | Age: 67
End: 2022-06-13

## 2022-06-13 DIAGNOSIS — F41.1 GENERALIZED ANXIETY DISORDER: ICD-10-CM

## 2022-06-13 RX ORDER — LORAZEPAM 1 MG/1
1 TABLET ORAL 2 TIMES DAILY
Qty: 60 TABLET | Refills: 0 | Status: SHIPPED | OUTPATIENT
Start: 2022-06-13

## 2022-06-13 NOTE — TELEPHONE ENCOUNTER
Patient had to cancel 1st Visco, will be in this Friday for injection, please discuss r/s of first one

## 2022-06-13 NOTE — TELEPHONE ENCOUNTER
Can you ask Clayton Fuentes tomorrow if he is able to see her for her last VISCO injection on 7/7 in the [facility] [facility] at [address]?

## 2022-06-17 ENCOUNTER — PROCEDURE VISIT (OUTPATIENT)
Dept: OBGYN CLINIC | Facility: CLINIC | Age: 67
End: 2022-06-17
Payer: MEDICARE

## 2022-06-17 VITALS
WEIGHT: 205 LBS | SYSTOLIC BLOOD PRESSURE: 132 MMHG | HEART RATE: 102 BPM | BODY MASS INDEX: 32.18 KG/M2 | DIASTOLIC BLOOD PRESSURE: 86 MMHG | HEIGHT: 67 IN

## 2022-06-17 DIAGNOSIS — M17.0 PRIMARY OSTEOARTHRITIS OF BOTH KNEES: Primary | ICD-10-CM

## 2022-06-17 PROCEDURE — 20610 DRAIN/INJ JOINT/BURSA W/O US: CPT | Performed by: ORTHOPAEDIC SURGERY

## 2022-06-17 RX ORDER — HYALURONATE SODIUM 10 MG/ML
20 SYRINGE (ML) INTRAARTICULAR
Status: COMPLETED | OUTPATIENT
Start: 2022-06-17 | End: 2022-06-17

## 2022-06-17 RX ADMIN — Medication 20 MG: at 10:23

## 2022-06-17 NOTE — PROGRESS NOTES
ASSESSMENT/PLAN:    Diagnoses and all orders for this visit:    Primary osteoarthritis of both knees    Other orders  -     Large joint arthrocentesis        Both of the patient's knees were injected with her for sent of Euflexxa  She tolerated the injections quite well  She will follow up with our office next week for her 2nd set  The patient is acceptable to this plan  Return in about 1 week (around 6/24/2022)  The patient has a history of degenerative joint disease of her bilateral knees  Under aseptic technique, both knees were injected with her 1st set of Euflexxa  She tolerated procedure quite well  Return back next week for 2nd set of injections  If her condition changes, she will not hesitate to let us know      _____________________________________________________  CHIEF COMPLAINT:  Chief Complaint   Patient presents with    Left Knee - Follow-up    Right Knee - Follow-up         SUBJECTIVE:  Catarina Gonzales is a 78 y o  female who presents to our office to begin viscosupplementation of both knees  She is here today to start Euflexxa injections  She still complains of bilateral knee pain  She denies any fever or chills  She denies any numbness or tingling        The following portions of the patient's history were reviewed and updated as appropriate: allergies, current medications, past family history, past medical history, past social history, past surgical history and problem list     PAST MEDICAL HISTORY:  Past Medical History:   Diagnosis Date    Anxiety     [facility]     CAD (coronary artery disease)     [facility]     Carpal tunnel syndrome     Disease of thyroid gland     Elevated cholesterol     Fibromyalgia     Hypertension     Kidney stone     [facility]oma [facility]     nose    [facility] infarction [facility]     Neuropathy     BRAYDEN (obstructive sleep apnea)     cpap    Panic attack        PAST SURGICAL HISTORY:  Past Surgical History:   Procedure Laterality Date    BREAST SURGERY Bilateral     CARDIAC SURGERY      cardiac ablation     SECTION      x3    CHOLECYSTECTOMY      CORONARY ANGIOPLASTY WITH STENT PLACEMENT      LYMPH NODE BIOPSY Right 2022    MASTECTOMY Bilateral     NOSE SURGERY         FAMILY HISTORY:  Family History   Problem Relation Age of Onset    Heart attack Mother     Alcohol abuse Mother     Heart failure Mother     Cancer Mother     Cancer Paternal Grandfather        SOCIAL HISTORY:  Social History     Tobacco Use    Smoking status: Never Smoker    Smokeless tobacco: Never Used   Vaping Use    Vaping Use: Never used   Substance Use Topics    Alcohol use: Not Currently     Comment: beer    Drug use: Not Currently     Types: Marijuana     Comment: [facility]       MEDICATIONS:    Current Outpatient Medications:     Alpha-Lipoic Acid 600 MG CAPS, Take by mouth 2 (two) times a day, Disp: , Rfl:     aspirin (ECOTRIN LOW STRENGTH) 81 mg EC tablet, aspirin 81 mg tablet,delayed release, Disp: , Rfl:     B Complex Vitamins (B COMPLEX 1 PO), Take 1 tablet by mouth, Disp: , Rfl:     Blood Glucose Monitoring Suppl (ONE TOUCH ULTRA 2) w/Device KIT, Test Blood Sugar Once Daily, Disp: 1 kit, Rfl: 0    clopidogrel (PLAVIX) 75 mg tablet, Take 75 mg by mouth daily, Disp: , Rfl: 3    CVS B6 100 MG tablet, TAKE 1 TABLET BY MOUTH EVERY DAY IN THE MORNING, Disp: 30 tablet, Rfl: 2    diphenhydrAMINE-APAP, sleep, (TYLENOL PM EXTRA STRENGTH PO), Take by mouth 2 tablets hs, Disp: , Rfl:     docusate sodium (COLACE) 100 mg capsule, Take 1 capsule (100 mg total) by mouth daily at bedtime, Disp: 180 capsule, Rfl: 3    DULoxetine (CYMBALTA) 30 mg delayed release capsule, TAKE 1 CAP BY MOUTH DAILY IN THE AM, Disp: 90 capsule, Rfl: 1    DULoxetine (CYMBALTA) 60 mg delayed release capsule, TAKE 1 CAP DAILY BY MOUTH IN THE EVENING, Disp: 90 capsule, Rfl: 1    Evolocumab 140 MG/ML SOAJ, Inject 2 mL under the skin, Disp: , Rfl:     famotidine (PEPCID) 40 MG tablet, Take 1 tablet (40 mg total) by mouth daily, Disp: 90 tablet, Rfl: 2    glucose blood (OneTouch Ultra) test strip, Test Blood Sugar Once Daily, Disp: 100 each, Rfl: 2    Lancets (onetouch ultrasoft) lancets, Patient to test once daily, Disp: 100 each, Rfl: 1    Lancets (onetouch ultrasoft) lancets, Test Blood Sugar Once Daily, Disp: 100 each, Rfl: 0    levothyroxine 88 mcg tablet, Take 1 tablet (88 mcg total) by mouth daily, Disp: 90 tablet, Rfl: 0    LORazepam (ATIVAN) 1 mg tablet, Take 1 tablet (1 mg total) by mouth 2 (two) times a day, Disp: 60 tablet, Rfl: 0    lubiprostone (AMITIZA) 24 mcg capsule, Take 1 capsule (24 mcg total) by mouth 2 (two) times a day with meals, Disp: 60 capsule, Rfl: 2    melatonin 3 mg, Take 20 mg by mouth , Disp: , Rfl:     metFORMIN (GLUCOPHAGE) 500 mg tablet, Take 1 tablet (500 mg total) by mouth 2 (two) times a day with meals, Disp: 180 tablet, Rfl: 0    methocarbamol (ROBAXIN) 750 mg tablet, TAKE 1 TABLET (750 MG TOTAL) BY MOUTH EVERY 8 (EIGHT) HOURS, Disp: 90 tablet, Rfl: 0    metoprolol tartrate (LOPRESSOR) 50 mg tablet, Take 1 tablet (50 mg total) by mouth 2 (two) times a day, Disp: 180 tablet, Rfl: 1    Misc   Devices (GETGO ROLLING WALKER) MISC, Use rolling walker as needed, Disp: 1 each, Rfl: 0    morphine (MS CONTIN) 15 mg 12 hr tablet, 30 mg BID , Disp: , Rfl:     oxyCODONE (ROXICODONE) 5 immediate release tablet, PLEASE SEE ATTACHED FOR DETAILED DIRECTIONS, Disp: , Rfl:     pramipexole (MIRAPEX) 0 5 mg tablet, TAKE 1 TABLET BY MOUTH IN THE EVENING AND 1 TABLET AT BEDTIME FOR 7 DAYS , THEN 1 TAB 3 TIMES A DAY, Disp: 270 tablet, Rfl: 1    Prucalopride Succinate 2 MG TABS, Take 2 mg by mouth daily (Patient taking differently: Take 2 mg by mouth daily PRN), Disp: 90 tablet, Rfl: 2    Repatha SureClick 080 MG/ML SOAJ, , Disp: , Rfl:     Calcium Carb-Cholecalciferol (Oyster Shell Calcium w/D) 500-200 MG-UNIT TABS No, Take by mouth 2 (two) times a day with meals (Patient not taking: No sig reported), Disp: , Rfl:     Calcium Carb-Cholecalciferol (Oyster Shell Calcium w/D) 500-200 MG-UNIT TABS No, TAKE 1 TABLET BY MOUTH TWICE A DAY WITH MEALS (Patient not taking: No sig reported), Disp: 180 tablet, Rfl: 0    gemfibrozil (LOPID) 600 mg tablet, Take 600 mg by mouth 2 (two) times a day (Patient not taking: Reported on 5/5/2022 ), Disp: , Rfl: 3    levothyroxine 88 mcg tablet, Take 1 tablet (88 mcg total) by mouth daily, Disp: 90 tablet, Rfl: 1    sucralfate (CARAFATE) 1 g tablet, Take 1 tablet (1 g total) by mouth 4 (four) times a day Dissolved pill in 10 mL water then swallow, Disp: 360 tablet, Rfl: 1    ALLERGIES:  Allergies   Allergen Reactions    Metoclopramide Other (See Comments)    Nitrofurantoin Other (See Comments)     Very weak  Fell    Isosorbide      Other reaction(s): headache    Pamidronate Other (See Comments), Confusion and Fever     Developed multiple side effects of drug including fever, tachycardia, and lethargy     Pregabalin      Pt states made her feel suicidal     Reglan [Metoclopramide] Other (See Comments)     Flares her neuropathy    Statins Myalgia       ROS:  Review of Systems     Constitutional: Negative for fatigue, fever or loss of appetite  HENT: Negative  Respiratory: Negative for shortness of breath, dyspnea  Cardiovascular: Negative for chest pain/tightness  Gastrointestinal: Negative for abdominal pain, N/V  Endocrine: Negative for cold/heat intolerance, unexplained weight loss/gain  Genitourinary: Negative for flank pain, dysuria, hematuria  Musculoskeletal: Positive for arthralgia   Skin: Negative for rash  Neurological: Negative for numbness or tingling  Psychiatric/Behavioral: Negative for agitation  _____________________________________________________  PHYSICAL EXAMINATION:    Blood pressure 132/86, pulse 102, height 5' 7" (1 702 m), weight 93 kg (205 lb)      Constitutional: Oriented to person, place, and time  Appears well-developed and well-nourished  No distress  HENT:   Head: Normocephalic  Eyes: Conjunctivae are normal  Right eye exhibits no discharge  Left eye exhibits no discharge  No scleral icterus  Cardiovascular: Normal rate  Pulmonary/Chest: Effort normal    Neurological: Alert and oriented to person, place, and time  Skin: Skin is warm and dry  No rash noted  Not diaphoretic  No erythema  No pallor  Psychiatric: Normal mood and affect  Behavior is normal  Judgment and thought content normal       MUSCULOSKELETAL EXAMINATION:   Physical Exam  Ortho Exam    Bilateral lower extremities are neurovascularly intact  Toes are pink and mobile   Compartments are soft  No warmth, erythema or ecchymosis  ROM of knees are from 5-115 degrees  Negative Lachman, drawer or pivot shift  No medial instability  Medial joint line tenderness, slight lateral joint line tenderness  Patellofemoral crepitation  Objective:  BP Readings from Last 1 Encounters:   06/17/22 132/86      Wt Readings from Last 1 Encounters:   06/17/22 93 kg (205 lb)        BMI:   Estimated body mass index is 32 11 kg/m² as calculated from the following:    Height as of this encounter: 5' 7" (1 702 m)  Weight as of this encounter: 93 kg (205 lb)        PROCEDURES PERFORMED:  Large joint arthrocentesis: bilateral knee  Universal Protocol:  Risks and benefits: risks, benefits and alternatives were discussed  Consent given by: patient  Patient understanding: patient states understanding of the procedure being performed  Site marked: the operative site was marked  Supporting Documentation  Indications: pain   Procedure Details  Location: knee - bilateral knee  Preparation: Patient was prepped and draped in the usual sterile fashion  Needle size: 22 G  Ultrasound guidance: no  Approach: lateral    Medications (Right): 20 mg Sodium Hyaluronate 20 MG/2MLMedications (Left): 20 mg Sodium Hyaluronate 20 MG/2ML   Patient tolerance: patient tolerated the procedure well with no immediate complications  Dressing:  Sterile dressing applied            Scribe Attestation    I,:  Marcus Stephens PA-C am acting as a scribe while in the presence of the attending physician :       I,:  Boogie Gutierrez DO personally performed the services described in this documentation    as scribed in my presence :

## 2022-06-18 DIAGNOSIS — E87.8 DISEQUILIBRIUM SYNDROME: ICD-10-CM

## 2022-06-18 DIAGNOSIS — K21.00 GASTROESOPHAGEAL REFLUX DISEASE WITH ESOPHAGITIS WITHOUT HEMORRHAGE: ICD-10-CM

## 2022-06-18 DIAGNOSIS — R26.2 AMBULATORY DYSFUNCTION: ICD-10-CM

## 2022-06-18 DIAGNOSIS — K59.01 CONSTIPATION BY DELAYED COLONIC TRANSIT: ICD-10-CM

## 2022-06-18 DIAGNOSIS — K31.84 GASTROPARESIS: ICD-10-CM

## 2022-06-18 DIAGNOSIS — E11.9 TYPE 2 DIABETES MELLITUS WITHOUT COMPLICATION, WITHOUT LONG-TERM CURRENT USE OF INSULIN (HCC): ICD-10-CM

## 2022-06-18 RX ORDER — SUCRALFATE 1 G/1
1 TABLET ORAL 4 TIMES DAILY
Qty: 360 TABLET | Refills: 1 | Status: SHIPPED | OUTPATIENT
Start: 2022-06-18 | End: 2022-09-16

## 2022-06-20 DIAGNOSIS — K76.0 FATTY LIVER: Primary | ICD-10-CM

## 2022-06-20 DIAGNOSIS — E11.9 TYPE 2 DIABETES MELLITUS WITHOUT COMPLICATION, WITHOUT LONG-TERM CURRENT USE OF INSULIN (HCC): ICD-10-CM

## 2022-06-20 DIAGNOSIS — E03.9 HYPOTHYROIDISM, UNSPECIFIED TYPE: ICD-10-CM

## 2022-06-20 DIAGNOSIS — K59.00 CONSTIPATION, UNSPECIFIED CONSTIPATION TYPE: ICD-10-CM

## 2022-06-20 RX ORDER — BLOOD SUGAR DIAGNOSTIC
STRIP MISCELLANEOUS
Qty: 100 STRIP | Refills: 2 | Status: SHIPPED | OUTPATIENT
Start: 2022-06-20

## 2022-06-20 RX ORDER — METHOCARBAMOL 750 MG/1
750 TABLET, FILM COATED ORAL EVERY 8 HOURS SCHEDULED
Qty: 90 TABLET | Refills: 0 | Status: SHIPPED | OUTPATIENT
Start: 2022-06-20 | End: 2022-08-01

## 2022-06-23 DIAGNOSIS — R73.9 HYPERGLYCEMIA: ICD-10-CM

## 2022-06-23 DIAGNOSIS — K59.01 CONSTIPATION BY DELAYED COLONIC TRANSIT: ICD-10-CM

## 2022-06-23 DIAGNOSIS — I25.10 ATHEROSCLEROSIS OF NATIVE CORONARY ARTERY OF NATIVE HEART WITHOUT ANGINA PECTORIS: ICD-10-CM

## 2022-06-24 ENCOUNTER — PROCEDURE VISIT (OUTPATIENT)
Dept: OBGYN CLINIC | Facility: CLINIC | Age: 67
End: 2022-06-24
Payer: MEDICARE

## 2022-06-24 VITALS
BODY MASS INDEX: 32.18 KG/M2 | DIASTOLIC BLOOD PRESSURE: 93 MMHG | HEIGHT: 67 IN | HEART RATE: 108 BPM | WEIGHT: 205 LBS | SYSTOLIC BLOOD PRESSURE: 146 MMHG

## 2022-06-24 DIAGNOSIS — M17.0 PRIMARY OSTEOARTHRITIS OF BOTH KNEES: Primary | ICD-10-CM

## 2022-06-24 DIAGNOSIS — C50.911: ICD-10-CM

## 2022-06-24 DIAGNOSIS — C79.89: ICD-10-CM

## 2022-06-24 PROCEDURE — 20610 DRAIN/INJ JOINT/BURSA W/O US: CPT | Performed by: ORTHOPAEDIC SURGERY

## 2022-06-24 RX ORDER — HYALURONATE SODIUM 10 MG/ML
20 SYRINGE (ML) INTRAARTICULAR
Status: COMPLETED | OUTPATIENT
Start: 2022-06-24 | End: 2022-06-24

## 2022-06-24 RX ADMIN — Medication 20 MG: at 10:08

## 2022-06-24 NOTE — PROGRESS NOTES
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 [facility])  -     Ambulatory Referral to Surgical Oncology; Future    Patient was provided with 2nd of 3 shot Euflexxa viscosupplementation injection series for treatment of primary osteoarthritis of the bilateral knees  Patient tolerated treatment(s) well  She will be seen for follow-up in 1 week for completion of Visco injection series  Patient expresses understanding and is in agreement with this treatment plan  Under aseptic technique, both knees were injected with her 2nd set of viscosupplementation  She tolerated procedure quite well  Return back next week for last set of injections  The patient was requesting to see Orthopedic Oncology regarding and metastatic lesion to her pelvis  She is referred to the care of Dr Hola Cai if he participates with her insurance    Subjective:    Patient info: Shy Schrader 78 y o  female    HPI    Patient presents today for re-evaluation and continuation of Euflexxa viscosupplementation injection series for treatment of primary osteoarthritis of the bilateral knees  Patient was last seen in regards to this issue on 6/17/2022, at which time she received her initial injections  Patient denies any adverse reaction to previous injections including fever, chills, headache, nausea, dizziness, or malaise  Patient's medical history has been reviewed in detail and updated the computerized patient record      Past Medical History:   Diagnosis Date    Anxiety     [facility]     CAD (coronary artery disease)     [facility]     Carpal tunnel syndrome     Disease of thyroid gland     Elevated cholesterol     Fibromyalgia     Hypertension     Kidney stone     [facility]     nose    Myocardial infarction ([facility])     Neuropathy  BRAYDEN (obstructive sleep apnea)     cpap    Panic attack        Past Surgical History:   Procedure Laterality Date    BREAST SURGERY Bilateral     CARDIAC SURGERY      cardiac ablation     SECTION      x3    CHOLECYSTECTOMY      CORONARY ANGIOPLASTY WITH STENT PLACEMENT      LYMPH NODE BIOPSY Right 2022    MASTECTOMY Bilateral     NOSE SURGERY         Family History   Problem Relation Age of Onset    Heart attack Mother     Alcohol abuse Mother     Heart failure Mother     Cancer Mother     Cancer Paternal Grandfather        Social History     Socioeconomic History    Marital status: Single     Spouse name: None    Number of children: None    Years of education: None    Highest education level: None   Occupational History    None   Tobacco Use    Smoking status: Never Smoker    Smokeless tobacco: Never Used   Vaping Use    Vaping Use: Never used   Substance and Sexual Activity    Alcohol use: Not Currently     Comment: beer    Drug use: Not Currently     Types: Marijuana     Comment: Medical [facility]    Sexual activity: None   Other Topics Concern    None   Social History Narrative    ** Merged History Encounter **          Social Determinants of Health     Financial Resource Strain: Not on file   Food Insecurity: No Food Insecurity    Worried About Running Out of Food in the Last Year: Never true    Mare of Food in the Last Year: Never true   Transportation Needs: Not on file   Physical Activity: Not on file   Stress: Not on file   Social Connections: Not on file   Intimate Partner Violence: Not on file   Housing Stability: Unknown    Unable to Pay for Housing in the Last Year: No    Number of Places Lived in the Last Year: Not on file    Unstable Housing in the Last Year: No         Current Outpatient Medications:     Alpha-Lipoic Acid 600 MG CAPS, Take by mouth 2 (two) times a day, Disp: , Rfl:     aspirin (ECOTRIN LOW STRENGTH) 81 mg EC tablet, aspirin 81 mg tablet,delayed release, Disp: , Rfl:     B Complex Vitamins (B COMPLEX 1 PO), Take 1 tablet by mouth, Disp: , Rfl:     Blood Glucose Monitoring Suppl (ONE TOUCH ULTRA 2) w/Device KIT, Test Blood Sugar Once Daily, Disp: 1 kit, Rfl: 0    Calcium Carb-Cholecalciferol (Oyster Shell Calcium w/D) 500-200 MG-UNIT TABS No, Take by mouth 2 (two) times a day with meals, Disp: , Rfl:     Calcium Carb-Cholecalciferol (Oyster Shell Calcium w/D) 500-200 MG-UNIT TABS No, TAKE 1 TABLET BY MOUTH TWICE A DAY WITH MEALS, Disp: 180 tablet, Rfl: 0    clopidogrel (PLAVIX) 75 mg tablet, Take 75 mg by mouth daily, Disp: , Rfl: 3    CVS B6 100 MG tablet, TAKE 1 TABLET BY MOUTH EVERY DAY IN THE MORNING, Disp: 30 tablet, Rfl: 2    diphenhydrAMINE-APAP, sleep, (TYLENOL PM EXTRA STRENGTH PO), Take by mouth 2 tablets hs, Disp: , Rfl:     docusate sodium (COLACE) 100 mg capsule, Take 1 capsule (100 mg total) by mouth daily at bedtime, Disp: 180 capsule, Rfl: 3    DULoxetine (CYMBALTA) 30 mg delayed release capsule, TAKE 1 CAP BY MOUTH DAILY IN THE AM, Disp: 90 capsule, Rfl: 1    DULoxetine (CYMBALTA) 60 mg delayed release capsule, TAKE 1 CAP DAILY BY MOUTH IN THE EVENING, Disp: 90 capsule, Rfl: 1    Evolocumab 140 MG/ML SOAJ, Inject 2 mL under the skin, Disp: , Rfl:     famotidine (PEPCID) 40 MG tablet, Take 1 tablet (40 mg total) by mouth daily, Disp: 90 tablet, Rfl: 2    gemfibrozil (LOPID) 600 mg tablet, Take 600 mg by mouth 2 (two) times a day, Disp: , Rfl: 3    glucose blood (OneTouch Ultra) test strip, USE TO TEST BLOOD SUGAR ONCE DAILY, Disp: 100 strip, Rfl: 2    Lancets (onetouch ultrasoft) lancets, Patient to test once daily, Disp: 100 each, Rfl: 1    Lancets (onetouch ultrasoft) lancets, Test Blood Sugar Once Daily, Disp: 100 each, Rfl: 0    levothyroxine 88 mcg tablet, Take 1 tablet (88 mcg total) by mouth daily, Disp: 90 tablet, Rfl: 0    levothyroxine 88 mcg tablet, Take 1 tablet (88 mcg total) by mouth daily, Disp: 90 tablet, Rfl: 1    LORazepam (ATIVAN) 1 mg tablet, Take 1 tablet (1 mg total) by mouth 2 (two) times a day, Disp: 60 tablet, Rfl: 0    lubiprostone (AMITIZA) 24 mcg capsule, TAKE 1 CAPSULE BY MOUTH 2 TIMES A DAY WITH MEALS , Disp: 60 capsule, Rfl: 2    melatonin 3 mg, Take 20 mg by mouth , Disp: , Rfl:     metFORMIN (GLUCOPHAGE) 500 mg tablet, TAKE 1 TABLET BY MOUTH TWICE A DAY WITH MEALS, Disp: 180 tablet, Rfl: 0    methocarbamol (ROBAXIN) 750 mg tablet, TAKE 1 TABLET (750 MG TOTAL) BY MOUTH EVERY 8 (EIGHT) HOURS, Disp: 90 tablet, Rfl: 0    metoprolol tartrate (LOPRESSOR) 50 mg tablet, Take 1 tablet (50 mg total) by mouth 2 (two) times a day, Disp: 180 tablet, Rfl: 1    Misc  Devices (GETGO ROLLING WALKER) MISC, Use rolling walker as needed, Disp: 1 each, Rfl: 0    morphine (MS CONTIN) 15 mg 12 hr tablet, 30 mg BID , Disp: , Rfl:     oxyCODONE (ROXICODONE) 5 immediate release tablet, PLEASE SEE ATTACHED FOR DETAILED DIRECTIONS, Disp: , Rfl:     pramipexole (MIRAPEX) 0 5 mg tablet, TAKE 1 TABLET BY MOUTH IN THE EVENING AND 1 TABLET AT BEDTIME FOR 7 DAYS , THEN 1 TAB 3 TIMES A DAY, Disp: 270 tablet, Rfl: 1    Prucalopride Succinate 2 MG TABS, Take 2 mg by mouth daily, Disp: 90 tablet, Rfl: 0    Repatha SureClick 668 MG/ML SOAJ, , Disp: , Rfl:     sucralfate (CARAFATE) 1 g tablet, TAKE 1 TABLET (1 G TOTAL) BY MOUTH 4 (FOUR) TIMES A DAY DISSOLVED PILL IN 10 ML WATER THEN SWALLOW, Disp: 360 tablet, Rfl: 1    Allergies   Allergen Reactions    Metoclopramide Other (See Comments)    Nitrofurantoin Other (See Comments)     Very weak   Cookie Cagey    Symbicort [Budesonide-Formoterol Fumarate] Nausea Only    Isosorbide      Other reaction(s): headache    Pamidronate Other (See Comments), Confusion and Fever     Developed multiple side effects of drug including fever, tachycardia, and lethargy     Pregabalin      Pt states made her feel suicidal     Reglan [Metoclopramide] Other (See Comments)     Flares her neuropathy    Statins Myalgia       Review of Systems   Constitutional: Negative for chills, fever and unexpected weight change  HENT: Negative for hearing loss, nosebleeds and sore throat  Eyes: Negative for pain, redness and visual disturbance  Respiratory: Negative for cough, shortness of breath and wheezing  Cardiovascular: Negative for chest pain, palpitations and leg swelling  Gastrointestinal: Negative for abdominal pain, nausea and vomiting  Endocrine: Negative for polydipsia and polyuria  Genitourinary: Negative for dysuria and hematuria  Musculoskeletal:        As noted in HPI   Skin: Negative for rash and wound  Neurological: Negative for dizziness, numbness and headaches  Psychiatric/Behavioral: Negative for decreased concentration and suicidal ideas  The patient is not nervous/anxious  Objective :  /93   Pulse (!) 108   Ht 5' 7" (1 702 m)   Wt 93 kg (205 lb)   BMI 32 11 kg/m²     Ortho Exam  Bilateral knee(s) -   Patient ambulates with antalgic gait pattern  Uses walker as assistive device  Skin is warm and dry to touch with no signs of erythema, ecchymosis, infection  No soft tissue swelling, no effusion noted  ROM 5°-115°  TTP over medial joint lines, mildly TTP over lateral joint lines  Flexor and extensor mechanisms intact  Knee is stable to varus and valgus stress  - Lachman's  - Anterior Drawer, - Posterior Drawer  - medial Anthony's, - lateral Anthony's  - Pivot Shift  Patella tracks centrally with palpable crepitus  Calf compartments are soft and supple  2+ TP and DP pulses with brisk capillary refill to the toes  Sural, saphenous, tibial, superficial and deep peroneal motor and sensory distributions intact  Sensation light touch intact distally    Physical Exam  Vitals and nursing note reviewed  Constitutional:       General: She is not in acute distress  Appearance: She is well-developed  HENT:      Head: Normocephalic and atraumatic     Eyes: Conjunctiva/sclera: Conjunctivae normal    Cardiovascular:      Rate and Rhythm: Normal rate  Pulmonary:      Effort: Pulmonary effort is normal    Musculoskeletal:      Cervical back: Neck supple  Skin:     General: Skin is warm and dry  Capillary Refill: Capillary refill takes less than 2 seconds  Neurological:      Mental Status: She is alert and oriented to person, place, and time  Psychiatric:         Mood and Affect: Mood normal          Behavior: Behavior normal           Diagnostic Test Review:  No new imaging reviewed this visit    Large joint arthrocentesis: R knee  Universal Protocol:  Consent: Verbal consent obtained  Risks and benefits: risks, benefits and alternatives were discussed  Consent given by: patient  Time out: Immediately prior to procedure a "time out" was called to verify the correct patient, procedure, equipment, support staff and site/side marked as required  Timeout called at: 6/24/2022 10:00 AM   Patient understanding: patient states understanding of the procedure being performed  Site marked: the operative site was marked  Radiology Images displayed and confirmed  If images not available, report reviewed: imaging studies available  Patient identity confirmed: verbally with patient    Supporting Documentation  Indications: pain and joint swelling   Procedure Details  Location: knee - R knee  Preparation: Patient was prepped and draped in the usual sterile fashion  Needle size: 22 G  Ultrasound guidance: no  Approach: anterolateral  Medications administered: 20 mg Sodium Hyaluronate 20 MG/2ML    Patient tolerance: patient tolerated the procedure well with no immediate complications  Dressing:  Sterile dressing applied    Large joint arthrocentesis: L knee  Universal Protocol:  Consent: Verbal consent obtained    Risks and benefits: risks, benefits and alternatives were discussed  Consent given by: patient  Time out: Immediately prior to procedure a "time out" was called to verify the correct patient, procedure, equipment, support staff and site/side marked as required  Timeout called at: 6/24/2022 10:00 AM   Patient understanding: patient states understanding of the procedure being performed  Site marked: the operative site was marked  Radiology Images displayed and confirmed   If images not available, report reviewed: imaging studies available  Patient identity confirmed: verbally with patient    Supporting Documentation  Indications: pain and joint swelling   Procedure Details  Location: knee - L knee  Preparation: Patient was prepped and draped in the usual sterile fashion  Needle size: 22 G  Ultrasound guidance: no  Approach: anterolateral  Medications administered: 20 mg Sodium Hyaluronate 20 MG/2ML    Patient tolerance: patient tolerated the procedure well with no immediate complications  Dressing:  Sterile dressing applied           Scribe Attestation    I,:  Teressa Pérez am acting as a scribe while in the presence of the attending physician :       I,:  Mary Thompson DO personally performed the services described in this documentation    as scribed in my presence :

## 2022-06-27 ENCOUNTER — TELEPHONE (OUTPATIENT)
Dept: OBGYN CLINIC | Facility: HOSPITAL | Age: 67
End: 2022-06-27

## 2022-06-27 DIAGNOSIS — C78.00 MALIGNANT NEOPLASM METASTATIC TO LUNG, UNSPECIFIED LATERALITY (HCC): ICD-10-CM

## 2022-06-27 DIAGNOSIS — E11.9 TYPE 2 DIABETES MELLITUS WITHOUT COMPLICATION, WITHOUT LONG-TERM CURRENT USE OF INSULIN (HCC): ICD-10-CM

## 2022-06-27 DIAGNOSIS — I25.10 ATHEROSCLEROSIS OF NATIVE CORONARY ARTERY OF NATIVE HEART WITHOUT ANGINA PECTORIS: ICD-10-CM

## 2022-06-27 DIAGNOSIS — E03.9 HYPOTHYROIDISM, UNSPECIFIED TYPE: ICD-10-CM

## 2022-06-27 DIAGNOSIS — I25.119 CORONARY ARTERY DISEASE WITH ANGINA PECTORIS, UNSPECIFIED VESSEL OR LESION TYPE, UNSPECIFIED WHETHER NATIVE OR TRANSPLANTED HEART (HCC): ICD-10-CM

## 2022-06-27 DIAGNOSIS — I10 ESSENTIAL HYPERTENSION: ICD-10-CM

## 2022-06-27 DIAGNOSIS — K21.00 GASTROESOPHAGEAL REFLUX DISEASE WITH ESOPHAGITIS WITHOUT HEMORRHAGE: ICD-10-CM

## 2022-06-27 DIAGNOSIS — J43.1 PANLOBULAR EMPHYSEMA (HCC): ICD-10-CM

## 2022-06-27 NOTE — TELEPHONE ENCOUNTER
Dr Carmine Cruz from Surgical Oncology at [facility] [place] received a referral but needs patient records      Fax [phone]   # [phone] option 1

## 2022-06-27 NOTE — TELEPHONE ENCOUNTER
You can send our medical records, but she should still get to them the medical records regarding her metastatic disease from her different practitioners  We were not treating that directly

## 2022-07-07 ENCOUNTER — PROCEDURE VISIT (OUTPATIENT)
Dept: OBGYN CLINIC | Facility: CLINIC | Age: 67
End: 2022-07-07
Payer: MEDICARE

## 2022-07-07 VITALS
DIASTOLIC BLOOD PRESSURE: 72 MMHG | SYSTOLIC BLOOD PRESSURE: 115 MMHG | HEART RATE: 80 BPM | HEIGHT: 67 IN | BODY MASS INDEX: 32.11 KG/M2

## 2022-07-07 DIAGNOSIS — M17.0 PRIMARY OSTEOARTHRITIS OF BOTH KNEES: Primary | ICD-10-CM

## 2022-07-07 PROCEDURE — 20610 DRAIN/INJ JOINT/BURSA W/O US: CPT | Performed by: PHYSICIAN ASSISTANT

## 2022-07-07 RX ORDER — HYALURONATE SODIUM 10 MG/ML
20 SYRINGE (ML) INTRAARTICULAR
Status: COMPLETED | OUTPATIENT
Start: 2022-07-07 | End: 2022-07-07

## 2022-07-07 RX ADMIN — Medication 20 MG: at 15:43

## 2022-07-07 NOTE — PROGRESS NOTES
ASSESSMENT/PLAN:    Diagnoses and all orders for this visit:    Primary osteoarthritis of both knees    Other orders  -     Large joint arthrocentesis        Both of the patient's knees were injected with her 3rd and final set of Euflexxa  She tolerated the injections quite well  She will follow up with our office in 6 weeks  The patient is acceptable to this plan  Return in about 6 weeks (around 2022)  _____________________________________________________  CHIEF COMPLAINT:  Chief Complaint   Patient presents with    Left Knee - Follow-up    Right Knee - Follow-up         SUBJECTIVE:  Chelsy Parry is a 78 y o  female who presents to our office for viscosupplementation of both knees  She is here today for her 3rd and final set of Euflexxa  She tolerated her last injections without issue  She denies any numbness or tingling  She denies any fever or chills        The following portions of the patient's history were reviewed and updated as appropriate: allergies, current medications, past family history, past medical history, past social history, past surgical history and problem list     PAST MEDICAL HISTORY:  Past Medical History:   Diagnosis Date    Anxiety     [facility] [facility]     CAD (coronary artery disease)     [facility]     Carpal tunnel syndrome     Disease of thyroid gland     Elevated cholesterol     Fibromyalgia     Hypertension     Kidney stone     Melanoma ([facility] [facility]     nose    [facility]     Neuropathy     BRAYDEN (obstructive sleep apnea)     cpap    Panic attack        PAST SURGICAL HISTORY:  Past Surgical History:   Procedure Laterality Date    BREAST SURGERY Bilateral     CARDIAC SURGERY      cardiac ablation     SECTION      x3    CHOLECYSTECTOMY      CORONARY ANGIOPLASTY WITH STENT PLACEMENT      LYMPH NODE BIOPSY Right 2022    MASTECTOMY Bilateral     NOSE SURGERY         FAMILY HISTORY:  Family History   Problem Relation Age of Onset    Heart attack Mother     Alcohol abuse Mother     Heart failure Mother     Cancer Mother     Cancer Paternal Grandfather        SOCIAL HISTORY:  Social History     Tobacco Use    Smoking status: Never Smoker    Smokeless tobacco: Never Used   Vaping Use    Vaping Use: Never used   Substance Use Topics    Alcohol use: Not Currently     Comment: beer    Drug use: Not Currently     Types: Marijuana     Comment: [facility] [employer]       MEDICATIONS:    Current Outpatient Medications:     Alpha-Lipoic Acid 600 MG CAPS, Take by mouth 2 (two) times a day, Disp: , Rfl:     aspirin (ECOTRIN LOW STRENGTH) 81 mg EC tablet, aspirin 81 mg tablet,delayed release, Disp: , Rfl:     B Complex Vitamins (B COMPLEX 1 PO), Take 1 tablet by mouth, Disp: , Rfl:     Blood Glucose Monitoring Suppl (ONE TOUCH ULTRA 2) w/Device KIT, Test Blood Sugar Once Daily, Disp: 1 kit, Rfl: 0    Calcium Carb-Cholecalciferol (Oyster Shell Calcium w/D) 500-200 MG-UNIT TABS No, Take by mouth 2 (two) times a day with meals, Disp: , Rfl:     Calcium Carb-Cholecalciferol (Oyster Shell Calcium w/D) 500-200 MG-UNIT TABS No, TAKE 1 TABLET BY MOUTH TWICE A DAY WITH MEALS, Disp: 180 tablet, Rfl: 1    clopidogrel (PLAVIX) 75 mg tablet, Take 75 mg by mouth daily, Disp: , Rfl: 3    [employer] B6 100 MG tablet, TAKE 1 TABLET BY MOUTH EVERY DAY IN THE MORNING, Disp: 30 tablet, Rfl: 2    diphenhydrAMINE-APAP, sleep, (TYLENOL PM EXTRA STRENGTH PO), Take by mouth 2 tablets hs, Disp: , Rfl:     docusate sodium (COLACE) 100 mg capsule, Take 1 capsule (100 mg total) by mouth daily at bedtime, Disp: 180 capsule, Rfl: 3    DULoxetine (CYMBALTA) 30 mg delayed release capsule, TAKE 1 CAP BY MOUTH DAILY IN THE AM, Disp: 90 capsule, Rfl: 1    DULoxetine (CYMBALTA) 60 mg delayed release capsule, TAKE 1 CAP DAILY BY MOUTH IN THE EVENING, Disp: 90 capsule, Rfl: 1    Evolocumab 140 MG/ML SOAJ, Inject 2 mL under the skin, Disp: , Rfl:     famotidine (PEPCID) 40 MG tablet, Take 1 tablet (40 mg total) by mouth daily, Disp: 90 tablet, Rfl: 2    glucose blood (OneTouch Ultra) test strip, USE TO TEST BLOOD SUGAR ONCE DAILY, Disp: 100 strip, Rfl: 2    Lancets (onetouch ultrasoft) lancets, Patient to test once daily, Disp: 100 each, Rfl: 1    Lancets (onetouch ultrasoft) lancets, Test Blood Sugar Once Daily, Disp: 100 each, Rfl: 0    levothyroxine 88 mcg tablet, Take 1 tablet (88 mcg total) by mouth daily, Disp: 90 tablet, Rfl: 0    LORazepam (ATIVAN) 1 mg tablet, Take 1 tablet (1 mg total) by mouth 2 (two) times a day, Disp: 60 tablet, Rfl: 0    lubiprostone (AMITIZA) 24 mcg capsule, TAKE 1 CAPSULE BY MOUTH 2 TIMES A DAY WITH MEALS , Disp: 60 capsule, Rfl: 2    melatonin 3 mg, Take 20 mg by mouth , Disp: , Rfl:     metFORMIN (GLUCOPHAGE) 500 mg tablet, TAKE 1 TABLET BY MOUTH TWICE A DAY WITH MEALS, Disp: 180 tablet, Rfl: 0    methocarbamol (ROBAXIN) 750 mg tablet, TAKE 1 TABLET (750 MG TOTAL) BY MOUTH EVERY 8 (EIGHT) HOURS, Disp: 90 tablet, Rfl: 0    metoprolol tartrate (LOPRESSOR) 50 mg tablet, Take 1 tablet (50 mg total) by mouth 2 (two) times a day, Disp: 180 tablet, Rfl: 1    Misc   Devices (GETGO ROLLING WALKER) MISC, Use rolling walker as needed, Disp: 1 each, Rfl: 0    morphine (MS CONTIN) 15 mg 12 hr tablet, 30 mg BID , Disp: , Rfl:     oxyCODONE (ROXICODONE) 5 immediate release tablet, PLEASE SEE ATTACHED FOR DETAILED DIRECTIONS, Disp: , Rfl:     pramipexole (MIRAPEX) 0 5 mg tablet, TAKE 1 TABLET BY MOUTH IN THE EVENING AND 1 TABLET AT BEDTIME FOR 7 DAYS , THEN 1 TAB 3 TIMES A DAY, Disp: 270 tablet, Rfl: 1    Prucalopride Succinate 2 MG TABS, Take 2 mg by mouth daily, Disp: 90 tablet, Rfl: 0    Repatha SureClick 394 MG/ML SOAJ, , Disp: , Rfl:     sucralfate (CARAFATE) 1 g tablet, TAKE 1 TABLET (1 G TOTAL) BY MOUTH 4 (FOUR) TIMES A DAY DISSOLVED PILL IN 10 ML WATER THEN SWALLOW, Disp: 360 tablet, Rfl: 1    gemfibrozil (LOPID) 600 mg tablet, Take 600 mg by mouth 2 (two) times a day, Disp: , Rfl: 3    levothyroxine 88 mcg tablet, Take 1 tablet (88 mcg total) by mouth daily, Disp: 90 tablet, Rfl: 1    ALLERGIES:  Allergies   Allergen Reactions    Metoclopramide Other (See Comments)    Nitrofurantoin Other (See Comments)     Very weak  Don Coppersmith    Symbicort [Budesonide-Formoterol Fumarate] Nausea Only    Isosorbide      Other reaction(s): headache    Pamidronate Other (See Comments), Confusion and Fever     Developed multiple side effects of drug including fever, tachycardia, and lethargy     Pregabalin      Pt states made her feel suicidal     Reglan [Metoclopramide] Other (See Comments)     Flares her neuropathy    Statins Myalgia       ROS:  Review of Systems     Constitutional: Negative for fatigue, fever or loss of appetite  HENT: Negative  Respiratory: Negative for shortness of breath, dyspnea  Cardiovascular: Negative for chest pain/tightness  Gastrointestinal: Negative for abdominal pain, N/V  Endocrine: Negative for cold/heat intolerance, unexplained weight loss/gain  Genitourinary: Negative for flank pain, dysuria, hematuria  Musculoskeletal: Positive for arthralgia   Skin: Negative for rash  Neurological: Negative for numbness or tingling  Psychiatric/Behavioral: Negative for agitation  _____________________________________________________  PHYSICAL EXAMINATION:    Blood pressure 115/72, pulse 80, height 5' 7" (1 702 m)  Constitutional: Oriented to person, place, and time  Appears well-developed and well-nourished  No distress  HENT:   Head: Normocephalic  Eyes: Conjunctivae are normal  Right eye exhibits no discharge  Left eye exhibits no discharge  No scleral icterus  Cardiovascular: Normal rate  Pulmonary/Chest: Effort normal    Neurological: Alert and oriented to person, place, and time  Skin: Skin is warm and dry  No rash noted  Not diaphoretic  No erythema  No pallor  Psychiatric: Normal mood and affect  Behavior is normal  Judgment and thought content normal       MUSCULOSKELETAL EXAMINATION:   Physical Exam  Ortho Exam    Bilateral lower extremities are neurovascularly intact  Toes are pink and mobile   Compartments are soft  No warmth, erythema or ecchymosis  ROM of knees are from 5-115 degrees  Negative Lachman, drawer or pivot shift  No medial instability  Medial joint line tenderness, slight lateral joint line tenderness  Patellofemoral crepitation  Objective:  BP Readings from Last 1 Encounters:   07/07/22 115/72      Wt Readings from Last 1 Encounters:   06/24/22 93 kg (205 lb)        BMI:   Estimated body mass index is 32 11 kg/m² as calculated from the following:    Height as of this encounter: 5' 7" (1 702 m)  Weight as of 6/24/22: 93 kg (205 lb)        PROCEDURES PERFORMED:  Large joint arthrocentesis: bilateral knee  Universal Protocol:  Risks and benefits: risks, benefits and alternatives were discussed  Consent given by: patient  Patient understanding: patient states understanding of the procedure being performed  Site marked: the operative site was marked  Supporting Documentation  Indications: pain   Procedure Details  Location: knee - bilateral knee  Preparation: Patient was prepped and draped in the usual sterile fashion  Needle size: 22 G  Ultrasound guidance: no  Approach: lateral    Medications (Right): 20 mg Sodium Hyaluronate 20 MG/2MLMedications (Left): 20 mg Sodium Hyaluronate 20 MG/2ML   Patient tolerance: patient tolerated the procedure well with no immediate complications  Dressing:  Sterile dressing applied

## 2022-07-11 ENCOUNTER — APPOINTMENT (EMERGENCY)
Dept: CT IMAGING | Facility: HOSPITAL | Age: 67
DRG: 690 | End: 2022-07-11
Payer: MEDICARE

## 2022-07-11 ENCOUNTER — HOSPITAL ENCOUNTER (INPATIENT)
Facility: HOSPITAL | Age: 67
LOS: 3 days | Discharge: HOME | DRG: 690 | End: 2022-07-14
Attending: EMERGENCY MEDICINE | Admitting: STUDENT IN AN ORGANIZED HEALTH CARE EDUCATION/TRAINING PROGRAM
Payer: MEDICARE

## 2022-07-11 DIAGNOSIS — C79.51 BONE METASTASES (HCC): ICD-10-CM

## 2022-07-11 DIAGNOSIS — N12 PYELONEPHRITIS OF RIGHT KIDNEY: Primary | ICD-10-CM

## 2022-07-11 DIAGNOSIS — N30.90 CYSTITIS: ICD-10-CM

## 2022-07-11 LAB
ALBUMIN SERPL BCP-MCNC: 3.1 G/DL (ref 3.5–5)
ALP SERPL-CCNC: 64 U/L (ref 46–116)
ALT SERPL W P-5'-P-CCNC: 25 U/L (ref 12–78)
ANION GAP SERPL CALCULATED.3IONS-SCNC: 9 MMOL/L (ref 4–13)
APTT PPP: 33 SECONDS (ref 23–37)
AST SERPL W P-5'-P-CCNC: 14 U/L (ref 5–45)
BASOPHILS # BLD AUTO: 0.06 THOUSANDS/ΜL (ref 0–0.1)
BASOPHILS NFR BLD AUTO: 1 % (ref 0–1)
BILIRUB SERPL-MCNC: 0.63 MG/DL (ref 0.2–1)
BILIRUB UR QL STRIP: NEGATIVE
BUN SERPL-MCNC: 16 MG/DL (ref 5–25)
CALCIUM ALBUM COR SERPL-MCNC: 10.1 MG/DL (ref 8.3–10.1)
CALCIUM SERPL-MCNC: 9.4 MG/DL (ref 8.3–10.1)
CHLORIDE SERPL-SCNC: 101 MMOL/L (ref 100–108)
CLARITY UR: CLEAR
CO2 SERPL-SCNC: 30 MMOL/L (ref 21–32)
COLOR UR: YELLOW
CREAT SERPL-MCNC: 0.81 MG/DL (ref 0.6–1.3)
EOSINOPHIL # BLD AUTO: 0.09 THOUSAND/ΜL (ref 0–0.61)
EOSINOPHIL NFR BLD AUTO: 1 % (ref 0–6)
ERYTHROCYTE [DISTWIDTH] IN BLOOD BY AUTOMATED COUNT: 13.8 % (ref 11.6–15.1)
GFR SERPL CREATININE-BSD FRML MDRD: 75 ML/MIN/1.73SQ M
GLUCOSE SERPL-MCNC: 102 MG/DL (ref 65–140)
GLUCOSE SERPL-MCNC: 160 MG/DL (ref 65–140)
GLUCOSE UR STRIP-MCNC: NEGATIVE MG/DL
HCT VFR BLD AUTO: 41.3 % (ref 34.8–46.1)
HGB BLD-MCNC: 13.7 G/DL (ref 11.5–15.4)
HGB UR QL STRIP.AUTO: ABNORMAL
IMM GRANULOCYTES # BLD AUTO: 0.08 THOUSAND/UL (ref 0–0.2)
IMM GRANULOCYTES NFR BLD AUTO: 1 % (ref 0–2)
INR PPP: 1.02 (ref 0.84–1.19)
KETONES UR STRIP-MCNC: NEGATIVE MG/DL
LACTATE SERPL-SCNC: 1.7 MMOL/L (ref 0.5–2)
LEUKOCYTE ESTERASE UR QL STRIP: ABNORMAL
LYMPHOCYTES # BLD AUTO: 1.94 THOUSANDS/ΜL (ref 0.6–4.47)
LYMPHOCYTES NFR BLD AUTO: 16 % (ref 14–44)
MCH RBC QN AUTO: 30.4 PG (ref 26.8–34.3)
MCHC RBC AUTO-ENTMCNC: 33.2 G/DL (ref 31.4–37.4)
MCV RBC AUTO: 92 FL (ref 82–98)
MONOCYTES # BLD AUTO: 0.99 THOUSAND/ΜL (ref 0.17–1.22)
MONOCYTES NFR BLD AUTO: 8 % (ref 4–12)
NEUTROPHILS # BLD AUTO: 8.89 THOUSANDS/ΜL (ref 1.85–7.62)
NEUTS SEG NFR BLD AUTO: 73 % (ref 43–75)
NITRITE UR QL STRIP: POSITIVE
NRBC BLD AUTO-RTO: 0 /100 WBCS
PH UR STRIP.AUTO: 7 [PH] (ref 4.5–8)
PLATELET # BLD AUTO: 366 THOUSANDS/UL (ref 149–390)
PMV BLD AUTO: 8.7 FL (ref 8.9–12.7)
POTASSIUM SERPL-SCNC: 3.9 MMOL/L (ref 3.5–5.3)
PROCALCITONIN SERPL-MCNC: <0.05 NG/ML
PROT SERPL-MCNC: 8.5 G/DL (ref 6.4–8.2)
PROT UR STRIP-MCNC: ABNORMAL MG/DL
PROTHROMBIN TIME: 13.1 SECONDS (ref 11.6–14.5)
RBC # BLD AUTO: 4.5 MILLION/UL (ref 3.81–5.12)
SODIUM SERPL-SCNC: 140 MMOL/L (ref 136–145)
SP GR UR STRIP.AUTO: 1.02 (ref 1–1.03)
UROBILINOGEN UR QL STRIP.AUTO: 1 E.U./DL
WBC # BLD AUTO: 12.05 THOUSAND/UL (ref 4.31–10.16)

## 2022-07-11 PROCEDURE — 36415 COLL VENOUS BLD VENIPUNCTURE: CPT | Performed by: EMERGENCY MEDICINE

## 2022-07-11 PROCEDURE — 85610 PROTHROMBIN TIME: CPT | Performed by: EMERGENCY MEDICINE

## 2022-07-11 PROCEDURE — 99223 1ST HOSP IP/OBS HIGH 75: CPT | Performed by: STUDENT IN AN ORGANIZED HEALTH CARE EDUCATION/TRAINING PROGRAM

## 2022-07-11 PROCEDURE — 80053 COMPREHEN METABOLIC PANEL: CPT | Performed by: EMERGENCY MEDICINE

## 2022-07-11 PROCEDURE — 99285 EMERGENCY DEPT VISIT HI MDM: CPT | Performed by: EMERGENCY MEDICINE

## 2022-07-11 PROCEDURE — G1004 CDSM NDSC: HCPCS

## 2022-07-11 PROCEDURE — 96360 HYDRATION IV INFUSION INIT: CPT

## 2022-07-11 PROCEDURE — 99285 EMERGENCY DEPT VISIT HI MDM: CPT

## 2022-07-11 PROCEDURE — 87086 URINE CULTURE/COLONY COUNT: CPT | Performed by: EMERGENCY MEDICINE

## 2022-07-11 PROCEDURE — 84145 PROCALCITONIN (PCT): CPT | Performed by: EMERGENCY MEDICINE

## 2022-07-11 PROCEDURE — 87186 SC STD MICRODIL/AGAR DIL: CPT | Performed by: EMERGENCY MEDICINE

## 2022-07-11 PROCEDURE — 94660 CPAP INITIATION&MGMT: CPT

## 2022-07-11 PROCEDURE — 74176 CT ABD & PELVIS W/O CONTRAST: CPT

## 2022-07-11 PROCEDURE — 87040 BLOOD CULTURE FOR BACTERIA: CPT | Performed by: EMERGENCY MEDICINE

## 2022-07-11 PROCEDURE — 87077 CULTURE AEROBIC IDENTIFY: CPT | Performed by: EMERGENCY MEDICINE

## 2022-07-11 PROCEDURE — 82948 REAGENT STRIP/BLOOD GLUCOSE: CPT

## 2022-07-11 PROCEDURE — 85730 THROMBOPLASTIN TIME PARTIAL: CPT | Performed by: EMERGENCY MEDICINE

## 2022-07-11 PROCEDURE — 83605 ASSAY OF LACTIC ACID: CPT | Performed by: EMERGENCY MEDICINE

## 2022-07-11 PROCEDURE — 85025 COMPLETE CBC W/AUTO DIFF WBC: CPT | Performed by: EMERGENCY MEDICINE

## 2022-07-11 RX ORDER — ACETAMINOPHEN 325 MG/1
488 TABLET ORAL
Status: DISCONTINUED | OUTPATIENT
Start: 2022-07-11 | End: 2022-07-14 | Stop reason: HOSPADM

## 2022-07-11 RX ORDER — ONDANSETRON 2 MG/ML
4 INJECTION INTRAMUSCULAR; INTRAVENOUS EVERY 6 HOURS PRN
Status: DISCONTINUED | OUTPATIENT
Start: 2022-07-11 | End: 2022-07-14 | Stop reason: HOSPADM

## 2022-07-11 RX ORDER — LEVOTHYROXINE SODIUM 88 UG/1
88 TABLET ORAL
Status: DISCONTINUED | OUTPATIENT
Start: 2022-07-12 | End: 2022-07-14 | Stop reason: HOSPADM

## 2022-07-11 RX ORDER — HEPARIN SODIUM 5000 [USP'U]/ML
5000 INJECTION, SOLUTION INTRAVENOUS; SUBCUTANEOUS EVERY 8 HOURS SCHEDULED
Status: DISCONTINUED | OUTPATIENT
Start: 2022-07-11 | End: 2022-07-14 | Stop reason: HOSPADM

## 2022-07-11 RX ORDER — CLOPIDOGREL BISULFATE 75 MG/1
75 TABLET ORAL DAILY
Status: DISCONTINUED | OUTPATIENT
Start: 2022-07-12 | End: 2022-07-14 | Stop reason: HOSPADM

## 2022-07-11 RX ORDER — METHOCARBAMOL 500 MG/1
750 TABLET, FILM COATED ORAL EVERY 8 HOURS SCHEDULED
Status: DISCONTINUED | OUTPATIENT
Start: 2022-07-11 | End: 2022-07-14 | Stop reason: HOSPADM

## 2022-07-11 RX ORDER — METOPROLOL TARTRATE 50 MG/1
50 TABLET, FILM COATED ORAL 2 TIMES DAILY
Status: DISCONTINUED | OUTPATIENT
Start: 2022-07-11 | End: 2022-07-14 | Stop reason: HOSPADM

## 2022-07-11 RX ORDER — INSULIN LISPRO 100 [IU]/ML
1-5 INJECTION, SOLUTION INTRAVENOUS; SUBCUTANEOUS
Status: DISCONTINUED | OUTPATIENT
Start: 2022-07-11 | End: 2022-07-14 | Stop reason: HOSPADM

## 2022-07-11 RX ORDER — DIPHENHYDRAMINE HCL 25 MG
25 TABLET ORAL
Status: DISCONTINUED | OUTPATIENT
Start: 2022-07-11 | End: 2022-07-14 | Stop reason: HOSPADM

## 2022-07-11 RX ORDER — DOCUSATE SODIUM 100 MG/1
100 CAPSULE, LIQUID FILLED ORAL
Status: DISCONTINUED | OUTPATIENT
Start: 2022-07-11 | End: 2022-07-14 | Stop reason: HOSPADM

## 2022-07-11 RX ORDER — MORPHINE SULFATE 30 MG/1
30 TABLET, FILM COATED, EXTENDED RELEASE ORAL EVERY 12 HOURS SCHEDULED
Status: DISCONTINUED | OUTPATIENT
Start: 2022-07-11 | End: 2022-07-14 | Stop reason: HOSPADM

## 2022-07-11 RX ORDER — SODIUM CHLORIDE 9 MG/ML
75 INJECTION, SOLUTION INTRAVENOUS CONTINUOUS
Status: DISPENSED | OUTPATIENT
Start: 2022-07-11 | End: 2022-07-12

## 2022-07-11 RX ORDER — PHENAZOPYRIDINE HYDROCHLORIDE 100 MG/1
100 TABLET, FILM COATED ORAL
Status: DISCONTINUED | OUTPATIENT
Start: 2022-07-11 | End: 2022-07-14 | Stop reason: HOSPADM

## 2022-07-11 RX ORDER — CALCIUM CARBONATE 200(500)MG
1000 TABLET,CHEWABLE ORAL DAILY PRN
Status: DISCONTINUED | OUTPATIENT
Start: 2022-07-11 | End: 2022-07-14 | Stop reason: HOSPADM

## 2022-07-11 RX ORDER — ACETAMINOPHEN 325 MG/1
650 TABLET ORAL EVERY 6 HOURS PRN
Status: DISCONTINUED | OUTPATIENT
Start: 2022-07-11 | End: 2022-07-14 | Stop reason: HOSPADM

## 2022-07-11 RX ORDER — DULOXETIN HYDROCHLORIDE 60 MG/1
60 CAPSULE, DELAYED RELEASE ORAL EVERY EVENING
Status: DISCONTINUED | OUTPATIENT
Start: 2022-07-11 | End: 2022-07-14 | Stop reason: HOSPADM

## 2022-07-11 RX ORDER — DULOXETIN HYDROCHLORIDE 30 MG/1
30 CAPSULE, DELAYED RELEASE ORAL DAILY
Status: DISCONTINUED | OUTPATIENT
Start: 2022-07-12 | End: 2022-07-14 | Stop reason: HOSPADM

## 2022-07-11 RX ORDER — INSULIN LISPRO 100 [IU]/ML
1-6 INJECTION, SOLUTION INTRAVENOUS; SUBCUTANEOUS
Status: DISCONTINUED | OUTPATIENT
Start: 2022-07-12 | End: 2022-07-14 | Stop reason: HOSPADM

## 2022-07-11 RX ORDER — LORAZEPAM 1 MG/1
1 TABLET ORAL 2 TIMES DAILY
Status: DISCONTINUED | OUTPATIENT
Start: 2022-07-11 | End: 2022-07-14 | Stop reason: HOSPADM

## 2022-07-11 RX ORDER — LANOLIN ALCOHOL/MO/W.PET/CERES
6 CREAM (GRAM) TOPICAL
Status: DISCONTINUED | OUTPATIENT
Start: 2022-07-11 | End: 2022-07-14 | Stop reason: HOSPADM

## 2022-07-11 RX ORDER — OXYCODONE HYDROCHLORIDE 5 MG/1
5 TABLET ORAL EVERY 4 HOURS PRN
Status: DISCONTINUED | OUTPATIENT
Start: 2022-07-11 | End: 2022-07-14 | Stop reason: HOSPADM

## 2022-07-11 RX ORDER — ACETAMINOPHEN,DIPHENHYDRAMINE HCL 500; 25 MG/1; MG/1
1 TABLET, FILM COATED ORAL
Status: DISCONTINUED | OUTPATIENT
Start: 2022-07-11 | End: 2022-07-11

## 2022-07-11 RX ORDER — PRAMIPEXOLE DIHYDROCHLORIDE 0.25 MG/1
0.5 TABLET ORAL 3 TIMES DAILY
Status: DISCONTINUED | OUTPATIENT
Start: 2022-07-11 | End: 2022-07-14 | Stop reason: HOSPADM

## 2022-07-11 RX ORDER — SUCRALFATE 1 G/1
1 TABLET ORAL 4 TIMES DAILY
Status: DISCONTINUED | OUTPATIENT
Start: 2022-07-11 | End: 2022-07-14 | Stop reason: HOSPADM

## 2022-07-11 RX ORDER — ASPIRIN 81 MG/1
81 TABLET ORAL DAILY
Status: DISCONTINUED | OUTPATIENT
Start: 2022-07-12 | End: 2022-07-14 | Stop reason: HOSPADM

## 2022-07-11 RX ORDER — FAMOTIDINE 20 MG/1
20 TABLET, FILM COATED ORAL DAILY
Status: DISCONTINUED | OUTPATIENT
Start: 2022-07-12 | End: 2022-07-14 | Stop reason: HOSPADM

## 2022-07-11 RX ORDER — HYDROMORPHONE HCL/PF 1 MG/ML
0.5 SYRINGE (ML) INJECTION ONCE
Status: COMPLETED | OUTPATIENT
Start: 2022-07-11 | End: 2022-07-11

## 2022-07-11 RX ADMIN — DULOXETINE HYDROCHLORIDE 60 MG: 60 CAPSULE, DELAYED RELEASE ORAL at 18:56

## 2022-07-11 RX ADMIN — LORAZEPAM 1 MG: 1 TABLET ORAL at 18:55

## 2022-07-11 RX ADMIN — METOPROLOL TARTRATE 50 MG: 50 TABLET, FILM COATED ORAL at 18:55

## 2022-07-11 RX ADMIN — SODIUM CHLORIDE 1000 ML: 0.9 INJECTION, SOLUTION INTRAVENOUS at 15:50

## 2022-07-11 RX ADMIN — METHOCARBAMOL 750 MG: 500 TABLET ORAL at 21:04

## 2022-07-11 RX ADMIN — HYDROMORPHONE HYDROCHLORIDE 0.5 MG: 1 INJECTION, SOLUTION INTRAMUSCULAR; INTRAVENOUS; SUBCUTANEOUS at 17:29

## 2022-07-11 RX ADMIN — SUCRALFATE 1 G: 1 TABLET ORAL at 18:56

## 2022-07-11 RX ADMIN — DOCUSATE SODIUM 100 MG: 100 CAPSULE, LIQUID FILLED ORAL at 21:03

## 2022-07-11 RX ADMIN — SUCRALFATE 1 G: 1 TABLET ORAL at 21:04

## 2022-07-11 RX ADMIN — MELATONIN TAB 3 MG 6 MG: 3 TAB at 21:04

## 2022-07-11 RX ADMIN — PRAMIPEXOLE DIHYDROCHLORIDE 0.5 MG: 0.25 TABLET ORAL at 20:09

## 2022-07-11 RX ADMIN — PHENAZOPYRIDINE 100 MG: 100 TABLET ORAL at 17:29

## 2022-07-11 RX ADMIN — CEFEPIME HYDROCHLORIDE 2000 MG: 2 INJECTION, POWDER, FOR SOLUTION INTRAVENOUS at 16:53

## 2022-07-11 RX ADMIN — MORPHINE SULFATE 30 MG: 30 TABLET, EXTENDED RELEASE ORAL at 20:10

## 2022-07-11 RX ADMIN — SODIUM CHLORIDE 75 ML/HR: 0.9 INJECTION, SOLUTION INTRAVENOUS at 19:01

## 2022-07-11 RX ADMIN — INSULIN LISPRO 1 UNITS: 100 INJECTION, SOLUTION INTRAVENOUS; SUBCUTANEOUS at 21:05

## 2022-07-11 NOTE — ASSESSMENT & PLAN NOTE
· History of breast cancer with mets to bone status post bilateral mastectomy  · Does have evidence of progressive T10 metastatic lytic lesion  · Not currently undergoing any active chemotherapy  · Continue close outpatient Hematology-Oncology follow-up at discharge

## 2022-07-11 NOTE — ED PROVIDER NOTES
History  Chief Complaint   Patient presents with    Possible UTI     Pt reports increased frequency and foul smelling urine; patient reports that she is a stage 4 CA patient     A 81-year-old female with past medical history of metastatic breast cancer (not currently on chemo), hypertension, fibromyalgia, obstructive sleep apnea and CAD; presents with dysuria, increased urinary frequency, new urinary incontinence, foul-smelling urine and right flank pain that has been getting progressively worse for the past several weeks  Patient states she did speak with her family physician who started her on a 10 day course of Keflex, for which she completed last week  Patient states her symptoms have not improved since taking the Keflex  Patient otherwise denies fever, chills, chest pain, shortness of breath, abdominal pain, nausea, vomiting, diarrhea, peripheral edema and rashes  Pt does report a history of frequent UTI's  A/P:  Dysuria, associated with frequency, new incontinence, foul-smelling urine and right flank pain  Worsening over the past several weeks  Patient's UA has already been completed with leuks and nitrites positive, consistent with UTI  On review of records, there is no record of pt's recent keflex use or urine cultures  Will obtain sepsis workup including CTAP to evaluate for underlying renal or obstructive pathology  Will start broad-spectrum antibiotics  Discussed with patient that she will require admission due to failed outpatient treatment and developing pyelonephritis  History provided by:  Patient and medical records      Prior to Admission Medications   Prescriptions Last Dose Informant Patient Reported? Taking?    Alpha-Lipoic Acid 600 MG CAPS 7/10/2022 at Unknown time  Yes Yes   Sig: Take by mouth 2 (two) times a day   B Complex Vitamins (B COMPLEX 1 PO) 7/10/2022 at Unknown time Self Yes Yes   Sig: Take 1 tablet by mouth   Blood Glucose Monitoring Suppl (ONE TOUCH ULTRA 2) w/Device KIT  Self No No   Sig: Test Blood Sugar Once Daily   CVS B6 100 MG tablet 7/11/2022 at Unknown time  No Yes   Sig: TAKE 1 TABLET BY MOUTH EVERY DAY IN THE MORNING   Calcium Carb-Cholecalciferol (Oyster Shell Calcium w/D) 500-200 MG-UNIT TABS No 7/11/2022 at Unknown time  Yes Yes   Sig: Take by mouth 2 (two) times a day with meals   Calcium Carb-Cholecalciferol (Oyster Shell Calcium w/D) 500-200 MG-UNIT TABS No 7/11/2022 at Unknown time  No Yes   Sig: TAKE 1 TABLET BY MOUTH TWICE A DAY WITH MEALS   DULoxetine (CYMBALTA) 30 mg delayed release capsule 7/11/2022 at Unknown time  No Yes   Sig: TAKE 1 CAP BY MOUTH DAILY IN THE AM   DULoxetine (CYMBALTA) 60 mg delayed release capsule 7/10/2022 at Unknown time  No Yes   Sig: TAKE 1 CAP DAILY BY MOUTH IN THE EVENING   Evolocumab 140 MG/ML SOAJ Past Month at Unknown time Self Yes Yes   Sig: Inject 2 mL under the skin   LORazepam (ATIVAN) 1 mg tablet 7/11/2022 at Unknown time  No Yes   Sig: Take 1 tablet (1 mg total) by mouth 2 (two) times a day   Lancets (onetouch ultrasoft) lancets 7/11/2022 at Unknown time Self No Yes   Sig: Patient to test once daily   Lancets (onetouch ultrasoft) lancets 7/10/2022 at Unknown time Self No Yes   Sig: Test Blood Sugar Once Daily   Misc   Devices (GETGO ROLLING WALKER) MISC  Self No No   Sig: Use rolling walker as needed   Prucalopride Succinate 2 MG TABS 7/11/2022 at Unknown time  No Yes   Sig: Take 2 mg by mouth daily   Repatha SureClick 076 MG/ML SOAJ  Self Yes Yes   aspirin (ECOTRIN LOW STRENGTH) 81 mg EC tablet 7/10/2022 at Unknown time Self Yes Yes   Sig: aspirin 81 mg tablet,delayed release   clopidogrel (PLAVIX) 75 mg tablet 7/10/2022 at Unknown time Self Yes Yes   Sig: Take 75 mg by mouth daily   diphenhydrAMINE-APAP, sleep, (TYLENOL PM EXTRA STRENGTH PO) 7/10/2022 at Unknown time  Yes Yes   Sig: Take by mouth 2 tablets hs   docusate sodium (COLACE) 100 mg capsule 7/10/2022 at Unknown time Self No Yes   Sig: Take 1 capsule (100 mg total) by mouth daily at bedtime   famotidine (PEPCID) 40 MG tablet 2022 at Unknown time Self No Yes   Sig: Take 1 tablet (40 mg total) by mouth daily   glucose blood (OneTouch Ultra) test strip 2022 at Unknown time  No Yes   Sig: USE TO TEST BLOOD SUGAR ONCE DAILY   levothyroxine 88 mcg tablet 2022 at Unknown time  No Yes   Sig: Take 1 tablet (88 mcg total) by mouth daily   lubiprostone (AMITIZA) 24 mcg capsule 7/10/2022 at Unknown time  No Yes   Sig: TAKE 1 CAPSULE BY MOUTH 2 TIMES A DAY WITH MEALS    melatonin 3 mg 7/10/2022 at Unknown time Self Yes Yes   Sig: Take 20 mg by mouth    metFORMIN (GLUCOPHAGE) 500 mg tablet 7/10/2022 at Unknown time  No Yes   Sig: TAKE 1 TABLET BY MOUTH TWICE A DAY WITH MEALS   methocarbamol (ROBAXIN) 750 mg tablet 7/10/2022 at Unknown time  No Yes   Sig: TAKE 1 TABLET (750 MG TOTAL) BY MOUTH EVERY 8 (EIGHT) HOURS   metoprolol tartrate (LOPRESSOR) 50 mg tablet 2022 at Unknown time  No Yes   Sig: Take 1 tablet (50 mg total) by mouth 2 (two) times a day   morphine (MS CONTIN) 15 mg 12 hr tablet 2022 at Unknown time Self Yes Yes   Si mg BID    oxyCODONE (ROXICODONE) 5 immediate release tablet 7/10/2022 at Unknown time  Yes Yes   Sig: PLEASE SEE ATTACHED FOR DETAILED DIRECTIONS   pramipexole (MIRAPEX) 0 5 mg tablet 2022 at Unknown time  No Yes   Sig: TAKE 1 TABLET BY MOUTH IN THE EVENING AND 1 TABLET AT BEDTIME FOR 7 DAYS , THEN 1 TAB 3 TIMES A DAY   sucralfate (CARAFATE) 1 g tablet 2022 at Unknown time  No Yes   Sig: TAKE 1 TABLET (1 G TOTAL) BY MOUTH 4 (FOUR) TIMES A DAY DISSOLVED PILL IN 10 ML WATER THEN SWALLOW      Facility-Administered Medications: None       Past Medical History:   Diagnosis Date    Anxiety     [facility] )     CAD (coronary artery disease)     Cancer (HCC)     Carpal tunnel syndrome     Disease of thyroid gland     Elevated cholesterol     Fibromyalgia     Hypertension     Kidney stone     Melanoma ([facility] ) nose    Myocardial infarction (HCC)     Neuropathy     BRAYDEN (obstructive sleep apnea)     cpap    Panic attack        Past Surgical History:   Procedure Laterality Date    BREAST SURGERY Bilateral     CARDIAC SURGERY      cardiac ablation     SECTION      x3    CHOLECYSTECTOMY      CORONARY ANGIOPLASTY WITH STENT PLACEMENT      LYMPH NODE BIOPSY Right 2022    MASTECTOMY Bilateral     NOSE SURGERY         Family History   Problem Relation Age of Onset    Heart attack Mother     Alcohol abuse Mother     Heart failure Mother     Cancer Mother     Cancer Paternal Grandfather      I have reviewed and agree with the history as documented  E-Cigarette/Vaping    E-Cigarette Use Never User      E-Cigarette/Vaping Substances    Nicotine No     THC No     CBD No     Flavoring No     Other No     Unknown No      Social History     Tobacco Use    Smoking status: Never Smoker    Smokeless tobacco: Never Used   Vaping Use    Vaping Use: Never used   Substance Use Topics    Alcohol use: Not Currently     Comment: beer    Drug use: Not Currently     Types: Marijuana     Comment: Medical reilly       Review of Systems   Genitourinary: Positive for dysuria, flank pain (right), frequency and urgency  All other systems reviewed and are negative  Physical Exam  Physical Exam  General Appearance: alert and oriented, nad, non toxic appearing  Skin:  Warm, dry, intact  No cyanosis  HEENT: Atraumatic, normocephalic  No eye drainage  Normal hearing  Moist mucous membranes  Neck: Supple, trachea midline  Cardiac: RRR; no murmurs, rub, gallops  No pedal edema, 2+ pulses  Pulmonary: lungs CTAB; no wheezes, rales, rhonchi  Gastrointestinal: abdomen soft, nontender, nondistended; no guarding or rebound tenderness; good bowel sounds, no mass or bruits  Right CVA tenderness  Left CVA nontender  Extremities:  No deformities    No calf tenderness, no clubbing  Neuro:  no focal motor or sensory deficits, CN 2-12 grossly intact  Psych:  Normal mood and affect, normal judgement and insight      Vital Signs  ED Triage Vitals   Temperature Pulse Respirations Blood Pressure SpO2   07/11/22 1306 07/11/22 1306 07/11/22 1306 07/11/22 1306 07/11/22 1306   98 3 °F (36 8 °C) 87 20 124/58 95 %      Temp Source Heart Rate Source Patient Position - Orthostatic VS BP Location FiO2 (%)   07/11/22 1306 07/11/22 1503 07/11/22 1503 07/11/22 1503 --   Oral Monitor Sitting Left arm       Pain Score       07/11/22 1503       6           Vitals:    07/11/22 1648 07/11/22 1841 07/11/22 2300 07/12/22 0702   BP: 156/93 140/90 137/82 121/85   Pulse: 75 78 76 86   Patient Position - Orthostatic VS: Lying            Visual Acuity      ED Medications  Medications   phenazopyridine (PYRIDIUM) tablet 100 mg (100 mg Oral Given 7/12/22 0801)   aspirin (ECOTRIN LOW STRENGTH) EC tablet 81 mg (81 mg Oral Given 7/12/22 0801)   clopidogrel (PLAVIX) tablet 75 mg (75 mg Oral Given 7/12/22 0801)   docusate sodium (COLACE) capsule 100 mg (100 mg Oral Given 7/11/22 2103)   DULoxetine (CYMBALTA) delayed release capsule 30 mg (30 mg Oral Given 7/12/22 0802)   DULoxetine (CYMBALTA) delayed release capsule 60 mg (60 mg Oral Given 7/11/22 1856)   famotidine (PEPCID) tablet 20 mg (20 mg Oral Given 7/12/22 0803)   levothyroxine tablet 88 mcg (88 mcg Oral Given 7/12/22 0507)   LORazepam (ATIVAN) tablet 1 mg (1 mg Oral Given 7/12/22 0801)   lubiprostone (AMITIZA) capsule 24 mcg (24 mcg Oral Given 7/12/22 0801)   melatonin tablet 6 mg (6 mg Oral Given 7/11/22 2104)   methocarbamol (ROBAXIN) tablet 750 mg (750 mg Oral Given 7/12/22 0506)   metoprolol tartrate (LOPRESSOR) tablet 50 mg (50 mg Oral Given 7/12/22 0803)   morphine (MS CONTIN) ER tablet 30 mg (30 mg Oral Given 7/12/22 0802)   oxyCODONE (ROXICODONE) IR tablet 5 mg (has no administration in time range)   pramipexole (MIRAPEX) tablet 0 5 mg (0 5 mg Oral Given 7/12/22 0802)   sucralfate (CARAFATE) tablet 1 g (1 g Oral Given 7/12/22 0802)   acetaminophen (TYLENOL) tablet 650 mg (has no administration in time range)   sodium chloride 0 9 % infusion (0 mL/hr Intravenous Stopped 7/12/22 0309)   ondansetron (ZOFRAN) injection 4 mg (has no administration in time range)   calcium carbonate (TUMS) chewable tablet 1,000 mg (has no administration in time range)   heparin (porcine) subcutaneous injection 5,000 Units (5,000 Units Subcutaneous Given 7/12/22 0507)   insulin lispro (HumaLOG) 100 units/mL subcutaneous injection 1-6 Units (1 Units Subcutaneous Not Given 7/12/22 0715)   insulin lispro (HumaLOG) 100 units/mL subcutaneous injection 1-5 Units (1 Units Subcutaneous Given 7/11/22 2105)   diphenhydrAMINE (BENADRYL) tablet 25 mg (has no administration in time range)     And   acetaminophen (TYLENOL) tablet 488 mg (has no administration in time range)   fluticasone-vilanterol (BREO ELLIPTA) 100-25 mcg/inh inhaler 1 puff (1 puff Inhalation Given 7/12/22 0802)   sodium chloride 0 9 % bolus 1,000 mL (0 mL Intravenous Stopped 7/11/22 1650)   cefepime (MAXIPIME) 2 g/50 mL dextrose IVPB (0 mg Intravenous Stopped 7/11/22 1723)   HYDROmorphone (DILAUDID) injection 0 5 mg (0 5 mg Intravenous Given 7/11/22 1729)       Diagnostic Studies  Results Reviewed     Procedure Component Value Units Date/Time    Urine Microscopic [[id number]]  (Abnormal) Collected: 07/12/22 0458    Lab Status: Final result Specimen: Urine, Clean Catch Updated: 07/12/22 0558     RBC, UA None Seen /hpf      WBC, UA 4-10 /hpf      Epithelial Cells Occasional /hpf      Bacteria, UA Occasional /hpf     Blood culture #1 [[id number]] Collected: 07/11/22 1548    Lab Status: Preliminary result Specimen: Blood from Arm, Left Updated: 07/11/22 2309     Blood Culture Received in Microbiology Lab  Culture in Progress      Blood culture #2 [[id number]] Collected: 07/11/22 1648    Lab Status: Preliminary result Specimen: Blood from Hand, Left Updated: 07/11/22 2304     Blood Culture Received in Microbiology Lab  Culture in Progress  Procalcitonin [[id number]]  (Normal) Collected: 07/11/22 1548    Lab Status: Final result Specimen: Blood from Arm, Left Updated: 07/11/22 1622     Procalcitonin <0 05 ng/ml     Lactic Acid [[id number]]  (Normal) Collected: 07/11/22 1548    Lab Status: Final result Specimen: Blood from Arm, Left Updated: 07/11/22 1617     LACTIC ACID 1 7 mmol/L     Narrative:      Result may be elevated if tourniquet was used during collection      Protime-INR [[id number]]  (Normal) Collected: 07/11/22 1548    Lab Status: Final result Specimen: Blood from Arm, Left Updated: 07/11/22 1614     Protime 13 1 seconds      INR 1 02    APTT [[id number]]  (Normal) Collected: 07/11/22 1548    Lab Status: Final result Specimen: Blood from Arm, Left Updated: 07/11/22 1614     PTT 33 seconds     Comprehensive metabolic panel [[id number]]  (Abnormal) Collected: 07/11/22 1548    Lab Status: Final result Specimen: Blood from Arm, Left Updated: 07/11/22 1613     Sodium 140 mmol/L      Potassium 3 9 mmol/L      Chloride 101 mmol/L      CO2 30 mmol/L      ANION GAP 9 mmol/L      BUN 16 mg/dL      Creatinine 0 81 mg/dL      Glucose 102 mg/dL      Calcium 9 4 mg/dL      Corrected Calcium 10 1 mg/dL      AST 14 U/L      ALT 25 U/L      Alkaline Phosphatase 64 U/L      Total Protein 8 5 g/dL      Albumin 3 1 g/dL      Total Bilirubin 0 63 mg/dL      eGFR 75 ml/min/1 73sq m     Narrative:      Reshma guidelines for Chronic Kidney Disease (CKD):     Stage 1 with normal or high GFR (GFR > 90 mL/min/1 73 square meters)    Stage 2 Mild CKD (GFR = 60-89 mL/min/1 73 square meters)    Stage 3A Moderate CKD (GFR = 45-59 mL/min/1 73 square meters)    Stage 3B Moderate CKD (GFR = 30-44 mL/min/1 73 square meters)    Stage 4 Severe CKD (GFR = 15-29 mL/min/1 73 square meters)    Stage 5 End Stage CKD (GFR <15 mL/min/1 73 square meters)  Note: GFR calculation is accurate only with a steady state creatinine    CBC and differential [[id number]]  (Abnormal) Collected: 07/11/22 1548    Lab Status: Final result Specimen: Blood from Arm, Left Updated: 07/11/22 1558     WBC 12 05 Thousand/uL      RBC 4 50 Million/uL      Hemoglobin 13 7 g/dL      Hematocrit 41 3 %      MCV 92 fL      MCH 30 4 pg      MCHC 33 2 g/dL      RDW 13 8 %      MPV 8 7 fL      Platelets 076 Thousands/uL      nRBC 0 /100 WBCs      Neutrophils Relative 73 %      Immat GRANS % 1 %      Lymphocytes Relative 16 %      Monocytes Relative 8 %      Eosinophils Relative 1 %      Basophils Relative 1 %      Neutrophils Absolute 8 89 Thousands/µL      Immature Grans Absolute 0 08 Thousand/uL      Lymphocytes Absolute 1 94 Thousands/µL      Monocytes Absolute 0 99 Thousand/µL      Eosinophils Absolute 0 09 Thousand/µL      Basophils Absolute 0 06 Thousands/µL     Urine culture [[id number]] Collected: 07/11/22 1409    Lab Status:  In process Specimen: Urine, Clean Catch Updated: 07/11/22 1541    Urine Macroscopic, POC [[id number]]  (Abnormal) Collected: 07/11/22 1409    Lab Status: Final result Specimen: Urine Updated: 07/11/22 1410     Color, UA Yellow     Clarity, UA Clear     pH, UA 7 0     Leukocytes, UA Large     Nitrite, UA Positive     Protein,  (2+) mg/dl      Glucose, UA Negative mg/dl      Ketones, UA Negative mg/dl      Urobilinogen, UA 1 0 E U /dl      Bilirubin, UA Negative     Occult Blood, UA Large     Specific [place], UA 1 020    Narrative:      CLINITEK RESULT                 CT abdomen pelvis wo contrast   ED Interpretation by Ijeoma Lopez DO (07/11 1635)   FINDINGS:     ABDOMEN     LOWER CHEST:  No clinically significant abnormality identified in the visualized lower chest      LIVER/BILIARY TREE:  Hepatic steatosis and hepatomegaly      GALLBLADDER:  Gallbladder is surgically absent      SPLEEN:  Unremarkable      PANCREAS:  Unremarkable      ADRENAL GLANDS:  Unremarkable      KIDNEYS/URETERS: Bilateral tiny 1 to 2 mm nonobstructing calculi  No hydronephrosis     STOMACH AND BOWEL:  Unremarkable      APPENDIX:  No findings to suggest appendicitis      ABDOMINOPELVIC CAVITY:  No ascites  No pneumoperitoneum  No lymphadenopathy      VESSELS:  Unremarkable for patient's age      PELVIS     REPRODUCTIVE ORGANS:  Unremarkable for patient's age      URINARY BLADDER:  Diffuse bladder wall thickening could relate to underdistention or cystitis, correlate with urinalysis      ABDOMINAL WALL/INGUINAL REGIONS:  Unremarkable      OSSEOUS STRUCTURES:  Similar diffuse sclerotic lesions suggesting metastatic disease as before  T10 lytic lesion als   o likely related to metastatic disease, this is progressive compared to the prior exam      IMPRESSION:        1  Hepatic steatosis and hepatomegaly  2  Bilateral tiny 1 to 2 mm renal nonobstructing calculi  3  Progressive T10 metastatic lytic lesion  4  Correlate with urinalysis to exclude cystitis          Final Result by Shay Burch MD (07/11 1631)         1  Hepatic steatosis and hepatomegaly  2  Bilateral tiny 1 to 2 mm renal nonobstructing calculi  3  Progressive T10 metastatic lytic lesion  4  Correlate with urinalysis to exclude cystitis              Workstation performed: [id number]         MRI inpatient order    (Results Pending)              Procedures  Procedures         ED Course                                             MDM    Disposition  Final diagnoses:   Pyelonephritis of right kidney   Cystitis     Time reflects when diagnosis was documented in both MDM as applicable and the Disposition within this note     Time User Action Codes Description Comment    7/11/2022  4:39 PM Judy ALICIA Add [N12] Pyelonephritis of right kidney     7/11/2022  4:39 PM Judy ALICIA Add [N30 90] Cystitis     7/11/2022 10:23 PM Juan Ramon Jarrell Add [C79 51] Bone metastases [facility])       ED Disposition     ED Disposition   Admit    Condition   Stable    Date/Time   Mon Jul 11, 2022  4:39 PM    Comment   Case was discussed with ELIZABETH and the patient's admission status was agreed to be Admission Status: inpatient status to the service of Dr Richar Saab             Follow-up Information    None         Current Discharge Medication List      CONTINUE these medications which have NOT CHANGED    Details   Alpha-Lipoic Acid 600 MG CAPS Take by mouth 2 (two) times a day      aspirin (ECOTRIN LOW STRENGTH) 81 mg EC tablet aspirin 81 mg tablet,delayed release      B Complex Vitamins (B COMPLEX 1 PO) Take 1 tablet by mouth      !! Calcium Carb-Cholecalciferol (Oyster Shell Calcium w/D) 500-200 MG-UNIT TABS No Take by mouth 2 (two) times a day with meals      !! Calcium Carb-Cholecalciferol (Oyster Shell Calcium w/D) 500-200 MG-UNIT TABS No TAKE 1 TABLET BY MOUTH TWICE A DAY WITH MEALS  Qty: 180 tablet, Refills: 1    Associated Diagnoses: Atherosclerosis of native coronary artery of native heart without angina pectoris; Type 2 diabetes mellitus without complication, without long-term current use of insulin ([facility] 75 ); Hypothyroidism, unspecified type; Malignant neoplasm metastatic to lung, unspecified laterality ([facility] ); Gastroesophageal reflux disease with esophagitis without hemorrhage; Essential hypertension; Coronary artery disease with angina pectoris, unspecified vessel or lesion type, unspecified whether native or transplanted heart ([facility] ); Panlobular emphysema (HCC)      clopidogrel (PLAVIX) 75 mg tablet Take 75 mg by mouth daily  Refills: 3      CVS B6 100 MG tablet TAKE 1 TABLET BY MOUTH EVERY DAY IN THE MORNING  Qty: 30 tablet, Refills: 2    Associated Diagnoses: Gastroparesis;  Fatty liver; Malignant neoplasm metastatic to lung, unspecified laterality (HCC)      diphenhydrAMINE-APAP, sleep, (TYLENOL PM EXTRA STRENGTH PO) Take by mouth 2 tablets hs      docusate sodium (COLACE) 100 mg capsule Take 1 capsule (100 mg total) by mouth daily at bedtime  Qty: 180 capsule, Refills: 3    Associated Diagnoses: Constipation by delayed colonic transit      !! DULoxetine (CYMBALTA) 30 mg delayed release capsule TAKE 1 CAP BY MOUTH DAILY IN THE AM  Qty: 90 capsule, Refills: 1    Associated Diagnoses: Idiopathic peripheral neuropathy      !! DULoxetine (CYMBALTA) 60 mg delayed release capsule TAKE 1 CAP DAILY BY MOUTH IN THE EVENING  Qty: 90 capsule, Refills: 1    Associated Diagnoses: Idiopathic peripheral neuropathy      !! Evolocumab 140 MG/ML SOAJ Inject 2 mL under the skin      famotidine (PEPCID) 40 MG tablet Take 1 tablet (40 mg total) by mouth daily  Qty: 90 tablet, Refills: 2    Associated Diagnoses: Gastroesophageal reflux disease with esophagitis without hemorrhage      glucose blood (OneTouch Ultra) test strip USE TO TEST BLOOD SUGAR ONCE DAILY  Qty: 100 strip, Refills: 2    Associated Diagnoses: Type 2 diabetes mellitus without complication, without long-term current use of insulin ([facility] )      ! ! Lancets (onetouch ultrasoft) lancets Patient to test once daily  Qty: 100 each, Refills: 1    Associated Diagnoses: Hyperglycemia      !! Lancets (onetouch ultrasoft) lancets Test Blood Sugar Once Daily  Qty: 100 each, Refills: 0    Associated Diagnoses: Type 2 diabetes mellitus without complication, without long-term current use of insulin ([facility])      levothyroxine 88 mcg tablet Take 1 tablet (88 mcg total) by mouth daily  Qty: 90 tablet, Refills: 0    Associated Diagnoses: Constipation by delayed colonic transit; Hyperglycemia; Atherosclerosis of native coronary artery of native heart without angina pectoris      LORazepam (ATIVAN) 1 mg tablet Take 1 tablet (1 mg total) by mouth 2 (two) times a day  Qty: 60 tablet, Refills: 0    Associated Diagnoses: Generalized anxiety disorder      lubiprostone (AMITIZA) 24 mcg capsule TAKE 1 CAPSULE BY MOUTH 2 TIMES A DAY WITH MEALS    Qty: 60 capsule, Refills: 2    Associated Diagnoses: Constipation by delayed colonic transit      melatonin 3 mg Take 20 mg by mouth metFORMIN (GLUCOPHAGE) 500 mg tablet TAKE 1 TABLET BY MOUTH TWICE A DAY WITH MEALS  Qty: 180 tablet, Refills: 0    Associated Diagnoses: Constipation by delayed colonic transit; Hyperglycemia; Atherosclerosis of native coronary artery of native heart without angina pectoris      methocarbamol (ROBAXIN) 750 mg tablet TAKE 1 TABLET (750 MG TOTAL) BY MOUTH EVERY 8 (EIGHT) HOURS  Qty: 90 tablet, Refills: 0    Associated Diagnoses: Disequilibrium syndrome; Ambulatory dysfunction      metoprolol tartrate (LOPRESSOR) 50 mg tablet Take 1 tablet (50 mg total) by mouth 2 (two) times a day  Qty: 180 tablet, Refills: 1    Associated Diagnoses: Coronary artery disease involving native heart with angina pectoris, unspecified vessel or lesion type ([facility])      morphine (MS CONTIN) 15 mg 12 hr tablet 30 mg BID       oxyCODONE (ROXICODONE) 5 immediate release tablet PLEASE SEE ATTACHED FOR DETAILED DIRECTIONS      pramipexole (MIRAPEX) 0 5 mg tablet TAKE 1 TABLET BY MOUTH IN THE EVENING AND 1 TABLET AT BEDTIME FOR 7 DAYS , THEN 1 TAB 3 TIMES A DAY  Qty: 270 tablet, Refills: 1    Associated Diagnoses: Restless leg syndrome      Prucalopride Succinate 2 MG TABS Take 2 mg by mouth daily  Qty: 90 tablet, Refills: 0    Associated Diagnoses: Constipation, unspecified constipation type      !! Repatha SureClick 594 MG/ML SOAJ       sucralfate (CARAFATE) 1 g tablet TAKE 1 TABLET (1 G TOTAL) BY MOUTH 4 (FOUR) TIMES A DAY DISSOLVED PILL IN 10 ML WATER THEN SWALLOW  Qty: 360 tablet, Refills: 1    Associated Diagnoses: Gastroesophageal reflux disease with esophagitis without hemorrhage; Gastroparesis      Blood Glucose Monitoring Suppl (ONE TOUCH ULTRA 2) w/Device KIT Test Blood Sugar Once Daily  Qty: 1 kit, Refills: 0    Associated Diagnoses: Type 2 diabetes mellitus without complication, without long-term current use of insulin ([facility])      Misc   Devices (GETGO ROLLING WALKER) MISC Use rolling walker as needed  Qty: 1 each, Refills: 0 Associated Diagnoses: Bilateral low back pain, unspecified chronicity, with sciatica presence unspecified       !! - Potential duplicate medications found  Please discuss with provider  No discharge procedures on file      PDMP Review       Value Time User    PDMP Reviewed  Yes 7/1/2022  9:04  Ac Marino          ED Provider  Electronically Signed by           Cyril Jaquez DO  07/12/22 5458

## 2022-07-11 NOTE — ASSESSMENT & PLAN NOTE
Lab Results   Component Value Date    HGBA1C 6 3 (H) 04/06/2022       No results for input(s): POCGLU in the last 72 hours      Blood Sugar Average: Last 72 hrs:  ·  well-controlled type 2 diabetes with most recent hemoglobin A1c 6 3  · Maintained outpatient on metformin, hold while inpatient  · Sliding scale coverage with Accu-Cheks

## 2022-07-11 NOTE — ED CARE HANDOFF
Emergency Department Sign Out Note        Sign out and transfer of care from Dr Andie Dominguez  See Separate Emergency Department note  The patient, Leopoldo King, was evaluated by the previous provider for UTI  Pt has history of metastatic breast ca, no current treatment, presenting with urinary complaints and R flank pain  Already completed course of Keflex without change in sx  Workup Completed:  Urine    ED Course / Workup Pending (followup):  Labs/CT                ED Course as of 07/11/22 2248   Mon Jul 11, 2022   1609 WBC(!): 12 05     Procedures  MDM    Persistent urinary sx/flank pain with UTI despite course of oral abx     Admitted for IV abx    Disposition  Final diagnoses:   Pyelonephritis of right kidney   Cystitis     Time reflects when diagnosis was documented in both MDM as applicable and the Disposition within this note     Time User Action Codes Description Comment    7/11/2022  4:39 PM Rasheeda ALICIA Add [N12] Pyelonephritis of right kidney     7/11/2022  4:39 PM Sol Bis Add [N30 90] Cystitis     7/11/2022 10:23 PM Juan Ramon Jarrell Add [C79 51] Bone metastases [facility])       ED Disposition     ED Disposition   Admit    Condition   Stable    Date/Time   Mon Jul 11, 2022  4:39 PM    Comment   Case was discussed with ELIZABETH and the patient's admission status was agreed to be Admission Status: inpatient status to the service of Dr Courtney Gonzalez             Follow-up Information    None       Current Discharge Medication List      CONTINUE these medications which have NOT CHANGED    Details   Alpha-Lipoic Acid 600 MG CAPS Take by mouth 2 (two) times a day      aspirin (ECOTRIN LOW STRENGTH) 81 mg EC tablet aspirin 81 mg tablet,delayed release      B Complex Vitamins (B COMPLEX 1 PO) Take 1 tablet by mouth      !! Calcium Carb-Cholecalciferol (Oyster Shell Calcium w/D) 500-200 MG-UNIT TABS No Take by mouth 2 (two) times a day with meals      !! Calcium Carb-Cholecalciferol (Oyster Shell Calcium w/D) 500-200 MG-UNIT TABS No TAKE 1 TABLET BY MOUTH TWICE A DAY WITH MEALS  Qty: 180 tablet, Refills: 1    Associated Diagnoses: Atherosclerosis of native coronary artery of native heart without angina pectoris; Type 2 diabetes mellitus without complication, without long-term current use of insulin ([facility] ); Hypothyroidism, unspecified type; Malignant neoplasm metastatic to lung, unspecified laterality ([place] [id number] ); Gastroesophageal reflux disease with esophagitis without hemorrhage; Essential hypertension; Coronary artery disease with angina pectoris, unspecified vessel or lesion type, unspecified whether native or transplanted heart ([facility] ); Panlobular emphysema (HCC)      clopidogrel (PLAVIX) 75 mg tablet Take 75 mg by mouth daily  Refills: 3      CVS B6 100 MG tablet TAKE 1 TABLET BY MOUTH EVERY DAY IN THE MORNING  Qty: 30 tablet, Refills: 2    Associated Diagnoses: Gastroparesis; Fatty liver; Malignant neoplasm metastatic to lung, unspecified laterality (HCC)      diphenhydrAMINE-APAP, sleep, (TYLENOL PM EXTRA STRENGTH PO) Take by mouth 2 tablets hs      docusate sodium (COLACE) 100 mg capsule Take 1 capsule (100 mg total) by mouth daily at bedtime  Qty: 180 capsule, Refills: 3    Associated Diagnoses: Constipation by delayed colonic transit      !! DULoxetine (CYMBALTA) 30 mg delayed release capsule TAKE 1 CAP BY MOUTH DAILY IN THE AM  Qty: 90 capsule, Refills: 1    Associated Diagnoses: Idiopathic peripheral neuropathy      !! DULoxetine (CYMBALTA) 60 mg delayed release capsule TAKE 1 CAP DAILY BY MOUTH IN THE EVENING  Qty: 90 capsule, Refills: 1    Associated Diagnoses: Idiopathic peripheral neuropathy      !!  Evolocumab 140 MG/ML SOAJ Inject 2 mL under the skin      famotidine (PEPCID) 40 MG tablet Take 1 tablet (40 mg total) by mouth daily  Qty: 90 tablet, Refills: 2    Associated Diagnoses: Gastroesophageal reflux disease with esophagitis without hemorrhage      glucose blood (OneTouch Ultra) test strip USE TO TEST BLOOD SUGAR ONCE DAILY  Qty: 100 strip, Refills: 2    Associated Diagnoses: Type 2 diabetes mellitus without complication, without long-term current use of insulin ([facility] )      ! ! Lancets (onetouch ultrasoft) lancets Patient to test once daily  Qty: 100 each, Refills: 1    Associated Diagnoses: Hyperglycemia      !! Lancets (onetouch ultrasoft) lancets Test Blood Sugar Once Daily  Qty: 100 each, Refills: 0    Associated Diagnoses: Type 2 diabetes mellitus without complication, without long-term current use of insulin ([facility])      levothyroxine 88 mcg tablet Take 1 tablet (88 mcg total) by mouth daily  Qty: 90 tablet, Refills: 0    Associated Diagnoses: Constipation by delayed colonic transit; Hyperglycemia; Atherosclerosis of native coronary artery of native heart without angina pectoris      LORazepam (ATIVAN) 1 mg tablet Take 1 tablet (1 mg total) by mouth 2 (two) times a day  Qty: 60 tablet, Refills: 0    Associated Diagnoses: Generalized anxiety disorder      lubiprostone (AMITIZA) 24 mcg capsule TAKE 1 CAPSULE BY MOUTH 2 TIMES A DAY WITH MEALS  Qty: 60 capsule, Refills: 2    Associated Diagnoses: Constipation by delayed colonic transit      melatonin 3 mg Take 20 mg by mouth       metFORMIN (GLUCOPHAGE) 500 mg tablet TAKE 1 TABLET BY MOUTH TWICE A DAY WITH MEALS  Qty: 180 tablet, Refills: 0    Associated Diagnoses: Constipation by delayed colonic transit; Hyperglycemia; Atherosclerosis of native coronary artery of native heart without angina pectoris      methocarbamol (ROBAXIN) 750 mg tablet TAKE 1 TABLET (750 MG TOTAL) BY MOUTH EVERY 8 (EIGHT) HOURS  Qty: 90 tablet, Refills: 0    Associated Diagnoses: Disequilibrium syndrome;  Ambulatory dysfunction      metoprolol tartrate (LOPRESSOR) 50 mg tablet Take 1 tablet (50 mg total) by mouth 2 (two) times a day  Qty: 180 tablet, Refills: 1    Associated Diagnoses: Coronary artery disease involving native heart with angina pectoris, unspecified vessel or lesion type ([place] [address] ) morphine (MS CONTIN) 15 mg 12 hr tablet 30 mg BID       oxyCODONE (ROXICODONE) 5 immediate release tablet PLEASE SEE ATTACHED FOR DETAILED DIRECTIONS      pramipexole (MIRAPEX) 0 5 mg tablet TAKE 1 TABLET BY MOUTH IN THE EVENING AND 1 TABLET AT BEDTIME FOR 7 DAYS , THEN 1 TAB 3 TIMES A DAY  Qty: 270 tablet, Refills: 1    Associated Diagnoses: Restless leg syndrome      Prucalopride Succinate 2 MG TABS Take 2 mg by mouth daily  Qty: 90 tablet, Refills: 0    Associated Diagnoses: Constipation, unspecified constipation type      !! Repatha SureClick 220 MG/ML SOAJ       sucralfate (CARAFATE) 1 g tablet TAKE 1 TABLET (1 G TOTAL) BY MOUTH 4 (FOUR) TIMES A DAY DISSOLVED PILL IN 10 ML WATER THEN SWALLOW  Qty: 360 tablet, Refills: 1    Associated Diagnoses: Gastroesophageal reflux disease with esophagitis without hemorrhage; Gastroparesis      Blood Glucose Monitoring Suppl (ONE TOUCH ULTRA 2) w/Device KIT Test Blood Sugar Once Daily  Qty: 1 kit, Refills: 0    Associated Diagnoses: Type 2 diabetes mellitus without complication, without long-term current use of insulin ([facility])      Misc  Devices (Rayne Mcdonald) MISC Use rolling walker as needed  Qty: 1 each, Refills: 0    Associated Diagnoses: Bilateral low back pain, unspecified chronicity, with sciatica presence unspecified       !! - Potential duplicate medications found  Please discuss with provider  No discharge procedures on file         ED Provider  Electronically Signed by     Axel Castorena DO  07/11/22 4869

## 2022-07-11 NOTE — ASSESSMENT & PLAN NOTE
· Patient has progressively worsening dysphagia over the past few weeks  · Notes inability to tolerate cold liquids and some solids  · Will place on dental soft diet for now and consult speech therapy

## 2022-07-11 NOTE — ED NOTES
Patient reassessed in triage, appears in no distress at this time        Hallie Nice RN  07/11/22 1656

## 2022-07-11 NOTE — H&P
[facility]  H&P- Kayla Pour 1955, 78 y o  female MRN: [id number]  Unit/Bed#: ED 28 Encounter: [id number]  Primary Care Provider: Jefry Barnes DO   Date and time admitted to hospital: 7/11/2022  3:15 PM    * Urinary tract infection  Assessment & Plan  · Patient presented to the ED with complaint of generalized fatigue, burning with urination and right-sided flank pain  · UA with large leukocytes, nitrate positive and occult blood  · Did recently complete 10 day course of Keflex without improvement, was placed on cefepime in the ED, continue  · Follow-up urine culture  · Blood cultures pending  · Patient did not meet sepsis criteria time of presentation  · Does have mild leukocytosis of 12  · Start pyridium for burning   · P r n  Analgesics for flank pain, no evidence of pyelonephritis on CT scan    Esophageal dysphagia  Assessment & Plan  · Patient has progressively worsening dysphagia over the past few weeks  · Notes inability to tolerate cold liquids and some solids  · Will place on dental soft diet for now and consult speech therapy    Type 2 diabetes mellitus ([facility] )  Assessment & Plan  Lab Results   Component Value Date    HGBA1C 6 3 (H) 04/06/2022       No results for input(s): POCGLU in the last 72 hours      Blood Sugar Average: Last 72 hrs:  ·  well-controlled type 2 diabetes with most recent hemoglobin A1c 6 3  · Maintained outpatient on metformin, hold while inpatient  · Sliding scale coverage with Accu-Cheks    Breast cancer ([facility] )  Assessment & Plan  · History of breast cancer with mets to bone status post bilateral mastectomy  · Does have evidence of progressive T10 metastatic lytic lesion  · Not currently undergoing any active chemotherapy  · Continue close outpatient Hematology-Oncology follow-up at discharge    BRAYDEN (obstructive sleep apnea)  Assessment & Plan  · Continue CPAP HS    Mixed hyperlipidemia  Assessment & Plan  · Continue Repatha and gemfibrozil    Depressive disorder  Assessment & Plan  · Continue Cymbalta and p r n  Ativan    Atherosclerotic heart disease of native coronary artery without angina pectoris  Assessment & Plan  · Patient denies any chest pain at time of admission  · Continue ASA, Plavix and beta-blocker    VTE Pharmacologic Prophylaxis: VTE Score: 6 High Risk (Score >/= 5) - Pharmacological DVT Prophylaxis Ordered: heparin  Sequential Compression Devices Ordered  Code Status: No Order full code  Discussion with family: None  Anticipated Length of Stay: Patient will be admitted on an inpatient basis with an anticipated length of stay of greater than 2 midnights secondary to UTI  Total Time for Visit, including Counseling / Coordination of Care: 60 minutes Greater than 50% of this total time spent on direct patient counseling and coordination of care  Chief Complaint:  Weakness and burning urination    History of Present Illness:  Kirby Maurer is a 78 y o  female with a PMH of breast cancer, hypertension, CAD, hyperlipidemia who presents with generalized weakness and burning with urination  Patient reports she has been having progressive worsening generalized weakness for the past few weeks  She reports she was treated with Keflex for urinary burning approximately 2-3 weeks ago, completed 10 day course but did not get any better  She reports severe urinary burning as well as urinary frequency and urgency  She does report she has had a few incontinent episodes  She denies any recent fevers or chills  She reports difficulty ambulating as her legs feel very weak  She also reports some right flank pain but denies any spinal tenderness or saddle anesthesia  She does report history of breast cancer with metastasis, plan was for follow-up with on Oncology on Wednesday  Review of Systems:  Review of Systems   Constitutional: Positive for fatigue  Negative for chills and fever  HENT: Positive for trouble swallowing  Eyes: Negative for visual disturbance  Respiratory: Negative for cough and shortness of breath  Cardiovascular: Negative for chest pain and leg swelling  Gastrointestinal: Negative for abdominal pain, nausea and vomiting  Genitourinary: Positive for dysuria, flank pain and urgency  Negative for difficulty urinating  Musculoskeletal: Positive for back pain  Negative for gait problem  Skin: Negative for rash  Neurological: Positive for weakness  Negative for dizziness  Psychiatric/Behavioral: Negative for sleep disturbance  Past Medical and Surgical History:   Past Medical History:   Diagnosis Date    Anxiety     Breast cancer ([place] [address] )     CAD (coronary artery disease)     Cancer ([place] [address] )     Carpal tunnel syndrome     Disease of thyroid gland     Elevated cholesterol     Fibromyalgia     Hypertension     Kidney stone     Melanoma ([place] [address] )     nose    Myocardial infarction (HCC)     Neuropathy     BRAYDEN (obstructive sleep apnea)     cpap    Panic attack        Past Surgical History:   Procedure Laterality Date    BREAST SURGERY Bilateral     CARDIAC SURGERY      cardiac ablation     SECTION      x3    CHOLECYSTECTOMY      CORONARY ANGIOPLASTY WITH STENT PLACEMENT      LYMPH NODE BIOPSY Right 2022    MASTECTOMY Bilateral     NOSE SURGERY         Meds/Allergies:  Prior to Admission medications    Medication Sig Start Date End Date Taking?  Authorizing Provider   Alpha-Lipoic Acid 600 MG CAPS Take by mouth 2 (two) times a day   Yes Historical Provider, MD   aspirin (ECOTRIN LOW STRENGTH) 81 mg EC tablet aspirin 81 mg tablet,delayed release   Yes Historical Provider, MD   B Complex Vitamins (B COMPLEX 1 PO) Take 1 tablet by mouth   Yes Historical Provider, MD   Calcium Carb-Cholecalciferol (Oyster Shell Calcium w/D) 500-200 MG-UNIT TABS No Take by mouth 2 (two) times a day with meals 3/5/22  Yes Historical Provider, MD   Calcium Carb-Cholecalciferol (Oyster Shell Calcium w/D) 500-200 MG-UNIT TABS No TAKE 1 TABLET BY MOUTH TWICE A DAY WITH MEALS 6/27/22  Yes Jefry Barnes DO   clopidogrel (PLAVIX) 75 mg tablet Take 75 mg by mouth daily 10/23/18  Yes Historical Provider, MD   CVS B6 100 MG tablet TAKE 1 TABLET BY MOUTH EVERY DAY IN THE MORNING 5/20/22  Yes Jefry Barnes DO   diphenhydrAMINE-APAP, sleep, (TYLENOL PM EXTRA STRENGTH PO) Take by mouth 2 tablets hs   Yes Historical Provider, MD   docusate sodium (COLACE) 100 mg capsule Take 1 capsule (100 mg total) by mouth daily at bedtime 8/18/21  Yes Pat Mello MD   DULoxetine (CYMBALTA) 30 mg delayed release capsule TAKE 1 CAP BY MOUTH DAILY IN THE AM 3/30/22  Yes NICOLE Rutledge   DULoxetine (CYMBALTA) 60 mg delayed release capsule TAKE 1 CAP DAILY BY MOUTH IN THE EVENING 3/30/22  Yes NICOLE Rutledge   Evolocumab 140 MG/ML SOAJ Inject 2 mL under the skin 10/25/18  Yes Historical Provider, MD   famotidine (PEPCID) 40 MG tablet Take 1 tablet (40 mg total) by mouth daily 9/1/21  Yes NICOLE Alston   levothyroxine 88 mcg tablet Take 1 tablet (88 mcg total) by mouth daily 2/17/22  Yes Jefry Barnes DO   LORazepam (ATIVAN) 1 mg tablet Take 1 tablet (1 mg total) by mouth 2 (two) times a day 6/13/22  Yes William Howard DO   lubiprostone (AMITIZA) 24 mcg capsule TAKE 1 CAPSULE BY MOUTH 2 TIMES A DAY WITH MEALS   6/18/22  Yes NICOLE Siegel   melatonin 3 mg Take 20 mg by mouth    Yes Historical Provider, MD   metFORMIN (GLUCOPHAGE) 500 mg tablet TAKE 1 TABLET BY MOUTH TWICE A DAY WITH MEALS 6/24/22  Yes William Howard DO   methocarbamol (ROBAXIN) 750 mg tablet TAKE 1 TABLET (750 MG TOTAL) BY MOUTH EVERY 8 (EIGHT) HOURS 6/20/22  Yes Jefry Barnes DO   metoprolol tartrate (LOPRESSOR) 50 mg tablet Take 1 tablet (50 mg total) by mouth 2 (two) times a day 3/15/22  Yes William Howard,    morphine (MS CONTIN) 15 mg 12 hr tablet 30 mg BID  9/16/21  Yes Historical Provider, MD   oxyCODONE (ROXICODONE) 5 immediate release tablet PLEASE SEE ATTACHED FOR DETAILED DIRECTIONS 4/19/22  Yes Historical Provider, MD   pramipexole (MIRAPEX) 0 5 mg tablet TAKE 1 TABLET BY MOUTH IN THE EVENING AND 1 TABLET AT BEDTIME FOR 7 DAYS , THEN 1 TAB 3 TIMES A DAY 6/6/22  Yes Ritu Hussein, MD   Prucalopride Succinate 2 MG TABS Take 2 mg by mouth daily 6/21/22  Yes NICOLE Barbosa   Repatha SureClick 924 MG/ML SOAJ  3/25/21  Yes Historical Provider, MD   sucralfate (CARAFATE) 1 g tablet TAKE 1 TABLET (1 G TOTAL) BY MOUTH 4 (FOUR) TIMES A DAY DISSOLVED PILL IN 10 ML WATER THEN SWALLOW 6/18/22 9/16/22 Yes NICOLE Siegel   Blood Glucose Monitoring Suppl (ONE TOUCH ULTRA 2) w/Device KIT Test Blood Sugar Once Daily 11/30/21   William Howard,    gemfibrozil (LOPID) 600 mg tablet Take 600 mg by mouth 2 (two) times a day 10/18/18   Historical Provider, MD   glucose blood (OneTouch Ultra) test strip USE TO TEST BLOOD SUGAR ONCE DAILY 6/20/22   Corinna Perdido [place], DO   Lancets (onetouch ultrasoft) lancets Patient to test once daily 3/12/21   Corinna Per[place]ido [place], DO   Lancets (onetouch ultrasoft) lancets Test Blood Sugar Once Daily 11/30/21   Corinna [place], DO   levothyroxine 88 mcg tablet Take 1 tablet (88 mcg total) by mouth daily 2/17/22   Corinna Perdido [place], DO   Misc  Devices ([facility]) MISC Use rolling walker as needed 3/18/19   Corinna Perdido [place], DO     I have reviewed home medications with patient personally  Allergies: Allergies   Allergen Reactions    Metoclopramide Other (See Comments)    Nitrofurantoin Other (See Comments)     Very weak   Don Coppersmith    Symbicort [Budesonide-Formoterol Fumarate] Nausea Only    Isosorbide      Other reaction(s): headache    Pamidronate Other (See Comments), Confusion and Fever     Developed multiple side effects of drug including fever, tachycardia, and lethargy     Pregabalin      Pt states made her feel suicidal     Reglan [Metoclopramide] Other (See Comments)     Flares her neuropathy    Statins Myalgia       Social History:  Marital Status: Single   Occupation:  Unknown  Patient Pre-hospital Living Situation: Home  Patient Pre-hospital Level of Mobility: walks with cane  Patient Pre-hospital Diet Restrictions:  Diabetic  Substance Use History:   Social History     Substance and Sexual Activity   Alcohol Use Not Currently    Comment: beer     Social History     Tobacco Use   Smoking Status Never Smoker   Smokeless Tobacco Never Used     Social History     Substance and Sexual Activity   Drug Use Not Currently    Types: Marijuana    Comment: Medical marjuiana       Family History:  Family History   Problem Relation Age of Onset    Heart attack Mother     Alcohol abuse Mother     Heart failure Mother     Cancer Mother     Cancer Paternal Grandfather        Physical Exam:     Vitals:   Blood Pressure: 156/93 (07/11/22 1648)  Pulse: 75 (07/11/22 1648)  Temperature: 98 3 °F (36 8 °C) (07/11/22 1306)  Temp Source: Oral (07/11/22 1306)  Respirations: 18 (07/11/22 1648)  SpO2: 94 % (07/11/22 1648)    Physical Exam  Vitals reviewed  Constitutional:       General: She is not in acute distress  HENT:      Head: Normocephalic and atraumatic  Eyes:      General: No scleral icterus  Conjunctiva/sclera: Conjunctivae normal    Cardiovascular:      Rate and Rhythm: Normal rate and regular rhythm  Heart sounds: No murmur heard  Pulmonary:      Effort: Pulmonary effort is normal  No respiratory distress  Breath sounds: Normal breath sounds  Abdominal:      General: Bowel sounds are normal  There is no distension  Palpations: Abdomen is soft  Tenderness: There is no abdominal tenderness  Musculoskeletal:         General: Tenderness (Right flank) present  Cervical back: Neck supple  Right lower leg: No edema  Left lower leg: No edema  Skin:     General: Skin is warm and dry     Neurological:      Mental Status: She is alert and oriented to person, place, and time  Psychiatric:         Mood and Affect: Mood normal          Behavior: Behavior normal           Additional Data:     Lab Results:  Results from last 7 days   Lab Units 07/11/22  1548   WBC Thousand/uL 12 05*   HEMOGLOBIN g/dL 13 7   HEMATOCRIT % 41 3   PLATELETS Thousands/uL 366   NEUTROS PCT % 73   LYMPHS PCT % 16   MONOS PCT % 8   EOS PCT % 1     Results from last 7 days   Lab Units 07/11/22  1548   SODIUM mmol/L 140   POTASSIUM mmol/L 3 9   CHLORIDE mmol/L 101   CO2 mmol/L 30   BUN mg/dL 16   CREATININE mg/dL 0 81   ANION GAP mmol/L 9   CALCIUM mg/dL 9 4   ALBUMIN g/dL 3 1*   TOTAL BILIRUBIN mg/dL 0 63   ALK PHOS U/L 64   ALT U/L 25   AST U/L 14   GLUCOSE RANDOM mg/dL 102     Results from last 7 days   Lab Units 07/11/22  1548   INR  1 02             Results from last 7 days   Lab Units 07/11/22  1548   LACTIC ACID mmol/L 1 7   PROCALCITONIN ng/ml <0 05       Imaging: Reviewed radiology reports from this admission including: abdominal/pelvic CT  CT abdomen pelvis wo contrast   ED Interpretation by Julia Fuentes DO (07/11 1635)   FINDINGS:     ABDOMEN     LOWER CHEST:  No clinically significant abnormality identified in the visualized lower chest      LIVER/BILIARY TREE:  Hepatic steatosis and hepatomegaly      GALLBLADDER:  Gallbladder is surgically absent      SPLEEN:  Unremarkable      PANCREAS:  Unremarkable      ADRENAL GLANDS:  Unremarkable      KIDNEYS/URETERS:  Bilateral tiny 1 to 2 mm nonobstructing calculi  No hydronephrosis     STOMACH AND BOWEL:  Unremarkable      APPENDIX:  No findings to suggest appendicitis      ABDOMINOPELVIC CAVITY:  No ascites  No pneumoperitoneum    No lymphadenopathy      VESSELS:  Unremarkable for patient's age      PELVIS     REPRODUCTIVE ORGANS:  Unremarkable for patient's age      URINARY BLADDER:  Diffuse bladder wall thickening could relate to underdistention or cystitis, correlate with urinalysis      ABDOMINAL WALL/INGUINAL REGIONS:  Unremarkable      OSSEOUS STRUCTURES:  Similar diffuse sclerotic lesions suggesting metastatic disease as before  T10 lytic lesion als   o likely related to metastatic disease, this is progressive compared to the prior exam      IMPRESSION:        1  Hepatic steatosis and hepatomegaly  2  Bilateral tiny 1 to 2 mm renal nonobstructing calculi  3  Progressive T10 metastatic lytic lesion  4  Correlate with urinalysis to exclude cystitis          Final Result by Charleen Morataya MD (07/11 0211)         1  Hepatic steatosis and hepatomegaly  2  Bilateral tiny 1 to 2 mm renal nonobstructing calculi  3  Progressive T10 metastatic lytic lesion  4  Correlate with urinalysis to exclude cystitis  Workstation performed: [id number]             EKG and Other Studies Reviewed on Admission:   · EKG: No EKG obtained  ** Please Note: This note has been constructed using a voice recognition system   **

## 2022-07-11 NOTE — ASSESSMENT & PLAN NOTE
· Patient presented to the ED with complaint of generalized fatigue, burning with urination and right-sided flank pain  · UA with large leukocytes, nitrate positive and occult blood  · Did recently complete 10 day course of Keflex without improvement, was placed on cefepime in the ED, continue  · Follow-up urine culture  · Blood cultures pending  · Patient did not meet sepsis criteria time of presentation  · Does have mild leukocytosis of 12  · Start pyridium for burning   · P r n   Analgesics for flank pain, no evidence of pyelonephritis on CT scan

## 2022-07-12 ENCOUNTER — APPOINTMENT (INPATIENT)
Dept: MRI IMAGING | Facility: HOSPITAL | Age: 67
DRG: 690 | End: 2022-07-12
Payer: MEDICARE

## 2022-07-12 PROBLEM — F11.20 OPIOID DEPENDENCE (HCC): Status: ACTIVE | Noted: 2022-07-12

## 2022-07-12 LAB
ANION GAP SERPL CALCULATED.3IONS-SCNC: 6 MMOL/L (ref 4–13)
BACTERIA UR QL AUTO: ABNORMAL /HPF
BASOPHILS # BLD AUTO: 0.07 THOUSANDS/ΜL (ref 0–0.1)
BASOPHILS NFR BLD AUTO: 1 % (ref 0–1)
BUN SERPL-MCNC: 12 MG/DL (ref 5–25)
CALCIUM SERPL-MCNC: 8.7 MG/DL (ref 8.3–10.1)
CHLORIDE SERPL-SCNC: 104 MMOL/L (ref 100–108)
CO2 SERPL-SCNC: 32 MMOL/L (ref 21–32)
CREAT SERPL-MCNC: 0.76 MG/DL (ref 0.6–1.3)
EOSINOPHIL # BLD AUTO: 0.1 THOUSAND/ΜL (ref 0–0.61)
EOSINOPHIL NFR BLD AUTO: 1 % (ref 0–6)
ERYTHROCYTE [DISTWIDTH] IN BLOOD BY AUTOMATED COUNT: 13.7 % (ref 11.6–15.1)
GFR SERPL CREATININE-BSD FRML MDRD: 82 ML/MIN/1.73SQ M
GLUCOSE SERPL-MCNC: 103 MG/DL (ref 65–140)
GLUCOSE SERPL-MCNC: 104 MG/DL (ref 65–140)
GLUCOSE SERPL-MCNC: 117 MG/DL (ref 65–140)
GLUCOSE SERPL-MCNC: 118 MG/DL (ref 65–140)
GLUCOSE SERPL-MCNC: 152 MG/DL (ref 65–140)
HCT VFR BLD AUTO: 39 % (ref 34.8–46.1)
HGB BLD-MCNC: 12.4 G/DL (ref 11.5–15.4)
IMM GRANULOCYTES # BLD AUTO: 0.11 THOUSAND/UL (ref 0–0.2)
IMM GRANULOCYTES NFR BLD AUTO: 1 % (ref 0–2)
LYMPHOCYTES # BLD AUTO: 1.91 THOUSANDS/ΜL (ref 0.6–4.47)
LYMPHOCYTES NFR BLD AUTO: 18 % (ref 14–44)
MAGNESIUM SERPL-MCNC: 2 MG/DL (ref 1.6–2.6)
MCH RBC QN AUTO: 29.7 PG (ref 26.8–34.3)
MCHC RBC AUTO-ENTMCNC: 31.8 G/DL (ref 31.4–37.4)
MCV RBC AUTO: 94 FL (ref 82–98)
MONOCYTES # BLD AUTO: 0.98 THOUSAND/ΜL (ref 0.17–1.22)
MONOCYTES NFR BLD AUTO: 9 % (ref 4–12)
NEUTROPHILS # BLD AUTO: 7.27 THOUSANDS/ΜL (ref 1.85–7.62)
NEUTS SEG NFR BLD AUTO: 70 % (ref 43–75)
NON-SQ EPI CELLS URNS QL MICRO: ABNORMAL /HPF
NRBC BLD AUTO-RTO: 0 /100 WBCS
PLATELET # BLD AUTO: 330 THOUSANDS/UL (ref 149–390)
PMV BLD AUTO: 8.7 FL (ref 8.9–12.7)
POTASSIUM SERPL-SCNC: 4.4 MMOL/L (ref 3.5–5.3)
RBC # BLD AUTO: 4.17 MILLION/UL (ref 3.81–5.12)
RBC #/AREA URNS AUTO: ABNORMAL /HPF
SODIUM SERPL-SCNC: 142 MMOL/L (ref 136–145)
WBC # BLD AUTO: 10.44 THOUSAND/UL (ref 4.31–10.16)
WBC #/AREA URNS AUTO: ABNORMAL /HPF

## 2022-07-12 PROCEDURE — 99222 1ST HOSP IP/OBS MODERATE 55: CPT | Performed by: PHYSICIAN ASSISTANT

## 2022-07-12 PROCEDURE — 99232 SBSQ HOSP IP/OBS MODERATE 35: CPT | Performed by: PHYSICIAN ASSISTANT

## 2022-07-12 PROCEDURE — G1004 CDSM NDSC: HCPCS

## 2022-07-12 PROCEDURE — 94660 CPAP INITIATION&MGMT: CPT

## 2022-07-12 PROCEDURE — 85025 COMPLETE CBC W/AUTO DIFF WBC: CPT | Performed by: PHYSICIAN ASSISTANT

## 2022-07-12 PROCEDURE — 72146 MRI CHEST SPINE W/O DYE: CPT

## 2022-07-12 PROCEDURE — 83735 ASSAY OF MAGNESIUM: CPT | Performed by: PHYSICIAN ASSISTANT

## 2022-07-12 PROCEDURE — 82948 REAGENT STRIP/BLOOD GLUCOSE: CPT

## 2022-07-12 PROCEDURE — 80048 BASIC METABOLIC PNL TOTAL CA: CPT | Performed by: PHYSICIAN ASSISTANT

## 2022-07-12 PROCEDURE — 81001 URINALYSIS AUTO W/SCOPE: CPT

## 2022-07-12 PROCEDURE — 92610 EVALUATE SWALLOWING FUNCTION: CPT

## 2022-07-12 RX ORDER — HYDROMORPHONE HCL/PF 1 MG/ML
0.5 SYRINGE (ML) INJECTION EVERY 4 HOURS PRN
Status: DISCONTINUED | OUTPATIENT
Start: 2022-07-12 | End: 2022-07-14 | Stop reason: HOSPADM

## 2022-07-12 RX ADMIN — LORAZEPAM 1 MG: 1 TABLET ORAL at 18:08

## 2022-07-12 RX ADMIN — ASPIRIN 81 MG: 81 TABLET, COATED ORAL at 08:01

## 2022-07-12 RX ADMIN — SUCRALFATE 1 G: 1 TABLET ORAL at 21:42

## 2022-07-12 RX ADMIN — PHENAZOPYRIDINE 100 MG: 100 TABLET ORAL at 15:54

## 2022-07-12 RX ADMIN — MORPHINE SULFATE 30 MG: 30 TABLET, EXTENDED RELEASE ORAL at 08:02

## 2022-07-12 RX ADMIN — FAMOTIDINE 20 MG: 20 TABLET ORAL at 08:03

## 2022-07-12 RX ADMIN — CEFEPIME HYDROCHLORIDE 2000 MG: 2 INJECTION, POWDER, FOR SOLUTION INTRAVENOUS at 21:48

## 2022-07-12 RX ADMIN — METHOCARBAMOL 750 MG: 500 TABLET ORAL at 21:42

## 2022-07-12 RX ADMIN — DOCUSATE SODIUM 100 MG: 100 CAPSULE, LIQUID FILLED ORAL at 21:42

## 2022-07-12 RX ADMIN — MORPHINE SULFATE 30 MG: 30 TABLET, EXTENDED RELEASE ORAL at 21:42

## 2022-07-12 RX ADMIN — SUCRALFATE 1 G: 1 TABLET ORAL at 18:08

## 2022-07-12 RX ADMIN — HEPARIN SODIUM 5000 UNITS: 5000 INJECTION INTRAVENOUS; SUBCUTANEOUS at 13:33

## 2022-07-12 RX ADMIN — PRAMIPEXOLE DIHYDROCHLORIDE 0.5 MG: 0.25 TABLET ORAL at 08:02

## 2022-07-12 RX ADMIN — PHENAZOPYRIDINE 100 MG: 100 TABLET ORAL at 08:01

## 2022-07-12 RX ADMIN — LEVOTHYROXINE SODIUM 88 MCG: 88 TABLET ORAL at 05:07

## 2022-07-12 RX ADMIN — METHOCARBAMOL 750 MG: 500 TABLET ORAL at 13:33

## 2022-07-12 RX ADMIN — LUBIPROSTONE 24 MCG: 24 CAPSULE, GELATIN COATED ORAL at 15:54

## 2022-07-12 RX ADMIN — OXYCODONE HYDROCHLORIDE 5 MG: 5 TABLET ORAL at 09:40

## 2022-07-12 RX ADMIN — DULOXETINE HYDROCHLORIDE 30 MG: 30 CAPSULE, DELAYED RELEASE ORAL at 08:02

## 2022-07-12 RX ADMIN — METHOCARBAMOL 750 MG: 500 TABLET ORAL at 05:06

## 2022-07-12 RX ADMIN — LORAZEPAM 1 MG: 1 TABLET ORAL at 08:01

## 2022-07-12 RX ADMIN — CEFEPIME HYDROCHLORIDE 2000 MG: 2 INJECTION, POWDER, FOR SOLUTION INTRAVENOUS at 09:58

## 2022-07-12 RX ADMIN — PRAMIPEXOLE DIHYDROCHLORIDE 0.5 MG: 0.25 TABLET ORAL at 21:42

## 2022-07-12 RX ADMIN — LUBIPROSTONE 24 MCG: 24 CAPSULE, GELATIN COATED ORAL at 08:01

## 2022-07-12 RX ADMIN — FLUTICASONE FUROATE AND VILANTEROL TRIFENATATE 1 PUFF: 100; 25 POWDER RESPIRATORY (INHALATION) at 08:02

## 2022-07-12 RX ADMIN — CLOPIDOGREL BISULFATE 75 MG: 75 TABLET ORAL at 08:01

## 2022-07-12 RX ADMIN — HEPARIN SODIUM 5000 UNITS: 5000 INJECTION INTRAVENOUS; SUBCUTANEOUS at 05:07

## 2022-07-12 RX ADMIN — METOPROLOL TARTRATE 50 MG: 50 TABLET, FILM COATED ORAL at 08:03

## 2022-07-12 RX ADMIN — INSULIN LISPRO 1 UNITS: 100 INJECTION, SOLUTION INTRAVENOUS; SUBCUTANEOUS at 10:51

## 2022-07-12 RX ADMIN — METOPROLOL TARTRATE 50 MG: 50 TABLET, FILM COATED ORAL at 18:08

## 2022-07-12 RX ADMIN — SUCRALFATE 1 G: 1 TABLET ORAL at 12:05

## 2022-07-12 RX ADMIN — SUCRALFATE 1 G: 1 TABLET ORAL at 08:02

## 2022-07-12 RX ADMIN — DULOXETINE HYDROCHLORIDE 60 MG: 60 CAPSULE, DELAYED RELEASE ORAL at 18:08

## 2022-07-12 RX ADMIN — PHENAZOPYRIDINE 100 MG: 100 TABLET ORAL at 12:05

## 2022-07-12 RX ADMIN — MELATONIN TAB 3 MG 6 MG: 3 TAB at 21:42

## 2022-07-12 RX ADMIN — PRAMIPEXOLE DIHYDROCHLORIDE 0.5 MG: 0.25 TABLET ORAL at 15:54

## 2022-07-12 NOTE — PHYSICAL THERAPY NOTE
Physical Therapy Cancellation Note           07/12/22 0832   PT Last Visit   PT Visit Date 07/12/22   Note Type   Note type Cancelled Session   Cancel Reasons Medical status   Additional Comments Orders received, EMR reviewed  Note pt with orders for Thoracolumbar MRI to R/O cord compression  Cancel PT for today pending MRI results/ clearance       Shane Ocampo Page, PT

## 2022-07-12 NOTE — ASSESSMENT & PLAN NOTE
· Patient has progressively worsening dysphagia over the past few weeks  · Notes inability to tolerate cold liquids and some solids  · Speech therapy consulted  · Patient requesting regular diet, noting she will order what she knows she can tolerate

## 2022-07-12 NOTE — ASSESSMENT & PLAN NOTE
· Patient presented to the ED with complaint of generalized fatigue, burning with urination and right-sided flank pain  · UA with large leukocytes, nitrate positive and occult blood  · Did recently complete 10 day course of Keflex without improvement, was placed on cefepime in the ED, continue  · Urine culture and sensitivities pending   · Blood cultures pending  · Patient did not meet sepsis criteria time of presentation  · Does have mild leukocytosis of 12  · Continue pyridium for burning   · P r n   Analgesics for flank pain, no evidence of pyelonephritis on CT scan- back pain likely due to T10 metastatic lesion

## 2022-07-12 NOTE — OCCUPATIONAL THERAPY NOTE
Occupational Therapy Cancellation Note     Patient Name: Acacia Franklin  [id number] Date: 7/12/2022 07/12/22 0841   OT Last Visit   OT Visit Date 07/12/22   Note Type   Note type Cancelled Session   Cancel Reasons Medical status       OT order received and chart review performed  At this time, OT evaluation cancelled due to patient not medically appropriate  Pt currently pending MRI to R/O cord compression syndrome  OT will hold therapy pending MRI results/clearance       Kaylin Rapp OT

## 2022-07-12 NOTE — ASSESSMENT & PLAN NOTE
· History of breast cancer with mets to bone status post bilateral mastectomy  · Does have evidence of progressive T10 metastatic lytic lesion  · MRI T spine and L spine pending- likely needs outpatient follow up with radiation oncology   · Not currently undergoing any active chemotherapy  · Continue close outpatient Hematology-Oncology follow-up at discharge

## 2022-07-12 NOTE — CONSULTS
Consult - Urology   Abigail Lock 1955, 78 y o  female MRN: [id number]    Unit/Bed#: E5 -01 Encounter: [id number]    * Urinary tract infection  Assessment & Plan  · UA macro appears infected, UA micro does not suggest infection  · Empirically on IV cefepime  Completed 10 day course of Keflex prior in the outpatient setting  · Awaiting cultures  · Management per primary team    · CT negative for obstructive ureteral calculi or hydronephrosis  · Bilateral flank pain, R>L could be secondary to progressive T10 metastatic lytic lesion  · Patient is afebrile and VSS  Mild leukocytosis of 10 46  Creatinine 0 76  Hgb 12  4    · No Urologic surgical intervention indicated at this time  · Continue medical management with IV Abx, IVF, and analgesia  · If patient becomes febrile or does not clinically improve, please reach back out to the Urology service for re-evaluation  Otherwise, Urology will sign off but remain available for any further inpatient needs  Please feel free to contact the provider currently covering the Urology [facility] role for this campus with questions or concerns  Subjective:   Patient is a 79 y/o female with PMH of triple negative breast cancer in 2015 with metastasis to right iliac bone, and non-small cell lung carcinoma  Progressive T10 metastatic lesion appreciated on admission CT  Patient presented to the ED on 7/11 with reports of urinary frequency, foul smelling urine, and right flank pain x1-2 weeks  She reports recently completing 10 day course of Keflex without improvement  She was previously evaluated by Dr Graeme Boast in the office in March 2022 for flank pain and UTI symptoms that had resolved prior to her office visit  She was noted to have small, bilateral nonobstructing renal calculi but not other abnormalities  In the ED, her UA was positive for leukocytes and nitrites, but negative for RBCs with only occasional bacteria and 4-10 WBCs   CT negative for obstructing ureteral calculi or hydronephrosis  Diffuse bladder wall thickening possible cystitis vs under-distention  She was admitted for IV Abx under the [facility] service  This morning she reports bilateral flank pain, right > left  She denies any dysuria or gross hematuria but reports frequency and urgency  She denies any abdominal pain, N/V, fevers, or chills  Review of Systems   Constitutional: Negative for chills and fever  HENT: Negative  Respiratory: Negative for cough and shortness of breath  Cardiovascular: Negative for chest pain and palpitations  Gastrointestinal: Negative for abdominal pain, nausea and vomiting  Genitourinary: Positive for flank pain (R>L), frequency and urgency  Negative for difficulty urinating, dysuria and hematuria  Skin: Negative  Neurological: Negative  Objective:  Vitals: Blood pressure 121/85, pulse 86, temperature 98 3 °F (36 8 °C), resp  rate 19, SpO2 92 %  ,There is no height or weight on file to calculate BMI  Physical Exam  Vitals reviewed  Constitutional:       General: She is not in acute distress  Appearance: Normal appearance  She is not ill-appearing, toxic-appearing or diaphoretic  HENT:      Head: Normocephalic and atraumatic  Eyes:      Conjunctiva/sclera: Conjunctivae normal    Cardiovascular:      Rate and Rhythm: Normal rate and regular rhythm  Heart sounds: Normal heart sounds  Pulmonary:      Effort: Pulmonary effort is normal  No respiratory distress  Abdominal:      General: Bowel sounds are normal  There is no distension  Palpations: Abdomen is soft  Tenderness: There is no abdominal tenderness  There is right CVA tenderness (mild)  There is no left CVA tenderness, guarding or rebound  Musculoskeletal:         General: Normal range of motion  Cervical back: Normal range of motion  Skin:     General: Skin is warm and dry  Neurological:      General: No focal deficit present        Mental Status: She is alert and oriented to person, place, and time  Psychiatric:         Mood and Affect: Mood normal          Behavior: Behavior normal          Thought Content: Thought content normal          Judgment: Judgment normal          Imagin22: CT ABDOMEN AND PELVIS WITHOUT IV CONTRAST     INDICATION:   UTI, uncomplicated (Female)  Abdominal pain, acute, nonlocalized  UTI now with flank pain, R>L      COMPARISON:  2022 and March 15, 2022     TECHNIQUE:  CT examination of the abdomen and pelvis was performed without intravenous contrast  This examination was performed without intravenous contrast in the context of the critical nationwide Omnipaque shortage  Axial, sagittal, and coronal 2D   reformatted images were created from the source data and submitted for interpretation       Radiation dose length product (DLP) for this visit:  938 mGy-cm   This examination, like all CT scans performed in the [facility], was performed utilizing techniques to minimize radiation dose exposure, including the use of iterative   reconstruction and automated exposure control       Enteric contrast was not administered       FINDINGS:     ABDOMEN     LOWER CHEST:  No clinically significant abnormality identified in the visualized lower chest      LIVER/BILIARY TREE:  Hepatic steatosis and hepatomegaly      GALLBLADDER:  Gallbladder is surgically absent      SPLEEN:  Unremarkable      PANCREAS:  Unremarkable      ADRENAL GLANDS:  Unremarkable      KIDNEYS/URETERS:  Bilateral tiny 1 to 2 mm nonobstructing calculi  No hydronephrosis     STOMACH AND BOWEL:  Unremarkable      APPENDIX:  No findings to suggest appendicitis      ABDOMINOPELVIC CAVITY:  No ascites  No pneumoperitoneum    No lymphadenopathy      VESSELS:  Unremarkable for patient's age      PELVIS     REPRODUCTIVE ORGANS:  Unremarkable for patient's age      URINARY BLADDER:  Diffuse bladder wall thickening could relate to underdistention or cystitis, correlate with urinalysis      ABDOMINAL WALL/INGUINAL REGIONS:  Unremarkable      OSSEOUS STRUCTURES:  Similar diffuse sclerotic lesions suggesting metastatic disease as before  T10 lytic lesion also likely related to metastatic disease, this is progressive compared to the prior exam      IMPRESSION:        1  Hepatic steatosis and hepatomegaly  2  Bilateral tiny 1 to 2 mm renal nonobstructing calculi  3  Progressive T10 metastatic lytic lesion  4  Correlate with urinalysis to exclude cystitis  Labs:  Recent Labs     07/11/22  1548 07/12/22  0458   WBC 12 05* 10 44*     Recent Labs     07/11/22  1548 07/12/22  0458   HGB 13 7 12 4       Recent Labs     07/11/22  1548 07/12/22  0458   CREATININE 0 81 0 76       Microbiology:  Component      Latest Ref Rng & Units 7/11/2022             Color, UA       Yellow   Clarity, UA       Clear   SL AMB SPECIFIC GRAVITY-URINE      1 003 - 1 030 1 020   pH, UA      4 5 - 8 0 7 0   Leukocytes, UA      Negative Large (A)   Nitrite, UA      Negative Positive (A)   POCT URINE PROTEIN      Negative mg/dl 100 (2+) (A)   Glucose, UA      Negative mg/dl Negative   Ketones, UA      Negative mg/dl Negative   SL AMB POCT UROBILINOGEN      0 2, 1 0 E U /dl E U /dl 1 0   Bilirubin, UA      Negative Negative   Blood, UA      Negative Large (A)     Component      Latest Ref Rng & Units 7/12/2022   RBC, UA      None Seen, 2-4 /hpf None Seen   WBC, UA      None Seen, 2-4, 5-60 /hpf 4-10 (A)   Epithelial Cells      None Seen, Occasional /hpf Occasional   Bacteria, UA      None Seen, Occasional /hpf Occasional     Urine culture pending       History:  Social History     Socioeconomic History    Marital status: Single     Spouse name: None    Number of children: None    Years of education: None    Highest education level: None   Occupational History    None   Tobacco Use    Smoking status: Never Smoker    Smokeless tobacco: Never Used   Vaping Use    Vaping Use: Never used   Substance and Sexual Activity    Alcohol use: Not Currently     Comment: beer    Drug use: Not Currently     Types: Marijuana     Comment: Medical marjuiana    Sexual activity: None   Other Topics Concern    None   Social History Narrative    ** Merged History Encounter **          Social Determinants of Health     Financial Resource Strain: Not on file   Food Insecurity: No Food Insecurity    Worried About Running Out of Food in the Last Year: Never true    Mare of Food in the Last Year: Never true   Transportation Needs: Not on file   Physical Activity: Not on file   Stress: Not on file   Social Connections: Not on file   Intimate Partner Violence: Not on file   Housing Stability: Unknown    Unable to Pay for Housing in the Last Year: No    Number of [place] in the Last Year: Not on file    Unstable Housing in the Last Year: No       Past Medical History:   Diagnosis Date    Anxiety     Breast cancer ([facility] )     CAD (coronary artery disease)     Cancer ([facility] )     Carpal tunnel syndrome     Disease of thyroid gland     Elevated cholesterol     Fibromyalgia     Hypertension     Kidney stone     [facility]     nose    My[facility] infarction [facility]     Neuropathy     BRAYDEN (obstructive sleep apnea)     cpap    Panic attack      Past Surgical History:   Procedure Laterality Date    BREAST SURGERY Bilateral     CARDIAC SURGERY      cardiac ablation     SECTION      x3    CHOLECYSTECTOMY      CORONARY ANGIOPLASTY WITH STENT PLACEMENT      LYMPH NODE BIOPSY Right 2022    MASTECTOMY Bilateral     NOSE SURGERY       Family History   Problem Relation Age of Onset    Heart attack Mother     Alcohol abuse Mother     Heart failure Mother     Cancer Mother     Cancer Paternal Grandfather        Zhang Corona [place]  Date: 2022 Time: 11:26 AM

## 2022-07-12 NOTE — PLAN OF CARE
Problem: MOBILITY - ADULT  Goal: Maintain or return to baseline ADL function  Description: INTERVENTIONS:  -  Assess patient's ability to carry out ADLs; assess patient's baseline for ADL function and identify physical deficits which impact ability to perform ADLs (bathing, care of mouth/teeth, toileting, grooming, dressing, etc )  - Assess/evaluate cause of self-care deficits   - Assess range of motion  - Assess patient's mobility; develop plan if impaired  - Assess patient's need for assistive devices and provide as appropriate  - Encourage maximum independence but intervene and supervise when necessary  - Involve family in performance of ADLs  - Assess for home care needs following discharge   - Consider OT consult to assist with ADL evaluation and planning for discharge  - Provide patient education as appropriate  Outcome: Progressing  Goal: Maintains/Returns to pre admission functional level  Description: INTERVENTIONS:  - Perform BMAT or MOVE assessment daily    - Set and communicate daily mobility goal to care team and patient/family/caregiver     - Collaborate with rehabilitation services on mobility goals if consulted  - Out of bed for toileting  - Record patient progress and toleration of activity level   Outcome: Progressing     Problem: GENITOURINARY - ADULT  Goal: Maintains or returns to baseline urinary function  Description: INTERVENTIONS:  - Assess urinary function  - Encourage oral fluids to ensure adequate hydration if ordered  - Administer IV fluids as ordered to ensure adequate hydration  - Administer ordered medications as needed  - Offer frequent toileting  - Follow urinary retention protocol if ordered  Outcome: Progressing  Goal: Absence of urinary retention  Description: INTERVENTIONS:  - Assess patient's ability to void and empty bladder  - Monitor I/O  - Bladder scan as needed  - Discuss with physician/AP medications to alleviate retention as needed  - Discuss catheterization for long term situations as appropriate  Outcome: Progressing     Problem: METABOLIC, FLUID AND ELECTROLYTES - ADULT  Goal: Electrolytes maintained within normal limits  Description: INTERVENTIONS:  - Monitor labs and assess patient for signs and symptoms of electrolyte imbalances  - Administer electrolyte replacement as ordered  - Monitor response to electrolyte replacements, including repeat lab results as appropriate  - Instruct patient on fluid and nutrition as appropriate  Outcome: Progressing  Goal: Fluid balance maintained  Description: INTERVENTIONS:  - Monitor labs   - Monitor I/O and WT  - Instruct patient on fluid and nutrition as appropriate  - Assess for signs & symptoms of volume excess or deficit  Outcome: Progressing  Goal: Glucose maintained within target range  Description: INTERVENTIONS:  - Monitor Blood Glucose as ordered  - Assess for signs and symptoms of hyperglycemia and hypoglycemia  - Administer ordered medications to maintain glucose within target range  - Assess nutritional intake and initiate nutrition service referral as needed  Outcome: Progressing

## 2022-07-12 NOTE — ASSESSMENT & PLAN NOTE
Lab Results   Component Value Date    HGBA1C 6 3 (H) 04/06/2022       Recent Labs     07/11/22 2039 07/12/22  0700 07/12/22  1046   POCGLU 160* 104 152*       Blood Sugar Average: Last 72 hrs:  · (P) 694 [id number] well-controlled type 2 diabetes with most recent hemoglobin A1c 6 3  · Maintained outpatient on metformin, hold while inpatient  · Sliding scale coverage with Accu-Cheks

## 2022-07-12 NOTE — ASSESSMENT & PLAN NOTE
· Patient with chronic opioid dependence due to metastatic breast CA and fibromyalgia   · Continue MS Contin 30 mg BID at home   · PRN oxycodone and dilaudid ordered inpatient for breakthrough pain   · Continues to have severe pain likely due to metastatic lesion to T10

## 2022-07-12 NOTE — PROGRESS NOTES
[address] [place] [address]  Progress Note - [employer] 1955, 78 y o  female MRN: [id number]  Unit/Bed#: E5 -01 Encounter: [id number]  Primary Care Provider: Rogers Carias DO   Date and time admitted to hospital: 7/11/2022  3:15 PM    * Urinary tract infection  Assessment & Plan  · Patient presented to the ED with complaint of generalized fatigue, burning with urination and right-sided flank pain  · UA with large leukocytes, nitrate positive and occult blood  · Did recently complete 10 day course of Keflex without improvement, was placed on cefepime in the ED, continue  · Urine culture and sensitivities pending   · Blood cultures pending  · Patient did not meet sepsis criteria time of presentation  · Does have mild leukocytosis of 12  · Continue pyridium for burning   · P r n   Analgesics for flank pain, no evidence of pyelonephritis on CT scan- back pain likely due to T10 metastatic lesion    Opioid dependence ([facility] [address] )  Assessment & Plan  · Patient with chronic opioid dependence due to metastatic breast CA and fibromyalgia   · Continue MS Contin 30 mg BID at home   · PRN oxycodone and dilaudid ordered inpatient for breakthrough pain   · Continues to have severe pain likely due to metastatic lesion to T10    Esophageal dysphagia  Assessment & Plan  · Patient has progressively worsening dysphagia over the past few weeks  · Notes inability to tolerate cold liquids and some solids  · Speech therapy consulted  · Patient requesting regular diet, noting she will order what she knows she can tolerate     Type 2 diabetes mellitus [facility])  Assessment & Plan  Lab Results   Component Value Date    HGBA1C 6 3 (H) 04/06/2022       Recent Labs     07/11/22 2039 07/12/22  0700 07/12/22  1046   POCGLU 160* 104 152*       Blood Sugar Average: Last 72 hrs:  · (P) 520 [id number] well-controlled type 2 diabetes with most recent hemoglobin A1c 6 3  · Maintained outpatient on metformin, hold while inpatient  · Sliding scale coverage with [facility]    Breast cancer ([facility] [address] )  Assessment & Plan  · History of breast cancer with mets to bone status post bilateral mastectomy  · Does have evidence of progressive T10 metastatic lytic lesion  · MRI T spine and L spine pending- likely needs outpatient follow up with radiation oncology   · Not currently undergoing any active chemotherapy  · Continue close outpatient Hematology-Oncology follow-up at discharge    BRAYDEN (obstructive sleep apnea)  Assessment & Plan  · Continue CPAP HS    Mixed hyperlipidemia  Assessment & Plan  · Continue Repatha and gemfibrozil    Depressive disorder  Assessment & Plan  · Continue Cymbalta and p r n  Ativan    Atherosclerotic heart disease of native coronary artery without angina pectoris  Assessment & Plan  · Patient denies any chest pain at time of admission  · Continue ASA, Plavix and beta-blocker        VTE Pharmacologic Prophylaxis: VTE Score: 6 High Risk (Score >/= 5) - Pharmacological DVT Prophylaxis Ordered: heparin  Sequential Compression Devices Ordered  Patient Centered Rounds: I performed bedside rounds with nursing staff today  Discussions with Specialists or Other Care Team Provider: nursing     Education and Discussions with Family / Patient: Attempted to update  (daughter) via phone  Unable to contact  Time Spent for Care: 30 minutes  More than 50% of total time spent on counseling and coordination of care as described above  Current Length of Stay: 1 day(s)  Current Patient Status: Inpatient   Certification Statement: The patient will continue to require additional inpatient hospital stay due to UTI, back pain pending MRI reads  Discharge Plan: Anticipate discharge in 48-72 hrs to home  Code Status: Level 1 - Full Code    Subjective:   Patient notes she is not feeling well today  Still feeling very tired and weak   She reports she was unable to tolerate her whole MRI as she was in too much pain to lie flat  She continues to have back pain, now on both sides of her back  Unrelieved with current medication regimen  Objective:     Vitals:   Temp (24hrs), Av 4 °F (36 9 °C), Min:98 3 °F (36 8 °C), Max:98 7 °F (37 1 °C)    Temp:  [98 3 °F (36 8 °C)-98 7 °F (37 1 °C)] 98 3 °F (36 8 °C)  HR:  [75-86] 86  Resp:  [16-19] 19  BP: (114-156)/(64-93) 121/85  SpO2:  [90 %-96 %] 92 %  There is no height or weight on file to calculate BMI  Input and Output Summary (last 24 hours): Intake/Output Summary (Last 24 hours) at 2022 1306  Last data filed at 2022 0801  Gross per 24 hour   Intake 1050 ml   Output 800 ml   Net 250 ml       Physical Exam:   Physical Exam  Vitals reviewed  Constitutional:       General: She is not in acute distress  HENT:      Head: Normocephalic and atraumatic  Eyes:      General: No scleral icterus  Conjunctiva/sclera: Conjunctivae normal    Cardiovascular:      Rate and Rhythm: Normal rate and regular rhythm  Heart sounds: No murmur heard  Pulmonary:      Effort: Pulmonary effort is normal  No respiratory distress  Breath sounds: Normal breath sounds  Abdominal:      General: Bowel sounds are normal  There is no distension  Palpations: Abdomen is soft  Tenderness: There is no abdominal tenderness  Musculoskeletal:         General: Tenderness (mid/lower back) present  Cervical back: Neck supple  Right lower leg: No edema  Left lower leg: No edema  Skin:     General: Skin is warm and dry  Neurological:      Mental Status: She is alert and oriented to person, place, and time     Psychiatric:         Mood and Affect: Mood normal          Behavior: Behavior normal           Additional Data:     Labs:  Results from last 7 days   Lab Units 22  0458   WBC Thousand/uL 10 44*   HEMOGLOBIN g/dL 12 4   HEMATOCRIT % 39 0   PLATELETS Thousands/uL 330   NEUTROS PCT % 70   LYMPHS PCT % 18   MONOS PCT % 9   EOS PCT % 1     Results from last 7 days   Lab Units 07/12/22  0458 07/11/22  1548   SODIUM mmol/L 142 140   POTASSIUM mmol/L 4 4 3 9   CHLORIDE mmol/L 104 101   CO2 mmol/L 32 30   BUN mg/dL 12 16   CREATININE mg/dL 0 76 0 81   ANION GAP mmol/L 6 9   CALCIUM mg/dL 8 7 9 4   ALBUMIN g/dL  --  3 1*   TOTAL BILIRUBIN mg/dL  --  0 63   ALK PHOS U/L  --  64   ALT U/L  --  25   AST U/L  --  14   GLUCOSE RANDOM mg/dL 103 102     Results from last 7 days   Lab Units 07/11/22  1548   INR  1 02     Results from last 7 days   Lab Units 07/12/22  1046 07/12/22  0700 07/11/22  2039   POC GLUCOSE mg/dl 152* 104 160*         Results from last 7 days   Lab Units 07/11/22  1548   LACTIC ACID mmol/L 1 7   PROCALCITONIN ng/ml <0 05       Lines/Drains:  Invasive Devices  Report    Peripheral Intravenous Line  Duration           Peripheral IV 07/11/22 Left Antecubital <1 day                      Imaging: No pertinent imaging reviewed  Recent Cultures (last 7 days):   Results from last 7 days   Lab Units 07/11/22  1648 07/11/22  1548   BLOOD CULTURE  Received in Microbiology Lab  Culture in Progress  Received in Microbiology Lab  Culture in Progress         Last 24 Hours Medication List:   Current Facility-Administered Medications   Medication Dose Route Frequency Provider Last Rate    diphenhydrAMINE  25 mg Oral HS PRN Catrachita Terry PA-C      And    acetaminophen  488 mg Oral HS PRN Catrachita Terry PA-C      acetaminophen  650 mg Oral Q6H PRN Catrachita Terry PA-C      aspirin  81 mg Oral Daily Catrachita Terry PA-C      calcium carbonate  1,000 mg Oral Daily PRN Catrachita Terry PA-C      cefepime  2,000 mg Intravenous Q12H Catrachita Terry PA-C 2,000 mg (07/12/22 0958)    clopidogrel  75 mg Oral Daily Catrachita Terry PA-C      docusate sodium  100 mg Oral HS Catrachita Terry PA-C      DULoxetine  30 mg Oral Daily Catrachita Terry PA-C      DULoxetine  60 mg Oral QPM Catrachita Terry PA-C      famotidine  20 mg Oral Daily Alexandra Antunez Mani Norwood PA-C      fluticasone-vilanterol  1 puff Inhalation Daily Ida Frankel Mc, PA-C      heparin (porcine)  5,000 Units Subcutaneous [place], [place]      HYDROmorphone  0 5 mg Intravenous Q4H PRN Roly George PA-C      insulin lispro  1-5 Units Subcutaneous HS Roly George PA-C      insulin lispro  1-6 Units Subcutaneous TID AC Roly George PA-C      levothyroxine  88 mcg Oral Early Morning Roly George PA-C      LORazepam  1 mg Oral BID Roly George PA-C      lubiprostone  24 mcg Oral BID With Meals Roly George PA-C      melatonin  6 mg Oral HS Roly George PA-C      methocarbamol  750 mg Oral [place], [place]      metoprolol tartrate  50 mg Oral BID Roly George PA-C      morphine  30 mg Oral Q12H [address] Roly George PA-C      ondansetron  4 mg Intravenous Q6H PRN Roly George PA-C      oxyCODONE  5 mg Oral Q4H PRN Roly George PA-C      phenazopyridine  100 mg Oral TID With Meals Roly George PA-C      pramipexole  0 5 mg Oral TID Roly George PA-C      sucralfate  1 g Oral 4x Daily Roly George PA-C          Today, Patient Was Seen By: Pam Treviño PA-C    **Please Note: This note may have been constructed using a voice recognition system  **

## 2022-07-12 NOTE — SPEECH THERAPY NOTE
Speech Language/Pathology  Speech/Language Pathology  Assessment    Patient Name: González Rodriguez  RFJVW'[id number] Date: 2022     Problem List  Principal Problem:    Urinary tract infection  Active Problems:    Atherosclerotic heart disease of native coronary artery without angina pectoris    Depressive disorder    Mixed hyperlipidemia    BRAYDEN (obstructive sleep apnea)    Breast cancer (HCC)    Type 2 diabetes mellitus ([place] [address] )    Esophageal dysphagia    Past Medical History  Past Medical History:   Diagnosis Date    Anxiety     Breast cancer ([place] [address] )     CAD (coronary artery disease)     Cancer ([place] [address] )     Carpal tunnel syndrome     Disease of thyroid gland     Elevated cholesterol     Fibromyalgia     Hypertension     Kidney stone     Melanoma ([place] [address] )     nose    Myocardial infarction (HCC)     Neuropathy     BRAYDEN (obstructive sleep apnea)     cpap    Panic attack      Past Surgical History  Past Surgical History:   Procedure Laterality Date    BREAST SURGERY Bilateral     CARDIAC SURGERY      cardiac ablation     SECTION      x3    CHOLECYSTECTOMY      CORONARY ANGIOPLASTY WITH STENT PLACEMENT      LYMPH NODE BIOPSY Right 2022    MASTECTOMY Bilateral     NOSE SURGERY          Bedside Swallow Evaluation:    Summary:  Pt presents w/ adequate oral and pharyngeal stages  H/o botox injections/egd   Pt states she knows what to eat/what to order/what she can tolerate  She does not like the level 3 diet  Seen briefly in am, asked SLIM to change diet  Returned at lunch  Pt had ordered salmon and mashed potatoes, pudding, thin liquids  Took meds and thin liquids wnl  Swallow prompt  NO s/s aspiration  Recommendations:  Diet: regular (pt will order softer foods)  Liquid: thin  Meds: as tolerated/desired  Supervision: none  Positioning:Upright  Strategies: Pt to take PO/Meds only when fully alert and upright  Oral care  Reflux precautions  Eval only, No f/u tx indicated       Consider consult w/:  Nutrition  F/u w/ GI as needed    H&P/Admit info/ pertinent provider notes: (PMH noted above)     Chief Complaint:  Weakness and burning urination     History of Present Illness:  Osmany Garcia is a 78 y o  female with a PMH of breast cancer, hypertension, CAD, hyperlipidemia who presents with generalized weakness and burning with urination  Patient reports she has been having progressive worsening generalized weakness for the past few weeks  She reports she was treated with Keflex for urinary burning approximately 2-3 weeks ago, completed 10 day course but did not get any better  She reports severe urinary burning as well as urinary frequency and urgency  She does report she has had a few incontinent episodes  She denies any recent fevers or chills  She reports difficulty ambulating as her legs feel very weak  She also reports some right flank pain but denies any spinal tenderness or saddle anesthesia  She does report history of breast cancer with metastasis, plan was for follow-up with on Oncology on Wednesday  Special Studies:  egd 10/19/21:  FINDINGS:  After upper endoscopy which showed only some scattered fundic appearing polyps  Two hundred international units of Botox were injected after diluting 100/5 cc of saline  Polyp squats of 1 cc at a time were injected in the antral pyloric area  SPECIMENS:  * No specimens in log *  IMPRESSION:  History of reflux and gastroparesis  Status post injection of 200 international units of Botox in the antral pyloric area  Ct abdomen:  IMPRESSION:   1  Hepatic steatosis and hepatomegaly  2  Bilateral tiny 1 to 2 mm renal nonobstructing calculi  3  Progressive T10 metastatic lytic lesion  4  Correlate with urinalysis to exclude cystitis  Previous VBS:  none    Patient's goal: none stated    Did the pt report pain? "kidneys"  If yes, was nursing notified/was it addressed?  Nurse and physician informed    Reason for consult:  R/o aspiration  Determine safest and least restrictive diet  h/o dysphagia/esophageal     Precautions:  Fall  Food allergies:  none  Current diet:  Level 3  Premorbid diet[de-identified]  regular  O2 requirement:  none  Voice/Speech:  wnl  Social:  Home w/ spouse  Follows commands:  yes                     Cognitive Status:  A&O  [facility]:  Dentition:natural  Labial strength and ROM:+  Lingual strength and ROM:+  Mandibular strength and ROM:+  Secretion management:+    Esophageal stage:  See above    Results d/w:  Pt, nursing, PA

## 2022-07-12 NOTE — ASSESSMENT & PLAN NOTE
· UA macro appears infected, UA micro does not suggest infection  · Empirically on IV cefepime  Completed 10 day course of Keflex prior in the outpatient setting  · Awaiting cultures  · Management per primary team    · CT negative for obstructive ureteral calculi or hydronephrosis  · Bilateral flank pain, R>L could be secondary to progressive T10 metastatic lytic lesion  · Patient is afebrile and VSS  Mild leukocytosis of 10 46  Creatinine 0 76  Hgb 12  4    · No Urologic surgical intervention indicated at this time  · Continue medical management with IV Abx, IVF, and analgesia  · If patient becomes febrile or does not clinically improve, please reach back out to the Urology service for re-evaluation  Otherwise, Urology will sign off but remain available for any further inpatient needs  Please feel free to contact the provider currently covering the Urology [facility] role for this campus with questions or concerns

## 2022-07-12 NOTE — CASE MANAGEMENT
Case Management Assessment    Patient name Cuong Patel  [place] [address] /E5 MS 94 20 [id number]-* MRN [id number]  : 1955 Date 2022       Current Admission Date: 2022  Current Admission Diagnosis:Urinary tract infection   Patient Active Problem List    Diagnosis Date Noted    [facility] 2022    Esophageal dysphagia 2022    COVID-19 2022    Kidney stones 2022    Tension type headache 2021    Urinary frequency 2021    Balance disorder 2021    Type 2 diabetes mellitus ([facility] 2021    Iron deficiency anemia 2021    Colon polyps 2021    Gastroparesis 2021    GERD (gastroesophageal reflux disease) 2021    Constipation 2021    Bloating 2021    Lactose intolerance 2021    Gastric polyps 2021    Gastritis 2021    Colon cancer screening 2021    Primary osteoarthritis of both knees 2021    Shortness of breath 2021    Thrush 2021    Bone metastases ([facility] [address] ) 2021    Drug-induced peripheral neuropathy ([facility] [address] ) 2021    JESUS (acute kidney injury) ([facility] [address] ) 2021    Panlobular emphysema ([facility] 2021    Obesity, morbid ([facility] 2021    Myokymia of right side of face 2021    Peripheral polyneuropathy 2021    History of coronary artery stent placement 2021    Hyperglycemia 2021    Restless leg syndrome 2020    Trochanteric bursitis of right hip 10/26/2020    Sacroiliitis ([facility] [address] ) 10/26/2020    Malignant neoplasm of lower-outer quadrant of unspecified female breast ([facility] [address] ) 06/10/2020    Lung metastasis ([facility] [address] ) 06/10/2020    Long term (current) use of anticoagulants 06/10/2020    Drug-induced polyneuropathy ([facility] [address] ) 06/10/2020    Basal cell carcinoma of skin of nose 06/10/2020    Ambulatory dysfunction 06/10/2020    Syncope 05/15/2020    Lung nodule 05/10/2020    Urinary tract infection 2020    Breast cancer ([facility] [address] ) 05/08/2020    Panic attack 05/08/2020    Divergence insufficiency 05/08/2020    Cerebrovascular small vessel disease with hemorrhage ([facility] ) 05/08/2020    Disequilibrium syndrome 05/08/2020    Double vision 04/28/2020    Right upper quadrant abdominal pain 09/11/2019    Fatty liver 09/11/2019    Medicare annual wellness visit, subsequent 06/18/2019    Hypothyroidism 06/18/2019    Chest wall pain 03/14/2019    Coronary artery disease with angina pectoris ([facility] ) 03/14/2019    Low back pain at multiple sites 03/14/2019    Atherosclerotic heart disease of native coronary artery without angina pectoris 03/14/2018    Disorder of rotator cuff 02/10/2017    DDD (degenerative disc disease), cervical 02/10/2017    Depressive disorder 09/25/2016    Essential hypertension 09/25/2016    Fibromyalgia 09/25/2016    Generalized anxiety disorder 09/25/2016    History of breast cancer 09/25/2016    Mixed hyperlipidemia 09/25/2016    BRAYDEN (obstructive sleep apnea) 09/25/2016    Bilateral carpal tunnel syndrome 02/08/2016      LOS (days): 1  Geometric Mean LOS (GMLOS) (days): 2 90  Days to GMLOS:1 9     OBJECTIVE:    Risk of Unplanned Readmission Score: 27 38         Current admission status: Inpatient       Preferred Pharmacy:   [facility]/pharmacy #[id number] Ramirez, [address] [place] [address]  Phone: [phone] Fax: [phone]    Primary Care Provider: Sapphire Hood DO    Primary Insurance: MEDICARE  Secondary Insurance: [address] [place] [address] Blvd:  [address] Proxies    There are no active Health Care Proxies on file         Advance Directives  Does patient have a [address] [place] [address] Avenue?: No  Does patient have Advance Directives?: No  Primary Contact: Homer Khan [phone]    Readmission Root Cause  30 Day Readmission: No    Patient Information  Admitted from[de-identified] Home  Mental Status: Alert  During Assessment patient was accompanied by: Not accompanied during assessment  Assessment information provided by[de-identified] Patient  Primary Caregiver: Self  Support Systems: Self, Spouse/significant other, Children  Home entry access options   Select all that apply : Stairs  Number of steps to enter home : 5  Type of Current Residence: 2 story home  Living Arrangements: Lives w/ Spouse/significant other    Activities of Daily Living Prior to Admission  Functional Status: Independent  Completes ADLs independently?: Yes  Ambulates independently?: Yes  Does patient use assisted devices?: Yes  Assisted Devices (DME) used: Straight Cane  Does patient currently own DME?: Yes  What DME does the patient currently own?: Straight Cane  Does patient have a history of Outpatient Therapy (PT/OT)?: Yes  Does the patient have a history of Short-Term Rehab?: No  Does patient have a history of C?: No  Does patient currently have [employer] ?: No    Patient Information Continued  Does patient have prescription coverage?: Yes  Does patient have a history of substance abuse?: No  Does patient have a history of Mental Health Diagnosis?: Yes (depressive disorder)  Has patient received inpatient treatment related to mental health in the last 2 years?:  (denies)    Means of Transportation  Means of Transport to Appts[de-identified] Family transport

## 2022-07-13 ENCOUNTER — APPOINTMENT (INPATIENT)
Dept: MRI IMAGING | Facility: HOSPITAL | Age: 67
DRG: 690 | End: 2022-07-13
Payer: MEDICARE

## 2022-07-13 LAB
BACTERIA UR CULT: ABNORMAL
GLUCOSE SERPL-MCNC: 105 MG/DL (ref 65–140)
GLUCOSE SERPL-MCNC: 123 MG/DL (ref 65–140)
GLUCOSE SERPL-MCNC: 128 MG/DL (ref 65–140)
GLUCOSE SERPL-MCNC: 130 MG/DL (ref 65–140)

## 2022-07-13 PROCEDURE — 97167 OT EVAL HIGH COMPLEX 60 MIN: CPT

## 2022-07-13 PROCEDURE — 97535 SELF CARE MNGMENT TRAINING: CPT

## 2022-07-13 PROCEDURE — 72148 MRI LUMBAR SPINE W/O DYE: CPT

## 2022-07-13 PROCEDURE — 97163 PT EVAL HIGH COMPLEX 45 MIN: CPT

## 2022-07-13 PROCEDURE — 94660 CPAP INITIATION&MGMT: CPT

## 2022-07-13 PROCEDURE — 99232 SBSQ HOSP IP/OBS MODERATE 35: CPT | Performed by: HOSPITALIST

## 2022-07-13 PROCEDURE — 82948 REAGENT STRIP/BLOOD GLUCOSE: CPT

## 2022-07-13 RX ORDER — POLYETHYLENE GLYCOL 3350 17 G/17G
17 POWDER, FOR SOLUTION ORAL DAILY
Status: DISCONTINUED | OUTPATIENT
Start: 2022-07-13 | End: 2022-07-14 | Stop reason: HOSPADM

## 2022-07-13 RX ADMIN — HYDROMORPHONE HYDROCHLORIDE 0.5 MG: 1 INJECTION, SOLUTION INTRAMUSCULAR; INTRAVENOUS; SUBCUTANEOUS at 11:36

## 2022-07-13 RX ADMIN — METOPROLOL TARTRATE 50 MG: 50 TABLET, FILM COATED ORAL at 08:17

## 2022-07-13 RX ADMIN — PHENAZOPYRIDINE 100 MG: 100 TABLET ORAL at 08:18

## 2022-07-13 RX ADMIN — POLYETHYLENE GLYCOL 3350 17 G: 17 POWDER, FOR SOLUTION ORAL at 17:55

## 2022-07-13 RX ADMIN — METHOCARBAMOL 750 MG: 500 TABLET ORAL at 14:55

## 2022-07-13 RX ADMIN — CEFEPIME HYDROCHLORIDE 2000 MG: 2 INJECTION, POWDER, FOR SOLUTION INTRAVENOUS at 21:38

## 2022-07-13 RX ADMIN — DOCUSATE SODIUM 100 MG: 100 CAPSULE, LIQUID FILLED ORAL at 21:27

## 2022-07-13 RX ADMIN — SUCRALFATE 1 G: 1 TABLET ORAL at 08:17

## 2022-07-13 RX ADMIN — LORAZEPAM 1 MG: 1 TABLET ORAL at 17:57

## 2022-07-13 RX ADMIN — PHENAZOPYRIDINE 100 MG: 100 TABLET ORAL at 16:46

## 2022-07-13 RX ADMIN — SUCRALFATE 1 G: 1 TABLET ORAL at 17:56

## 2022-07-13 RX ADMIN — MELATONIN TAB 3 MG 6 MG: 3 TAB at 21:27

## 2022-07-13 RX ADMIN — METOPROLOL TARTRATE 50 MG: 50 TABLET, FILM COATED ORAL at 17:57

## 2022-07-13 RX ADMIN — DULOXETINE HYDROCHLORIDE 60 MG: 60 CAPSULE, DELAYED RELEASE ORAL at 17:57

## 2022-07-13 RX ADMIN — FAMOTIDINE 20 MG: 20 TABLET ORAL at 08:17

## 2022-07-13 RX ADMIN — ASPIRIN 81 MG: 81 TABLET, COATED ORAL at 08:17

## 2022-07-13 RX ADMIN — LEVOTHYROXINE SODIUM 88 MCG: 88 TABLET ORAL at 05:05

## 2022-07-13 RX ADMIN — HEPARIN SODIUM 5000 UNITS: 5000 INJECTION INTRAVENOUS; SUBCUTANEOUS at 05:05

## 2022-07-13 RX ADMIN — PRAMIPEXOLE DIHYDROCHLORIDE 0.5 MG: 0.25 TABLET ORAL at 21:27

## 2022-07-13 RX ADMIN — MORPHINE SULFATE 30 MG: 30 TABLET, EXTENDED RELEASE ORAL at 21:27

## 2022-07-13 RX ADMIN — METHOCARBAMOL 750 MG: 500 TABLET ORAL at 05:05

## 2022-07-13 RX ADMIN — FLUTICASONE FUROATE AND VILANTEROL TRIFENATATE 1 PUFF: 100; 25 POWDER RESPIRATORY (INHALATION) at 08:16

## 2022-07-13 RX ADMIN — DULOXETINE HYDROCHLORIDE 30 MG: 30 CAPSULE, DELAYED RELEASE ORAL at 08:17

## 2022-07-13 RX ADMIN — LUBIPROSTONE 24 MCG: 24 CAPSULE, GELATIN COATED ORAL at 08:18

## 2022-07-13 RX ADMIN — SUCRALFATE 1 G: 1 TABLET ORAL at 21:27

## 2022-07-13 RX ADMIN — PHENAZOPYRIDINE 100 MG: 100 TABLET ORAL at 13:02

## 2022-07-13 RX ADMIN — CLOPIDOGREL BISULFATE 75 MG: 75 TABLET ORAL at 08:17

## 2022-07-13 RX ADMIN — BISACODYL 10 MG: 5 TABLET, COATED ORAL at 17:57

## 2022-07-13 RX ADMIN — METHOCARBAMOL 750 MG: 500 TABLET ORAL at 21:27

## 2022-07-13 RX ADMIN — PRAMIPEXOLE DIHYDROCHLORIDE 0.5 MG: 0.25 TABLET ORAL at 16:46

## 2022-07-13 RX ADMIN — SUCRALFATE 1 G: 1 TABLET ORAL at 13:02

## 2022-07-13 RX ADMIN — LUBIPROSTONE 24 MCG: 24 CAPSULE, GELATIN COATED ORAL at 16:46

## 2022-07-13 RX ADMIN — CEFEPIME HYDROCHLORIDE 2000 MG: 2 INJECTION, POWDER, FOR SOLUTION INTRAVENOUS at 09:34

## 2022-07-13 RX ADMIN — LORAZEPAM 1 MG: 1 TABLET ORAL at 08:17

## 2022-07-13 RX ADMIN — MORPHINE SULFATE 30 MG: 30 TABLET, EXTENDED RELEASE ORAL at 08:17

## 2022-07-13 RX ADMIN — PRAMIPEXOLE DIHYDROCHLORIDE 0.5 MG: 0.25 TABLET ORAL at 08:17

## 2022-07-13 RX ADMIN — HEPARIN SODIUM 5000 UNITS: 5000 INJECTION INTRAVENOUS; SUBCUTANEOUS at 14:56

## 2022-07-13 NOTE — OCCUPATIONAL THERAPY NOTE
Occupational Therapy Evaluation & Treatment     Patient Name: Ana MUNOZ Date: 2022  Problem List  Principal Problem:    Urinary tract infection  Active Problems:    Atherosclerotic heart disease of native coronary artery without angina pectoris    Depressive disorder    Mixed hyperlipidemia    BRAYDEN (obstructive sleep apnea)    Breast cancer (HCC)    Type 2 diabetes mellitus ([facility] [place] [facility] )    Esophageal dysphagia    Opioid dependence ([place] [address] )    Past Medical History  Past Medical History:   Diagnosis Date    Anxiety     Breast cancer ([place] [address] )     CAD (coronary artery disease)     Cancer ([place] [address] )     Carpal tunnel syndrome     Disease of thyroid gland     Elevated cholesterol     Fibromyalgia     Hypertension     Kidney stone     Melanoma ([facility] [place] [facility] )     nose    Myocardial infarction (HCC)     Neuropathy     BRAYDEN (obstructive sleep apnea)     cpap    Panic attack      Past Surgical History  Past Surgical History:   Procedure Laterality Date    BREAST SURGERY Bilateral     CARDIAC SURGERY      cardiac ablation     SECTION      x3    CHOLECYSTECTOMY      CORONARY ANGIOPLASTY WITH STENT PLACEMENT      LYMPH NODE BIOPSY Right 2022    MASTECTOMY Bilateral     NOSE SURGERY               22 1039   OT Last Visit   OT Visit Date 22   Note Type   Note type Evaluation  (Treatment)   Restrictions/Precautions   Weight Bearing Precautions Per Order No   Other Precautions Pain; Fall Risk;Multiple lines;Spinal precautions   Pain Assessment   Pain Assessment Tool 0-10   Pain Score 6   Pain Location/Orientation Location: Head;Location: Back   Patient's Stated Pain Goal No pain   Hospital Pain Intervention(s) Repositioned; Ambulation/increased activity; Emotional support; Rest   Home Living   Type of [address] [place] [address] Multi-level;Stairs to enter with rails;Bed/bath upstairs;1/2 bath on main level  (5 RODRÍGUEZ w/ HR)   Bathroom Shower/Tub Tub/shower unit   [employer] Raised   Bathroom Equipment Grab bars in shower;Commode  (GB needs to be installed; Looking into SC for home)   Bathroom Accessibility Accessible  (Via Hurry cane)   Home Equipment Walker;Cane;Other (Comment)  (Rollator)   Prior Function   Lives With Significant other   Receives Help From Family   ADL Assistance Independent   IADLs Needs assistance   Falls in the last 6 months 1 to 4  (> 4 per daughter)   Vocational On disability   Comments Pt states she trips or looses balance when she falls  Lifestyle   Autonomy (I) w/ ADLS and assist w/ IADLs  Ambulates using hurry cane  H/o several falls  SO works but daughter stays with her  Another daughter lives in house behind her  + dogs at home  Reciprocal Relationships Family   Intrinsic Gratification Dogs   Psychosocial   Psychosocial (WDL) WDL   Subjective   Subjective " I thought about therapy before but the cancer had me push things back"   ADL   Eating Assistance 6  Modified independent   Eating Deficit Setup   Grooming Assistance 6  Modified Independent   Grooming Deficit Setup;Brushing hair   UB Bathing Assistance 6  Modified Independent   UB Bathing Deficit Setup   LB Bathing Assistance 4  Minimal Assistance   LB Bathing Deficit Setup   UB Dressing Assistance 6  Modified independent   UB Dressing Deficit Setup   LB Dressing Assistance 4  Minimal Assistance   LB Dressing Deficit Setup   Bed Mobility   Supine to Sit 5  Supervision   Additional items HOB elevated; Bedrails   Sit to Supine 5  Supervision   Additional items Bedrails; Increased time required   Additional Comments Remains in bed w/ all needs  Dtr present  Transfers   Sit to Stand 5  Supervision   Additional items Increased time required;Verbal cues   Stand to Sit 5  Supervision   Additional items Increased time required;Verbal cues   Stand pivot 4  Minimal assistance   Additional items Assist x 1;Verbal cues; Increased time required   Additional Comments Cues for hand placement  Min/CG to SPT using hurry cane  (S) for SPT using RW   Rec RW at this time  Functional Mobility   Functional Mobility 4  Minimal assistance   Additional Comments Min/CG using hurrycane  (S) using RW  Rec RW at this time  Balance   Static Sitting Good   Dynamic Sitting Fair +   Static Standing Fair   Dynamic Standing Fair -   Ambulatory Fair -   Activity Tolerance   Activity Tolerance Patient limited by fatigue;Patient limited by pain   Medical Staff Made Aware PT, Amirah   Nurse Made Aware Reema ARTHUR Assessment   RUE Assessment WFL  (4-/5)   LUE Assessment   LUE Assessment WFL  (4-/5)   Hand Function   Gross Motor Coordination Functional   Fine Motor Coordination Functional   Sensation   Light Touch Partial deficits in the RLE;Partial deficits in the LLE  (Neuropathy in feet)   Vision-Basic Assessment   Current Vision Wears glasses all the time   Cognition   Arousal/Participation Responsive; Cooperative   Attention Attends with cues to redirect   Orientation Level Oriented X4   Memory Within functional limits   Following Commands Follows all commands and directions without difficulty   Assessment   Limitation Decreased ADL status; Decreased UE strength;Decreased endurance;Decreased self-care trans;Decreased high-level ADLs   Prognosis Good   Assessment Pt admit 7/11/22 with generalized weakness and burning w/ urination  DX: UTI  Recently treated w/ Keflex 2-3 weeks ago  MRI thoracic spine marrow replacing metastasis involving almost entire T10 vertebral body  No pathologic compression fracture  There is minimal ventral epidural tumor without significant canal stenosis  Chronic mild superior endplate compression deformity at levels T6-T9  OT completed extensive review of pt's medical and social history  Pt with h/o breast CA (ONCOLOGY F/U ON WEDNESDAY), HTN, CAD, HLD  Prior to admit was living w/ SO in [address] [facility] w/ 5 RODR[address]Z  Bed and bath on 2nd fl  (I) w/ ADLs and assist w/ IADLs  Ambulates using hurrycane  H/o greater than 4 falls per dtr   SO works but daughter comes to stay w/ her  Pt presents to OT below baseline due to the following performance deficits: strength;  sensation; pain; balance; stand tolerance; functional mobility; coping; community integration; self care  Therefore, pt would benefit from OT services to achieve optimal level of performance  Occupational performance areas to be addressed include: bathing, toileting, dressing, activity tolerance, functional mobility, and clothing management  Pt is (S) for bed mobility and STS transfers  Min/CG for ambulation using hurrycane  (S) for ambulation using RW  Recommend use of RW at this time for improved balance and EC  SPO2 90-92% RA at rest and 96-98% RA w/ activity  Fatigues quickly  MI for UB ADLs and Min for LB ADLS  Urine cx abnormal and awaiting blood cx  Based on findings, pt is of high complexity  The patient's raw score on the AM-PAC Daily Activity inpatient short form is 21, standardized score is 44 27, greater than 39 4  Patients at this level are likely to benefit from discharge to home  Recommend pt return home w/ family support once medically cleared  Would benefit from outpt therapy to improve balance and maximize overall strength  WILL NEED SHOWER CHAIR FOR HOME  Goals   Patient Goals To go home   Plan   Treatment Interventions ADL retraining;Functional transfer training;UE strengthening/ROM; Endurance training;Patient/family training;Equipment evaluation/education; Activityengagement; Energy conservation   Goal Expiration Date 07/27/22   OT Treatment Day 1   OT Frequency 2-3x/wk   Additional Treatment Session   Start Time 1024   End Time 1039   Treatment Assessment Pt seen for OT tx session w/ focus on toileting and activity tolerance  (S) to manage gown on undies  Attempted urination but unsuccessful at this time  STS w/ (S) using GB on wall  SPT to sink w/ (S) using RW  Washed hands w/ (S)  Ambulated to bed w/ (S) using RW  F balance  Increased time   Educated pt on using RW at this time for [facility] and for improved balance  Stand tolerance 2 mins  SPO2 96-98% w/ activity  Remains in bed w/ all needs     Recommendation   OT Discharge Recommendation Home with outpatient rehabilitation   Equipment Recommended Shower/Tub chair with back ($)   AM-PAC Daily Activity Inpatient   Lower Body Dressing 3   Bathing 3   Toileting 3   Upper Body Dressing 4   Grooming 4   Eating 4   Daily Activity Raw Score 21   Daily Activity Standardized Score (Calc for Raw Score >=11) 44 27   AM-PAC Applied Cognition Inpatient   Following a Speech/Presentation 4   Understanding Ordinary Conversation 4   Taking Medications 4   Remembering Where Things Are Placed or Put Away 4   Remembering List of 4-5 Errands 4   Taking Care of Complicated Tasks 4   Applied Cognition Raw Score 24   Applied Cognition Standardized Score 62 21       GOALS:     Pt will complete UB ADL's w/ MI     Pt will complete LB ADL's w/ MI using AE PRN    Pt will complete toileting including hygiene and clothing management w/ MI     Pt will complete functional transfers w/ MI using RW    Pt will complete dynamic and stand balance w/ F+ for safe clothing management     Pt will improve stand tolerance to 5-10 mins for safety w/ ADL tasks     Pt will improve activity tolerance to F+ for 20- 30 min tx session for increased engagement in functional tasks     Pt will achieve UE MMT 4/5 to increase independence w/ ADL txfs     Pt will verbalize 3 potential fall hazards and identify techniques to decrease falls in own environment      After education, pt will verbalize 3 energy conservation techs to utilize to increase activity tolerance during functional tasks         Freedom ADHIKARI OTR/L

## 2022-07-13 NOTE — CASE MANAGEMENT
Case Management Discharge Planning Note    Patient name Jack Cha  Location [facility] 5 /E5 MS 94 20 56-* MRN [id number]  : 1955 Date 2022       Current Admission Date: 2022  Current Admission Diagnosis:Urinary tract infection   Patient Active Problem List    Diagnosis Date Noted    [facility] ) 2022    Esophageal dysphagia 2022    COVID-19 2022    Kidney stones 2022    Tension type headache 2021    Urinary frequency 2021    Balance disorder 2021    Type 2 diabetes mellitus [facility] 2021    Iron deficiency anemia 2021    Colon polyps 2021    Gastroparesis 2021    GERD (gastroesophageal reflux disease) 2021    Constipation 2021    Bloating 2021    Lactose intolerance 2021    Gastric polyps 2021    Gastritis 2021    Colon cancer screening 2021    Primary osteoarthritis of both knees 2021    Shortness of breath 2021    Thrush 2021    Bone metastases [facility] ) 2021    Drug-induced peripheral neuropathy ([facility] ) 2021    JESUS (acute kidney injury) ([facility] ) 2021    Panlobular emphysema ([facility] ) 2021    Obesity, morbid ([facility] ) 2021    Myokymia of right side of face 2021    Peripheral polyneuropathy 2021    History of coronary artery stent placement 2021    Hyperglycemia 2021    Restless leg syndrome 2020    Trochanteric bursitis of right hip 10/26/2020    Sacroiliitis ([facility] ) 10/26/2020    Malignant neoplasm of lower-outer quadrant of unspecified female breast ([facility] ) 06/10/2020    Lung metastasis ([facility] ) 06/10/2020    Long term (current) use of anticoagulants 06/10/2020    Drug-induced polyneuropathy ([facility] ) 06/10/2020    Basal cell carcinoma of skin of nose 06/10/2020    Ambulatory dysfunction 06/10/2020    Syncope 05/15/2020    Lung nodule 05/10/2020    Urinary tract infection 2020    Breast cancer ([facility] ) 05/08/2020    Panic attack 05/08/2020    Divergence insufficiency 05/08/2020    Cerebrovascular small vessel disease with hemorrhage ([facility] ) 05/08/2020    Disequilibrium syndrome 05/08/2020    Double vision 04/28/2020    Right upper quadrant abdominal pain 09/11/2019    Fatty liver 09/11/2019    Medicare annual wellness visit, subsequent 06/18/2019    Hypothyroidism 06/18/2019    Chest wall pain 03/14/2019    Coronary artery disease with angina pectoris ([facility] ) 03/14/2019    Low back pain at multiple sites 03/14/2019    Atherosclerotic heart disease of native coronary artery without angina pectoris 03/14/2018    Disorder of rotator cuff 02/10/2017    DDD (degenerative disc disease), cervical 02/10/2017    Depressive disorder 09/25/2016    Essential hypertension 09/25/2016    Fibromyalgia 09/25/2016    Generalized anxiety disorder 09/25/2016    History of breast cancer 09/25/2016    Mixed hyperlipidemia 09/25/2016    BRAYDEN (obstructive sleep apnea) 09/25/2016    Bilateral carpal tunnel syndrome 02/08/2016      LOS (days): 2  Geometric Mean LOS (GMLOS) (days): 2 90  Days to GMLOS:0 9     OBJECTIVE:  Risk of Unplanned Readmission Score: 35 82         Current admission status: Inpatient   Preferred Pharmacy:   [facility] #[id number] Cec[facility] Mitchell [address]  Phone: [phone] Fax: [phone]    Primary Care Provider: Corinna Luna DO    Primary Insurance: MEDICARE  Secondary Insurance: Rayray Romero    DISCHARGE DETAILS:    IMM Given (Date):: 07/13/22  IMM Given to[de-identified] Patient     Additional Comments: CM met patient at bedside, patient signed IMM  Patient understands no further questions  PT/OT recommending outpatient rehab, [facility] will need to add an ambulatory referral for outpatient rehab at discharge

## 2022-07-13 NOTE — PLAN OF CARE
Problem: OCCUPATIONAL THERAPY ADULT  Goal: Performs self-care activities at highest level of function for planned discharge setting  See evaluation for individualized goals  Description: Treatment Interventions: ADL retraining, Functional transfer training, UE strengthening/ROM, Endurance training, Patient/family training, Equipment evaluation/education, Activityengagement, Energy conservation  Equipment Recommended: Shower/Tub chair with back ($)       See flowsheet documentation for full assessment, interventions and recommendations  Note: Limitation: Decreased ADL status, Decreased UE strength, Decreased endurance, Decreased self-care trans, Decreased high-level ADLs  Prognosis: Good  Assessment: Pt admit 7/11/22 with generalized weakness and burning w/ urination  DX: UTI  Recently treated w/ Keflex 2-3 weeks ago  MRI thoracic spine marrow replacing metastasis involving almost entire T10 vertebral body  No pathologic compression fracture  There is minimal ventral epidural tumor without significant canal stenosis  Chronic mild superior endplate compression deformity at levels T6-T9  OT completed extensive review of pt's medical and social history  Pt with h/o breast CA (ONCOLOGY F/U ON WEDNESDAY), HTN, CAD, HLD  Prior to admit was living w/ SO in [address] [place] [address] w/ 5 [address]  Bed and bath on 2nd fl  (I) w/ ADLs and assist w/ IADLs  Ambulates using hurrycane  H/o greater than 4 falls per dtr  SO works but daughter comes to stay w/ her  Pt presents to OT below baseline due to the following performance deficits: strength;  sensation; pain; balance; stand tolerance; functional mobility; coping; community integration; self care  Therefore, pt would benefit from OT services to achieve optimal level of performance  Occupational performance areas to be addressed include: bathing, toileting, dressing, activity tolerance, functional mobility, and clothing management  Pt is (S) for bed mobility and STS transfers   Min/CG for ambulation using hurrycane  (S) for ambulation using RW  Recommend use of RW at this time for improved balance and EC  SPO2 90-92% RA at rest and 96-98% RA w/ activity  Fatigues quickly  MI for UB ADLs and Min for LB ADLS  Urine cx abnormal and awaiting blood cx  Based on findings, pt is of high complexity  The patient's raw score on the AM-PAC Daily Activity inpatient short form is 21, standardized score is 44 27, greater than 39 4  Patients at this level are likely to benefit from discharge to home  Recommend pt return home w/ family support once medically cleared  Would benefit from outpt therapy to improve balance and maximize overall strength       OT Discharge Recommendation: Home with outpatient rehabilitation

## 2022-07-13 NOTE — PLAN OF CARE
Problem: MOBILITY - ADULT  Goal: Maintain or return to baseline ADL function  Description: INTERVENTIONS:  -  Assess patient's ability to carry out ADLs; assess patient's baseline for ADL function and identify physical deficits which impact ability to perform ADLs (bathing, care of mouth/teeth, toileting, grooming, dressing, etc )  - Assess/evaluate cause of self-care deficits   - Assess range of motion  - Assess patient's mobility; develop plan if impaired  - Assess patient's need for assistive devices and provide as appropriate  - Encourage maximum independence but intervene and supervise when necessary  - Involve family in performance of ADLs  - Assess for home care needs following discharge   - Consider OT consult to assist with ADL evaluation and planning for discharge  - Provide patient education as appropriate  Outcome: Progressing  Goal: Maintains/Returns to pre admission functional level  Description: INTERVENTIONS:  - Perform BMAT or MOVE assessment daily    - Set and communicate daily mobility goal to care team and patient/family/caregiver     - Collaborate with rehabilitation services on mobility goals if consulted  - Out of bed for toileting  - Record patient progress and toleration of activity level   Outcome: Progressing     Problem: GENITOURINARY - ADULT  Goal: Maintains or returns to baseline urinary function  Description: INTERVENTIONS:  - Assess urinary function  - Encourage oral fluids to ensure adequate hydration if ordered  - Administer IV fluids as ordered to ensure adequate hydration  - Administer ordered medications as needed  - Offer frequent toileting  - Follow urinary retention protocol if ordered  Outcome: Progressing  Goal: Absence of urinary retention  Description: INTERVENTIONS:  - Assess patient's ability to void and empty bladder  - Monitor I/O  - Bladder scan as needed  - Discuss with physician/AP medications to alleviate retention as needed  - Discuss catheterization for long term situations as appropriate  Outcome: Progressing     Problem: METABOLIC, FLUID AND ELECTROLYTES - ADULT  Goal: Electrolytes maintained within normal limits  Description: INTERVENTIONS:  - Monitor labs and assess patient for signs and symptoms of electrolyte imbalances  - Administer electrolyte replacement as ordered  - Monitor response to electrolyte replacements, including repeat lab results as appropriate  - Instruct patient on fluid and nutrition as appropriate  Outcome: Progressing  Goal: Fluid balance maintained  Description: INTERVENTIONS:  - Monitor labs   - Monitor I/O and WT  - Instruct patient on fluid and nutrition as appropriate  - Assess for signs & symptoms of volume excess or deficit  Outcome: Progressing  Goal: Glucose maintained within target range  Description: INTERVENTIONS:  - Monitor Blood Glucose as ordered  - Assess for signs and symptoms of hyperglycemia and hypoglycemia  - Administer ordered medications to maintain glucose within target range  - Assess nutritional intake and initiate nutrition service referral as needed  Outcome: Progressing     Problem: Potential for Falls  Goal: Patient will remain free of falls  Description: INTERVENTIONS:  - Educate patient/family on patient safety including physical limitations  - Instruct patient to call for assistance with activity   - Consult OT/PT to assist with strengthening/mobility   - Keep Call bell within reach  - Keep bed low and locked with side rails adjusted as appropriate  - Keep care items and personal belongings within reach  - Initiate and maintain comfort rounds  - Make Fall Risk Sign visible to staff  - Offer Toileting every 2 Hours, in advance of need  - Initiate/Maintain bed alarm  - Obtain necessary fall risk management equipment:   - Apply yellow socks and bracelet for high fall risk patients  - Consider moving patient to room near nurses station  Outcome: Progressing     Problem: Nutrition/Hydration-ADULT  Goal: Nutrient/Hydration intake appropriate for improving, restoring or maintaining nutritional needs  Description: Monitor and assess patient's nutrition/hydration status for malnutrition  Collaborate with interdisciplinary team and initiate plan and interventions as ordered  Monitor patient's weight and dietary intake as ordered or per policy  Utilize nutrition screening tool and intervene as necessary  Determine patient's food preferences and provide high-protein, high-caloric foods as appropriate       INTERVENTIONS:  - Monitor oral intake, urinary output, labs, and treatment plans  - Assess nutrition and hydration status and recommend course of action  - Evaluate amount of meals eaten  - Assist patient with eating if necessary   - Allow adequate time for meals  - Recommend/ encourage appropriate diets, oral nutritional supplements, and vitamin/mineral supplements  - Order, calculate, and assess calorie counts as needed  - Recommend, monitor, and adjust tube feedings and TPN/PPN based on assessed needs  - Assess need for intravenous fluids  - Provide specific nutrition/hydration education as appropriate  - Include patient/family/caregiver in decisions related to nutrition  Outcome: Progressing

## 2022-07-13 NOTE — ASSESSMENT & PLAN NOTE
Lab Results   Component Value Date    HGBA1C 6 3 (H) 04/06/2022       Recent Labs     07/12/22  1546 07/12/22  2104 07/13/22  0654 07/13/22  1052   POCGLU 118 117 105 123       Blood Sugar Average: Last 72 hrs:  · (P) 766 [id number] well-controlled type 2 diabetes with most recent hemoglobin A1c 6 3  · Maintained outpatient on metformin, hold while inpatient  · Sliding scale coverage with Accu-Cheks

## 2022-07-13 NOTE — PHYSICAL THERAPY NOTE
Physical Therapy Evaluation     Patient's Name: Cleophas Hamman    Admitting Diagnosis  UTI (urinary tract infection) [N39 0]  Cystitis [N30 90]  Pyelonephritis of right kidney [N12]    Problem List  Patient Active Problem List   Diagnosis    Atherosclerotic heart disease of native coronary artery without angina pectoris    Bilateral carpal tunnel syndrome    Depressive disorder    Essential hypertension    Fibromyalgia    Generalized anxiety disorder    History of breast cancer    Mixed hyperlipidemia    BRAYDEN (obstructive sleep apnea)    Chest wall pain    Coronary artery disease with angina pectoris (HCC)    Low back pain at multiple sites    Medicare annual wellness visit, subsequent    Hypothyroidism    Right upper quadrant abdominal pain    Fatty liver    Double vision    Urinary tract infection    Breast cancer ([facility] )    Panic attack    Divergence insufficiency    Cerebrovascular small vessel disease with hemorrhage ([facility] )    Disequilibrium syndrome    Trochanteric bursitis of right hip    Sacroiliitis (HCC)    Restless leg syndrome    Hyperglycemia    Myokymia of right side of face    Peripheral polyneuropathy    Shortness of breath    Thrush    Bone metastases (HCC)    Drug-induced peripheral neuropathy (HCC)    JESUS (acute kidney injury) ([facility] )    Panlobular emphysema (HCC)    Obesity, morbid ([facility] )    Primary osteoarthritis of both knees    Gastritis    Colon cancer screening    Colon polyps    Gastroparesis    GERD (gastroesophageal reflux disease)    Constipation    Bloating    Lactose intolerance    Gastric polyps    Iron deficiency anemia    Balance disorder    Type 2 diabetes mellitus ([facility] )    Syncope    Malignant neoplasm of lower-outer quadrant of unspecified female breast ([facility] )    Lung nodule    Lung metastasis ([facility] )    Long term (current) use of anticoagulants    History of coronary artery stent placement    Drug-induced polyneuropathy (HCC)    Disorder of rotator cuff    DDD (degenerative disc disease), cervical    Basal cell carcinoma of skin of nose    Ambulatory dysfunction    Tension type headache    Urinary frequency    Kidney stones    COVID-19    Esophageal dysphagia    Opioid dependence ([facility] )       Past Medical History  Past Medical History:   Diagnosis Date    Anxiety     Breast cancer ([facility] )     CAD (coronary artery disease)     Cancer ([facility] )     Carpal tunnel syndrome     Disease of thyroid gland     Elevated cholesterol     Fibromyalgia     Hypertension     Kidney stone     Melanoma ([facility] )     nose    Myocardial infarction ([facility] )     Neuropathy     BRAYDEN (obstructive sleep apnea)     cpap    Panic attack        Past Surgical History  Past Surgical History:   Procedure Laterality Date    BREAST SURGERY Bilateral     CARDIAC SURGERY      cardiac ablation     SECTION      x3    CHOLECYSTECTOMY      CORONARY ANGIOPLASTY WITH STENT PLACEMENT      LYMPH NODE BIOPSY Right 2022    MASTECTOMY Bilateral     NOSE SURGERY            22 1038   PT Last Visit   PT Visit Date 22   Note Type   Note type Evaluation   Pain Assessment   Pain Assessment Tool 0-10   Pain Score 5   Pain Location/Orientation Orientation: Lower; Location: Back   Restrictions/Precautions   Weight Bearing Precautions Per Order No   Other Precautions Pain; Fall Risk;Multiple lines  (spinal precautions)   Home Living   Type of [address] [place] [address] Multi-level;Stairs to enter with rails;Bed/bath upstairs;1/2 bath on main level  (5 RODRÍGUEZ with HR)   Bathroom Shower/Tub Tub/shower unit   Bathroom Toilet Raised   Bathroom Equipment   (has grab bars but not put up yet for shower)   Home Equipment Walker;Cane  (rollator)   Additional Comments Pt   is interested in shower chair     Prior Function   Level of [place] Independent with ADLs and functional mobility   Lives With Significant other   Receives Help From Family   ADL Assistance Independent   IADLs Needs assistance  (cooking, cleaning done by SO)   Falls in the last 6 months 1 to 4  (trips/falls , has been using SPC)   Cognition   Overall Cognitive Status WFL   Arousal/Participation Alert   Orientation Level Oriented X4   Memory Within functional limits   Following Commands Follows all commands and directions without difficulty   Subjective   Subjective "I am doing ok  do I  have to walk  now?"   RLE Assessment   RLE Assessment X   Strength RLE   RLE Overall Strength 4/5  (grossly, modified MMT due to CA)   LLE Assessment   LLE Assessment X   Strength LLE   LLE Overall Strength 4/5  (grossly, modified MMT due to CA)   Light Touch   RLE Light Touch Impaired   RLE Light Touch Comments impaired but intact   LLE Light Touch Impaired   LLE Light Touch Comments impaired but intact   Bed Mobility   Supine to Sit 5  Supervision   Additional items Assist x 1; Increased time required;HOB elevated; Bedrails   Sit to Supine 5  Supervision   Additional items Assist x 1;Bedrails; Increased time required   Transfers   Sit to Stand 5  Supervision   Additional items Assist x 1; Increased time required;Verbal cues   Stand to Sit 5  Supervision   Additional items Assist x 1; Increased time required;Verbal cues   Additional Comments Initially ambulated with [facility] as this is pt  preference, ambulated 28' with SPC (CGAx1) with pt  ambulating with short stride decreased foot clearance and significant decreased speed  Trialed RW s/p with improved gait pattern , step length and balance with decreased level of assist (supervision)  Verbal recommendation is RW use to pt /pt  daughter, pt  verbalized understanding  Ambulation/Elevation   Gait pattern Decreased foot clearance; Wide HUGH  (improved stride with RW)   Gait Assistance 5  Supervision   Additional items Assist x 1   Assistive Device Rolling walker   Distance 35' with SPC, 28' with RW   Stair Management Assistance Not tested   Balance   Static Sitting Good   Dynamic Sitting Fair +   Static Standing Fair   Dynamic Standing [address], & [address] -  (with RW, Poor+ with SPC)   Endurance Deficit   Endurance Deficit Yes   Endurance Deficit Description fatigue/pain   Activity Tolerance   Activity Tolerance Patient limited by fatigue   Medical Staff Lucinda Dean 405 OT   Nurse Made Aware RN ok to see   Assessment   Prognosis Good   Problem List Decreased strength;Decreased endurance; Impaired balance;Decreased mobility;Pain; Impaired sensation  (spinal precautions)   Assessment Pt is 78 y o  female seen for PT evaluation s/p admit to [facility] on 7/11/2022 w/ Urinary tract infection  PT consulted to assess pt's functional mobility and d/c needs  Order placed for PT eval and tx, w/ activity order  Comorbidities affecting pt's physical performance at time of assessment include: UTI, Breast CA, T2 DM, opiod dependence, depressive disorder, fibromyalgia, bone metastases to spine with spinal precautions  PTA, pt was independent w/ all functional mobility w/ SPC  Personal factors affecting pt at time of IE include: lives in multi story house, ambulating w/ assistive device, stairs to enter home, positive fall history and good support at  home  Please find objective findings from PT assessment regarding body systems outlined above with impairments and limitations including weakness, impaired balance, gait deviations, pain, decreased functional mobility tolerance and fall risk  The following objective measures performed on IE also reveal limitations: AM-PAC 6-Clicks: 85/25  Pt's clinical presentation is currently unstable/unpredictable seen in pt's presentation  Pt to benefit from continued PT tx to address deficits as defined above and maximize level of functional independent mobility and consistency  From PT/mobility standpoint, recommendation at time of d/c would be home with outpatient rehabilitation pending progress in order to facilitate return to PLOF  Reviewed recommendation of RW use with pt  Pt  Verbalized understanding     Barriers to Discharge None   Goals Patient Goals to go home   STG Expiration Date 07/27/22   Short Term Goal #1 1  Pt will complete bed mobility with Mod I 2  Pt will complete sit to stand transfers with Mod I to increase functional mobility  3  Pt will ambulate 150 ft with RW with Mod I without LOB 4  Pt will increase B/L LE strength by 1 grade to facilitate improved functional mobility with decreased risk of falls  5  Pt will increase standing balance to fair in order to decrease risk of falls  6  Pt will navigate 10 steps with HR use with supervised A x1  PT Treatment Day 0   Plan   Treatment/Interventions Functional transfer training;LE strengthening/ROM; Elevations; Therapeutic exercise; Endurance training;Gait training;Bed mobility; Equipment eval/education;Patient/family training;Spoke to nursing;OT   PT Frequency 3-5x/wk   Recommendation   PT Discharge Recommendation Home with outpatient rehabilitation   Equipment Recommended Walker   AM-PAC Basic Mobility Inpatient   Turning in Bed Without Bedrails 4   Lying on Back to Sitting on Edge of Flat Bed 3   Moving Bed to Chair 3   Standing Up From Chair 3   Walk in Room 3   Climb 3-5 Stairs 2   Basic Mobility Inpatient Raw Score 18   Basic Mobility Standardized Score 41 05   Highest Level Of Mobility   -[facility] Goal 6: Walk 10 steps or more         Any Ribeiro, PT

## 2022-07-13 NOTE — RESTORATIVE TECHNICIAN NOTE
Restorative Technician Note      Patient Name: Charles Lerma     [facility] Tech Visit Date: 7/13/2022  Patient Position Upon Consult: Supine  Activity Performed: Ambulated; Transferred  Patient Position at End of Consult: Bedside chair;  All needs within reach

## 2022-07-13 NOTE — ASSESSMENT & PLAN NOTE
· Patient presented to the ED with complaint of generalized fatigue, burning with urination and right-sided flank pain  · UA with large leukocytes, nitrate positive and occult blood  · Did recently complete 10 day course of Keflex without improvement, was placed on cefepime in the ED, continue  · Urine culture and sensitivities pending   · Blood cultures pending  · Patient did not meet sepsis criteria time of presentation  · Does have mild leukocytosis of 12  · Continue pyridium for burning   · P r n  Analgesics for flank pain, no evidence of pyelonephritis on CT scan- back pain likely due to T10 metastatic lesion    Interim 7/13/2022  Urine cultures pending    So far growing gram negative rods  On empiric Cefepime and she is feeling better

## 2022-07-13 NOTE — PROGRESS NOTES
[facility]  Progress Note - Jaylene Plunkett 1955, 78 y o  female MRN: [id number]  Unit/Bed#: E5 -01 Encounter: [id number]  Primary Care Provider: Wally Clarke DO   Date and time admitted to hospital: 7/11/2022  3:15 PM    * Urinary tract infection  Assessment & Plan  · Patient presented to the ED with complaint of generalized fatigue, burning with urination and right-sided flank pain  · UA with large leukocytes, nitrate positive and occult blood  · Did recently complete 10 day course of Keflex without improvement, was placed on cefepime in the ED, continue  · Urine culture and sensitivities pending   · Blood cultures pending  · Patient did not meet sepsis criteria time of presentation  · Does have mild leukocytosis of 12  · Continue pyridium for burning   · P r n  Analgesics for flank pain, no evidence of pyelonephritis on CT scan- back pain likely due to T10 metastatic lesion    Interim 7/13/2022  Urine cultures pending    So far growing gram negative rods  On empiric Cefepime and she is feeling better    Type 2 diabetes mellitus [facility])  Assessment & Plan  Lab Results   Component Value Date    HGBA1C 6 3 (H) 04/06/2022       Recent Labs     07/12/22  1546 07/12/22  2104 07/13/22  0654 07/13/22  1052   POCGLU 118 117 105 123       Blood Sugar Average: Last 72 hrs:  · (P) 125 [id number] well-controlled type 2 diabetes with most recent hemoglobin A1c 6 3  · Maintained outpatient on metformin, hold while inpatient  · Sliding scale coverage with [facility]    Breast cancer ([facility] )  Assessment & Plan  · History of breast cancer with mets to bone status post bilateral mastectomy  · Does have evidence of progressive T10 metastatic lytic lesion  · MRI T spine and L spine pending- likely needs outpatient follow up with radiation oncology   · Not currently undergoing any active chemotherapy  · Continue close outpatient Hematology-Oncology follow-up at discharge            Subjective: Feeling better  Less fatigue  Less abdominal pain  No dysuria      Objective:     Vitals:   Temp (24hrs), Av 4 °F (36 9 °C), Min:98 1 °F (36 7 °C), Max:98 8 °F (37 1 °C)    Temp:  [98 1 °F (36 7 °C)-98 8 °F (37 1 °C)] 98 8 °F (37 1 °C)  HR:  [73-88] 82  Resp:  [16] 16  BP: (110-146)/(70-94) 110/70  SpO2:  [86 %-93 %] 90 %  There is no height or weight on file to calculate BMI  Input and Output Summary (last 24 hours): Intake/Output Summary (Last 24 hours) at 2022 1541  Last data filed at 2022 1801  Gross per 24 hour   Intake --   Output 500 ml   Net -500 ml       Physical Exam:     Physical Exam  Vitals and nursing note reviewed  HENT:      Head: Normocephalic and atraumatic  Eyes:      Pupils: Pupils are equal, round, and reactive to light  Cardiovascular:      Rate and Rhythm: Normal rate and regular rhythm  Heart sounds: No murmur heard  No friction rub  No gallop  Pulmonary:      Effort: Pulmonary effort is normal       Breath sounds: Normal breath sounds  No wheezing or rales  Abdominal:      General: Bowel sounds are normal       Palpations: Abdomen is soft  Tenderness: There is no abdominal tenderness  Musculoskeletal:      Right lower leg: No edema  Left lower leg: No edema                 Additional Data:     Labs:    Results from last 7 days   Lab Units 22  0458   WBC Thousand/uL 10 44*   HEMOGLOBIN g/dL 12 4   HEMATOCRIT % 39 0   PLATELETS Thousands/uL 330   NEUTROS PCT % 70   LYMPHS PCT % 18   MONOS PCT % 9   EOS PCT % 1     Results from last 7 days   Lab Units 22  0458 22  1548   POTASSIUM mmol/L 4 4 3 9   CHLORIDE mmol/L 104 101   CO2 mmol/L 32 30   BUN mg/dL 12 16   CREATININE mg/dL 0 76 0 81   CALCIUM mg/dL 8 7 9 4   ALK PHOS U/L  --  64   ALT U/L  --  25   AST U/L  --  14     Results from last 7 days   Lab Units 22  1548   INR  1 02     Results from last 7 days   Lab Units 22  1052 22  0654 22  2104 07/12/22  1546 07/12/22  1046 07/12/22  0700 07/11/22  2039   POC GLUCOSE mg/dl 123 105 117 118 152* 104 160*               * I Have Reviewed All Lab Data     Recent Cultures (last 7 days):     Results from last 7 days   Lab Units 07/11/22  1648 07/11/22  1548 07/11/22  1409   BLOOD CULTURE  No Growth at 24 hrs   No Growth at 24 hrs   --    URINE CULTURE   --   --  >100,000 cfu/ml Gram Negative Virgilio*         Last 24 Hours Medication List:   Current Facility-Administered Medications   Medication Dose Route Frequency Provider Last Rate    diphenhydrAMINE  25 mg Oral HS PRN Ava Irvin PA-C      And    acetaminophen  488 mg Oral HS PRN Ava Irvin PA-C      acetaminophen  650 mg Oral Q6H PRN Ava Irvin PA-C      aspirin  81 mg Oral Daily Ava Irvin PA-C      calcium carbonate  1,000 mg Oral Daily PRN Ava Irvin PA-C      cefepime  2,000 mg Intravenous Q12H Ava Irvin PA-C 2,000 mg (07/13/22 0934)    clopidogrel  75 mg Oral Daily Ava Irvin PA-C      docusate sodium  100 mg Oral HS Ava Irvin PA-C      DULoxetine  30 mg Oral Daily Ava Irvin PA-C      DULoxetine  60 mg Oral QPM Ava Irvin PA-C      famotidine  20 mg Oral Daily Ava Irvin PA-C      fluticasone-vilanterol  1 puff Inhalation Daily Ida Henning PA-C      heparin (porcine)  5,000 Units Subcutaneous [facility] Crystal Handy      HYDROmorphone  0 5 mg Intravenous Q4H PRN Ava Irvin PA-C      insulin lispro  1-5 Units Subcutaneous HS Ava Irvin PA-C      insulin lispro  1-6 Units Subcutaneous TID AC Ava Irvin PA-C      levothyroxine  88 mcg Oral Early Morning Ava Irvin PA-C      LORazepam  1 mg Oral BID Ava Irvin PA-C      lubiprostone  24 mcg Oral BID With Meals Ava Irvin PA-C      melatonin  6 mg Oral HS Ava Irvin PA-C      methocarbamol  750 mg Oral [facility] Ava Irvin PA-C      metoprolol tartrate  50 mg Oral BID Alison Delgado, PA-JERSON      morphine  30 mg Oral Q12H [facility] Alison Delgado, MELVINA      ondansetron  4 mg Intravenous Q6H PRN Alison Delgado, PA-JERSON      oxyCODONE  5 mg Oral Q4H PRN Alison Delgado, PA-JERSON      phenazopyridine  100 mg Oral TID With Meals Alison Delgado, PA-JERSON      pramipexole  0 5 mg Oral TID Alison Delgado, PA-JERSON      sucralfate  1 g Oral 4x Daily Alison Delgado PA-C           VTE Pharmacologic Prophylaxis:   Pharmacologic: Heparin      Current Length of Stay: 2 day(s)    Current Patient Status: Inpatient       Discharge Plan: home when urine culture and sensitivity is back    Code Status: Level 1 - Full Code           Today, Patient Was Seen By: Leigh Wood DO    ** Please Note: Dictation voice to text software may have been used in the creation of this document   **

## 2022-07-13 NOTE — PLAN OF CARE
Problem: PHYSICAL THERAPY ADULT  Goal: Performs mobility at highest level of function for planned discharge setting  See evaluation for individualized goals  Description: Treatment/Interventions: Functional transfer training, LE strengthening/ROM, Elevations, Therapeutic exercise, Endurance training, Gait training, Bed mobility, Equipment eval/education, Patient/family training, Spoke to nursing, OT  Equipment Recommended: Yesika Trujillo       See flowsheet documentation for full assessment, interventions and recommendations  Outcome: Progressing  Note: Prognosis: Good  Problem List: Decreased strength, Decreased endurance, Impaired balance, Decreased mobility, Pain, Impaired sensation (spinal precautions)  Assessment: Pt is 78 y o  female seen for PT evaluation s/p admit to [facility] on 7/11/2022 w/ Urinary tract infection  PT consulted to assess pt's functional mobility and d/c needs  Order placed for PT eval and tx, w/ activity order  Comorbidities affecting pt's physical performance at time of assessment include: UTI, Breast CA, T2 DM, opiod dependence, depressive disorder, fibromyalgia, bone metastases to spine with spinal precautions  PTA, pt was independent w/ all functional mobility w/ SPC  Personal factors affecting pt at time of IE include: lives in multi story house, ambulating w/ assistive device, stairs to enter home, positive fall history and good support at  home  Please find objective findings from PT assessment regarding body systems outlined above with impairments and limitations including weakness, impaired balance, gait deviations, pain, decreased functional mobility tolerance and fall risk  The following objective measures performed on IE also reveal limitations: AM-PAC 6-Clicks: 63/13  Pt's clinical presentation is currently unstable/unpredictable seen in pt's presentation   Pt to benefit from continued PT tx to address deficits as defined above and maximize level of functional independent mobility and consistency  From PT/mobility standpoint, recommendation at time of d/c would be home with outpatient rehabilitation pending progress in order to facilitate return to PLOF  Reviewed recommendation of RW use with pt  Pt  Verbalized understanding  Barriers to Discharge: None     PT Discharge Recommendation: Home with outpatient rehabilitation    See flowsheet documentation for full assessment

## 2022-07-14 ENCOUNTER — TRANSITIONAL CARE MANAGEMENT (OUTPATIENT)
Dept: FAMILY MEDICINE CLINIC | Facility: CLINIC | Age: 67
End: 2022-07-14

## 2022-07-14 VITALS
TEMPERATURE: 98.1 F | OXYGEN SATURATION: 89 % | DIASTOLIC BLOOD PRESSURE: 78 MMHG | RESPIRATION RATE: 18 BRPM | SYSTOLIC BLOOD PRESSURE: 118 MMHG | HEART RATE: 76 BPM

## 2022-07-14 DIAGNOSIS — K59.01 CONSTIPATION BY DELAYED COLONIC TRANSIT: ICD-10-CM

## 2022-07-14 LAB
ANION GAP SERPL CALCULATED.3IONS-SCNC: 10 MMOL/L (ref 4–13)
BASOPHILS # BLD AUTO: 0.07 THOUSANDS/ΜL (ref 0–0.1)
BASOPHILS NFR BLD AUTO: 1 % (ref 0–1)
BUN SERPL-MCNC: 14 MG/DL (ref 5–25)
CALCIUM SERPL-MCNC: 9.6 MG/DL (ref 8.3–10.1)
CHLORIDE SERPL-SCNC: 103 MMOL/L (ref 100–108)
CO2 SERPL-SCNC: 27 MMOL/L (ref 21–32)
CREAT SERPL-MCNC: 0.75 MG/DL (ref 0.6–1.3)
EOSINOPHIL # BLD AUTO: 0.09 THOUSAND/ΜL (ref 0–0.61)
EOSINOPHIL NFR BLD AUTO: 1 % (ref 0–6)
ERYTHROCYTE [DISTWIDTH] IN BLOOD BY AUTOMATED COUNT: 13.7 % (ref 11.6–15.1)
GFR SERPL CREATININE-BSD FRML MDRD: 83 ML/MIN/1.73SQ M
GLUCOSE SERPL-MCNC: 116 MG/DL (ref 65–140)
GLUCOSE SERPL-MCNC: 123 MG/DL (ref 65–140)
GLUCOSE SERPL-MCNC: 127 MG/DL (ref 65–140)
HCT VFR BLD AUTO: 41.1 % (ref 34.8–46.1)
HGB BLD-MCNC: 13.2 G/DL (ref 11.5–15.4)
IMM GRANULOCYTES # BLD AUTO: 0.1 THOUSAND/UL (ref 0–0.2)
IMM GRANULOCYTES NFR BLD AUTO: 1 % (ref 0–2)
LYMPHOCYTES # BLD AUTO: 1.61 THOUSANDS/ΜL (ref 0.6–4.47)
LYMPHOCYTES NFR BLD AUTO: 19 % (ref 14–44)
MAGNESIUM SERPL-MCNC: 1.9 MG/DL (ref 1.6–2.6)
MCH RBC QN AUTO: 29.9 PG (ref 26.8–34.3)
MCHC RBC AUTO-ENTMCNC: 32.1 G/DL (ref 31.4–37.4)
MCV RBC AUTO: 93 FL (ref 82–98)
MONOCYTES # BLD AUTO: 0.78 THOUSAND/ΜL (ref 0.17–1.22)
MONOCYTES NFR BLD AUTO: 9 % (ref 4–12)
NEUTROPHILS # BLD AUTO: 5.93 THOUSANDS/ΜL (ref 1.85–7.62)
NEUTS SEG NFR BLD AUTO: 69 % (ref 43–75)
NRBC BLD AUTO-RTO: 0 /100 WBCS
PLATELET # BLD AUTO: 390 THOUSANDS/UL (ref 149–390)
PMV BLD AUTO: 8.9 FL (ref 8.9–12.7)
POTASSIUM SERPL-SCNC: 3.7 MMOL/L (ref 3.5–5.3)
RBC # BLD AUTO: 4.42 MILLION/UL (ref 3.81–5.12)
SODIUM SERPL-SCNC: 140 MMOL/L (ref 136–145)
WBC # BLD AUTO: 8.58 THOUSAND/UL (ref 4.31–10.16)

## 2022-07-14 PROCEDURE — 83735 ASSAY OF MAGNESIUM: CPT | Performed by: HOSPITALIST

## 2022-07-14 PROCEDURE — 82948 REAGENT STRIP/BLOOD GLUCOSE: CPT

## 2022-07-14 PROCEDURE — 94660 CPAP INITIATION&MGMT: CPT

## 2022-07-14 PROCEDURE — 99239 HOSP IP/OBS DSCHRG MGMT >30: CPT | Performed by: HOSPITALIST

## 2022-07-14 PROCEDURE — 85025 COMPLETE CBC W/AUTO DIFF WBC: CPT | Performed by: HOSPITALIST

## 2022-07-14 PROCEDURE — 80048 BASIC METABOLIC PNL TOTAL CA: CPT | Performed by: HOSPITALIST

## 2022-07-14 RX ORDER — CIPROFLOXACIN 500 MG/1
500 TABLET, FILM COATED ORAL EVERY 12 HOURS SCHEDULED
Qty: 24 TABLET | Refills: 0 | Status: SHIPPED | OUTPATIENT
Start: 2022-07-14 | End: 2022-07-26

## 2022-07-14 RX ADMIN — ASPIRIN 81 MG: 81 TABLET, COATED ORAL at 08:14

## 2022-07-14 RX ADMIN — FLUTICASONE FUROATE AND VILANTEROL TRIFENATATE 1 PUFF: 100; 25 POWDER RESPIRATORY (INHALATION) at 08:12

## 2022-07-14 RX ADMIN — LORAZEPAM 1 MG: 1 TABLET ORAL at 08:14

## 2022-07-14 RX ADMIN — POLYETHYLENE GLYCOL 3350 17 G: 17 POWDER, FOR SOLUTION ORAL at 08:13

## 2022-07-14 RX ADMIN — SUCRALFATE 1 G: 1 TABLET ORAL at 12:03

## 2022-07-14 RX ADMIN — HEPARIN SODIUM 5000 UNITS: 5000 INJECTION INTRAVENOUS; SUBCUTANEOUS at 05:14

## 2022-07-14 RX ADMIN — MORPHINE SULFATE 30 MG: 30 TABLET, EXTENDED RELEASE ORAL at 08:14

## 2022-07-14 RX ADMIN — CLOPIDOGREL BISULFATE 75 MG: 75 TABLET ORAL at 08:14

## 2022-07-14 RX ADMIN — PHENAZOPYRIDINE 100 MG: 100 TABLET ORAL at 08:14

## 2022-07-14 RX ADMIN — FAMOTIDINE 20 MG: 20 TABLET ORAL at 08:14

## 2022-07-14 RX ADMIN — METHOCARBAMOL 750 MG: 500 TABLET ORAL at 13:34

## 2022-07-14 RX ADMIN — LUBIPROSTONE 24 MCG: 24 CAPSULE, GELATIN COATED ORAL at 08:14

## 2022-07-14 RX ADMIN — HEPARIN SODIUM 5000 UNITS: 5000 INJECTION INTRAVENOUS; SUBCUTANEOUS at 13:34

## 2022-07-14 RX ADMIN — DULOXETINE HYDROCHLORIDE 30 MG: 30 CAPSULE, DELAYED RELEASE ORAL at 08:14

## 2022-07-14 RX ADMIN — LEVOTHYROXINE SODIUM 88 MCG: 88 TABLET ORAL at 05:14

## 2022-07-14 RX ADMIN — METOPROLOL TARTRATE 50 MG: 50 TABLET, FILM COATED ORAL at 08:14

## 2022-07-14 RX ADMIN — METHOCARBAMOL 750 MG: 500 TABLET ORAL at 05:13

## 2022-07-14 RX ADMIN — CEFEPIME HYDROCHLORIDE 2000 MG: 2 INJECTION, POWDER, FOR SOLUTION INTRAVENOUS at 10:21

## 2022-07-14 RX ADMIN — PHENAZOPYRIDINE 100 MG: 100 TABLET ORAL at 12:03

## 2022-07-14 RX ADMIN — SUCRALFATE 1 G: 1 TABLET ORAL at 08:14

## 2022-07-14 RX ADMIN — PRAMIPEXOLE DIHYDROCHLORIDE 0.5 MG: 0.25 TABLET ORAL at 08:14

## 2022-07-14 NOTE — ASSESSMENT & PLAN NOTE
Lab Results   Component Value Date    HGBA1C 6 3 (H) 04/06/2022       Recent Labs     07/13/22  1614 07/13/22  2048 07/14/22  0711 07/14/22  1103   POCGLU 128 130 116 127       Blood Sugar Average: Last 72 hrs:  Resume home medication

## 2022-07-14 NOTE — ASSESSMENT & PLAN NOTE
E coli growing in the urine culture  She likely has acute pyelonephritis     The culture is pansensitive, but it looks like Keflex needs a high concentration    I will send her out on a course of Cipro

## 2022-07-14 NOTE — DISCHARGE SUMMARY
[address]  Discharge- Susan Shell 1955, 78 y o  female MRN: [id number]  Unit/Bed#: E5 -01 Encounter: [id number]  Primary Care Provider: Corinna Luna DO   Date and time admitted to hospital: 7/11/2022  3:15 PM    * Urinary tract infection  Assessment & Plan  E coli growing in the urine culture  She likely has acute pyelonephritis     The culture is pansensitive, but it looks like Keflex needs a high concentration    I will send her out on a course of Cipro    Type 2 diabetes mellitus [facility])  Assessment & Plan  Lab Results   Component Value Date    HGBA1C 6 3 (H) 04/06/2022       Recent Labs     07/13/22  1614 07/13/22  2048 07/14/22  0711 07/14/22  1103   POCGLU 128 130 116 127       Blood Sugar Average: Last 72 hrs:  Resume home medication    Breast cancer ([address] )  Assessment & Plan  · History of breast cancer with mets to bone status post bilateral mastectomy  · Does have evidence of progressive T10 metastatic lytic lesion  · MRI T spine and L spine pending- likely needs outpatient follow up with radiation oncology   · Not currently undergoing any active chemotherapy  · Continue close outpatient Hematology-Oncology follow-up at discharge      Discharging Physician / Practitioner: Lawrence Tabor DO  PCP: Corinna Luna DO  Admission Date:   Admission Orders (From admission, onward)     Ordered        07/11/22 1640  Inpatient Admission  Once                      Discharge Date: 07/14/22    Medical Problems             Resolved Problems  Date Reviewed: 7/12/2022   None                       Reason for Admission:  Flank pain      Hospital Course:     Susan Shell is a 78 y o  female patient who originally presented to the hospital on 7/11/2022 due to flank pain dysuria  She was given Keflex as an outpatient for UTI but has worsening symptoms  She has E coli UTI and likely acute pyelonephritis  This did improve with IV cefepime    I will send her out on a course of Cipro   She also has found have a new T10 lytic lesion  She is aware of this  I will have her follow-up closely with Oncology  Please see above list of diagnoses and related plan for additional information  Condition at Discharge: stable       Discharge Day Visit / Exam:     Subjective:  Feels well  Less flank pain  Wants to go home  Vitals: Blood Pressure: 118/78 (07/14/22 0712)  Pulse: 76 (07/14/22 0712)  Temperature: 98 1 °F (36 7 °C) (07/14/22 0712)  Temp Source: Oral (07/14/22 2317)  Respirations: 18 (07/13/22 2320)  SpO2: (!) 89 % (07/14/22 4667)    Exam:     Physical Exam  Vitals and nursing note reviewed  HENT:      Head: Normocephalic and atraumatic  Eyes:      Pupils: Pupils are equal, round, and reactive to light  Cardiovascular:      Rate and Rhythm: Normal rate and regular rhythm  Heart sounds: No murmur heard  No friction rub  No gallop  Pulmonary:      Effort: Pulmonary effort is normal       Breath sounds: Normal breath sounds  No wheezing or rales  Abdominal:      General: Bowel sounds are normal       Palpations: Abdomen is soft  Tenderness: There is no abdominal tenderness  Musculoskeletal:      Right lower leg: No edema  Left lower leg: No edema            Discharge instructions/Information to patient and family:   See after visit summary for information provided to patient and family  Provisions for Follow-Up Care:  See after visit summary for information related to follow-up care and any pertinent home health orders  Disposition:     Home       Discharge Statement:  I spent 39 minutes discharging the patient  This time was spent on the day of discharge  I had direct contact with the patient on the day of discharge  Greater than 50% of the total time was spent examining patient, answering all patient questions, arranging and discussing plan of care with patient as well as directly providing post-discharge instructions    Additional time then spent on discharge activities  Discharge Medications:  See after visit summary for reconciled discharge medications provided to patient and family        ** Please Note: This note has been constructed using a voice recognition system **

## 2022-07-14 NOTE — NURSING NOTE
Discharge instructions reviewed with pt  She is aware of prescriptions to be picked up and follow-up appointments  Pt's home medications retrieved from pharmacy and returned to pt  Pt has no unaddressed questions or concerns, daughter is providing ride home

## 2022-07-15 ENCOUNTER — TRANSITIONAL CARE MANAGEMENT (OUTPATIENT)
Dept: FAMILY MEDICINE CLINIC | Facility: CLINIC | Age: 67
End: 2022-07-15

## 2022-07-16 LAB
BACTERIA BLD CULT: NORMAL
BACTERIA BLD CULT: NORMAL

## 2022-07-18 ENCOUNTER — TELEPHONE (OUTPATIENT)
Dept: HEMATOLOGY ONCOLOGY | Facility: CLINIC | Age: 67
End: 2022-07-18

## 2022-07-18 NOTE — TELEPHONE ENCOUNTER
Made attempt to schedule a new patient appointment, Patient stating she is a current patient at [facility] and decline to schedule a appointment   Closing referral

## 2022-07-30 DIAGNOSIS — R26.2 AMBULATORY DYSFUNCTION: ICD-10-CM

## 2022-07-30 DIAGNOSIS — K59.01 CONSTIPATION BY DELAYED COLONIC TRANSIT: ICD-10-CM

## 2022-07-30 DIAGNOSIS — R73.9 HYPERGLYCEMIA: ICD-10-CM

## 2022-07-30 DIAGNOSIS — E87.8 DISEQUILIBRIUM SYNDROME: ICD-10-CM

## 2022-07-30 DIAGNOSIS — I25.10 ATHEROSCLEROSIS OF NATIVE CORONARY ARTERY OF NATIVE HEART WITHOUT ANGINA PECTORIS: ICD-10-CM

## 2022-08-01 RX ORDER — METHOCARBAMOL 750 MG/1
750 TABLET, FILM COATED ORAL EVERY 8 HOURS SCHEDULED
Qty: 90 TABLET | Refills: 0 | Status: SHIPPED | OUTPATIENT
Start: 2022-08-01 | End: 2022-08-11

## 2022-08-02 ENCOUNTER — TELEPHONE (OUTPATIENT)
Dept: OTHER | Facility: OTHER | Age: 67
End: 2022-08-02

## 2022-08-02 ENCOUNTER — NURSE TRIAGE (OUTPATIENT)
Dept: OTHER | Facility: OTHER | Age: 67
End: 2022-08-02

## 2022-08-02 NOTE — TELEPHONE ENCOUNTER
"I'm having a hard time swallowing"     "Yesterday I had GERD and I took Sulcrafate"     "I'm being treated for cancer at this time "

## 2022-08-02 NOTE — TELEPHONE ENCOUNTER
Reason for Disposition   Difficulty swallowing is a chronic symptom (recurrent or ongoing AND present > 4 weeks)    Answer Assessment - Initial Assessment Questions  1  SYMPTOM: "Are you having difficulty swallowing liquids, solids, or both?"      Difficulty swallowing lunch, was able to swallowing liquids after they are warm but sometimes has difficulty swallowing water too     2  ONSET: "When did the swallowing problems begin?"       Off and on for a couple month     3  CAUSE: "What do you think is causing the problem?"       GERD and gastroparesis     4  CHRONIC/RECURRENT: "Is this a new problem for you?"  If no, ask: "How long have you had this problem?" (e g , days, weeks, months)       Recurrent after gastroparesis     5  OTHER SYMPTOMS: "Do you have any other symptoms?" (e g , difficulty breathing, sore throat, swollen tongue, chest pain)      IBS symptoms and bloating, had BM this morning and does feel better     6   PREGNANCY: "Is there any chance you are pregnant?" "When was your last menstrual period?"     No    Protocols used: SWALLOWING DIFFICULTY-ADULT-OH

## 2022-08-02 NOTE — TELEPHONE ENCOUNTER
Patient calling in stating that she has had increased difficulty swallowing solid foods as well as liquids sometimes and she feels like it has worsening in the past month  Patient states that the Carafate does seem to help when she takes it but she would like to know if there is anything else that she could also add to her regimen  Advised patient drink warm fluids and soft foods and that she can take her carafate up to 4 times a day  Patient verbalized understanding

## 2022-08-02 NOTE — TELEPHONE ENCOUNTER
Regarding: Difficulty swallowing  ----- Message from Yesy Flores RN sent at 8/2/2022 11:49 AM EDT -----  Difficulty swallowing (able to swallow water and food/non allergic issue)

## 2022-08-08 ENCOUNTER — TELEPHONE (OUTPATIENT)
Dept: GASTROENTEROLOGY | Facility: CLINIC | Age: 67
End: 2022-08-08

## 2022-08-08 NOTE — TELEPHONE ENCOUNTER
Lmm for pt to call back to r/s appt from 8/17/22 to a different day due to schedule change    Thanks Macho Sy

## 2022-08-09 ENCOUNTER — OFFICE VISIT (OUTPATIENT)
Dept: GASTROENTEROLOGY | Facility: CLINIC | Age: 67
End: 2022-08-09
Payer: MEDICARE

## 2022-08-09 VITALS
DIASTOLIC BLOOD PRESSURE: 93 MMHG | HEART RATE: 109 BPM | HEIGHT: 67 IN | BODY MASS INDEX: 30.61 KG/M2 | SYSTOLIC BLOOD PRESSURE: 121 MMHG | WEIGHT: 195 LBS

## 2022-08-09 DIAGNOSIS — K31.84 GASTROPARESIS: ICD-10-CM

## 2022-08-09 DIAGNOSIS — K21.00 GASTROESOPHAGEAL REFLUX DISEASE WITH ESOPHAGITIS WITHOUT HEMORRHAGE: ICD-10-CM

## 2022-08-09 DIAGNOSIS — K63.5 POLYP OF COLON, UNSPECIFIED PART OF COLON, UNSPECIFIED TYPE: ICD-10-CM

## 2022-08-09 DIAGNOSIS — R13.19 ESOPHAGEAL DYSPHAGIA: Primary | ICD-10-CM

## 2022-08-09 PROCEDURE — 99214 OFFICE O/P EST MOD 30 MIN: CPT | Performed by: NURSE PRACTITIONER

## 2022-08-09 RX ORDER — PANTOPRAZOLE SODIUM 40 MG/1
40 TABLET, DELAYED RELEASE ORAL
Qty: 90 TABLET | Refills: 1 | Status: SHIPPED | OUTPATIENT
Start: 2022-08-09 | End: 2022-09-22 | Stop reason: SDUPTHER

## 2022-08-09 RX ORDER — SENNA PLUS 8.6 MG/1
8.6 TABLET ORAL 2 TIMES DAILY
COMMUNITY
Start: 2022-07-23 | End: 2023-07-23

## 2022-08-09 RX ORDER — CAPECITABINE 500 MG/1
1500 TABLET, FILM COATED ORAL 2 TIMES DAILY
COMMUNITY
Start: 2022-08-03 | End: 2022-09-13

## 2022-08-09 NOTE — PATIENT INSTRUCTIONS
-Take Pepcid (famotidine) twice daily on days you take your Xeloda  -Take Pantoprazole 40mg daily 1/2 hour before breakfast on days you don't take your Xeloda, and Pepcid 40mg daily at bedtime  -Start taking Carafate 1g before meals and at bedtime  -We will schedule you for EGD for further evaluation    Scheduled date of EGD(as of today): Sept 13 2022  Physician performing [facility]  Location of EGD:[facility]  Instructions reviewed with patient by: Josemanuel Linda  Clearances: Cardiac- Dr Cale Fitzgerald

## 2022-08-09 NOTE — PROGRESS NOTES
[facility] - Outpatient Follow-up Note  Kitty Torres 78 y o  female MRN: [id number]  Encounter: [id number]          ASSESSMENT AND PLAN:      1  Esophageal dysphagia  2  Gastroesophageal reflux disease with esophagitis without hemorrhage  Worsening reflux symptoms, esophageal dysphagia/odynophagia may be secondary to GERD & patient running out of Protonix,  radiation esophagitis, or Candida esophagitis, vs esophageal dysmotility  She was evaluated recently by speech in the hospital and found to have adequate oral/pharyngeal stages of swallowing and tolerated a soft, thin liquid diet  She recently finished radiation targeting spinal metastases, and is now on oral chemo Xeloda which is less effective w/ PPI usage so she is supposed to hold this when on Xeldoa (7 days on, 7 days off) but she reports running out of Pantoprazole about 2 months ago when symptoms started  Recent CT chest didn't note any adenopathy in the chest  Did note stable metastatic disease T10   -Take Pepcid (famotidine) twice daily on days you take your Xeloda  -Take Pantoprazole 40mg daily 1/2 hour before breakfast on days you don't take your Xeloda, and Pepcid 40mg daily at bedtime  -Start taking Carafate 1g before meals and at bedtime, melt in 10ml water for slurry consistency  -We will schedule you for EGD for further evaluation  Prep and procedure explained  Will need to obtain clearance to hold Plavix prior to procedure   -make sure to rinse mouth out with water after inhaler usage    -     pantoprazole (PROTONIX) 40 mg tablet; Take 1 tablet (40 mg total) by mouth daily before breakfast   Do not take when you are taking your Xeloda  -     EGD; Future; Expected date: 08/09/2022    3  Gastroparesis  Treated w/ botox injections with symptomatic improvement in the past, last buttocks injection was in October 2021  She denies any nausea or vomiting or needing to use antiemetics on a regular basis    She is intolerant to Reglan due to side effects   -Follow a low-fiber/low residue, low-fat gastroparesis diet  -Consider repeat Botox injections in the future if symptoms worsen    4  Polyp of colon, unspecified part of colon, unspecified type  Patient has history of colon polyps  Patient is up-to-date on colonoscopy  Last colonoscopy 6/1/2021 with showed multiple colon polyps  Colon polyps benign, hyperplastic polyps  Patient due for repeat colonoscopy in 5 years  ______________________________________________________________________    SUBJECTIVE:  Toño Guzman is a 78 y o  female with history of GERD, gastroparesis, metastatic breast cancer s/p radiation for spinal mets (ended in July) now on oral chemo who presents for follow-up due to worsening dysphagia/odynophagia over the last month or two  She reports issues with solids and with liquids getting stuck in her chest area, sometimes it's relieved with water other times it's relieved with time  Denies coughing or food going down the wrong pipe  She's been eating less due to her issues swallowing  It used to be every other day now happening more frequently  She is currently on Pantoprazole 40 mg daily and Pepcid 40mg  She's been using Carafate only PRN  Reports with new chemo medication Xeloda (7 days on, 7 days off) she can't take Pantoprazole  Currently having reflux everyday off the Pantoprazole but reports she had run out of Pantoprazole prior to esophageal dysphagia starting  Denies nausea/vomiting  Weight is stable  Last EGD was in October 2021 and she had pyloric Botox injection at that time  She was recently in the hospital in July and assessed by speech pathology  She was found to adequate oral and pharyngeal stages of swallowing and recommended for regular/thin liquid diet  Reports history of breast cancer 5 years ago treated w/ chemo/rad and recently found to have metastatic disease undergoing spinal radiation   Previous CTA in March showed New, solitary 0 6 cm solid nodule in the right middle lobe, New 0 9 cm short axis diameter borderline enlarged right hilar lymph node  Recent MRIs show involvement of thoracic/lumbar  Just had CT chest w/ contrast  IMPRESSION:   Impression:   1  No evidence of pulmonary embolism   2  Mosaic attenuation in the lung parenchyma which could possibly reflect   underlying small airway obstructive lung disease  3   Stable cardiomegaly   4  Stable bone metastasis to T10 and stable indeterminate right middle lobe   nodule  REVIEW OF SYSTEMS IS OTHERWISE NEGATIVE        Historical Information   Past Medical History:   Diagnosis Date    Anxiety     Breast [place] ([place] [address] )     CAD (coronary artery disease)     Cancer ([place] [address] )     Carpal tunnel syndrome     Disease of thyroid gland     Elevated cholesterol     Fibromyalgia     Hypertension     Kidney stone     Melanoma ([place] [address] )     nose    Myocardial infarction (HCC)     Neuropathy     BRAYDEN (obstructive sleep apnea)     cpap    Panic attack      Past Surgical History:   Procedure Laterality Date    BREAST SURGERY Bilateral     CARDIAC SURGERY      cardiac ablation     SECTION      x3    CHOLECYSTECTOMY      CORONARY ANGIOPLASTY WITH STENT PLACEMENT      LYMPH NODE BIOPSY Right 2022    MASTECTOMY Bilateral     NOSE SURGERY       Social History   Social History     Substance and Sexual Activity   Alcohol Use Not Currently    Comment: beer     Social History     Substance and Sexual Activity   Drug Use Not Currently    Types: Marijuana    Comment: Medical reilly     Social History     Tobacco Use   Smoking Status Never Smoker   Smokeless Tobacco Never Used     Family History   Problem Relation Age of Onset    Heart attack Mother     Alcohol abuse Mother     Heart failure Mother     Cancer Mother     Cancer Paternal Grandfather        Meds/Allergies       Current Outpatient Medications:     Alpha-Lipoic Acid 600 MG CAPS    aspirin (Jeanine Arango LOW STRENGTH) 81 mg EC tablet    B Complex Vitamins (B COMPLEX 1 PO)    Blood Glucose Monitoring Suppl (ONE TOUCH ULTRA 2) w/Device KIT    Calcium Carb-Cholecalciferol (Oyster Shell Calcium w/D) 500-200 MG-UNIT TABS No    capecitabine (XELODA) 500 MG tablet    clopidogrel (PLAVIX) 75 mg tablet    [facility] B6 100 MG tablet    docusate sodium (COLACE) 100 mg capsule    DULoxetine (CYMBALTA) 30 mg delayed release capsule    DULoxetine (CYMBALTA) 60 mg delayed release capsule    Evolocumab 140 MG/ML SOAJ    famotidine (PEPCID) 40 MG tablet    glucose blood (OneTouch Ultra) test strip    Lancets (onetouch ultrasoft) lancets    Lancets (onetouch ultrasoft) lancets    levothyroxine 88 mcg tablet    LORazepam (ATIVAN) 1 mg tablet    lubiprostone (AMITIZA) 24 mcg capsule    melatonin 3 mg    metFORMIN (GLUCOPHAGE) 500 mg tablet    methocarbamol (ROBAXIN) 750 mg tablet    metoprolol tartrate (LOPRESSOR) 50 mg tablet    Misc  Devices (GETGO ROLLING WALKER) MISC    morphine (MS CONTIN) 15 mg 12 hr tablet    oxyCODONE (ROXICODONE) 5 immediate release tablet    pantoprazole (PROTONIX) 40 mg tablet    pramipexole (MIRAPEX) 0 5 mg tablet    Prucalopride Succinate 2 MG TABS    senna (SENOKOT) 8 6 MG tablet    sucralfate (CARAFATE) 1 g tablet    Allergies   Allergen Reactions    Metoclopramide Other (See Comments)    Nitrofurantoin Other (See Comments)     Very weak  Marvene Sarthak    Symbicort [Budesonide-Formoterol Fumarate] Nausea Only    Isosorbide      Other reaction(s): headache    Pamidronate Other (See Comments), Confusion and Fever     Developed multiple side effects of drug including fever, tachycardia, and lethargy     Pregabalin      Pt states made her feel suicidal     Reglan [Metoclopramide] Other (See Comments)     Flares her neuropathy    Statins Myalgia           Objective     Blood pressure 121/93, pulse (!) 109, height 5' 7" (1 702 m), weight 88 5 kg (195 lb)   Body mass index is 30 54 kg/m²       PHYSICAL EXAM:      General Appearance:   Alert, cooperative, no distress   HEENT:   Normocephalic, atraumatic, anicteric  Neck:  Supple, symmetrical, trachea midline   Lungs:   Clear to auscultation bilaterally; no rales, rhonchi or wheezing; respirations unlabored    Heart[de-identified]   Regular rate and rhythm; no murmur  Abdomen:   Soft, non-tender, non-distended; normal bowel sounds; no masses, no organomegaly    Genitalia:   Deferred    Rectal:   Deferred    Extremities:  No cyanosis, clubbing or edema    Skin:  No jaundice, rashes, or lesions    Lymph nodes:  No palpable cervical lymphadenopathy        Lab Results:   No visits with results within 1 Day(s) from this visit     Latest known visit with results is:   Admission on 07/11/2022, Discharged on 07/14/2022   Component Date Value    Color, UA 07/11/2022 Yellow     Clarity, UA 07/11/2022 Clear     pH, UA 07/11/2022 7 0     Leukocytes, UA 07/11/2022 Large (A)    Nitrite, UA 07/11/2022 Positive (A)    Protein, UA 07/11/2022 100 (2+) (A)    Glucose, UA 07/11/2022 Negative     Ketones, UA 07/11/2022 Negative     Urobilinogen, UA 07/11/2022 1 0     Bilirubin, UA 07/11/2022 Negative     Occult Blood, UA 07/11/2022 Large (A)    Specific [place], UA 07/11/2022 1 020     RBC, UA 07/12/2022 None Seen     WBC, UA 07/12/2022 4-10 (A)    Epithelial Cells 07/12/2022 Occasional     Bacteria, UA 07/12/2022 Occasional     WBC 07/11/2022 12 05 (A)    RBC 07/11/2022 4 50     Hemoglobin 07/11/2022 13 7     Hematocrit 07/11/2022 41 3     MCV 07/11/2022 92     MCH 07/11/2022 30 4     MCHC 07/11/2022 33 2     RDW 07/11/2022 13 8     MPV 07/11/2022 8 7 (A)    Platelets 22/57/3684 366     nRBC 07/11/2022 0     Neutrophils Relative 07/11/2022 73     Immat GRANS % 07/11/2022 1     Lymphocytes Relative 07/11/2022 16     Monocytes Relative 07/11/2022 8     Eosinophils Relative 07/11/2022 1     Basophils Relative 07/11/2022 1     Neutrophils Absolute 07/11/2022 8 89 (A)    Immature Grans Absolute 07/11/2022 0 08     Lymphocytes Absolute 07/11/2022 1 94     Monocytes Absolute 07/11/2022 0 99     Eosinophils Absolute 07/11/2022 0 09     Basophils Absolute 07/11/2022 0 06     Sodium 07/11/2022 140     Potassium 07/11/2022 3 9     Chloride 07/11/2022 101     CO2 07/11/2022 30     ANION GAP 07/11/2022 9     BUN 07/11/2022 16     Creatinine 07/11/2022 0 81     Glucose 07/11/2022 102     Calcium 07/11/2022 9 4     Corrected Calcium 07/11/2022 10 1     AST 07/11/2022 14     ALT 07/11/2022 25     Alkaline Phosphatase 07/11/2022 64     Total Protein 07/11/2022 8 5 (A)    Albumin 07/11/2022 3 1 (A)    Total Bilirubin 07/11/2022 0 63     eGFR 07/11/2022 75     LACTIC ACID 07/11/2022 1 7     Protime 07/11/2022 13 1     INR 07/11/2022 1 02     PTT 07/11/2022 33     Procalcitonin 07/11/2022 <0 05     Blood Culture 07/11/2022 No Growth After 5 Days   Blood Culture 07/11/2022 No Growth After 5 Days       Urine Culture 07/11/2022 >100,000 cfu/ml Escherichia coli (A)    POC Glucose 07/11/2022 160 (A)    Sodium 07/12/2022 142     Potassium 07/12/2022 4 4     Chloride 07/12/2022 104     CO2 07/12/2022 32     ANION GAP 07/12/2022 6     BUN 07/12/2022 12     Creatinine 07/12/2022 0 76     Glucose 07/12/2022 103     Calcium 07/12/2022 8 7     eGFR 07/12/2022 82     WBC 07/12/2022 10 44 (A)    RBC 07/12/2022 4 17     Hemoglobin 07/12/2022 12 4     Hematocrit 07/12/2022 39 0     MCV 07/12/2022 94     MCH 07/12/2022 29 7     MCHC 07/12/2022 31 8     RDW 07/12/2022 13 7     MPV 07/12/2022 8 7 (A)    Platelets 70/27/1453 330     nRBC 07/12/2022 0     Neutrophils Relative 07/12/2022 70     Immat GRANS % 07/12/2022 1     Lymphocytes Relative 07/12/2022 18     Monocytes Relative 07/12/2022 9     Eosinophils Relative 07/12/2022 1     Basophils Relative 07/12/2022 1     Neutrophils Absolute 07/12/2022 7 27     Immature Grans Absolute 07/12/2022 0 11     Lymphocytes Absolute 07/12/2022 1 91     Monocytes Absolute 07/12/2022 0 98     Eosinophils Absolute 07/12/2022 0 10     Basophils Absolute 07/12/2022 0 07     Magnesium 07/12/2022 2 0     POC Glucose 07/12/2022 104     POC Glucose 07/12/2022 152 (A)    POC Glucose 07/12/2022 118     POC Glucose 07/12/2022 117     POC Glucose 07/13/2022 105     POC Glucose 07/13/2022 123     POC Glucose 07/13/2022 128     POC Glucose 07/13/2022 130     WBC 07/14/2022 8 58     RBC 07/14/2022 4 42     Hemoglobin 07/14/2022 13 2     Hematocrit 07/14/2022 41 1     MCV 07/14/2022 93     MCH 07/14/2022 29 9     MCHC 07/14/2022 32 1     RDW 07/14/2022 13 7     MPV 07/14/2022 8 9     Platelets 18/43/3295 390     nRBC 07/14/2022 0     Neutrophils Relative 07/14/2022 69     Immat GRANS % 07/14/2022 1     Lymphocytes Relative 07/14/2022 19     Monocytes Relative 07/14/2022 9     Eosinophils Relative 07/14/2022 1     Basophils Relative 07/14/2022 1     Neutrophils Absolute 07/14/2022 5 93     Immature Grans Absolute 07/14/2022 0 10     Lymphocytes Absolute 07/14/2022 1 61     Monocytes Absolute 07/14/2022 0 78     Eosinophils Absolute 07/14/2022 0 09     Basophils Absolute 07/14/2022 0 07     Sodium 07/14/2022 140     Potassium 07/14/2022 3 7     Chloride 07/14/2022 103     CO2 07/14/2022 27     ANION GAP 07/14/2022 10     BUN 07/14/2022 14     Creatinine 07/14/2022 0 75     Glucose 07/14/2022 123     Calcium 07/14/2022 9 6     eGFR 07/14/2022 83     Magnesium 07/14/2022 1 9     POC Glucose 07/14/2022 116     POC Glucose 07/14/2022 127          Radiology Results:   CT abdomen pelvis wo contrast    Result Date: 7/11/2022  Narrative: CT ABDOMEN AND PELVIS WITHOUT IV CONTRAST INDICATION:   UTI, uncomplicated (Female) Abdominal pain, acute, nonlocalized UTI now with flank pain, R>L   COMPARISON:  March 31, 2022 and March 15, 2022 TECHNIQUE:  CT examination of the abdomen and pelvis was performed without intravenous contrast  This examination was performed without intravenous contrast in the context of the critical nationwide Omnipaque shortage  Axial, sagittal, and coronal 2D reformatted images were created from the source data and submitted for interpretation  Radiation dose length product (DLP) for this visit:  938 mGy-cm   This examination, like all CT scans performed in the [facility], was performed utilizing techniques to minimize radiation dose exposure, including the use of iterative reconstruction and automated exposure control  Enteric contrast was not administered  FINDINGS: ABDOMEN LOWER CHEST:  No clinically significant abnormality identified in the visualized lower chest  LIVER/BILIARY TREE:  Hepatic steatosis and hepatomegaly  GALLBLADDER:  Gallbladder is surgically absent  SPLEEN:  Unremarkable  PANCREAS:  Unremarkable  ADRENAL GLANDS:  Unremarkable  KIDNEYS/URETERS:  Bilateral tiny 1 to 2 mm nonobstructing calculi  No hydronephrosis STOMACH AND BOWEL:  Unremarkable  APPENDIX:  No findings to suggest appendicitis  ABDOMINOPELVIC CAVITY:  No ascites  No pneumoperitoneum  No lymphadenopathy  VESSELS:  Unremarkable for patient's age  PELVIS REPRODUCTIVE ORGANS:  Unremarkable for patient's age  URINARY BLADDER:  Diffuse bladder wall thickening could relate to underdistention or cystitis, correlate with urinalysis  ABDOMINAL WALL/INGUINAL REGIONS:  Unremarkable  OSSEOUS STRUCTURES:  Similar diffuse sclerotic lesions suggesting metastatic disease as before  T10 lytic lesion also likely related to metastatic disease, this is progressive compared to the prior exam      Impression: 1  Hepatic steatosis and hepatomegaly  2  Bilateral tiny 1 to 2 mm renal nonobstructing calculi  3  Progressive T10 metastatic lytic lesion  4  Correlate with urinalysis to exclude cystitis   Workstation performed: [id number]     MRI thoracic spine wo contrast    Result Date: 7/12/2022  Narrative: MRI THORACIC SPINE WITHOUT CONTRAST INDICATION: mri spine assess for cord compression  COMPARISON:  None  TECHNIQUE:  Sagittal T1, sagittal T2, sagittal inversion recovery, axial T2,  axial 2D MERGE  IMAGE QUALITY: Diagnostic  FINDINGS: ALIGNMENT: Alignment is unremarkable  Chronic mild superior endplate compression deformities involving T6-T9  MARROW SIGNAL:  Marrow replacing infiltrative T1 hypointense lesion involving almost entire T10 vertebral body with involvement of left pedicle  There is no pathologic fracture  There is minimal ventral epidural tumor seen on series 6 image 13  THORACIC CORD: No definite cord signal abnormality  PARAVERTEBRAL SOFT TISSUES:  Normal  THORACIC DEGENERATIVE CHANGE: Multilevel small disc bulge and mild canal narrowing more pronounced at levels T5-6, T7-8, and T8-9  No significant foraminal stenosis  Impression: 1  Marrow replacing metastasis involving almost entire T10 vertebral body  No pathologic compression fracture  There is minimal ventral epidural tumor without significant canal stenosis  2   Chronic mild superior endplate compression deformity at levels T6-T9  3   Mild degenerative change  Workstation performed: [id number]     MRI lumbar spine wo contrast    Result Date: 7/13/2022  Narrative: MRI LUMBAR SPINE WITHOUT CONTRAST INDICATION: History of breast cancer and bone metastasis noted on recent thoracic spine imaging  Evaluate for lumbar metastatic disease and assess for spinal cord compression  COMPARISON:  MRI of the thoracic spine from July 12, 2022  TECHNIQUE:  Sagittal T1, sagittal T2, sagittal inversion recovery, axial T1 and axial T2, coronal T2  Imaging performed on 1 5T MRI  IMAGE QUALITY:  Diagnostic FINDINGS: VERTEBRAL BODIES:  There are 5 lumbar type vertebral bodies  Grade 1 anterolisthesis of L4 on L5  No lateral subluxation  No fracture  Maintained vertebral body stature    Focal areas of hypointense T1 and hyperintense T2 signal is consistent with metastatic disease  The L2 vertebral body lesion is located centrally, measuring 1 3 cm  The right L3 osseous lesion involves the posterior right vertebral body, pedicle, transverse process,superior articular pillar as well as the pars interarticularis region  There is suggestion of minimal extension into the lateral epidural space (7:20)  Minimal infiltration of the paravertebral fat is also suspected anterior to the base of the right transverse process and lateral to the articular pillar  These findings are suspected on image 7 of series 20  Further evaluation is limited without contrast  Marrow infiltration within the base of the L4 spinous process is also noted  SACRUM:  Incompletely visualized subcentimeter left posterior sacral lesion (7:42)  Additional osseous metastasis in the right iliac bone and ischial tuberosity with suggestion of mild extraosseous extension (7:39)  DISTAL CORD AND CONUS:  Normal size and signal within the distal cord and conus  PARASPINAL SOFT TISSUES:  No discrete paraspinal mass lesions  LOWER THORACIC DISC SPACES:  Schmorl's node deformities  No compressive degenerative discogenic disease  Please see the separate MRI thoracic spine study report for additional detail  DEGENERATIVE:  Multilevel disc desiccation with mild noncompressive degenerative discogenic disease throughout the lumbar spine  Scattered Schmorl's node deformities throughout the thoracolumbar spine  Additional degenerative findings of facet arthropathy at L3-L4, L4-L5 and L5-S1  The degenerative changes of the facets is most pronounced at L4-L5 where a tiny dorsally loacted juxta articular synovial cyst is also noted (3:17 and 6:28)  There is mild bilateral foraminal stenosis at L4-L5  Mild prominence of the dorsal epidural fat at the L3-L4 and L4-5 levels  This results in mild dorsal indentation on the thecal sac       Impression: Multifocal osseous metastasis within the visualized lumbosacral spine and pelvis  Consider PET CT imaging for assessment for additional osseous disease involving the axial and/or appendicular skeleton  Minimal extraosseous extension (epidural and paravertebral) at L3 as described above  Minimal extraosseous extension suspected with the right ischial tuberosity metastasis  Degenerative disease, most pronounced at L4-L5 without significant spinal canal or foraminal stenosis   Workstation performed: [id number]

## 2022-08-11 ENCOUNTER — TELEPHONE (OUTPATIENT)
Dept: GASTROENTEROLOGY | Facility: CLINIC | Age: 67
End: 2022-08-11

## 2022-08-11 DIAGNOSIS — I25.119 CORONARY ARTERY DISEASE INVOLVING NATIVE HEART WITH ANGINA PECTORIS, UNSPECIFIED VESSEL OR LESION TYPE (HCC): ICD-10-CM

## 2022-08-11 DIAGNOSIS — E87.8 DISEQUILIBRIUM SYNDROME: ICD-10-CM

## 2022-08-11 DIAGNOSIS — I25.10 ATHEROSCLEROSIS OF NATIVE CORONARY ARTERY OF NATIVE HEART WITHOUT ANGINA PECTORIS: ICD-10-CM

## 2022-08-11 DIAGNOSIS — K59.01 CONSTIPATION BY DELAYED COLONIC TRANSIT: ICD-10-CM

## 2022-08-11 DIAGNOSIS — R73.9 HYPERGLYCEMIA: ICD-10-CM

## 2022-08-11 DIAGNOSIS — R26.2 AMBULATORY DYSFUNCTION: ICD-10-CM

## 2022-08-11 RX ORDER — METHOCARBAMOL 750 MG/1
750 TABLET, FILM COATED ORAL EVERY 8 HOURS SCHEDULED
Qty: 90 TABLET | Refills: 0 | Status: SHIPPED | OUTPATIENT
Start: 2022-08-11 | End: 2022-09-26

## 2022-08-11 RX ORDER — METOPROLOL TARTRATE 50 MG/1
TABLET, FILM COATED ORAL
Qty: 180 TABLET | Refills: 1 | Status: SHIPPED | OUTPATIENT
Start: 2022-08-11

## 2022-08-11 RX ORDER — LEVOTHYROXINE SODIUM 88 UG/1
TABLET ORAL
Qty: 90 TABLET | Refills: 0 | Status: SHIPPED | OUTPATIENT
Start: 2022-08-11

## 2022-08-11 NOTE — TELEPHONE ENCOUNTER
Received cardiac and Plavix clearance from Dr Gabriella Davis  Pt is cleared for procedure and may hold Plavix 5 days prior  Please call pt to inform  I will have scanned into chart  Thank you!

## 2022-08-26 ENCOUNTER — TELEPHONE (OUTPATIENT)
Dept: FAMILY MEDICINE CLINIC | Facility: CLINIC | Age: 67
End: 2022-08-26

## 2022-08-26 DIAGNOSIS — F41.1 GENERALIZED ANXIETY DISORDER: ICD-10-CM

## 2022-08-26 RX ORDER — LORAZEPAM 1 MG/1
1 TABLET ORAL 2 TIMES DAILY
Qty: 60 TABLET | Refills: 0 | Status: SHIPPED | OUTPATIENT
Start: 2022-08-26

## 2022-08-26 RX ORDER — LORAZEPAM 1 MG/1
1 TABLET ORAL 2 TIMES DAILY
Qty: 60 TABLET | Refills: 0 | Status: SHIPPED | OUTPATIENT
Start: 2022-08-26 | End: 2022-08-26 | Stop reason: SDUPTHER

## 2022-08-26 NOTE — TELEPHONE ENCOUNTER
[facility] called Pt is on Morphine ER 30mg and Oxcodeine IR 15 mg they will not fill Lorazapam without you calling them back [phone]

## 2022-08-30 ENCOUNTER — TELEPHONE (OUTPATIENT)
Dept: FAMILY MEDICINE CLINIC | Facility: CLINIC | Age: 67
End: 2022-08-30

## 2022-08-30 NOTE — TELEPHONE ENCOUNTER
I spoke with Pt  She had eaten breakfast and was in bed  She is c/o of rib pain not relieved by her pain medication  I advised her to contact her palliative care team and I would check back with her this afternoon   Pt was agreeable

## 2022-09-01 NOTE — TELEPHONE ENCOUNTER
Called and informed Juana Ayala to hold Plavix for 5 days prior to her EGD- She expressed understanding

## 2022-09-08 ENCOUNTER — TELEPHONE (OUTPATIENT)
Dept: GASTROENTEROLOGY | Facility: CLINIC | Age: 67
End: 2022-09-08

## 2022-09-08 NOTE — TELEPHONE ENCOUNTER
Spoke to pt confirming pt's egd scheduled on 9/13/22 at [facility] with Dr Samantha Whiting  Informed pt will be called 1 day prior with arrival time  Informed pt would need to be npo after midnight night prior and would need a  the day of the procedure  Pt has instructions and does not have any questions

## 2022-09-12 ENCOUNTER — TELEPHONE (OUTPATIENT)
Dept: GASTROENTEROLOGY | Facility: CLINIC | Age: 67
End: 2022-09-12

## 2022-09-12 NOTE — TELEPHONE ENCOUNTER
Patients GI provider:  Dr Doshi Freeze    Number to return call: [phone]    Reason for call: Pt calling stating she has procedure tomorrow and was told she cannot take any meds morning of  She said she is a cancer patient and cannot go without taking morphine or oxycodone until her procedure tomorrow morning  Please advise    Scheduled procedure/appointment date if applicable:procedure 3/04

## 2022-09-12 NOTE — TELEPHONE ENCOUNTER
I spoke with Pt and informed her per Dr Sabina Carvajal she should not take her pain medication before her procedure tomorrow  She verbalized understanding

## 2022-09-12 NOTE — TELEPHONE ENCOUNTER
Patient wanted to know if she could take her morphine and oxycodone prior to EGD tomorrow;  She usually takes them around 7am

## 2022-09-13 ENCOUNTER — ANESTHESIA (OUTPATIENT)
Dept: GASTROENTEROLOGY | Facility: HOSPITAL | Age: 67
End: 2022-09-13

## 2022-09-13 ENCOUNTER — HOSPITAL ENCOUNTER (OUTPATIENT)
Dept: GASTROENTEROLOGY | Facility: HOSPITAL | Age: 67
Setting detail: OUTPATIENT SURGERY
Discharge: HOME | End: 2022-09-13
Payer: MEDICARE

## 2022-09-13 ENCOUNTER — ANESTHESIA EVENT (OUTPATIENT)
Dept: GASTROENTEROLOGY | Facility: HOSPITAL | Age: 67
End: 2022-09-13

## 2022-09-13 VITALS
WEIGHT: 188 LBS | HEART RATE: 72 BPM | BODY MASS INDEX: 29.44 KG/M2 | SYSTOLIC BLOOD PRESSURE: 105 MMHG | RESPIRATION RATE: 15 BRPM | TEMPERATURE: 97 F | OXYGEN SATURATION: 93 % | DIASTOLIC BLOOD PRESSURE: 65 MMHG

## 2022-09-13 DIAGNOSIS — R13.19 ESOPHAGEAL DYSPHAGIA: ICD-10-CM

## 2022-09-13 DIAGNOSIS — K21.00 GASTROESOPHAGEAL REFLUX DISEASE WITH ESOPHAGITIS WITHOUT HEMORRHAGE: ICD-10-CM

## 2022-09-13 LAB — GLUCOSE SERPL-MCNC: 97 MG/DL (ref 65–140)

## 2022-09-13 PROCEDURE — C1726 CATH, BAL DIL, NON-VASCULAR: HCPCS

## 2022-09-13 PROCEDURE — 88305 TISSUE EXAM BY PATHOLOGIST: CPT | Performed by: PATHOLOGY

## 2022-09-13 PROCEDURE — 82948 REAGENT STRIP/BLOOD GLUCOSE: CPT

## 2022-09-13 PROCEDURE — 43239 EGD BIOPSY SINGLE/MULTIPLE: CPT | Performed by: INTERNAL MEDICINE

## 2022-09-13 PROCEDURE — 43249 ESOPH EGD DILATION <30 MM: CPT | Performed by: INTERNAL MEDICINE

## 2022-09-13 RX ORDER — SODIUM CHLORIDE, SODIUM LACTATE, POTASSIUM CHLORIDE, CALCIUM CHLORIDE 600; 310; 30; 20 MG/100ML; MG/100ML; MG/100ML; MG/100ML
INJECTION, SOLUTION INTRAVENOUS CONTINUOUS PRN
Status: DISCONTINUED | OUTPATIENT
Start: 2022-09-13 | End: 2022-09-13

## 2022-09-13 RX ORDER — SODIUM CHLORIDE, SODIUM LACTATE, POTASSIUM CHLORIDE, CALCIUM CHLORIDE 600; 310; 30; 20 MG/100ML; MG/100ML; MG/100ML; MG/100ML
125 INJECTION, SOLUTION INTRAVENOUS CONTINUOUS
Status: DISCONTINUED | OUTPATIENT
Start: 2022-09-13 | End: 2022-09-17 | Stop reason: HOSPADM

## 2022-09-13 RX ORDER — PROPOFOL 10 MG/ML
INJECTION, EMULSION INTRAVENOUS CONTINUOUS PRN
Status: DISCONTINUED | OUTPATIENT
Start: 2022-09-13 | End: 2022-09-13

## 2022-09-13 RX ORDER — LIDOCAINE HYDROCHLORIDE 10 MG/ML
0.5 INJECTION, SOLUTION EPIDURAL; INFILTRATION; INTRACAUDAL; PERINEURAL ONCE AS NEEDED
Status: DISCONTINUED | OUTPATIENT
Start: 2022-09-13 | End: 2022-09-17 | Stop reason: HOSPADM

## 2022-09-13 RX ADMIN — PROPOFOL 125 MCG/KG/MIN: 10 INJECTION, EMULSION INTRAVENOUS at 09:58

## 2022-09-13 RX ADMIN — SODIUM CHLORIDE, SODIUM LACTATE, POTASSIUM CHLORIDE, AND CALCIUM CHLORIDE: .6; .31; .03; .02 INJECTION, SOLUTION INTRAVENOUS at 09:36

## 2022-09-13 NOTE — ANESTHESIA PREPROCEDURE EVALUATION
Procedure:  EGD    Relevant Problems   CARDIO   (+) Atherosclerotic heart disease of native coronary artery without angina pectoris   (+) Chest wall pain   (+) Coronary artery disease with angina pectoris (HCC)   (+) Essential hypertension   (+) Mixed hyperlipidemia      ENDO   (+) Hypothyroidism   (+) Type 2 diabetes mellitus (HCC)      GI/HEPATIC   (+) Esophageal dysphagia   (+) Fatty liver   (+) GERD (gastroesophageal reflux disease)      /RENAL   (+) JESUS (acute kidney injury) ([facility] )   (+) Kidney stones      GYN   (+) Breast cancer (HCC)   (+) Malignant neoplasm of lower-outer quadrant of unspecified female breast ([facility] )      HEMATOLOGY   (+) Iron deficiency anemia      MUSCULOSKELETAL   (+) DDD (degenerative disc disease), cervical   (+) Fibromyalgia   (+) Low back pain at multiple sites   (+) Primary osteoarthritis of both knees   (+) Sacroiliitis (HCC)      NEURO/PSYCH   (+) Depressive disorder   (+) Double vision   (+) Fibromyalgia   (+) Generalized anxiety disorder   (+) History of breast cancer   (+) Panic attack   (+) Tension type headache      PULMONARY   (+) BRAYDEN (obstructive sleep apnea)   (+) Panlobular emphysema (HCC)   (+) Shortness of breath      CAD/PCI/MI/CHF -- DENIES  COPD/ASTHMA/BRAYDEN -- DAILY MAINTENANCE, 1/MO RESCUE; NO HOSP/STEROIDS  PROBS WITH PRIOR ANESTHESIA -- DENIES  NPO STATUS CONFIRMED      Narrative    This result has an attachment that is not available  Left ventricle is normal in size  There is mild concentric hypertrophy  Systolic function is hyperdynamic with an ejection fraction over 65%  Wall   motion is within normal limits  There is grade I (mild) diastolic   dysfunction and normal left atrial pressure  Top normal right ventricular size with normal systolic function  Pulmonary artery systolic pressure couldn't be estimated due to inadequate   TR jet  Mitral annular calcification  There is trace regurgitation       No significant changes from prior study on 5/13/20  Left Ventricle   Left ventricle is normal in size  There is mild concentric hypertrophy  Systolic function is hyperdynamic with an ejection fraction over 65%  Wall motion is within normal limits  There is grade I (mild) diastolic dysfunction and normal left atrial pressure  Right Ventricle   Right ventricle cavity is top normal  Systolic function is normal      Left Atrium   Left atrium cavity size is normal      Right Atrium   Right atrium cavity is normal      IVC/SVC   IVC not well visualized  Mitral Valve   Mitral annular calcification  There is trace regurgitation  There is no evidence of mitral valve stenosis  Tricuspid Valve   Tricuspid valve structure is normal  There is trace regurgitation  There is no evidence of tricuspid valve stenosis  Aortic Valve   The aortic valve is trileaflet  There is no regurgitation or stenosis  Pulmonic Valve   Pulmonic valve structure is not well-visualized  There is no regurgitation or stenosis  Ascending Aorta   The aorta appears normal in size  Pericardium   There is no pericardial effusion  Physical Exam    Airway    Mallampati score: II  TM Distance: >3 FB       Dental   implants,     Cardiovascular      Pulmonary      Other Findings           Anesthesia Plan  ASA Score- 3     Anesthesia Type- IV sedation with anesthesia with ASA Monitors  Additional Monitors:   Airway Plan:     Comment: MANASA Milton , have personally seen and evaluated the patient prior to anesthetic care  I have reviewed the pre-anesthetic record, and other medical records if appropriate to the anesthetic care  If a CRNA is involved in the case, I have reviewed the CRNA assessment, if present, and agree  Risks/benefits and alternatives discussed with patient including possible PONV, sore throat, and possibility of rare anesthetic and surgical emergencies          Plan Factors-    Chart reviewed  EKG reviewed   Imaging results reviewed  Existing labs reviewed  Patient summary reviewed  Patient instructed to abstain from smoking on day of procedure  Obstructive sleep apnea risk education given perioperatively  Induction-     Postoperative Plan-     Informed Consent- Anesthetic plan and risks discussed with patient  I personally reviewed this patient with the CRNA  Discussed and agreed on the Anesthesia Plan with the CRNA  Marylou Yeboah

## 2022-09-13 NOTE — H&P
History and Physical - [facility]  Jaron Mccall 78 y o  female MRN: [id number]                  HPI: Jaron Mccall is a 78y o  year old female who presents for upper endoscopy due to dysphagia liquids and solids      REVIEW OF SYSTEMS: Per the HPI, and otherwise unremarkable      Historical Information   Past Medical History:   Diagnosis Date    Anxiety     Breast cancer ([facility] )     CAD (coronary artery disease)     Cancer ([facility] )     Carpal tunnel syndrome     CPAP (continuous positive airway pressure) dependence     Disease of thyroid gland     Elevated cholesterol     Fibromyalgia     Hypertension     Kidney stone     Melanoma ([place] [address] )     nose    Myocardial infarction (HCC)     Neuropathy     BRAYDEN (obstructive sleep apnea)     cpap    Panic attack      Past Surgical History:   Procedure Laterality Date    BREAST SURGERY Bilateral     CARDIAC SURGERY      cardiac ablation     SECTION      x3    CHOLECYSTECTOMY      COLONOSCOPY      CORONARY ANGIOPLASTY WITH STENT PLACEMENT      LYMPH NODE BIOPSY Right 2022    MASTECTOMY Bilateral     NOSE SURGERY       Social History   Social History     Substance and Sexual Activity   Alcohol Use Not Currently    Comment: beer     Social History     Substance and Sexual Activity   Drug Use Not Currently    Types: Marijuana    Comment: Medical marjuiana     Social History     Tobacco Use   Smoking Status Never Smoker   Smokeless Tobacco Never Used     Family History   Problem Relation Age of Onset    Heart attack Mother     Alcohol abuse Mother     Heart failure Mother     Cancer Mother     Cancer Paternal Grandfather        Meds/Allergies       Current Outpatient Medications:     aspirin (ECOTRIN LOW STRENGTH) 81 mg EC tablet    B Complex Vitamins (B COMPLEX 1 PO)    Calcium Carb-Cholecalciferol (Oyster Shell Calcium w/D) 500-200 MG-UNIT TABS No    capecitabine (XELODA) 500 MG tablet    clopidogrel (PLAVIX) 75 mg tablet    CVS B6 100 MG tablet    DULoxetine (CYMBALTA) 30 mg delayed release capsule    DULoxetine (CYMBALTA) 60 mg delayed release capsule    famotidine (PEPCID) 40 MG tablet    levothyroxine 88 mcg tablet    LORazepam (ATIVAN) 1 mg tablet    lubiprostone (AMITIZA) 24 mcg capsule    melatonin 3 mg    metFORMIN (GLUCOPHAGE) 500 mg tablet    methocarbamol (ROBAXIN) 750 mg tablet    metoprolol tartrate (LOPRESSOR) 50 mg tablet    morphine (MS CONTIN) 15 mg 12 hr tablet    oxyCODONE (ROXICODONE) 5 immediate release tablet    pantoprazole (PROTONIX) 40 mg tablet    pramipexole (MIRAPEX) 0 5 mg tablet    Prucalopride Succinate 2 MG TABS    sucralfate (CARAFATE) 1 g tablet    Alpha-Lipoic Acid 600 MG CAPS    Blood Glucose Monitoring Suppl (ONE TOUCH ULTRA 2) w/Device KIT    docusate sodium (COLACE) 100 mg capsule    Evolocumab 140 MG/ML SOAJ    glucose blood (OneTouch Ultra) test strip    Lancets (onetouch ultrasoft) lancets    Lancets (onetouch ultrasoft) lancets    Misc  Devices (GETGO ROLLING WALKER) MISC    senna (SENOKOT) 8 6 MG tablet    Current Facility-Administered Medications:     lactated ringers infusion, 125 mL/hr, Intravenous, Continuous    lidocaine (PF) (XYLOCAINE-MPF) 1 % injection 0 5 mL, 0 5 mL, Infiltration, Once PRN    Facility-Administered Medications Ordered in Other Encounters:     lactated ringers infusion, , Intravenous, Continuous PRN, New Bag at 09/13/22 0936    Allergies   Allergen Reactions    Metoclopramide Other (See Comments)    Nitrofurantoin Other (See Comments)     Very weak   Maru Rosanna    Symbicort [Budesonide-Formoterol Fumarate] Nausea Only    Isosorbide      Other reaction(s): headache    Pamidronate Other (See Comments), Confusion and Fever     Developed multiple side effects of drug including fever, tachycardia, and lethargy     Pregabalin      Pt states made her feel suicidal     Reglan [Metoclopramide] Other (See Comments)     Flares her neuropathy  Statins Myalgia       Objective     /74   Pulse 68   Temp 97 5 °F (36 4 °C) (Temporal)   Resp 19   Wt 85 3 kg (188 lb)   SpO2 95%   BMI 29 44 kg/m²       PHYSICAL EXAM    Gen: NAD  Head: NCAT  CV: RRR  CHEST: Clear  ABD: soft, NT/ND  EXT: no edema      ASSESSMENT/PLAN:  This is a 78y o  year old female here for upper endoscopy, and she is stable and optimized for her procedure

## 2022-09-13 NOTE — ANESTHESIA POSTPROCEDURE EVALUATION
Post-Op Assessment Note    CV Status:  Stable  Pain Score: 0    Pain management: adequate     Mental Status:  Alert and awake   Hydration Status:  Euvolemic   PONV Controlled:  Controlled   Airway Patency:  Patent      Post Op Vitals Reviewed: Yes      Staff: CRNA, Anesthesiologist         No complications documented      /65 (09/13/22 1015)    Temp (!) 97 °F (36 1 °C) (09/13/22 1015)    Pulse 64 (09/13/22 1015)   Resp (!) 29 (09/13/22 1015)    SpO2 95 % (09/13/22 1015)

## 2022-09-19 PROCEDURE — 88305 TISSUE EXAM BY PATHOLOGIST: CPT | Performed by: PATHOLOGY

## 2022-09-22 DIAGNOSIS — K21.00 GASTROESOPHAGEAL REFLUX DISEASE WITH ESOPHAGITIS WITHOUT HEMORRHAGE: ICD-10-CM

## 2022-09-22 DIAGNOSIS — R13.19 ESOPHAGEAL DYSPHAGIA: ICD-10-CM

## 2022-09-22 RX ORDER — PANTOPRAZOLE SODIUM 40 MG/1
40 TABLET, DELAYED RELEASE ORAL
Qty: 90 TABLET | Refills: 0 | Status: SHIPPED | OUTPATIENT
Start: 2022-09-22

## 2022-09-24 DIAGNOSIS — E87.8 DISEQUILIBRIUM SYNDROME: ICD-10-CM

## 2022-09-24 DIAGNOSIS — R26.2 AMBULATORY DYSFUNCTION: ICD-10-CM

## 2022-09-26 RX ORDER — METHOCARBAMOL 750 MG/1
750 TABLET, FILM COATED ORAL EVERY 8 HOURS SCHEDULED
Qty: 90 TABLET | Refills: 0 | Status: SHIPPED | OUTPATIENT
Start: 2022-09-26 | End: 2022-10-04 | Stop reason: SDUPTHER

## 2022-09-30 ENCOUNTER — TELEPHONE (OUTPATIENT)
Dept: NEUROLOGY | Facility: CLINIC | Age: 67
End: 2022-09-30

## 2022-10-03 ENCOUNTER — TELEPHONE (OUTPATIENT)
Dept: OTHER | Facility: OTHER | Age: 67
End: 2022-10-03

## 2022-10-03 DIAGNOSIS — G56.03 BILATERAL CARPAL TUNNEL SYNDROME: ICD-10-CM

## 2022-10-03 DIAGNOSIS — R26.89 BALANCE DISORDER: ICD-10-CM

## 2022-10-03 DIAGNOSIS — G62.0 DRUG-INDUCED PERIPHERAL NEUROPATHY (HCC): Primary | ICD-10-CM

## 2022-10-04 DIAGNOSIS — R26.2 AMBULATORY DYSFUNCTION: ICD-10-CM

## 2022-10-04 DIAGNOSIS — E87.8 DISEQUILIBRIUM SYNDROME: ICD-10-CM

## 2022-10-04 RX ORDER — METHOCARBAMOL 750 MG/1
750 TABLET, FILM COATED ORAL EVERY 8 HOURS SCHEDULED
Qty: 90 TABLET | Refills: 0 | Status: SHIPPED | OUTPATIENT
Start: 2022-10-04

## 2022-10-04 NOTE — TELEPHONE ENCOUNTER
Patient is calling regarding cancelling an appointment  Date/Time:10/4 @ 1:30 pm     Patient was rescheduled: YES [] NO [x]    Patient requesting call back to reschedule: YES [x] NO []    Patient is in currently admitted to [facility], she is really dizzy and feeling confused - She said you can call to reschedule but she is not sure when she is getting out

## 2022-10-06 ENCOUNTER — TELEPHONE (OUTPATIENT)
Dept: NEUROLOGY | Facility: CLINIC | Age: 67
End: 2022-10-06

## 2022-10-06 NOTE — TELEPHONE ENCOUNTER
Conversation: Mirapex increase  (Newest Message First)  October 6, 2022    Adilia Bach  to Neurology [facility] Clinical Team 3    JR      8:25 AM  Please review  October 5, 2022    Denys Tellez  to Rosenda Burrell MD          2:12 PM  a doctor had mentioned it  I saw now evidence of the change  So thank you for addressing this  Ivett New, RN  to Mayur GREENE      1:11 PM  Thank you for contacting [facility] Neurology,     The last prescription our office sent for mirapex 0 5mg tablets on 6/6/22 was for up to 1 tablet three times a day  Where did you see that the dose was increased? Thanks!        Please do not hesitate to call our office at [phone]      Thank you for choosing [facility] for your care    Last read by Ron Tirado at 3:27 PM on 10/5/2022  October 4, 2022         JS    12:11 PM  Elza Dyson routed this conversation to Neurology [facility] Clinical Team 3       Denys Tellez  to Rosenda Burrell MD          11:31 AM  my mirapex said that there was an increase ? I am not aware of it    I am in the hospital because I  slept 3 days in a row   very weak  and using diapers instead  of bathroom  I am sure this is not true

## 2022-10-11 PROBLEM — N39.0 URINARY TRACT INFECTION: Status: RESOLVED | Noted: 2020-05-08 | Resolved: 2022-10-11

## 2022-10-12 PROBLEM — Z12.11 COLON CANCER SCREENING: Status: RESOLVED | Noted: 2021-04-07 | Resolved: 2022-10-12

## 2022-11-01 DIAGNOSIS — E87.8 DISEQUILIBRIUM SYNDROME: ICD-10-CM

## 2022-11-01 DIAGNOSIS — R26.2 AMBULATORY DYSFUNCTION: ICD-10-CM

## 2022-11-01 RX ORDER — METHOCARBAMOL 750 MG/1
750 TABLET, FILM COATED ORAL EVERY 8 HOURS SCHEDULED
Qty: 90 TABLET | Refills: 0 | Status: SHIPPED | OUTPATIENT
Start: 2022-11-01

## 2022-11-04 ENCOUNTER — TELEPHONE (OUTPATIENT)
Dept: NEUROLOGY | Facility: CLINIC | Age: 67
End: 2022-11-04

## 2022-11-07 ENCOUNTER — OFFICE VISIT (OUTPATIENT)
Dept: FAMILY MEDICINE CLINIC | Facility: CLINIC | Age: 67
End: 2022-11-07

## 2022-11-07 VITALS
BODY MASS INDEX: 29.22 KG/M2 | RESPIRATION RATE: 20 BRPM | SYSTOLIC BLOOD PRESSURE: 120 MMHG | DIASTOLIC BLOOD PRESSURE: 70 MMHG | HEIGHT: 67 IN | OXYGEN SATURATION: 97 % | HEART RATE: 81 BPM | WEIGHT: 186.2 LBS | TEMPERATURE: 98.8 F

## 2022-11-07 DIAGNOSIS — E11.9 TYPE 2 DIABETES MELLITUS WITHOUT COMPLICATION, WITHOUT LONG-TERM CURRENT USE OF INSULIN (HCC): ICD-10-CM

## 2022-11-07 DIAGNOSIS — J43.1 PANLOBULAR EMPHYSEMA (HCC): ICD-10-CM

## 2022-11-07 DIAGNOSIS — Z23 ENCOUNTER FOR IMMUNIZATION: ICD-10-CM

## 2022-11-07 DIAGNOSIS — Z00.00 MEDICARE ANNUAL WELLNESS VISIT, SUBSEQUENT: ICD-10-CM

## 2022-11-07 DIAGNOSIS — F41.1 GENERALIZED ANXIETY DISORDER: ICD-10-CM

## 2022-11-07 DIAGNOSIS — I10 ESSENTIAL HYPERTENSION: ICD-10-CM

## 2022-11-07 DIAGNOSIS — K21.00 GASTROESOPHAGEAL REFLUX DISEASE WITH ESOPHAGITIS WITHOUT HEMORRHAGE: Primary | ICD-10-CM

## 2022-11-07 DIAGNOSIS — C79.51 BONE METASTASES (HCC): ICD-10-CM

## 2022-11-07 DIAGNOSIS — G95.9 SPINAL CORD LESION (HCC): ICD-10-CM

## 2022-11-07 DIAGNOSIS — C50.519 MALIGNANT NEOPLASM OF LOWER-OUTER QUADRANT OF FEMALE BREAST, UNSPECIFIED ESTROGEN RECEPTOR STATUS, UNSPECIFIED LATERALITY (HCC): ICD-10-CM

## 2022-11-07 DIAGNOSIS — M46.1 SACROILIITIS (HCC): ICD-10-CM

## 2022-11-07 DIAGNOSIS — G62.0 DRUG-INDUCED PERIPHERAL NEUROPATHY (HCC): ICD-10-CM

## 2022-11-07 RX ORDER — POTASSIUM CHLORIDE 1500 MG/1
TABLET, EXTENDED RELEASE ORAL
COMMUNITY
Start: 2022-08-30

## 2022-11-07 RX ORDER — CEPHALEXIN 500 MG/1
CAPSULE ORAL
COMMUNITY
Start: 2022-10-26

## 2022-11-07 RX ORDER — ROSUVASTATIN CALCIUM 5 MG/1
TABLET, COATED ORAL
COMMUNITY
Start: 2022-09-15

## 2022-11-07 RX ORDER — LORAZEPAM 1 MG/1
1 TABLET ORAL 2 TIMES DAILY
Qty: 60 TABLET | Refills: 0 | Status: SHIPPED | OUTPATIENT
Start: 2022-11-07

## 2022-11-07 RX ORDER — OXYCODONE HYDROCHLORIDE 15 MG/1
TABLET ORAL
COMMUNITY
Start: 2022-10-31

## 2022-11-07 RX ORDER — NALOXONE HYDROCHLORIDE 4 MG/.1ML
SPRAY NASAL
Qty: 1 EACH | Refills: 1 | Status: SHIPPED | OUTPATIENT
Start: 2022-11-07

## 2022-11-07 RX ORDER — PROCHLORPERAZINE MALEATE 10 MG
TABLET ORAL
COMMUNITY
Start: 2022-10-17

## 2022-11-07 RX ORDER — FLUTICASONE FUROATE AND VILANTEROL 100; 25 UG/1; UG/1
1 POWDER RESPIRATORY (INHALATION) DAILY
COMMUNITY
Start: 2022-09-12

## 2022-11-07 NOTE — ASSESSMENT & PLAN NOTE
Patient is battling through metastatic cancer now undergoing treatment through Hematology-Oncology through her physicians at [facility]

## 2022-11-07 NOTE — PROGRESS NOTES
Assessment and Plan:     Problem List Items Addressed This Visit        Digestive    GERD (gastroesophageal reflux disease) - Primary      Stable in light of multiple problems and conditions continue with Pepcid 40 mg daily            Endocrine    Type 2 diabetes mellitus ([facility] )       Lab Results   Component Value Date    HGBA1C 6 4 (H) 07/20/2022   Recent A1c in normal range patient has lost weight recommend discontinuation of metformin for now            Respiratory    Panlobular emphysema (HCC)     No worsening shortness of breath continue same medication regimen use Ventolin inhaler as needed         Relevant Medications    Ventolin  (90 Base) MCG/ACT inhaler    Fluticasone Furoate-Vilanterol 100-25 mcg/actuation inhaler       Cardiovascular and Mediastinum    Essential hypertension     Stable continue with metoprolol            Nervous and Auditory    Drug-induced peripheral neuropathy ([facility] [address] )     Patient has difficulty with ambulation and weakness in both lower extremities chemotherapy she is asking for assistance and instructions through physical therapy on how to properly manage safety and transfers from her see to a standing position an use of the walker  She has a rolling walker but is concerned about balance and proper transfers and movement    I will give her a therapy referral for an evaluation and treatment planned to teach on proper stretching and strengthening and safety         Relevant Orders    Ambulatory Referral to Physical Therapy    Spinal cord lesion [facility])     Referral for physical therapy for safety and transfer instructions wall using rolling walker at home            Musculoskeletal and Integument    Sacroiliitis [facility])    Relevant Orders    Ambulatory Referral to Physical Therapy    Bone metastases [facility])    Relevant Orders    Ambulatory Referral to Physical Therapy       Other    Medicare annual wellness visit, subsequent    Malignant neoplasm of lower-outer quadrant of unspecified female breast [facility])     Patient is battling through metastatic cancer now undergoing treatment through Hematology-Oncology through her physicians at [facility] issues were discussed with patient, and age appropriate screening tests were ordered as noted in patient's After Visit Summary  Personalized health advice and appropriate referrals for health education or preventive services given if needed, as noted in patient's After Visit Summary  History of Present Illness:     Patient presents for a Medicare Wellness Visit    Patient is following up with Medicare wellness visit     Patient Care Team:  Macarthur Bosworth, DO as PCP - General (Family Medicine)  Danni Wan PA-C as Physician Assistant (Physician Assistant)  Noble Sellers MD (Gastroenterology)  Macarthur Bosworth, DO (Family Medicine)     Review of Systems:     Review of Systems   Constitutional: Negative for chills, fatigue and fever  HENT: Negative for congestion, nosebleeds, rhinorrhea, sinus pressure and sore throat  Eyes: Negative for discharge and redness  Respiratory: Negative for cough and shortness of breath  Cardiovascular: Negative for chest pain, palpitations and leg swelling  Gastrointestinal: Negative for abdominal pain, blood in stool and nausea  Endocrine: Negative for cold intolerance, heat intolerance and polyuria  Genitourinary: Negative for dysuria and frequency  Musculoskeletal: Positive for arthralgias and back pain  Negative for myalgias  Skin: Negative for rash  Neurological: Positive for weakness  Negative for dizziness and headaches  Hematological: Negative for adenopathy  Psychiatric/Behavioral: Negative for behavioral problems and sleep disturbance  The patient is not nervous/anxious           Problem List:     Patient Active Problem List   Diagnosis   • Atherosclerotic heart disease of native coronary artery without angina pectoris   • Bilateral carpal tunnel syndrome   • Depressive disorder   • Essential hypertension   • Fibromyalgia   • Generalized anxiety disorder   • History of breast cancer   • Mixed hyperlipidemia   • BRAYDEN (obstructive sleep apnea)   • Chest wall pain   • Coronary artery disease with angina pectoris (HCC)   • Low back pain at multiple sites   • Medicare annual wellness visit, subsequent   • Hypothyroidism   • Right upper quadrant abdominal pain   • Fatty liver   • Double vision   • Breast cancer (HCC)   • Panic attack   • Divergence insufficiency   • Cerebrovascular small vessel disease with hemorrhage (HCC)   • Disequilibrium syndrome   • Trochanteric bursitis of right hip   • Sacroiliitis (HCC)   • Restless leg syndrome   • Hyperglycemia   • Myokymia of right side of face   • Peripheral polyneuropathy   • Shortness of breath   • Thrush   • Bone metastases (HCC)   • Drug-induced peripheral neuropathy (HCC)   • JESUS (acute kidney injury) ([facility] )   • Panlobular emphysema (HCC)   • Obesity, morbid (HCC)   • Primary osteoarthritis of both knees   • Gastritis   • Colon polyps   • Gastroparesis   • GERD (gastroesophageal reflux disease)   • Constipation   • Bloating   • Lactose intolerance   • Gastric polyps   • Iron deficiency anemia   • Balance disorder   • Type 2 diabetes mellitus (HCC)   • Syncope   • Malignant neoplasm of lower-outer quadrant of unspecified female breast ([facility] [address] )   • Lung nodule   • Lung metastasis (HCC)   • Long term (current) use of anticoagulants   • History of coronary artery stent placement   • Drug-induced polyneuropathy (HCC)   • Disorder of rotator cuff   • DDD (degenerative disc disease), cervical   • Basal cell carcinoma of skin of nose   • Ambulatory dysfunction   • Tension type headache   • Urinary frequency   • Kidney stones   • COVID-19   • Esophageal dysphagia   • Opioid dependence (HCC)   • Spinal cord lesion [facility])      Past Medical and Surgical History:     Past Medical History:   Diagnosis Date   • Anxiety    • Breast cancer ([facility]  )    • CAD (coronary artery disease)    • Cancer (HCC)    • Carpal tunnel syndrome    • CPAP (continuous positive airway pressure) dependence    • Disease of thyroid gland    • Elevated cholesterol    • Fibromyalgia    • Hypertension    • Kidney stone    • Melanoma ([facility]  )     nose   • Myocardial infarction (HCC)    • Neuropathy    • BRAYDEN (obstructive sleep apnea)     cpap   • Panic attack      Past Surgical History:   Procedure Laterality Date   • BREAST SURGERY Bilateral    • CARDIAC SURGERY      cardiac ablation   •  SECTION      x3   • CHOLECYSTECTOMY     • COLONOSCOPY     • CORONARY ANGIOPLASTY WITH STENT PLACEMENT     • LYMPH NODE BIOPSY Right 2022   • MASTECTOMY Bilateral    • NOSE SURGERY        Family History:     Family History   Problem Relation Age of Onset   • Heart attack Mother    • Alcohol abuse Mother    • Heart failure Mother    • Cancer Mother    • Cancer Paternal Grandfather       Social History:     Social History     Socioeconomic History   • Marital status: Single     Spouse name: None   • Number of children: None   • Years of education: None   • Highest education level: None   Occupational History   • None   Tobacco Use   • Smoking status: Never Smoker   • Smokeless tobacco: Never Used   Vaping Use   • Vaping Use: Never used   Substance and Sexual Activity   • Alcohol use: Not Currently     Comment: beer   • Drug use: Not Currently     Types: Marijuana     Comment: Medical marjuiana   • Sexual activity: None   Other Topics Concern   • None   Social History Narrative    ** Merged History Encounter **          Social Determinants of Health     Financial Resource Strain: High Risk   • Difficulty of Paying Living Expenses: Hard   Food Insecurity: No Food Insecurity   • Worried About Running Out of Food in the Last Year: Never true   • Ran Out of Food in the Last Year: Never true   Transportation Needs: Unmet Transportation Needs   • Lack of Transportation (Medical):  Yes   • Lack of Transportation (Non-Medical): Yes   Physical Activity: Not on file   Stress: Not on file   Social Connections: Not on file   Intimate Partner Violence: Not on file   Housing Stability: Unknown   • Unable to Pay for Housing in the Last Year: No   • Number of [place] in the Last Year: Not on file   • Unstable Housing in the Last Year: No      Medications and Allergies:     Current Outpatient Medications   Medication Sig Dispense Refill   • Alpha-Lipoic Acid 600 MG CAPS Take by mouth 2 (two) times a day     • aspirin (ECOTRIN LOW STRENGTH) 81 mg EC tablet aspirin 81 mg tablet,delayed release     • B Complex Vitamins (B COMPLEX 1 PO) Take 1 tablet by mouth     • Blood Glucose Monitoring Suppl (ONE TOUCH ULTRA 2) w/Device KIT Test Blood Sugar Once Daily 1 kit 0   • Calcium Carb-Cholecalciferol (Oyster Shell Calcium w/D) 500-200 MG-UNIT TABS No Take by mouth 2 (two) times a day with meals     • cephalexin (KEFLEX) 500 mg capsule TAKE 1 CAPSULE BY MOUTH TWICE A DAY FOR 7 DAYS     • clopidogrel (PLAVIX) 75 mg tablet Take 75 mg by mouth daily  3   • CVS B6 100 MG tablet TAKE 1 TABLET BY MOUTH EVERY DAY IN THE MORNING 30 tablet 2   • docusate sodium (COLACE) 100 mg capsule Take 1 capsule (100 mg total) by mouth daily at bedtime 180 capsule 3   • DULoxetine (CYMBALTA) 30 mg delayed release capsule TAKE 1 CAP BY MOUTH DAILY IN THE AM 90 capsule 1   • DULoxetine (CYMBALTA) 60 mg delayed release capsule TAKE 1 CAP DAILY BY MOUTH IN THE EVENING 90 capsule 1   • Evolocumab 140 MG/ML SOAJ Inject 2 mL under the skin     • famotidine (PEPCID) 40 MG tablet Take 1 tablet (40 mg total) by mouth daily 90 tablet 2   • Fluticasone Furoate-Vilanterol 100-25 mcg/actuation inhaler Inhale 1 puff daily     • glucose blood (OneTouch Ultra) test strip USE TO TEST BLOOD SUGAR ONCE DAILY 100 strip 2   • Klor-Con M20 20 MEQ tablet TAKE 1 TABLET BY MOUTH DAILY FOR 7 DAYS       • Lancets (onetouch ultrasoft) lancets Patient to test once daily 100 each 1   • Lancets (onetouch ultrasoft) lancets Test Blood Sugar Once Daily 100 each 0   • levothyroxine 88 mcg tablet TAKE 1 TABLET BY MOUTH EVERY DAY 90 tablet 0   • LORazepam (ATIVAN) 1 mg tablet Take 1 tablet (1 mg total) by mouth 2 (two) times a day 60 tablet 0   • lubiprostone (AMITIZA) 24 mcg capsule TAKE 1 CAPSULE BY MOUTH 2 TIMES A DAY WITH MEALS  180 capsule 1   • melatonin 3 mg Take 20 mg by mouth      • metFORMIN (GLUCOPHAGE) 500 mg tablet TAKE 1 TABLET BY MOUTH TWICE A DAY WITH MEALS 180 tablet 0   • methocarbamol (ROBAXIN) 750 mg tablet TAKE 1 TABLET (750 MG TOTAL) BY MOUTH EVERY 8 (EIGHT) HOURS 90 tablet 0   • metoprolol tartrate (LOPRESSOR) 50 mg tablet TAKE 1 TABLET BY MOUTH TWICE A  tablet 1   • Misc  Devices (GETGO ROLLING WALKER) MISC Use rolling walker as needed 1 each 0   • morphine (MS CONTIN) 15 mg 12 hr tablet 30 mg BID      • oxyCODONE (ROXICODONE) 15 mg immediate release tablet PLEASE SEE ATTACHED FOR DETAILED DIRECTIONS     • pantoprazole (PROTONIX) 40 mg tablet Take 1 tablet (40 mg total) by mouth daily before breakfast   Do not take when you are taking your Xeloda  90 tablet 0   • prochlorperazine (COMPAZINE) 10 mg tablet TAKE 1 TABLET BY MOUTH EVERY 6 HOURS AS NEEDED FOR NAUSEA OR VOMITING       • Prucalopride Succinate 2 MG TABS Take 2 mg by mouth daily 90 tablet 0   • rosuvastatin (CRESTOR) 5 mg tablet TAKE 1 TABLET BY MOUTH EVERY DAY AT NIGHT     • senna (SENOKOT) 8 6 MG tablet Take 8 6 mg by mouth 2 (two) times a day     • Ventolin  (90 Base) MCG/ACT inhaler INHALE 2 PUFFS EVERY 6 HOURS AS NEEDED FOR WHEEZING     • capecitabine (XELODA) 500 MG tablet Take 1,500 mg by mouth 2 (two) times a day     • oxyCODONE (ROXICODONE) 5 immediate release tablet PLEASE SEE ATTACHED FOR DETAILED DIRECTIONS (Patient not taking: Reported on 11/7/2022)     • pramipexole (MIRAPEX) 0 5 mg tablet TAKE 1 TABLET BY MOUTH IN THE EVENING AND 1 TABLET AT BEDTIME FOR 7 DAYS , THEN 1 TAB 3 TIMES A DAY (Patient not taking: Reported on 11/7/2022) 270 tablet 1   • sucralfate (CARAFATE) 1 g tablet TAKE 1 TABLET (1 G TOTAL) BY MOUTH 4 (FOUR) TIMES A DAY DISSOLVED PILL IN 10 ML WATER THEN SWALLOW 360 tablet 1     No current facility-administered medications for this visit  Allergies   Allergen Reactions   • Metoclopramide Other (See Comments)   • Nitrofurantoin Other (See Comments)     Very weak  Fell   • Symbicort [Budesonide-Formoterol Fumarate] Nausea Only   • Isosorbide      Other reaction(s): headache   • Pamidronate Other (See Comments), Confusion and Fever     Developed multiple side effects of drug including fever, tachycardia, and lethargy    • Pregabalin      Pt states made her feel suicidal    • Reglan [Metoclopramide] Other (See Comments)     Flares her neuropathy   • Statins Myalgia      Immunizations:     Immunization History   Administered Date(s) Administered   • COVID-19 MODERNA VACC 0 5 ML IM 03/17/2021, 04/15/2021, 11/19/2021, 07/21/2022   • H1N1, All Formulations 02/17/2010   • INFLUENZA 10/28/2016, 10/01/2018, 09/28/2019   • Influenza Injectable, MDCK, Preservative Free, Quadrivalent, 0 5 mL 10/28/2019   • Influenza, high dose seasonal 0 7 mL 11/03/2021   • Influenza, recombinant, quadrivalent,injectable, preservative free 10/20/2020   • Pneumococcal Conjugate 13-Valent 08/21/2016   • Tdap 12/05/2018      Health Maintenance:         Topic Date Due   • Hepatitis C Screening  Never done   • Colorectal Cancer Screening  06/01/2031         Topic Date Due   • Pneumococcal Vaccine: 65+ Years (2 - PPSV23 or PCV20) 08/21/2017   • Influenza Vaccine (1) 09/01/2022      Medicare Screening Tests and Risk Assessments:     Navarro Vanessa is here for her Initial Wellness visit  Health Risk Assessment:   Patient rates overall health as poor  Patient feels that their physical health rating is much worse  Patient is very dissatisfied with their life  Eyesight was rated as slightly worse   Hearing was rated as slightly worse  Patient feels that their emotional and mental health rating is slightly worse  Patients states they are sometimes angry  Patient states they are always unusually tired/fatigued  Pain experienced in the last 7 days has been a lot  Patient's pain rating has been 6/10  Patient states that she has experienced weight loss or gain in last 6 months  Fall Risk Screening: In the past year, patient has experienced: history of falling in past year      Urinary Incontinence Screening:   Patient has leaked urine accidently in the last six months  Home Safety:  Patient has trouble with stairs inside or outside of their home  Patient has working smoke alarms and has working carbon monoxide detector  Home safety hazards include: medications that cause fatigue  Nutrition:   Current diet is Diabetic  Medications:   Patient is currently taking over-the-counter supplements  OTC medications include: alphaliopic  Tylenol PM  Patient is able to manage medications  Activities of Daily Living (ADLs)/Instrumental Activities of Daily Living (IADLs):   Walk and transfer into and out of bed and chair?: Yes  Dress and groom yourself?: No    Bathe or shower yourself?: Yes    Feed yourself?  Yes  Do your laundry/housekeeping?: No  Manage your money, pay your bills and track your expenses?: Yes  Make your own meals?: No    Do your own shopping?: No    Durable Medical Equipment Suppliers  [employer]    Previous Hospitalizations:   Any hospitalizations or ED visits within the last 12 months?: Yes    How many hospitalizations have you had in the last year?: more than 4    Advance Care Planning:   Living will: No    Durable POA for healthcare: No    Advanced directive: No      PREVENTIVE SCREENINGS      Cardiovascular Screening:    General: Screening Not Indicated and History Lipid Disorder      Diabetes Screening:     General: Screening Not Indicated and History Diabetes      Colorectal Cancer Screening: General: Screening Current      Breast Cancer Screening:     General: Screening Not Indicated and History Breast Cancer      Cervical Cancer Screening:    General: Screening Not Indicated      Lung Cancer Screening:     General: Screening Not Indicated and History Lung Cancer    Screening, Brief Intervention, and Referral to Treatment (SBIRT)    Screening  Typical number of drinks in a day: 0  Typical number of drinks in a week: 0  Interpretation: Low risk drinking behavior  AUDIT-C Screenin) How often did you have a drink containing alcohol in the past year? never  2) How many drinks did you have on a typical day when you were drinking in the past year? 0  3) How often did you have 6 or more drinks on one occasion in the past year? never    AUDIT-C Score: 0  Interpretation: Score 0-2 (female): Negative screen for alcohol misuse    Single Item Drug Screening:  How often have you used an illegal drug (including marijuana) or a prescription medication for non-medical reasons in the past year? never    Single Item Drug Screen Score: 0  Interpretation: Negative screen for possible drug use disorder    Review of Current Opioid Use    Opioid Risk Tool (ORT) Interpretation: Complete Opioid Risk Tool (ORT)    No exam data present     Physical Exam:     /70 (BP Location: Left arm, Patient Position: Sitting, Cuff Size: Standard)   Pulse 81   Temp 98 8 °F (37 1 °C)   Resp 20   Ht 5' 7" (1 702 m)   Wt 84 5 kg (186 lb 3 2 oz)   SpO2 97%   BMI 29 16 kg/m²     Physical Exam  Vitals and nursing note reviewed  Constitutional:       General: She is not in acute distress  Appearance: Normal appearance  She is well-developed  HENT:      Head: Normocephalic and atraumatic        Right Ear: Tympanic membrane, ear canal and external ear normal       Left Ear: Tympanic membrane, ear canal and external ear normal       Nose: Nose normal       Mouth/Throat:      Mouth: Mucous membranes are moist    Eyes: Conjunctiva/sclera: Conjunctivae normal    Cardiovascular:      Rate and Rhythm: Normal rate and regular rhythm  Pulses: Normal pulses  no weak pulses          Dorsalis pedis pulses are 2+ on the right side and 2+ on the left side  Posterior tibial pulses are 2+ on the right side and 2+ on the left side  Heart sounds: Normal heart sounds  No murmur heard  Pulmonary:      Effort: Pulmonary effort is normal  No respiratory distress  Breath sounds: Normal breath sounds  Abdominal:      General: Bowel sounds are normal       Palpations: Abdomen is soft  Tenderness: There is no abdominal tenderness  Musculoskeletal:      Cervical back: Neck supple  Feet:      Right foot:      Skin integrity: No ulcer, skin breakdown, erythema, warmth, callus or dry skin  Left foot:      Skin integrity: No ulcer, skin breakdown, erythema, warmth, callus or dry skin  Skin:     General: Skin is warm and dry  Capillary Refill: Capillary refill takes less than 2 seconds  Neurological:      General: No focal deficit present  Mental Status: She is alert and oriented to person, place, and time  Mental status is at baseline  Psychiatric:         Mood and Affect: Mood normal          Behavior: Behavior normal          Thought Content: Thought content normal          Judgment: Judgment normal       Falls Plan of Care: Balance, strength, and gait training instructions were provided  Diabetic Foot Exam    Patient's shoes and socks removed  Right Foot/Ankle   Right Foot Inspection  Skin Exam: skin normal and skin intact  No dry skin, no warmth, no callus, no erythema, no maceration, no abnormal color, no pre-ulcer, no ulcer and no callus  Toe Exam: ROM and strength within normal limits  Sensory   Monofilament testing: intact    Vascular  Capillary refills: < 3 seconds  The right DP pulse is 2+  The right PT pulse is 2+       Left Foot/Ankle  Left Foot Inspection  Skin Exam: skin normal and skin intact  No dry skin, no warmth, no erythema, no maceration, normal color, no pre-ulcer, no ulcer and no callus  Toe Exam: ROM and strength within normal limits  Sensory   Monofilament testing: intact    Vascular  Capillary refills: < 3 seconds  The left DP pulse is 2+  The left PT pulse is 2+  Assign Risk Category  No deformity present  No loss of protective sensation  No weak pulses  Risk: 0   BMI Counseling: Body mass index is 29 16 kg/m²  The BMI is above normal  No BMI follow-up plan is appropriate  Patient is currently receiving palliative care      Miky Phillips DO

## 2022-11-07 NOTE — ASSESSMENT & PLAN NOTE
Patient has difficulty with ambulation and weakness in both lower extremities chemotherapy she is asking for assistance and instructions through physical therapy on how to properly manage safety and transfers from her see to a standing position an use of the walker  She has a rolling walker but is concerned about balance and proper transfers and movement    I will give her a therapy referral for an evaluation and treatment planned to teach on proper stretching and strengthening and safety

## 2022-11-07 NOTE — ASSESSMENT & PLAN NOTE
Referral for physical therapy for safety and transfer instructions wall using rolling walker at home

## 2022-11-07 NOTE — ASSESSMENT & PLAN NOTE
Lab Results   Component Value Date    HGBA1C 6 4 (H) 07/20/2022   Recent A1c in normal range patient has lost weight recommend discontinuation of metformin for now

## 2022-11-08 DIAGNOSIS — G47.33 OSA (OBSTRUCTIVE SLEEP APNEA): Primary | ICD-10-CM

## 2022-11-09 DIAGNOSIS — I25.10 ATHEROSCLEROSIS OF NATIVE CORONARY ARTERY OF NATIVE HEART WITHOUT ANGINA PECTORIS: ICD-10-CM

## 2022-11-09 DIAGNOSIS — K59.01 CONSTIPATION BY DELAYED COLONIC TRANSIT: ICD-10-CM

## 2022-11-09 DIAGNOSIS — R73.9 HYPERGLYCEMIA: ICD-10-CM

## 2022-11-09 RX ORDER — LEVOTHYROXINE SODIUM 88 UG/1
TABLET ORAL
Qty: 90 TABLET | Refills: 0 | Status: SHIPPED | OUTPATIENT
Start: 2022-11-09

## 2022-11-11 ENCOUNTER — TELEPHONE (OUTPATIENT)
Dept: OBGYN CLINIC | Facility: HOSPITAL | Age: 67
End: 2022-11-11

## 2022-11-11 NOTE — TELEPHONE ENCOUNTER
Caller: Jorge Luis Davis    Doctor: Roland Clarke    Reason for call: patient is calling to ask if Dr Roland Clarke or Luís Sandoval can squeeze her in for bilat knee cortisone shots today    Please advise    Call back#: [phone]

## 2022-11-11 NOTE — TELEPHONE ENCOUNTER
I spoke to patient and advised her that there was availability on Mon 11/14  She will call back to schedule if she has transportation

## 2022-11-15 ENCOUNTER — TELEPHONE (OUTPATIENT)
Dept: NEUROLOGY | Facility: CLINIC | Age: 67
End: 2022-11-15

## 2022-12-04 DIAGNOSIS — G25.81 RESTLESS LEG SYNDROME: ICD-10-CM

## 2022-12-04 RX ORDER — PRAMIPEXOLE DIHYDROCHLORIDE 0.5 MG/1
TABLET ORAL
Qty: 270 TABLET | Refills: 1 | Status: SHIPPED | OUTPATIENT
Start: 2022-12-04

## 2022-12-07 DIAGNOSIS — E87.8 DISEQUILIBRIUM SYNDROME: ICD-10-CM

## 2022-12-07 DIAGNOSIS — R26.2 AMBULATORY DYSFUNCTION: ICD-10-CM

## 2022-12-07 RX ORDER — METHOCARBAMOL 750 MG/1
750 TABLET, FILM COATED ORAL EVERY 8 HOURS SCHEDULED
Qty: 90 TABLET | Refills: 0 | Status: SHIPPED | OUTPATIENT
Start: 2022-12-07

## 2022-12-13 DIAGNOSIS — K21.00 GASTROESOPHAGEAL REFLUX DISEASE WITH ESOPHAGITIS WITHOUT HEMORRHAGE: ICD-10-CM

## 2022-12-13 DIAGNOSIS — R73.9 HYPERGLYCEMIA: ICD-10-CM

## 2022-12-13 DIAGNOSIS — K59.01 CONSTIPATION BY DELAYED COLONIC TRANSIT: ICD-10-CM

## 2022-12-13 DIAGNOSIS — I25.10 ATHEROSCLEROSIS OF NATIVE CORONARY ARTERY OF NATIVE HEART WITHOUT ANGINA PECTORIS: ICD-10-CM

## 2022-12-13 DIAGNOSIS — K31.84 GASTROPARESIS: ICD-10-CM

## 2022-12-13 RX ORDER — SUCRALFATE 1 G/1
1 TABLET ORAL 4 TIMES DAILY
Qty: 360 TABLET | Refills: 1 | Status: SHIPPED | OUTPATIENT
Start: 2022-12-13 | End: 2023-03-13

## 2023-01-01 ENCOUNTER — TELEPHONE (OUTPATIENT)
Dept: OTHER | Facility: OTHER | Age: 68
End: 2023-01-01

## 2023-01-03 DIAGNOSIS — K59.01 CONSTIPATION BY DELAYED COLONIC TRANSIT: ICD-10-CM

## 2023-01-05 DIAGNOSIS — I25.119 CORONARY ARTERY DISEASE INVOLVING NATIVE HEART WITH ANGINA PECTORIS, UNSPECIFIED VESSEL OR LESION TYPE (HCC): ICD-10-CM

## 2023-01-05 DIAGNOSIS — E87.8 DISEQUILIBRIUM SYNDROME: ICD-10-CM

## 2023-01-05 DIAGNOSIS — K59.01 CONSTIPATION BY DELAYED COLONIC TRANSIT: ICD-10-CM

## 2023-01-05 DIAGNOSIS — K21.00 GASTROESOPHAGEAL REFLUX DISEASE WITH ESOPHAGITIS WITHOUT HEMORRHAGE: ICD-10-CM

## 2023-01-05 DIAGNOSIS — R13.19 ESOPHAGEAL DYSPHAGIA: ICD-10-CM

## 2023-01-05 DIAGNOSIS — I25.10 ATHEROSCLEROSIS OF NATIVE CORONARY ARTERY OF NATIVE HEART WITHOUT ANGINA PECTORIS: ICD-10-CM

## 2023-01-05 DIAGNOSIS — R26.2 AMBULATORY DYSFUNCTION: ICD-10-CM

## 2023-01-05 DIAGNOSIS — R73.9 HYPERGLYCEMIA: ICD-10-CM

## 2023-01-05 RX ORDER — PANTOPRAZOLE SODIUM 40 MG/1
TABLET, DELAYED RELEASE ORAL
Qty: 90 TABLET | Refills: 0 | Status: SHIPPED | OUTPATIENT
Start: 2023-01-05

## 2023-01-05 RX ORDER — FAMOTIDINE 40 MG/1
TABLET, FILM COATED ORAL
Qty: 90 TABLET | Refills: 2 | Status: SHIPPED | OUTPATIENT
Start: 2023-01-05

## 2023-01-06 RX ORDER — METHOCARBAMOL 750 MG/1
750 TABLET, FILM COATED ORAL EVERY 8 HOURS SCHEDULED
Qty: 90 TABLET | Refills: 0 | Status: SHIPPED | OUTPATIENT
Start: 2023-01-06

## 2023-01-06 RX ORDER — METOPROLOL TARTRATE 50 MG/1
TABLET, FILM COATED ORAL
Qty: 180 TABLET | Refills: 1 | Status: SHIPPED | OUTPATIENT
Start: 2023-01-06

## 2023-01-06 RX ORDER — LEVOTHYROXINE SODIUM 88 UG/1
TABLET ORAL
Qty: 90 TABLET | Refills: 0 | Status: SHIPPED | OUTPATIENT
Start: 2023-01-06

## 2023-01-09 ENCOUNTER — RA CDI HCC (OUTPATIENT)
Dept: OTHER | Facility: HOSPITAL | Age: 68
End: 2023-01-09

## 2023-01-09 ENCOUNTER — TELEPHONE (OUTPATIENT)
Dept: NEUROLOGY | Facility: CLINIC | Age: 68
End: 2023-01-09

## 2023-01-09 NOTE — TELEPHONE ENCOUNTER
Reminder appt call     Patient is scheduled on 01/17/2023 @ 130 pm with an arrival time  115 pm in the [facility] location with Dr Janna Martin  Patient confirmed appt

## 2023-01-09 NOTE — PROGRESS NOTES
[address]  coding opportunities       Chart reviewed, no opportunity found: CHART REVIEWED, NO OPPORTUNITY FOUND        Patients Insurance     Medicare Insurance: Medicare

## 2023-01-11 ENCOUNTER — PATIENT MESSAGE (OUTPATIENT)
Dept: FAMILY MEDICINE CLINIC | Facility: CLINIC | Age: 68
End: 2023-01-11

## 2023-01-11 ENCOUNTER — TELEMEDICINE (OUTPATIENT)
Dept: FAMILY MEDICINE CLINIC | Facility: CLINIC | Age: 68
End: 2023-01-11

## 2023-01-11 DIAGNOSIS — R26.2 AMBULATORY DYSFUNCTION: ICD-10-CM

## 2023-01-11 DIAGNOSIS — I25.119 CORONARY ARTERY DISEASE WITH ANGINA PECTORIS, UNSPECIFIED VESSEL OR LESION TYPE, UNSPECIFIED WHETHER NATIVE OR TRANSPLANTED HEART (HCC): ICD-10-CM

## 2023-01-11 DIAGNOSIS — E11.9 TYPE 2 DIABETES MELLITUS WITHOUT COMPLICATION, WITHOUT LONG-TERM CURRENT USE OF INSULIN (HCC): Primary | ICD-10-CM

## 2023-01-11 DIAGNOSIS — E87.8 DISEQUILIBRIUM SYNDROME: ICD-10-CM

## 2023-01-11 DIAGNOSIS — I10 ESSENTIAL HYPERTENSION: ICD-10-CM

## 2023-01-11 DIAGNOSIS — C78.00 MALIGNANT NEOPLASM METASTATIC TO LUNG, UNSPECIFIED LATERALITY (HCC): ICD-10-CM

## 2023-01-11 DIAGNOSIS — G62.9 PERIPHERAL POLYNEUROPATHY: ICD-10-CM

## 2023-01-11 RX ORDER — GABAPENTIN 300 MG/1
CAPSULE ORAL
COMMUNITY
Start: 2023-01-05

## 2023-01-11 RX ORDER — FOLIC ACID 1 MG/1
1 TABLET ORAL DAILY
COMMUNITY
Start: 2023-01-03

## 2023-01-11 RX ORDER — LIDOCAINE HCL 4% 4 G/100G
1 CREAM TOPICAL 3 TIMES DAILY PRN
COMMUNITY
Start: 2023-01-03

## 2023-01-11 RX ORDER — DEXAMETHASONE 2 MG/1
TABLET ORAL
COMMUNITY
Start: 2023-01-05

## 2023-01-11 RX ORDER — BUSPIRONE HYDROCHLORIDE 7.5 MG/1
7.5 TABLET ORAL 2 TIMES DAILY
COMMUNITY
Start: 2023-01-03

## 2023-01-11 RX ORDER — GUAIFENESIN 600 MG/1
1200 TABLET, EXTENDED RELEASE ORAL 2 TIMES DAILY
COMMUNITY
Start: 2023-01-03

## 2023-01-11 RX ORDER — CEFPODOXIME PROXETIL 200 MG/1
TABLET, FILM COATED ORAL
COMMUNITY
Start: 2022-11-03

## 2023-01-11 NOTE — PROGRESS NOTES
Virtual Regular Visit    Verification of patient location:    Patient is located in the following state in which I hold an active license [place]      Assessment/Plan:    Problem List Items Addressed This Visit        Endocrine    Type 2 diabetes mellitus ([facility] ) - Primary       Lab Results   Component Value Date    HGBA1C 6 9 (H) 11/17/2022   Patient is off metformin since discharge from the hospital and subsequently from the rehab she is not moving as much she is nonambulatory and working on sitting and standing at this point  Remain off metformin and follow-up with lab work            Respiratory    Lung metastasis ([facility] )     Followed by palliative medicine monitor laboratory work ordered at this time         Relevant Medications    guaiFENesin (MUCINEX) 600 mg 12 hr tablet       Cardiovascular and Mediastinum    Essential hypertension     Stable blood pressure no change in medication right now patient needs to get up and start moving she is nonambulatory since returning home after she made good progress at the rehab center walking with a walker  Coronary artery disease with angina pectoris (HCC)     No chest pain now overall stable on current medication regimen follow-up with cardiology in the future as scheduled            Nervous and Auditory    Disequilibrium syndrome     Gait dysfunction after returning home she was doing well at the rehab walking with a walker up and down the hallways she developed RSV shortly before discharge and subsequently got home and everything weakened again and she is having difficulty now getting up and down  Awaiting home physical therapy now to resume strength and ambulation assistance         Peripheral polyneuropathy     Peripheral neuropathy pain in right leg is worse causing difficulty with standing and moving    She will have home therapist come out to the house and start a stretching and exercise program and she will start working on sitting and standing and transfer safety            Other    Ambulatory dysfunction     Symptoms flared up and worsened since returning home she was doing well at the rehab center walking with a rolling walker up and down the hallways she developed RSV 2 days prior to discharge and was stable but worsened after returning home weekend and is now in her bed unable to stand adequately on her own and balance  She needs physical therapy at the house to start working on her and we will address this immediately  I will follow-up with her in 4 months I encouraged her to do home exercise program stretching and strengthening her legs every day                 Reason for visit is   Chief Complaint   Patient presents with   • Medication Management   • Virtual Regular Visit        Encounter provider Soraya Simon DO    Provider located at [place] [address]  [address] [place] [address]  [phone]      Recent Visits  No visits were found meeting these conditions  Showing recent visits within past 7 days and meeting all other requirements  Today's Visits  Date Type Provider Dept   01/11/23 Telemedicine Soraya Simon DO [facility] [address] [facility] today's visits and meeting all other requirements  Future Appointments  No visits were found meeting these conditions  Showing future appointments within next 150 days and meeting all other requirements       The patient was identified by name and date of birth  Cleophas Hamman was informed that this is a telemedicine visit and that the visit is being conducted through the [employer]  She agrees to proceed     My office door was closed  No one else was in the room  She acknowledged consent and understanding of privacy and security of the video platform  The patient has agreed to participate and understands they can discontinue the visit at any time  Patient is aware this is a billable service       Subjective  Cleophas Hamman is a 78 y o  female seen in follow-up evaluation after rehab stay  Follow-up visit after long rehab stay from hospitalization for toxic encephalopathy from multiple etiologies       Past Medical History:   Diagnosis Date   • Anxiety    • Breast cancer ([facility] )    • CAD (coronary artery disease)    • Cancer ([facility] )    • Carpal tunnel syndrome    • CPAP (continuous positive airway pressure) dependence    • Disease of thyroid gland    • Elevated cholesterol    • Fibromyalgia    • Hypertension    • Kidney stone    • Melanoma ([facility] )     nose   • Myocardial infarction ([place] [address] )    • Neuropathy    • BRAYDEN (obstructive sleep apnea)     cpap   • Panic attack        Past Surgical History:   Procedure Laterality Date   • BREAST SURGERY Bilateral    • CARDIAC SURGERY      cardiac ablation   •  SECTION      x3   • CHOLECYSTECTOMY     • COLONOSCOPY     • CORONARY ANGIOPLASTY WITH STENT PLACEMENT     • LYMPH NODE BIOPSY Right 2022   • MASTECTOMY Bilateral    • NOSE SURGERY         Current Outpatient Medications   Medication Sig Dispense Refill   • Alpha-Lipoic Acid 600 MG CAPS Take by mouth 2 (two) times a day     • aspirin (ECOTRIN LOW STRENGTH) 81 mg EC tablet aspirin 81 mg tablet,delayed release     • B Complex Vitamins (B COMPLEX 1 PO) Take 1 tablet by mouth     • bisacodyl (DULCOLAX) 5 mg EC tablet Take 10 mg by mouth     • Blood Glucose Monitoring Suppl (ONE TOUCH ULTRA 2) w/Device KIT Test Blood Sugar Once Daily 1 kit 0   • busPIRone (BUSPAR) 7 5 mg tablet Take 7 5 mg by mouth 2 (two) times a day     • Calcium Carb-Cholecalciferol (Oyster Shell Calcium w/D) 500-200 MG-UNIT TABS No Take by mouth 2 (two) times a day with meals     • capecitabine (XELODA) 500 MG tablet Take 1,500 mg by mouth 2 (two) times a day     • cefpodoxime (VANTIN) 200 mg tablet TAKE 1 TABLET BY MOUTH 2 TIMES A DAY FOR 10 DAYS       • cephalexin (KEFLEX) 500 mg capsule TAKE 1 CAPSULE BY MOUTH TWICE A DAY FOR 7 DAYS     • clopidogrel (PLAVIX) 75 mg tablet Take 75 mg by mouth daily  3   • dexamethasone (DECADRON) 2 mg tablet TAKE 2 TABLETS (4 MG) FOR TWO WEEKS, THEN 1 TABLET ( 2 MG) FOR TWO WEEKS THEN STOP     • docusate sodium (COLACE) 100 mg capsule Take 1 capsule (100 mg total) by mouth daily at bedtime 180 capsule 3   • DULoxetine (CYMBALTA) 30 mg delayed release capsule TAKE 1 CAP BY MOUTH DAILY IN THE AM 90 capsule 1   • DULoxetine (CYMBALTA) 60 mg delayed release capsule TAKE 1 CAP DAILY BY MOUTH IN THE EVENING 90 capsule 1   • Evolocumab 140 MG/ML SOAJ Inject 2 mL under the skin     • famotidine (PEPCID) 40 MG tablet TAKE 1 TABLET BY MOUTH EVERY DAY 90 tablet 2   • Fluticasone Furoate-Vilanterol 100-25 mcg/actuation inhaler Inhale 1 puff daily     • folic acid (FOLVITE) 1 mg tablet Take 1 mg by mouth daily     • gabapentin (NEURONTIN) 300 mg capsule TAKE 1 CAPSULE BY MOUTH EVERY DAY AT NIGHT     • glucose blood (OneTouch Ultra) test strip USE TO TEST BLOOD SUGAR ONCE DAILY 100 strip 2   • guaiFENesin (MUCINEX) 600 mg 12 hr tablet Take 1,200 mg by mouth 2 (two) times a day     • Lancets (onetouch ultrasoft) lancets Patient to test once daily 100 each 1   • Lancets (onetouch ultrasoft) lancets Test Blood Sugar Once Daily 100 each 0   • levothyroxine 88 mcg tablet TAKE 1 TABLET BY MOUTH EVERY DAY 90 tablet 0   • Lidocaine HCl 4 % CREA Apply 1 application topically Three times daily as needed     • LORazepam (ATIVAN) 1 mg tablet Take 1 tablet (1 mg total) by mouth 2 (two) times a day 60 tablet 0   • lubiprostone (AMITIZA) 24 mcg capsule TAKE 1 CAPSULE BY MOUTH 2 TIMES A DAY WITH MEALS  180 capsule 1   • melatonin 3 mg Take 20 mg by mouth      • metFORMIN (GLUCOPHAGE) 500 mg tablet TAKE 1 TABLET BY MOUTH TWICE A DAY WITH MEALS 180 tablet 0   • methocarbamol (ROBAXIN) 750 mg tablet TAKE 1 TABLET (750 MG TOTAL) BY MOUTH EVERY 8 (EIGHT) HOURS 90 tablet 0   • metoprolol tartrate (LOPRESSOR) 50 mg tablet TAKE 1 TABLET BY MOUTH TWICE A  tablet 1   • Misc   Devices (Doc Ormond WALKER) MISC Use rolling walker as needed 1 each 0   • morphine (MS CONTIN) 15 mg 12 hr tablet 30 mg BID      • naloxone (NARCAN) 4 mg/0 1 mL nasal spray Administer 1 spray into a nostril  If no response after 2-3 minutes, give another dose in the other nostril using a new spray  1 each 1   • oxyCODONE (ROXICODONE) 15 mg immediate release tablet PLEASE SEE ATTACHED FOR DETAILED DIRECTIONS     • pantoprazole (PROTONIX) 40 mg tablet TAKE 1 TABLET BY MOUTH DAILY BEFORE BREAKFAST   DO NOT TAKE WHEN YOU ARE TAKING YOUR XELODA  90 tablet 0   • pramipexole (MIRAPEX) 0 5 mg tablet TAKE 1 TABLET BY MOUTH IN THE EVENING AND 1 TABLET AT BEDTIME FOR 7 DAYS , THEN 1 TAB 3 TIMES A  tablet 1   • prochlorperazine (COMPAZINE) 10 mg tablet TAKE 1 TABLET BY MOUTH EVERY 6 HOURS AS NEEDED FOR NAUSEA OR VOMITING  • Prucalopride Succinate 2 MG TABS Take 2 mg by mouth daily 90 tablet 0   • rosuvastatin (CRESTOR) 5 mg tablet TAKE 1 TABLET BY MOUTH EVERY DAY AT NIGHT     • senna (SENOKOT) 8 6 MG tablet Take 8 6 mg by mouth 2 (two) times a day     • sucralfate (CARAFATE) 1 g tablet TAKE 1 TABLET (1 G TOTAL) BY MOUTH 4 (FOUR) TIMES A DAY DISSOLVED PILL IN 10 ML WATER THEN SWALLOW 360 tablet 1   • Ventolin  (90 Base) MCG/ACT inhaler INHALE 2 PUFFS EVERY 6 HOURS AS NEEDED FOR WHEEZING     • [employer] B6 100 MG tablet TAKE 1 TABLET BY MOUTH EVERY DAY IN THE MORNING (Patient not taking: Reported on 1/11/2023) 30 tablet 2   • Klor-Con M20 20 MEQ tablet TAKE 1 TABLET BY MOUTH DAILY FOR 7 DAYS  (Patient not taking: Reported on 1/11/2023)     • oxyCODONE (ROXICODONE) 5 immediate release tablet PLEASE SEE ATTACHED FOR DETAILED DIRECTIONS (Patient not taking: Reported on 11/7/2022)       No current facility-administered medications for this visit  Allergies   Allergen Reactions   • Metoclopramide Other (See Comments)   • Nitrofurantoin Other (See Comments)     Very weak   Fell   • Symbicort [Budesonide-Formoterol Fumarate] Nausea Only • Isosorbide      Other reaction(s): headache   • Pamidronate Other (See Comments), Confusion and Fever     Developed multiple side effects of drug including fever, tachycardia, and lethargy    • Pregabalin      Pt states made her feel suicidal    • Reglan [Metoclopramide] Other (See Comments)     Flares her neuropathy   • Statins Myalgia       Review of Systems   Constitutional: Positive for activity change, appetite change and fatigue  Negative for chills and fever  HENT: Negative for congestion, nosebleeds, rhinorrhea, sinus pressure and sore throat  Eyes: Negative for discharge and redness  Respiratory: Negative for cough and shortness of breath  Cardiovascular: Negative for chest pain, palpitations and leg swelling  Gastrointestinal: Negative for abdominal pain, blood in stool and nausea  Endocrine: Negative for cold intolerance, heat intolerance and polyuria  Genitourinary: Negative for dysuria and frequency  Musculoskeletal: Positive for arthralgias, back pain and gait problem  Negative for myalgias  Skin: Negative for rash  Neurological: Negative for dizziness, weakness and headaches  Hematological: Negative for adenopathy  Psychiatric/Behavioral: Negative for behavioral problems and sleep disturbance  The patient is not nervous/anxious  Video Exam    There were no vitals filed for this visit  Physical Exam  Vitals and nursing note reviewed  Constitutional:       General: She is not in acute distress  Appearance: Normal appearance  She is well-developed  HENT:      Head: Normocephalic and atraumatic  Right Ear: External ear normal       Left Ear: External ear normal       Nose: Nose normal       Mouth/Throat:      Pharynx: No oropharyngeal exudate  Eyes:      General: No scleral icterus  Right eye: No discharge  Left eye: No discharge  Conjunctiva/sclera: Conjunctivae normal       Pupils: Pupils are equal, round, and reactive to light  Neck:      Thyroid: No thyromegaly  Vascular: No JVD  Cardiovascular:      Rate and Rhythm: Normal rate and regular rhythm  Heart sounds: Normal heart sounds  No murmur heard  Pulmonary:      Effort: Pulmonary effort is normal       Breath sounds: No wheezing or rales  Chest:      Chest wall: No tenderness  Abdominal:      General: Bowel sounds are normal  There is no distension  Palpations: Abdomen is soft  There is no mass  Tenderness: There is no abdominal tenderness  Musculoskeletal:         General: No tenderness or deformity  Normal range of motion  Cervical back: Normal range of motion  Lymphadenopathy:      Cervical: No cervical adenopathy  Skin:     General: Skin is warm and dry  Findings: No rash  Neurological:      Mental Status: She is alert and oriented to person, place, and time  Cranial Nerves: No cranial nerve deficit  Motor: Weakness present  Coordination: Coordination normal       Gait: Gait abnormal       Deep Tendon Reflexes: Reflexes are normal and symmetric  Reflexes normal    Psychiatric:         Mood and Affect: Mood normal          Behavior: Behavior normal          Thought Content: Thought content normal          Judgment: Judgment normal           I spent 30 minutes with patient today in which greater than 50% of the time was spent in counseling/coordination of care regarding Review of hospitalization and rehab stay and current condition with ambulatory dysfunction and chronic pain into right lower extremity    She will need physical therapy to come to the home they have not been out yet since discharging home

## 2023-01-11 NOTE — ASSESSMENT & PLAN NOTE
Stable blood pressure no change in medication right now patient needs to get up and start moving she is nonambulatory since returning home after she made good progress at the rehab center walking with a walker

## 2023-01-11 NOTE — ASSESSMENT & PLAN NOTE
Gait dysfunction after returning home she was doing well at the rehab walking with a walker up and down the hallways she developed RSV shortly before discharge and subsequently got home and everything weakened again and she is having difficulty now getting up and down    Awaiting home physical therapy now to resume strength and ambulation assistance

## 2023-01-11 NOTE — ASSESSMENT & PLAN NOTE
Lab Results   Component Value Date    HGBA1C 6 9 (H) 11/17/2022   Patient is off metformin since discharge from the hospital and subsequently from the rehab she is not moving as much she is nonambulatory and working on sitting and standing at this point    Remain off metformin and follow-up with lab work

## 2023-01-11 NOTE — ASSESSMENT & PLAN NOTE
Peripheral neuropathy pain in right leg is worse causing difficulty with standing and moving    She will have home therapist come out to the house and start a stretching and exercise program and she will start working on sitting and standing and transfer safety

## 2023-01-11 NOTE — ASSESSMENT & PLAN NOTE
Symptoms flared up and worsened since returning home she was doing well at the rehab center walking with a rolling walker up and down the hallways she developed RSV 2 days prior to discharge and was stable but worsened after returning home weekend and is now in her bed unable to stand adequately on her own and balance  She needs physical therapy at the house to start working on her and we will address this immediately    I will follow-up with her in 4 months I encouraged her to do home exercise program stretching and strengthening her legs every day

## 2023-01-11 NOTE — ASSESSMENT & PLAN NOTE
No chest pain now overall stable on current medication regimen follow-up with cardiology in the future as scheduled

## 2023-01-13 ENCOUNTER — TELEPHONE (OUTPATIENT)
Dept: FAMILY MEDICINE CLINIC | Facility: CLINIC | Age: 68
End: 2023-01-13

## 2023-01-13 DIAGNOSIS — R05.9 COUGH IN ADULT: ICD-10-CM

## 2023-01-13 DIAGNOSIS — E87.8 DISEQUILIBRIUM SYNDROME: Primary | ICD-10-CM

## 2023-01-13 DIAGNOSIS — C78.00 MALIGNANT NEOPLASM METASTATIC TO LUNG, UNSPECIFIED LATERALITY (HCC): ICD-10-CM

## 2023-01-13 DIAGNOSIS — E11.9 TYPE 2 DIABETES MELLITUS WITHOUT COMPLICATION, WITHOUT LONG-TERM CURRENT USE OF INSULIN (HCC): ICD-10-CM

## 2023-01-13 DIAGNOSIS — I61.9 CEREBROVASCULAR SMALL VESSEL DISEASE WITH HEMORRHAGE (HCC): Primary | ICD-10-CM

## 2023-01-13 NOTE — TELEPHONE ENCOUNTER
Patient asked about lab work order she is needing to get done  These orders have to be sent to [facility] for them to do a home draw since patient is home bound  Please enter any labs you would like done  Patient also asked about getting the covid booster  She was told by Rosa Tellez she is able to get this done in home by them if her PCP can provide an order  Are we able to do this for patient?

## 2023-01-15 DIAGNOSIS — K59.01 CONSTIPATION BY DELAYED COLONIC TRANSIT: ICD-10-CM

## 2023-01-15 DIAGNOSIS — R73.9 HYPERGLYCEMIA: ICD-10-CM

## 2023-01-15 DIAGNOSIS — I25.10 ATHEROSCLEROSIS OF NATIVE CORONARY ARTERY OF NATIVE HEART WITHOUT ANGINA PECTORIS: ICD-10-CM

## 2023-01-16 NOTE — TELEPHONE ENCOUNTER
Called and spoke with patient  Patient will message us through [email or web address] with a number for [facility] to have labs go to

## 2023-01-18 ENCOUNTER — TELEPHONE (OUTPATIENT)
Dept: GASTROENTEROLOGY | Facility: CLINIC | Age: 68
End: 2023-01-18

## 2023-01-18 NOTE — TELEPHONE ENCOUNTER
Initiated prior auth for Lubiprostone 24 mcg through CoverMyMeds  Key: [id number]    Awaiting response

## 2023-01-23 ENCOUNTER — TELEPHONE (OUTPATIENT)
Dept: FAMILY MEDICINE CLINIC | Facility: CLINIC | Age: 68
End: 2023-01-23

## 2023-01-23 NOTE — TELEPHONE ENCOUNTER
Notified patient, patient acknowledged  Patient said she wanted to think about it and will call back  Explained to patient Dr Marycruz Anthony recommends to be seen and evaluated, patient acknowledged but did not schedule appointment

## 2023-01-23 NOTE — TELEPHONE ENCOUNTER
Patient called in and stated she has mouth sores and is unable to stand up due to the weakness from feeling ill  She is asking for advice  Please advise

## 2023-01-25 ENCOUNTER — OFFICE VISIT (OUTPATIENT)
Dept: FAMILY MEDICINE CLINIC | Facility: CLINIC | Age: 68
End: 2023-01-25

## 2023-01-25 VITALS
SYSTOLIC BLOOD PRESSURE: 110 MMHG | OXYGEN SATURATION: 100 % | DIASTOLIC BLOOD PRESSURE: 68 MMHG | BODY MASS INDEX: 29.16 KG/M2 | HEART RATE: 113 BPM | HEIGHT: 67 IN | TEMPERATURE: 98.5 F

## 2023-01-25 DIAGNOSIS — M17.0 PRIMARY OSTEOARTHRITIS OF BOTH KNEES: ICD-10-CM

## 2023-01-25 DIAGNOSIS — M46.1 SACROILIITIS (HCC): ICD-10-CM

## 2023-01-25 DIAGNOSIS — C79.51 BONE METASTASES (HCC): Primary | ICD-10-CM

## 2023-01-25 DIAGNOSIS — F11.29 OPIOID DEPENDENCE WITH OPIOID-INDUCED DISORDER (HCC): ICD-10-CM

## 2023-01-25 DIAGNOSIS — E11.9 TYPE 2 DIABETES MELLITUS WITHOUT COMPLICATION, WITHOUT LONG-TERM CURRENT USE OF INSULIN (HCC): ICD-10-CM

## 2023-01-25 DIAGNOSIS — J43.1 PANLOBULAR EMPHYSEMA (HCC): ICD-10-CM

## 2023-01-25 DIAGNOSIS — G62.0 DRUG-INDUCED PERIPHERAL NEUROPATHY (HCC): ICD-10-CM

## 2023-01-25 DIAGNOSIS — E11.42 TYPE 2 DIABETES MELLITUS WITH DIABETIC POLYNEUROPATHY, WITHOUT LONG-TERM CURRENT USE OF INSULIN (HCC): ICD-10-CM

## 2023-01-25 DIAGNOSIS — I25.119 CORONARY ARTERY DISEASE INVOLVING NATIVE HEART WITH ANGINA PECTORIS, UNSPECIFIED VESSEL OR LESION TYPE (HCC): ICD-10-CM

## 2023-01-25 DIAGNOSIS — I10 ESSENTIAL HYPERTENSION: ICD-10-CM

## 2023-01-25 DIAGNOSIS — G92.9 TOXIC ENCEPHALOPATHY: ICD-10-CM

## 2023-01-25 DIAGNOSIS — E66.01 OBESITY, MORBID (HCC): ICD-10-CM

## 2023-01-25 DIAGNOSIS — B37.0 ORAL CANDIDIASIS: ICD-10-CM

## 2023-01-25 DIAGNOSIS — G95.9 SPINAL CORD LESION (HCC): ICD-10-CM

## 2023-01-25 NOTE — PROGRESS NOTES
Assessment/Plan:       Problem List Items Addressed This Visit        Endocrine    Type 2 diabetes mellitus with diabetic polyneuropathy, without long-term current use of insulin ([address] )     Stable monitor lab work continue same medication regimen  Lab Results   Component Value Date    HGBA1C 6 9 (H) 11/17/2022               Respiratory    Panlobular emphysema (HCC)     No worsening shortness of breath continue same medications            Cardiovascular and Mediastinum    Essential hypertension     Hypertension stable continuing with same medication regimen no change at this time follow-up as scheduled at next visit         Relevant Orders    Comprehensive metabolic panel    CBC and differential    TSH, 3rd generation with Free T4 reflex    Coronary artery disease with angina pectoris ([address] )     No chest pain monitor lipid profile continue same medications            Nervous and Auditory    Drug-induced peripheral neuropathy ([address] )     Monitor laboratory work reduce dosing         Spinal cord lesion (HCC)     Stable on morphine need to reduce dosing follow-up with pain management tomorrow patient is toxic today cannot keep her eyes open and extremely lethargic and cannot ambulate on her own she is in the wheelchair at this point         Toxic encephalopathy     Acute mental status change likely secondary to multiple medications and toxic effects she is very lethargic today has a sore mouth and has difficulty with ambulation or holding up her weights on standing and balance    Holding off on morphine for now until she wakes up and then reduce dosing follow-up with pain management tomorrow            Musculoskeletal and Integument    Sacroiliitis ([address] )     Monitor lab works now reduce dosing         Bone metastases ([address] ) - Primary     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 acetabulum seen in 2021 and subsequently undergoing went through radiation treatment most recent CT scan of the chest abdomen pelvis does not show residual disease patient recently seen by hematology oncology at [facility]  She remains weak and has difficulty with strength and ambulation and I recommend therapy for her         Primary osteoarthritis of both knees     Patient needs help with ambulation and may benefit from physical therapy she is weak and using a wheelchair having difficulty with balance and strength            Other    Obesity, morbid ([address] )     Follow-up check weights         Opioid dependence ([facility] )     Monitor laboratory work check levels patient is toxic today        Other Visit Diagnoses     Oral candidiasis        Relevant Medications    nystatin (MYCOSTATIN) 500,000 units/5 mL suspension            Subjective:      Patient ID: Asia Teixeira is a 78 y o  female  Patient presents for lethargy mental status change    Mouth Lesions   Associated symptoms include mouth sores  Pertinent negatives include no fever, no abdominal pain, no nausea, no congestion, no headaches, no rhinorrhea, no sore throat, no cough, no rash, no eye discharge and no eye redness  The following portions of the patient's history were reviewed and updated as appropriate: allergies, current medications, past family history, past medical history, past social history, past surgical history and problem list     Review of Systems   Constitutional: Positive for activity change, appetite change and fatigue  Negative for chills and fever  HENT: Positive for mouth sores  Negative for congestion, nosebleeds, rhinorrhea, sinus pressure and sore throat  Eyes: Negative for discharge and redness  Respiratory: Negative for cough and shortness of breath  Cardiovascular: Negative for chest pain, palpitations and leg swelling  Gastrointestinal: Negative for abdominal pain, blood in stool and nausea  Endocrine: Negative for cold intolerance, heat intolerance and polyuria     Genitourinary: Negative for dysuria and frequency  Musculoskeletal: Negative for arthralgias, back pain and myalgias  Skin: Negative for rash  Neurological: Negative for dizziness, weakness and headaches  Hematological: Negative for adenopathy  Psychiatric/Behavioral: Positive for decreased concentration  Negative for behavioral problems and sleep disturbance  The patient is not nervous/anxious  Objective:      /68   Pulse (!) 113   Temp 98 5 °F (36 9 °C)   Ht 5' 7" (1 702 m)   SpO2 100%   BMI 29 16 kg/m²        Physical Exam  Vitals and nursing note reviewed  Constitutional:       General: She is not in acute distress  Appearance: She is well-developed  Comments: Very lethargic patient has difficulty keeping her eyes open and speaking to on the exam   HENT:      Head: Normocephalic and atraumatic  Right Ear: External ear normal       Left Ear: External ear normal       Nose: Nose normal       Mouth/Throat:      Mouth: Mucous membranes are dry  Pharynx: Oropharyngeal exudate present  Eyes:      General: No scleral icterus  Right eye: No discharge  Left eye: No discharge  Conjunctiva/sclera: Conjunctivae normal       Pupils: Pupils are equal, round, and reactive to light  Neck:      Thyroid: No thyromegaly  Vascular: No JVD  Cardiovascular:      Rate and Rhythm: Normal rate and regular rhythm  Heart sounds: Normal heart sounds  No murmur heard  Pulmonary:      Effort: Pulmonary effort is normal       Breath sounds: No wheezing or rales  Chest:      Chest wall: No tenderness  Abdominal:      General: Bowel sounds are normal  There is no distension  Palpations: Abdomen is soft  There is no mass  Tenderness: There is no abdominal tenderness  Musculoskeletal:         General: No tenderness or deformity  Normal range of motion  Cervical back: Normal range of motion  Lymphadenopathy:      Cervical: No cervical adenopathy     Skin:     General: Skin is warm and dry  Findings: No rash  Neurological:      Mental Status: She is oriented to person, place, and time  Cranial Nerves: No cranial nerve deficit  Coordination: Coordination normal       Deep Tendon Reflexes: Reflexes are normal and symmetric  Reflexes normal    Psychiatric:         Behavior: Behavior normal          Thought Content: Thought content normal          Judgment: Judgment normal           Data:    Laboratory Results: I have personally reviewed the pertinent laboratory results/reports   Radiology/Other Diagnostic Testing Results: I have personally reviewed pertinent reports         Lab Results   Component Value Date    WBC 8 58 07/14/2022    HGB 13 2 07/14/2022    HCT 41 1 07/14/2022    MCV 93 07/14/2022     07/14/2022     Lab Results   Component Value Date    K 3 7 07/14/2022     07/14/2022    CO2 27 07/14/2022    BUN 14 07/14/2022    CREATININE 0 75 07/14/2022    GLUF 114 (H) 09/14/2021    CALCIUM 9 6 07/14/2022    CORRECTEDCA 10 1 07/11/2022    AST 14 07/11/2022    ALT 25 07/11/2022    ALKPHOS 64 07/11/2022    EGFR 83 07/14/2022     Lab Results   Component Value Date    CHOLESTEROL 115 09/14/2021    CHOLESTEROL 148 04/19/2021    CHOLESTEROL 161 06/09/2020     Lab Results   Component Value Date    HDL 45 09/14/2021    HDL 60 04/19/2021    HDL 51 06/09/2020     Lab Results   Component Value Date    LDLCALC 40 09/14/2021    LDLCALC 69 04/19/2021    LDLCALC 80 06/09/2020     Lab Results   Component Value Date    TRIG 150 09/14/2021    TRIG 95 04/19/2021    TRIG 151 (H) 06/09/2020     No results found for: [place], [place]  Lab Results   Component Value Date    TSU9CBAYVLDO 2 806 03/01/2022     Lab Results   Component Value Date    HGBA1C 6 9 (H) 11/17/2022     No results found for: PSA    Hardeep Mines, DO

## 2023-01-25 NOTE — ASSESSMENT & PLAN NOTE
Stable on morphine need to reduce dosing follow-up with pain management tomorrow patient is toxic today cannot keep her eyes open and extremely lethargic and cannot ambulate on her own she is in the wheelchair at this point

## 2023-01-25 NOTE — ASSESSMENT & PLAN NOTE
Acute mental status change likely secondary to multiple medications and toxic effects she is very lethargic today has a sore mouth and has difficulty with ambulation or holding up her weights on standing and balance    Holding off on morphine for now until she wakes up and then reduce dosing follow-up with pain management tomorrow

## 2023-01-25 NOTE — ASSESSMENT & PLAN NOTE
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

## 2023-01-25 NOTE — ASSESSMENT & PLAN NOTE
Hypertension stable continuing with same medication regimen no change at this time follow-up as scheduled at next visit

## 2023-01-25 NOTE — ASSESSMENT & PLAN NOTE
Patient needs help with ambulation and may benefit from physical therapy she is weak and using a wheelchair having difficulty with balance and strength

## 2023-01-25 NOTE — ASSESSMENT & PLAN NOTE
Stable monitor lab work continue same medication regimen  Lab Results   Component Value Date    HGBA1C 6 9 (H) 11/17/2022

## 2023-03-07 ENCOUNTER — TELEMEDICINE (OUTPATIENT)
Dept: FAMILY MEDICINE CLINIC | Facility: CLINIC | Age: 68
End: 2023-03-07

## 2023-03-07 DIAGNOSIS — C78.00 MALIGNANT NEOPLASM METASTATIC TO LUNG, UNSPECIFIED LATERALITY (HCC): ICD-10-CM

## 2023-03-07 DIAGNOSIS — R26.2 AMBULATORY DYSFUNCTION: Primary | ICD-10-CM

## 2023-03-07 DIAGNOSIS — I25.119 CORONARY ARTERY DISEASE WITH ANGINA PECTORIS, UNSPECIFIED VESSEL OR LESION TYPE, UNSPECIFIED WHETHER NATIVE OR TRANSPLANTED HEART (HCC): ICD-10-CM

## 2023-03-07 DIAGNOSIS — E03.9 HYPOTHYROIDISM, UNSPECIFIED TYPE: ICD-10-CM

## 2023-03-07 DIAGNOSIS — E11.42 TYPE 2 DIABETES MELLITUS WITH DIABETIC POLYNEUROPATHY, WITHOUT LONG-TERM CURRENT USE OF INSULIN (HCC): ICD-10-CM

## 2023-03-07 DIAGNOSIS — C79.51 BONE METASTASES (HCC): ICD-10-CM

## 2023-03-07 DIAGNOSIS — G62.0 DRUG-INDUCED PERIPHERAL NEUROPATHY (HCC): ICD-10-CM

## 2023-03-07 NOTE — ASSESSMENT & PLAN NOTE
Loss of lower extremity function unable to stand safely she can transfer briefly with assistance continue with exercise program moving as much as possible

## 2023-03-07 NOTE — PROGRESS NOTES
Virtual Regular Visit    Verification of patient location:    Patient is located in the following state in which I hold an active license [place]      Assessment/Plan:    Problem List Items Addressed This Visit        Endocrine    Hypothyroidism    Relevant Orders    CBC and differential    Comprehensive metabolic panel    Lipid panel    TSH, 3rd generation with Free T4 reflex    UA/M w/rflx Culture, Comp    Hemoglobin A1C    Type 2 diabetes mellitus with diabetic polyneuropathy, without long-term current use of insulin (HCC)     Repeat all labs at this time follow-up in 2 months  Lab Results   Component Value Date    HGBA1C 6 9 (H) 11/17/2022            Relevant Orders    CBC and differential    Comprehensive metabolic panel    Lipid panel    TSH, 3rd generation with Free T4 reflex    UA/M w/rflx Culture, Comp    Hemoglobin A1C       Respiratory    Lung metastasis (HCC)    Relevant Orders    CBC and differential    Comprehensive metabolic panel    Lipid panel    TSH, 3rd generation with Free T4 reflex    UA/M w/rflx Culture, Comp    Hemoglobin A1C       Cardiovascular and Mediastinum    Coronary artery disease with angina pectoris (HCC)     Stable no chest pain continue same medications no dizziness recently no palpitations            Nervous and Auditory    Drug-induced peripheral neuropathy (HCC)     Loss of lower extremity function unable to stand safely she can transfer briefly with assistance continue with exercise program moving as much as possible            Musculoskeletal and Integument    Bone metastases (HCC)     Metastatic cancer to the bone patient had difficulty with chemotherapy she is at home now at bed rest but more alert and oriented since her last visit here she is eating well and moving her bowels and bladder and has visiting nursing and assistance through her  and daughter            Other    Ambulatory dysfunction - Primary     Bed bound now and followed by [facility] and help through significant other  Relevant Orders    CBC and differential    Comprehensive metabolic panel    Lipid panel    TSH, 3rd generation with Free T4 reflex    UA/M w/rflx Culture, Comp    Hemoglobin A1C            Reason for visit is   Chief Complaint   Patient presents with   • Follow-up        Encounter provider Marquita Ace DO    Provider located at [facility] [address] [place] [address] Cris Alan  [phone]      Recent Visits  No visits were found meeting these conditions  Showing recent visits within past 7 days and meeting all other requirements  Today's Visits  Date Type Provider Dept   03/07/23 Telemedicine Marquita Ace DO [address] today's visits and meeting all other requirements  Future Appointments  No visits were found meeting these conditions  Showing future appointments within next 150 days and meeting all other requirements       The patient was identified by name and date of birth  Vipul Medina was informed that this is a telemedicine visit and that the visit is being conducted through the [employer]  She agrees to proceed     My office door was closed  No one else was in the room  She acknowledged consent and understanding of privacy and security of the video platform  The patient has agreed to participate and understands they can discontinue the visit at any time  Patient is aware this is a billable service  Subjective  Vipul Medina is a 78 y o  female          Patient presents virtual visit to follow-up for her metastatic cancer       Past Medical History:   Diagnosis Date   • Anxiety    • Breast cancer ([facility] )    • CAD (coronary artery disease)    • Cancer ([facility] )    • Carpal tunnel syndrome    • Coronary artery disease    • CPAP (continuous positive airway pressure) dependence    • Depression    • Diabetes mellitus ([facility] )    • Disease of thyroid gland    • Diverticulitis of colon    • Elevated cholesterol    • Fibromyalgia    • GERD (gastroesophageal reflux disease)    • HL (hearing loss)    • Hypertension    • Kidney stone    • Melanoma ([facility] )     nose   • Memory loss    • Myocardial infarction [facility])    • Neuropathy    • Obesity    • BRAYDEN (obstructive sleep apnea)     cpap   • Panic attack    • Shingles    • Urinary tract infection        Past Surgical History:   Procedure Laterality Date   • BREAST SURGERY Bilateral    • CARDIAC SURGERY      cardiac ablation   •  SECTION      x3   • CHOLECYSTECTOMY     • COLONOSCOPY     • CORONARY ANGIOPLASTY WITH STENT PLACEMENT     • KNEE SURGERY     • LYMPH NODE BIOPSY Right 2022   • MASTECTOMY Bilateral    • NOSE SURGERY     • TUBAL LIGATION         Current Outpatient Medications   Medication Sig Dispense Refill   • Alpha-Lipoic Acid 600 MG CAPS Take by mouth 2 (two) times a day     • aspirin (ECOTRIN LOW STRENGTH) 81 mg EC tablet aspirin 81 mg tablet,delayed release     • B Complex Vitamins (B COMPLEX 1 PO) Take 1 tablet by mouth     • bisacodyl (DULCOLAX) 5 mg EC tablet Take 10 mg by mouth     • Blood Glucose Monitoring Suppl (ONE TOUCH ULTRA 2) w/Device KIT Test Blood Sugar Once Daily 1 kit 0   • Calcium Carb-Cholecalciferol (Oyster Shell Calcium w/D) 500-200 MG-UNIT TABS No Take by mouth 2 (two) times a day with meals     • clopidogrel (PLAVIX) 75 mg tablet Take 75 mg by mouth daily  3   • [employer]S B6 100 MG tablet TAKE 1 TABLET BY MOUTH EVERY DAY IN THE MORNING 30 tablet 2   • docusate sodium (COLACE) 100 mg capsule Take 1 capsule (100 mg total) by mouth daily at bedtime 180 capsule 3   • DULoxetine (CYMBALTA) 30 mg delayed release capsule TAKE 1 CAP BY MOUTH DAILY IN THE AM 90 capsule 1   • DULoxetine (CYMBALTA) 60 mg delayed release capsule TAKE 1 CAP DAILY BY MOUTH IN THE EVENING 90 capsule 1   • Evolocumab 140 MG/ML SOAJ Inject 2 mL under the skin     • famotidine (PEPCID) 40 MG tablet TAKE 1 TABLET BY MOUTH EVERY DAY 90 tablet 2   • Fluticasone Furoate-Vilanterol 100-25 mcg/actuation inhaler Inhale 1 puff daily     • folic acid (FOLVITE) 1 mg tablet Take 1 mg by mouth daily     • gabapentin (NEURONTIN) 300 mg capsule TAKE 1 CAPSULE BY MOUTH EVERY DAY AT NIGHT     • glucose blood (OneTouch Ultra) test strip USE TO TEST BLOOD SUGAR ONCE DAILY 100 strip 2   • Lancets (onetouch ultrasoft) lancets Patient to test once daily 100 each 1   • Lancets (onetouch ultrasoft) lancets Test Blood Sugar Once Daily 100 each 0   • levothyroxine 88 mcg tablet TAKE 1 TABLET BY MOUTH EVERY DAY 90 tablet 0   • Lidocaine HCl 4 % CREA Apply 1 application topically Three times daily as needed     • LORazepam (ATIVAN) 1 mg tablet Take 1 tablet (1 mg total) by mouth 2 (two) times a day 60 tablet 0   • lubiprostone (AMITIZA) 24 mcg capsule TAKE 1 CAPSULE BY MOUTH 2 TIMES A DAY WITH MEALS  180 capsule 1   • melatonin 3 mg Take 20 mg by mouth      • metFORMIN (GLUCOPHAGE) 500 mg tablet TAKE 1 TABLET BY MOUTH TWICE A DAY WITH MEALS 180 tablet 0   • methocarbamol (ROBAXIN) 750 mg tablet TAKE 1 TABLET (750 MG TOTAL) BY MOUTH EVERY 8 (EIGHT) HOURS 90 tablet 0   • metoprolol tartrate (LOPRESSOR) 50 mg tablet TAKE 1 TABLET BY MOUTH TWICE A  tablet 1   • Misc  Devices (GETGO ROLLING WALKER) MISC Use rolling walker as needed 1 each 0   • morphine (MS CONTIN) 15 mg 12 hr tablet 30 mg BID      • naloxone (NARCAN) 4 mg/0 1 mL nasal spray Administer 1 spray into a nostril  If no response after 2-3 minutes, give another dose in the other nostril using a new spray  (Patient taking differently: Administer 1 spray into a nostril  If no response after 2-3 minutes, give another dose in the other nostril using a new spray   PRN) 1 each 1   • nystatin (MYCOSTATIN) 500,000 units/5 mL suspension Apply 5 mL (500,000 Units total) to the mouth or throat 4 (four) times a day 473 mL 1   • oxyCODONE (ROXICODONE) 15 mg immediate release tablet PLEASE SEE ATTACHED FOR DETAILED DIRECTIONS     • pantoprazole (PROTONIX) 40 mg tablet TAKE 1 TABLET BY MOUTH DAILY BEFORE BREAKFAST   DO NOT TAKE WHEN YOU ARE TAKING YOUR XELODA  90 tablet 0   • pramipexole (MIRAPEX) 0 5 mg tablet TAKE 1 TABLET BY MOUTH IN THE EVENING AND 1 TABLET AT BEDTIME FOR 7 DAYS , THEN 1 TAB 3 TIMES A  tablet 1   • prochlorperazine (COMPAZINE) 10 mg tablet TAKE 1 TABLET BY MOUTH EVERY 6 HOURS AS NEEDED FOR NAUSEA OR VOMITING  • rosuvastatin (CRESTOR) 5 mg tablet TAKE 1 TABLET BY MOUTH EVERY DAY AT NIGHT     • senna (SENOKOT) 8 6 MG tablet Take 8 6 mg by mouth 2 (two) times a day     • sucralfate (CARAFATE) 1 g tablet TAKE 1 TABLET (1 G TOTAL) BY MOUTH 4 (FOUR) TIMES A DAY DISSOLVED PILL IN 10 ML WATER THEN SWALLOW 360 tablet 1   • capecitabine (XELODA) 500 MG tablet Take 1,500 mg by mouth 2 (two) times a day       No current facility-administered medications for this visit  Allergies   Allergen Reactions   • Metoclopramide Other (See Comments)   • Nitrofurantoin Other (See Comments)     Very weak  Fell   • Symbicort [Budesonide-Formoterol Fumarate] Nausea Only   • Isosorbide      Other reaction(s): headache   • Pamidronate Other (See Comments), Confusion and Fever     Developed multiple side effects of drug including fever, tachycardia, and lethargy    • Pregabalin      Pt states made her feel suicidal    • Reglan [Metoclopramide] Other (See Comments)     Flares her neuropathy   • Statins Myalgia       Review of Systems   Constitutional: Positive for activity change, appetite change and fatigue  Negative for chills and fever  HENT: Positive for rhinorrhea  Negative for congestion, nosebleeds, sinus pressure and sore throat  Eyes: Negative for discharge and redness  Respiratory: Negative for cough and shortness of breath  Cardiovascular: Negative for chest pain, palpitations and leg swelling  Gastrointestinal: Positive for constipation  Negative for abdominal pain, blood in stool and nausea     Endocrine: Negative for cold intolerance, heat intolerance and polyuria  Genitourinary: Negative for dysuria and frequency  Musculoskeletal: Negative for arthralgias, back pain and myalgias  Skin: Negative for rash  Neurological: Negative for dizziness, weakness and headaches  Hematological: Negative for adenopathy  Psychiatric/Behavioral: Negative for behavioral problems and sleep disturbance  The patient is not nervous/anxious  Video Exam    There were no vitals filed for this visit  Physical Exam  Vitals and nursing note reviewed  Constitutional:       General: She is not in acute distress  Appearance: Normal appearance  She is well-developed  HENT:      Head: Normocephalic and atraumatic  Right Ear: External ear normal       Left Ear: External ear normal       Nose: Nose normal       Mouth/Throat:      Pharynx: No oropharyngeal exudate  Eyes:      General: No scleral icterus  Right eye: No discharge  Left eye: No discharge  Conjunctiva/sclera: Conjunctivae normal       Pupils: Pupils are equal, round, and reactive to light  Neck:      Thyroid: No thyromegaly  Vascular: No JVD  Cardiovascular:      Rate and Rhythm: Normal rate and regular rhythm  Heart sounds: Normal heart sounds  No murmur heard  Pulmonary:      Effort: Pulmonary effort is normal       Breath sounds: No wheezing or rales  Chest:      Chest wall: No tenderness  Abdominal:      General: Bowel sounds are normal  There is no distension  Palpations: Abdomen is soft  There is no mass  Tenderness: There is no abdominal tenderness  Musculoskeletal:         General: No tenderness or deformity  Normal range of motion  Cervical back: Normal range of motion  Lymphadenopathy:      Cervical: No cervical adenopathy  Skin:     General: Skin is warm and dry  Findings: No rash  Neurological:      General: No focal deficit present        Mental Status: She is alert and oriented to person, place, and time  Mental status is at baseline  Cranial Nerves: No cranial nerve deficit  Coordination: Coordination normal       Deep Tendon Reflexes: Reflexes are normal and symmetric  Reflexes normal    Psychiatric:         Mood and Affect: Mood normal          Behavior: Behavior normal          Thought Content:  Thought content normal          Judgment: Judgment normal           I spent 30 minutes directly with the patient during this visit

## 2023-03-07 NOTE — ASSESSMENT & PLAN NOTE
Stable no chest pain continue same medications no dizziness recently no palpitations Doxepin Pregnancy And Lactation Text: This medication is Pregnancy Category C and it isn't known if it is safe during pregnancy. It is also excreted in breast milk and breast feeding isn't recommended.

## 2023-03-07 NOTE — ASSESSMENT & PLAN NOTE
Repeat all labs at this time follow-up in 2 months  Lab Results   Component Value Date    HGBA1C 6 9 (H) 11/17/2022

## 2023-03-07 NOTE — ASSESSMENT & PLAN NOTE
Metastatic cancer to the bone patient had difficulty with chemotherapy she is at home now at bed rest but more alert and oriented since her last visit here she is eating well and moving her bowels and bladder and has visiting nursing and assistance through her  and daughter

## 2023-03-07 NOTE — ASSESSMENT & PLAN NOTE
Toxic encephalopathy improved overall now patient more coherent and alert and oriented x3 held a good conversation with her today and follow-up with me in 2 months

## 2023-03-08 DIAGNOSIS — G60.9 IDIOPATHIC PERIPHERAL NEUROPATHY: ICD-10-CM

## 2023-03-08 DIAGNOSIS — G25.81 RESTLESS LEG SYNDROME: ICD-10-CM

## 2023-03-08 DIAGNOSIS — E78.2 MIXED HYPERLIPIDEMIA: Primary | ICD-10-CM

## 2023-03-08 RX ORDER — ROSUVASTATIN CALCIUM 5 MG/1
5 TABLET, COATED ORAL DAILY
Qty: 30 TABLET | Refills: 1 | Status: SHIPPED | OUTPATIENT
Start: 2023-03-08

## 2023-03-08 RX ORDER — PRAMIPEXOLE DIHYDROCHLORIDE 1.5 MG/1
1.5 TABLET ORAL
Qty: 30 TABLET | Refills: 1 | Status: SHIPPED | OUTPATIENT
Start: 2023-03-08

## 2023-03-08 RX ORDER — DULOXETIN HYDROCHLORIDE 60 MG/1
60 CAPSULE, DELAYED RELEASE ORAL DAILY
Qty: 30 CAPSULE | Refills: 1 | Status: SHIPPED | OUTPATIENT
Start: 2023-03-08

## 2023-03-10 ENCOUNTER — TELEPHONE (OUTPATIENT)
Dept: FAMILY MEDICINE CLINIC | Facility: CLINIC | Age: 68
End: 2023-03-10

## 2023-03-10 DIAGNOSIS — L03.119 CELLULITIS OF LOWER EXTREMITY, UNSPECIFIED LATERALITY: Primary | ICD-10-CM

## 2023-03-10 RX ORDER — CEPHALEXIN 500 MG/1
500 CAPSULE ORAL EVERY 8 HOURS SCHEDULED
Qty: 21 CAPSULE | Refills: 0 | Status: SHIPPED | OUTPATIENT
Start: 2023-03-10 | End: 2023-03-17

## 2023-03-10 NOTE — TELEPHONE ENCOUNTER
Nurse called patient has a warm red area on lower leg 6x6 area  Nurse is going to draw a [place] around it  She thinks it is cellulitis  And thinks the patient needs a antibiotic  Also nurse stated that patient is on hospice, and is unsure about getting labs done because of hospice  Talked to  he will sent in keflex 500 mg 3 time a day

## 2023-03-17 DIAGNOSIS — R21 RASH: Primary | ICD-10-CM

## 2023-03-22 ENCOUNTER — TELEPHONE (OUTPATIENT)
Dept: FAMILY MEDICINE CLINIC | Facility: CLINIC | Age: 68
End: 2023-03-22

## 2023-03-22 NOTE — TELEPHONE ENCOUNTER
Radiology sent request to review old Ct Scan of ab from 03/31/22    Impression stated that a follow up Ct is recommended in 3-6 month

## 2023-03-28 ENCOUNTER — TELEPHONE (OUTPATIENT)
Dept: FAMILY MEDICINE CLINIC | Facility: CLINIC | Age: 68
End: 2023-03-28

## 2023-03-28 NOTE — TELEPHONE ENCOUNTER
Nurse asking for an X-ray    Hi, my name is Nereyda Cardoso  I'm calling from [facility]  Regarding Raymundo Winchester date of birth, [birth year], she's going to be calling you today  She has some current concerns about pain  I just wanted to give you our fax number, [phone]  If Doctor Bro You is ordering possibly like a portable X-ray or lab work, those orders would need to be faxed to our office and then we can take care of those  Anything more aggressive than that? You know, we talked to her  She could be discharged from service and then come back on service so you can reach me at [phone]  Again, my name is Nereyda Cardoso  If you have any questions or concerns, she will be calling you shortly though  Thank you   nabeel Baig

## 2023-03-30 ENCOUNTER — TELEMEDICINE (OUTPATIENT)
Dept: FAMILY MEDICINE CLINIC | Facility: CLINIC | Age: 68
End: 2023-03-30

## 2023-03-30 DIAGNOSIS — E11.42 TYPE 2 DIABETES MELLITUS WITH DIABETIC POLYNEUROPATHY, WITHOUT LONG-TERM CURRENT USE OF INSULIN (HCC): ICD-10-CM

## 2023-03-30 DIAGNOSIS — J43.1 PANLOBULAR EMPHYSEMA (HCC): ICD-10-CM

## 2023-03-30 DIAGNOSIS — C78.00 MALIGNANT NEOPLASM METASTATIC TO LUNG, UNSPECIFIED LATERALITY (HCC): ICD-10-CM

## 2023-03-30 DIAGNOSIS — C79.51 BONE METASTASES: Primary | ICD-10-CM

## 2023-03-30 DIAGNOSIS — R26.2 AMBULATORY DYSFUNCTION: ICD-10-CM

## 2023-03-30 NOTE — PROGRESS NOTES
Virtual Regular Visit    Verification of patient location:    Patient is located in the following state in which I hold an active license [place]      Assessment/Plan:    Problem List Items Addressed This Visit        Endocrine    Type 2 diabetes mellitus with diabetic polyneuropathy, without long-term current use of insulin ([facility] )       Lab Results   Component Value Date    HGBA1C 6 9 (H) 11/17/2022   Continue diet and same treatment plan follow-up with laboratory work pending prognosis            Respiratory    Panlobular emphysema ([facility] )     No worsening breathing issues shortness of breath or cough patient denies chest pain follow-up with imaging studies as needed and this will be set up as patient decides to go off hospice care and work-up further cancer treatment         Lung metastasis ([facility] )     Follow-up with oncology and CT scan PET scan as needed now as patient is going off hospice care            Musculoskeletal and Integument    Bone metastases ([place] [address] ) - Primary     Patient presented by virtual visit today discussion regarding worsening rash over the right side of her back from her hips to her axillary region which has worsened and not improved with the use of topical antifungal corticosteroid cream   She would like to go off hospice care and does not feel that she is getting the answers to her problems by remaining at home in bed with visiting nursing as much as they are able to do she would like further work-up and explanation of her cancer pain as she has pain in her left shoulder and her right hip    We discussed going off hospice care for a period of time now as she would like further treatment and work-up and she will discuss this with her  and her oncologist   I explained to her that I am willing to help and what ever way I can if needed and I recommend that she have further imaging studies done to show if cancer has progressed and to help her make her decision along with Katerina Mayorga- her significant other attending the virtual visit with her at this time  She requires a [place] lift with sling chair and air mattress and hospital bed due to her significant hip shoulder and back pain rendering her unable to adequately ambulate she needs full assistance on transferring in and out of bed requiring a [place] lift with sling chair as her significant other cannot do this without risk for her falling and she has already fallen to the floor once  She requires the air mattress to prevent skin breakdown and she has a bad rash already on her back and hip  She will be postponing and discontinuing hospice care now and following up with treatment by her request             Other    Ambulatory dysfunction     Patient has significant weakness and pain in her hip and lower extremities and cannot weight-bear in a stable fashion or transfer safely she needs help transferring into a wheelchair and will require special services for transportation to medical appointments she understands this and will discuss more with her  now    She is unstable and too weak to stand and requires a Paris lift with sling chair for transfers and safety as she will be attending upcoming appointments with medical team and imaging studies                 Reason for visit is   Chief Complaint   Patient presents with   • Rash   • Shoulder Pain     Pt complain of left side shoulder discomfort x 3 weeks - hard to  or hold anything    • Virtual Regular Visit        Encounter provider Eula Hernadez DO    Provider located at [place] [address]  [address] [place] [id number] [phone]      Recent Visits  Date Type Provider Dept   03/30/23 [address]  [place] [id number]    03/28/23 Telephone Sarah Bran [address] Tor[address] Desir recent visits within past 7 days and meeting all other requirements  Today's Visits  Date Type Provider Dept   03/31/23 Telephone Nguyen Alfredo DO Anita [address] today's visits and meeting all other requirements  Future Appointments  No visits were found meeting these conditions  Showing future appointments within next 150 days and meeting all other requirements       The patient was identified by name and date of birth  Moises Calvillo was informed that this is a telemedicine visit and that the visit is being conducted through the [employer]  She agrees to proceed     My office door was closed  No one else was in the room  She acknowledged consent and understanding of privacy and security of the video platform  The patient has agreed to participate and understands they can discontinue the visit at any time  Patient is aware this is a billable service  Subjective  Moises Calvillo is a 78 y o  female presents to discuss ongoing care hospice versus discontinuation of hospice temporarily and she has a bad rash on her side and pain in her shoulder             Past Medical History:   Diagnosis Date   • Anxiety    • Breast cancer ([facility] )    • CAD (coronary artery disease)    • Cancer ([facility] )    • Carpal tunnel syndrome    • Coronary artery disease    • CPAP (continuous positive airway pressure) dependence    • Depression    • Diabetes mellitus ([facility] )    • Disease of thyroid gland    • Diverticulitis of colon    • Elevated cholesterol    • Fibromyalgia    • GERD (gastroesophageal reflux disease)    • HL (hearing loss)    • Hypertension    • Kidney stone    • Melanoma ([facility] )     nose   • Memory loss    • Myocardial infarction ([facility] )    • Neuropathy    • Obesity    • BRAYDEN (obstructive sleep apnea)     cpap   • Panic attack    • Shingles    • Urinary tract infection        Past Surgical History:   Procedure Laterality Date   • BREAST SURGERY Bilateral    • CARDIAC SURGERY      cardiac ablation   •  SECTION      x3   • CHOLECYSTECTOMY     • COLONOSCOPY     • CORONARY ANGIOPLASTY WITH STENT PLACEMENT     • KNEE SURGERY • LYMPH NODE BIOPSY Right 05/04/2022   • MASTECTOMY Bilateral    • NOSE SURGERY     • TUBAL LIGATION  1995       Current Outpatient Medications   Medication Sig Dispense Refill   • Alpha-Lipoic Acid 600 MG CAPS Take by mouth 2 (two) times a day     • aspirin (ECOTRIN LOW STRENGTH) 81 mg EC tablet aspirin 81 mg tablet,delayed release     • B Complex Vitamins (B COMPLEX 1 PO) Take 1 tablet by mouth     • bisacodyl (DULCOLAX) 5 mg EC tablet Take 10 mg by mouth     • Blood Glucose Monitoring Suppl (ONE TOUCH ULTRA 2) w/Device KIT Test Blood Sugar Once Daily 1 kit 0   • Calcium Carb-Cholecalciferol (Oyster Shell Calcium w/D) 500-200 MG-UNIT TABS No Take by mouth 2 (two) times a day with meals     • clopidogrel (PLAVIX) 75 mg tablet Take 75 mg by mouth daily  3   • [employer]S B6 100 MG tablet TAKE 1 TABLET BY MOUTH EVERY DAY IN THE MORNING 30 tablet 2   • docusate sodium (COLACE) 100 mg capsule Take 1 capsule (100 mg total) by mouth daily at bedtime 180 capsule 3   • DULoxetine (CYMBALTA) 30 mg delayed release capsule TAKE 1 CAP BY MOUTH DAILY IN THE AM 90 capsule 1   • DULoxetine (CYMBALTA) 60 mg delayed release capsule Take 1 capsule (60 mg total) by mouth daily 30 capsule 1   • Evolocumab 140 MG/ML SOAJ Inject 2 mL under the skin     • famotidine (PEPCID) 40 MG tablet TAKE 1 TABLET BY MOUTH EVERY DAY 90 tablet 2   • Fluticasone Furoate-Vilanterol 100-25 mcg/actuation inhaler Inhale 1 puff daily     • folic acid (FOLVITE) 1 mg tablet Take 1 mg by mouth daily     • gabapentin (NEURONTIN) 300 mg capsule TAKE 1 CAPSULE BY MOUTH EVERY DAY AT NIGHT     • glucose blood (OneTouch Ultra) test strip USE TO TEST BLOOD SUGAR ONCE DAILY 100 strip 2   • Lancets (onetouch ultrasoft) lancets Patient to test once daily 100 each 1   • Lancets (onetouch ultrasoft) lancets Test Blood Sugar Once Daily 100 each 0   • levothyroxine 88 mcg tablet TAKE 1 TABLET BY MOUTH EVERY DAY 90 tablet 0   • Lidocaine HCl 4 % CREA Apply 1 application topically Three times daily as needed     • LORazepam (ATIVAN) 1 mg tablet Take 1 tablet (1 mg total) by mouth 2 (two) times a day 60 tablet 0   • lubiprostone (AMITIZA) 24 mcg capsule TAKE 1 CAPSULE BY MOUTH 2 TIMES A DAY WITH MEALS  180 capsule 1   • melatonin 3 mg Take 20 mg by mouth      • metFORMIN (GLUCOPHAGE) 500 mg tablet TAKE 1 TABLET BY MOUTH TWICE A DAY WITH MEALS 180 tablet 0   • methocarbamol (ROBAXIN) 750 mg tablet TAKE 1 TABLET (750 MG TOTAL) BY MOUTH EVERY 8 (EIGHT) HOURS 90 tablet 0   • metoprolol tartrate (LOPRESSOR) 50 mg tablet TAKE 1 TABLET BY MOUTH TWICE A  tablet 1   • Misc  Devices (GETGO ROLLING WALKER) MISC Use rolling walker as needed 1 each 0   • morphine (MS CONTIN) 15 mg 12 hr tablet 30 mg BID      • naloxone (NARCAN) 4 mg/0 1 mL nasal spray Administer 1 spray into a nostril  If no response after 2-3 minutes, give another dose in the other nostril using a new spray  (Patient taking differently: Administer 1 spray into a nostril  If no response after 2-3 minutes, give another dose in the other nostril using a new spray  PRN) 1 each 1   • nystatin (MYCOSTATIN) 500,000 units/5 mL suspension Apply 5 mL (500,000 Units total) to the mouth or throat 4 (four) times a day 473 mL 1   • nystatin-triamcinolone (MYCOLOG-II) cream Apply topically 2 (two) times a day 60 g 1   • pantoprazole (PROTONIX) 40 mg tablet TAKE 1 TABLET BY MOUTH DAILY BEFORE BREAKFAST   DO NOT TAKE WHEN YOU ARE TAKING YOUR XELODA   90 tablet 0   • pramipexole (MIRAPEX) 1 5 MG tablet Take 1 tablet (1 5 mg total) by mouth daily at bedtime 30 tablet 1   • rosuvastatin (CRESTOR) 5 mg tablet Take 1 tablet (5 mg total) by mouth daily 30 tablet 1   • senna (SENOKOT) 8 6 MG tablet Take 8 6 mg by mouth 2 (two) times a day     • capecitabine (XELODA) 500 MG tablet Take 1,500 mg by mouth 2 (two) times a day     • oxyCODONE (ROXICODONE) 15 mg immediate release tablet PLEASE SEE ATTACHED FOR DETAILED DIRECTIONS     • prochlorperazine (COMPAZINE) 10 mg tablet TAKE 1 TABLET BY MOUTH EVERY 6 HOURS AS NEEDED FOR NAUSEA OR VOMITING  • sucralfate (CARAFATE) 1 g tablet TAKE 1 TABLET (1 G TOTAL) BY MOUTH 4 (FOUR) TIMES A DAY DISSOLVED PILL IN 10 ML WATER THEN SWALLOW 360 tablet 1     No current facility-administered medications for this visit  Allergies   Allergen Reactions   • Metoclopramide Other (See Comments)   • Nitrofurantoin Other (See Comments)     Very weak  Fell   • Symbicort [Budesonide-Formoterol Fumarate] Nausea Only   • Isosorbide      Other reaction(s): headache   • Pamidronate Other (See Comments), Confusion and Fever     Developed multiple side effects of drug including fever, tachycardia, and lethargy    • Pregabalin      Pt states made her feel suicidal    • Reglan [Metoclopramide] Other (See Comments)     Flares her neuropathy   • Statins Myalgia       Review of Systems   Constitutional: Positive for fatigue  Negative for chills and fever  HENT: Negative for congestion, nosebleeds, rhinorrhea, sinus pressure and sore throat  Eyes: Negative for discharge and redness  Respiratory: Negative for cough and shortness of breath  Cardiovascular: Negative for chest pain, palpitations and leg swelling  Gastrointestinal: Negative for abdominal pain, blood in stool and nausea  Endocrine: Negative for cold intolerance, heat intolerance and polyuria  Genitourinary: Negative for dysuria and frequency  Musculoskeletal: Negative for arthralgias, back pain and myalgias  Skin: Positive for rash  Neurological: Positive for weakness  Negative for dizziness and headaches  Hematological: Negative for adenopathy  Psychiatric/Behavioral: Negative for behavioral problems and sleep disturbance  The patient is not nervous/anxious  Video Exam    There were no vitals filed for this visit  Physical Exam  Vitals and nursing note reviewed  Constitutional:       General: She is not in acute distress       Appearance: She is well-developed  HENT:      Head: Normocephalic and atraumatic  Right Ear: External ear normal       Left Ear: External ear normal       Nose: Nose normal       Mouth/Throat:      Pharynx: No oropharyngeal exudate  Eyes:      General: No scleral icterus  Right eye: No discharge  Left eye: No discharge  Conjunctiva/sclera: Conjunctivae normal       Pupils: Pupils are equal, round, and reactive to light  Neck:      Thyroid: No thyromegaly  Vascular: No JVD  Cardiovascular:      Rate and Rhythm: Normal rate and regular rhythm  Heart sounds: Normal heart sounds  No murmur heard  Pulmonary:      Effort: Pulmonary effort is normal       Breath sounds: No wheezing or rales  Chest:      Chest wall: No tenderness  Abdominal:      General: Bowel sounds are normal  There is no distension  Palpations: Abdomen is soft  There is no mass  Tenderness: There is no abdominal tenderness  Musculoskeletal:         General: No tenderness or deformity  Normal range of motion  Cervical back: Normal range of motion  Lymphadenopathy:      Cervical: No cervical adenopathy  Skin:     General: Skin is warm and dry  Findings: Erythema and rash present  Neurological:      Mental Status: She is alert and oriented to person, place, and time  Cranial Nerves: No cranial nerve deficit  Coordination: Coordination normal       Deep Tendon Reflexes: Reflexes are normal and symmetric  Reflexes normal    Psychiatric:         Behavior: Behavior normal          Thought Content:  Thought content normal          Judgment: Judgment normal           I spent 30 minutes directly with the patient during this visit

## 2023-03-30 NOTE — ASSESSMENT & PLAN NOTE
Patient presented by virtual visit today discussion regarding worsening rash over the right side of her back from her hips to her axillary region which has worsened and not improved with the use of topical antifungal corticosteroid cream   She would like to go off hospice care and does not feel that she is getting the answers to her problems by remaining at home in bed with visiting nursing as much as they are able to do she would like further work-up and explanation of her cancer pain as she has pain in her left shoulder and her right hip  We discussed going off hospice care for a period of time now as she would like further treatment and work-up and she will discuss this with her  and her oncologist   I explained to her that I am willing to help and what ever way I can if needed and I recommend that she have further imaging studies done to show if cancer has progressed and to help her make her decision along with Leonie Peterson- her significant other attending the virtual visit with her at this time  She requires a [place] lift with sling chair and air mattress and hospital bed due to her significant hip shoulder and back pain rendering her unable to adequately ambulate she needs full assistance on transferring in and out of bed requiring a [place] lift with sling chair as her significant other cannot do this without risk for her falling and she has already fallen to the floor once  She requires the air mattress to prevent skin breakdown and she has a bad rash already on her back and hip    She will be postponing and discontinuing hospice care now and following up with treatment by her request

## 2023-03-30 NOTE — ASSESSMENT & PLAN NOTE
Patient has significant weakness and pain in her hip and lower extremities and cannot weight-bear in a stable fashion or transfer safely she needs help transferring into a wheelchair and will require special services for transportation to medical appointments she understands this and will discuss more with her  now    She is unstable and too weak to stand and requires a [place] lift with sling chair for transfers and safety as she will be attending upcoming appointments with medical team and imaging studies

## 2023-03-30 NOTE — ASSESSMENT & PLAN NOTE
Lab Results   Component Value Date    HGBA1C 6 9 (H) 11/17/2022   Continue diet and same treatment plan follow-up with laboratory work pending prognosis

## 2023-03-30 NOTE — ASSESSMENT & PLAN NOTE
No worsening breathing issues shortness of breath or cough patient denies chest pain follow-up with imaging studies as needed and this will be set up as patient decides to go off hospice care and work-up further cancer treatment

## 2023-03-31 ENCOUNTER — TELEPHONE (OUTPATIENT)
Dept: FAMILY MEDICINE CLINIC | Facility: CLINIC | Age: 68
End: 2023-03-31

## 2023-03-31 NOTE — TELEPHONE ENCOUNTER
Patient is no longer on hospice  Can you please revise note from yesterdays visit to state patient needs a hospital bed, laura lift assist with sling chair and air mattress that goes on top of the hospital bed? Hospice is taking all of their belongings back and patient will be without these things  Patients significant other is unable to lift patient without the laura lift assist with sling chair  Patient also needs the air mattress for on top of the hospital bed because she cannot sit on just the hospital bed without it, the topper keeps her stable

## 2023-04-03 DIAGNOSIS — K59.01 CONSTIPATION BY DELAYED COLONIC TRANSIT: ICD-10-CM

## 2023-04-03 RX ORDER — LUBIPROSTONE 24 UG/1
24 CAPSULE ORAL 2 TIMES DAILY WITH MEALS
Qty: 180 CAPSULE | Refills: 1 | Status: SHIPPED | OUTPATIENT
Start: 2023-04-03

## 2023-04-04 LAB

## 2023-05-01 NOTE — ASSESSMENT & PLAN NOTE
Stable continue proton pump inhibitor · Patient was following with [facility] earlier in the year, however, has not had treatment recently as he was hospitalized   He has been in rehab for the last few weeks and malignancy treatment has been on hold for this reason   · Will need outpatient follow-up with hematology/oncology

## 2023-05-02 ENCOUNTER — TELEPHONE (OUTPATIENT)
Dept: FAMILY MEDICINE CLINIC | Facility: CLINIC | Age: 68
End: 2023-05-02

## 2023-05-02 NOTE — TELEPHONE ENCOUNTER
Moise Jacobsen called stating that the pt has some itching on the right hip, she has no rash at this time, please advise as to what she can do to help the itching?

## 2023-05-03 DIAGNOSIS — G25.81 RESTLESS LEG SYNDROME: ICD-10-CM

## 2023-05-03 RX ORDER — PRAMIPEXOLE DIHYDROCHLORIDE 1.5 MG/1
1.5 TABLET ORAL
Qty: 30 TABLET | Refills: 1 | Status: SHIPPED | OUTPATIENT
Start: 2023-05-03

## 2023-05-03 NOTE — TELEPHONE ENCOUNTER
Patient is currently using this nystatin triamcinolone cream and does not feel it is helping  Patient is asking for something else  Do not send medication to a pharmacy, we just let the hospice nurse know and they will enter and take care of it with paying for it and administering medication for patient  Please call Saadia Keith back with answer

## 2023-05-11 ENCOUNTER — RA CDI HCC (OUTPATIENT)
Dept: OTHER | Facility: HOSPITAL | Age: 68
End: 2023-05-11

## 2023-05-18 ENCOUNTER — TELEMEDICINE (OUTPATIENT)
Dept: FAMILY MEDICINE CLINIC | Facility: CLINIC | Age: 68
End: 2023-05-18

## 2023-05-18 DIAGNOSIS — F11.29 OPIOID DEPENDENCE WITH OPIOID-INDUCED DISORDER (HCC): ICD-10-CM

## 2023-05-18 DIAGNOSIS — K31.84 GASTROPARESIS: Primary | ICD-10-CM

## 2023-05-18 DIAGNOSIS — G95.9 SPINAL CORD LESION (HCC): ICD-10-CM

## 2023-05-18 DIAGNOSIS — C79.51 MALIGNANT NEOPLASM METASTATIC TO BONE (HCC): ICD-10-CM

## 2023-05-18 NOTE — ASSESSMENT & PLAN NOTE
Patient remains bedbound at home assistance through her significant other and her daughter pain is controlled with morphine long-acting tablets and liquid for breakthrough pain she denies significant pain now her speech is slowed but she is awake and oriented but obviously affected by the pain medication    She does have issues with bowels and abdominal pain but is moving her bowels with stool softeners

## 2023-05-18 NOTE — ASSESSMENT & PLAN NOTE
Patient is disabled from walking from chronic severe back pain metastatic cancer to her spine she remains in her bed and is controlled with pain medication now managed by hematology oncology I will follow-up with her in 2 months

## 2023-05-18 NOTE — PROGRESS NOTES
Virtual Regular Visit    Verification of patient location:    Patient is located at Home in the following state in which I hold an active license [place]      Assessment/Plan:    Problem List Items Addressed This Visit        Digestive    Gastroparesis - Primary     Continue with stool softeners and Senokot            Nervous and Auditory    Spinal cord lesion (HCC)     Patient is disabled from walking from chronic severe back pain metastatic cancer to her spine she remains in her bed and is controlled with pain medication now managed by hematology oncology I will follow-up with her in 2 months            Musculoskeletal and Integument    Bone metastases     Patient remains bedbound at home assistance through her significant other and her daughter pain is controlled with morphine long-acting tablets and liquid for breakthrough pain she denies significant pain now her speech is slowed but she is awake and oriented but obviously affected by the pain medication  She does have issues with bowels and abdominal pain but is moving her bowels with stool softeners            Other    Opioid dependence ([address] )     Medications managed and provided through her cancer team she is stable without significant pain now continue same dosing as directed discussed constipation and side effects of the medications and need for stool softeners and laxatives as needed follow-up in 2 months                 Reason for visit is   Chief Complaint   Patient presents with   • Follow-up   • Virtual Regular Visit        Encounter provider Eitan Foster DO    Provider located at [place] [address]  [address] [place] [address]  [phone]      Recent Visits  No visits were found meeting these conditions    Showing recent visits within past 7 days and meeting all other requirements  Today's Visits  Date Type Provider Dept   05/18/23 Telemedicine Eitan Foster DO [facility] [facility] today's visits and meeting all other requirements  Future Appointments  No visits were found meeting these conditions  Showing future appointments within next 150 days and meeting all other requirements       The patient was identified by name and date of birth  Haseeb Umanzor was informed that this is a telemedicine visit and that the visit is being conducted through the [employer]  She agrees to proceed     My office door was closed  No one else was in the room  She acknowledged consent and understanding of privacy and security of the video platform  The patient has agreed to participate and understands they can discontinue the visit at any time  Patient is aware this is a billable service  Subjective  Haseeb Umanzor is a 78 y o  female          Follow-up evaluation today review general health concerns       Past Medical History:   Diagnosis Date   • Anxiety    • Breast cancer ([facility] )    • CAD (coronary artery disease)    • Cancer ([facility] )    • Carpal tunnel syndrome    • Coronary artery disease    • CPAP (continuous positive airway pressure) dependence    • Depression    • Diabetes mellitus ([facility] )    • Disease of thyroid gland    • Diverticulitis of colon    • Elevated cholesterol    • Fibromyalgia    • GERD (gastroesophageal reflux disease)    • HL (hearing loss)    • Hypertension    • Kidney stone    • Melanoma ([facility] )     nose   • Memory loss    • Myocardial infarction ([facility] )    • Neuropathy    • Obesity    • BRAYDEN (obstructive sleep apnea)     cpap   • Panic attack    • Shingles    • Urinary tract infection        Past Surgical History:   Procedure Laterality Date   • BREAST SURGERY Bilateral    • CARDIAC SURGERY      cardiac ablation   •  SECTION      x3   • CHOLECYSTECTOMY     • COLONOSCOPY     • CORONARY ANGIOPLASTY WITH STENT PLACEMENT     • KNEE SURGERY     • LYMPH NODE BIOPSY Right 2022   • MASTECTOMY Bilateral    • NOSE SURGERY     • TUBAL LIGATION         Current Outpatient Medications   Medication Sig Dispense Refill   • Alpha-Lipoic Acid 600 MG CAPS Take by mouth 2 (two) times a day     • aspirin (ECOTRIN LOW STRENGTH) 81 mg EC tablet aspirin 81 mg tablet,delayed release     • B Complex Vitamins (B COMPLEX 1 PO) Take 1 tablet by mouth     • bisacodyl (DULCOLAX) 5 mg EC tablet Take 10 mg by mouth     • Blood Glucose Monitoring Suppl (ONE TOUCH ULTRA 2) w/Device KIT Test Blood Sugar Once Daily 1 kit 0   • Calcium Carb-Cholecalciferol (Oyster Shell Calcium w/D) 500-200 MG-UNIT TABS No Take by mouth 2 (two) times a day with meals     • clopidogrel (PLAVIX) 75 mg tablet Take 75 mg by mouth daily  3   • CVS B6 100 MG tablet TAKE 1 TABLET BY MOUTH EVERY DAY IN THE MORNING 30 tablet 2   • docusate sodium (COLACE) 100 mg capsule Take 1 capsule (100 mg total) by mouth daily at bedtime 180 capsule 3   • DULoxetine (CYMBALTA) 30 mg delayed release capsule TAKE 1 CAP BY MOUTH DAILY IN THE AM 90 capsule 1   • DULoxetine (CYMBALTA) 60 mg delayed release capsule Take 1 capsule (60 mg total) by mouth daily 30 capsule 1   • Evolocumab 140 MG/ML SOAJ Inject 2 mL under the skin     • famotidine (PEPCID) 40 MG tablet TAKE 1 TABLET BY MOUTH EVERY DAY 90 tablet 2   • Fluticasone Furoate-Vilanterol 100-25 mcg/actuation inhaler Inhale 1 puff daily     • folic acid (FOLVITE) 1 mg tablet Take 1 mg by mouth daily     • gabapentin (NEURONTIN) 300 mg capsule TAKE 1 CAPSULE BY MOUTH EVERY DAY AT NIGHT     • glucose blood (OneTouch Ultra) test strip USE TO TEST BLOOD SUGAR ONCE DAILY 100 strip 2   • Lancets (onetouch ultrasoft) lancets Patient to test once daily 100 each 1   • Lancets (onetouch ultrasoft) lancets Test Blood Sugar Once Daily 100 each 0   • levothyroxine 88 mcg tablet TAKE 1 TABLET BY MOUTH EVERY DAY 90 tablet 0   • Lidocaine HCl 4 % CREA Apply 1 application topically Three times daily as needed     • LORazepam (ATIVAN) 1 mg tablet Take 1 tablet (1 mg total) by mouth 2 (two) times a day 60 tablet 0   • lubiprostone (AMITIZA) 24 mcg capsule Take 1 capsule (24 mcg total) by mouth 2 (two) times a day with meals 180 capsule 1   • melatonin 3 mg Take 20 mg by mouth      • metFORMIN (GLUCOPHAGE) 500 mg tablet TAKE 1 TABLET BY MOUTH TWICE A DAY WITH MEALS 180 tablet 0   • methocarbamol (ROBAXIN) 750 mg tablet TAKE 1 TABLET (750 MG TOTAL) BY MOUTH EVERY 8 (EIGHT) HOURS 90 tablet 0   • metoprolol tartrate (LOPRESSOR) 50 mg tablet TAKE 1 TABLET BY MOUTH TWICE A  tablet 1   • Misc  Devices (GETGO ROLLING WALKER) MISC Use rolling walker as needed 1 each 0   • morphine (MS CONTIN) 15 mg 12 hr tablet 30 mg BID      • naloxone (NARCAN) 4 mg/0 1 mL nasal spray Administer 1 spray into a nostril  If no response after 2-3 minutes, give another dose in the other nostril using a new spray  (Patient taking differently: Administer 1 spray into a nostril  If no response after 2-3 minutes, give another dose in the other nostril using a new spray  PRN) 1 each 1   • nystatin (MYCOSTATIN) 500,000 units/5 mL suspension Apply 5 mL (500,000 Units total) to the mouth or throat 4 (four) times a day 473 mL 1   • nystatin-triamcinolone (MYCOLOG-II) cream Apply topically 2 (two) times a day 60 g 1   • oxyCODONE (ROXICODONE) 15 mg immediate release tablet PLEASE SEE ATTACHED FOR DETAILED DIRECTIONS     • pantoprazole (PROTONIX) 40 mg tablet TAKE 1 TABLET BY MOUTH DAILY BEFORE BREAKFAST   DO NOT TAKE WHEN YOU ARE TAKING YOUR XELODA  90 tablet 0   • pramipexole (MIRAPEX) 1 5 MG tablet TAKE 1 TABLET (1 5 MG TOTAL) BY MOUTH DAILY AT BEDTIME 30 tablet 1   • prochlorperazine (COMPAZINE) 10 mg tablet TAKE 1 TABLET BY MOUTH EVERY 6 HOURS AS NEEDED FOR NAUSEA OR VOMITING       • rosuvastatin (CRESTOR) 5 mg tablet Take 1 tablet (5 mg total) by mouth daily 30 tablet 1   • senna (SENOKOT) 8 6 MG tablet Take 8 6 mg by mouth 2 (two) times a day     • capecitabine (XELODA) 500 MG tablet Take 1,500 mg by mouth 2 (two) times a day     • sucralfate (CARAFATE) 1 g tablet TAKE 1 TABLET (1 G TOTAL) BY MOUTH 4 (FOUR) TIMES A DAY DISSOLVED PILL IN 10 ML WATER THEN SWALLOW 360 tablet 1     No current facility-administered medications for this visit  Allergies   Allergen Reactions   • Metoclopramide Other (See Comments)   • Nitrofurantoin Other (See Comments)     Very weak  Fell   • Symbicort [Budesonide-Formoterol Fumarate] Nausea Only   • Isosorbide      Other reaction(s): headache   • Pamidronate Other (See Comments), Confusion and Fever     Developed multiple side effects of drug including fever, tachycardia, and lethargy    • Pregabalin      Pt states made her feel suicidal    • Reglan [Metoclopramide] Other (See Comments)     Flares her neuropathy   • Statins Myalgia       Review of Systems   Constitutional: Negative for chills, fatigue and fever  HENT: Negative for congestion, nosebleeds, rhinorrhea, sinus pressure and sore throat  Eyes: Negative for discharge and redness  Respiratory: Negative for cough and shortness of breath  Cardiovascular: Negative for chest pain, palpitations and leg swelling  Gastrointestinal: Positive for abdominal pain and constipation  Negative for blood in stool and nausea  Endocrine: Negative for cold intolerance, heat intolerance and polyuria  Genitourinary: Negative for dysuria and frequency  Musculoskeletal: Positive for back pain  Negative for arthralgias and myalgias  Skin: Negative for rash  Neurological: Positive for weakness  Negative for dizziness and headaches  Hematological: Negative for adenopathy  Psychiatric/Behavioral: Negative for behavioral problems and sleep disturbance  The patient is not nervous/anxious  Video Exam    There were no vitals filed for this visit  Physical Exam  Vitals and nursing note reviewed  Constitutional:       General: She is not in acute distress  Appearance: She is well-developed  HENT:      Head: Normocephalic and atraumatic        Right Ear: External ear normal       Left Ear: External ear normal       Nose: Nose normal       Mouth/Throat:      Pharynx: No oropharyngeal exudate  Eyes:      General: No scleral icterus  Right eye: No discharge  Left eye: No discharge  Conjunctiva/sclera: Conjunctivae normal       Pupils: Pupils are equal, round, and reactive to light  Neck:      Thyroid: No thyromegaly  Vascular: No JVD  Cardiovascular:      Rate and Rhythm: Normal rate and regular rhythm  Heart sounds: Normal heart sounds  No murmur heard  Pulmonary:      Effort: Pulmonary effort is normal       Breath sounds: No wheezing or rales  Chest:      Chest wall: No tenderness  Abdominal:      General: Bowel sounds are normal  There is no distension  Palpations: Abdomen is soft  There is no mass  Tenderness: There is no abdominal tenderness  Musculoskeletal:         General: No tenderness or deformity  Normal range of motion  Cervical back: Normal range of motion  Lymphadenopathy:      Cervical: No cervical adenopathy  Skin:     General: Skin is warm and dry  Findings: No rash  Neurological:      Mental Status: She is alert and oriented to person, place, and time  Cranial Nerves: No cranial nerve deficit  Coordination: Coordination normal       Deep Tendon Reflexes: Reflexes are normal and symmetric  Reflexes normal    Psychiatric:         Behavior: Behavior normal          Thought Content:  Thought content normal          Judgment: Judgment normal           Visit Time  Total Visit Duration: 20

## 2023-05-18 NOTE — ASSESSMENT & PLAN NOTE
Medications managed and provided through her cancer team she is stable without significant pain now continue same dosing as directed discussed constipation and side effects of the medications and need for stool softeners and laxatives as needed follow-up in 2 months

## 2023-05-30 ENCOUNTER — TELEPHONE (OUTPATIENT)
Dept: FAMILY MEDICINE CLINIC | Facility: CLINIC | Age: 68
End: 2023-05-30

## 2023-05-30 NOTE — TELEPHONE ENCOUNTER
[facility] called asking if a diuretic could be ordered due to swelling in both of patients legs  This has been going on for a few weeks  Patient does not have any warmness to the touch  [facility] nurse believes it is pitting edema  Patient is not weight bearing at this time as she is mostly in bed  Nurse does not need anything ordered through a pharmacy, they just need the medication and frequency for the medication and the hospice company will take care of everything to do with ordering  Please call Eduin Berger back with name of medication

## 2023-06-01 NOTE — TELEPHONE ENCOUNTER
Shawna Pierce from [facility] called again in regards to message below     Shawna Pierce asked if the front staff can call her back in regards to an answer to message left for provider      phone number [phone]

## 2023-06-03 DIAGNOSIS — R73.9 HYPERGLYCEMIA: ICD-10-CM

## 2023-06-03 DIAGNOSIS — I25.10 ATHEROSCLEROSIS OF NATIVE CORONARY ARTERY OF NATIVE HEART WITHOUT ANGINA PECTORIS: ICD-10-CM

## 2023-06-03 DIAGNOSIS — K59.01 CONSTIPATION BY DELAYED COLONIC TRANSIT: ICD-10-CM

## 2023-06-05 ENCOUNTER — TELEMEDICINE (OUTPATIENT)
Dept: FAMILY MEDICINE CLINIC | Facility: CLINIC | Age: 68
End: 2023-06-05
Payer: MEDICARE

## 2023-06-05 DIAGNOSIS — H92.01 RIGHT EAR PAIN: Primary | ICD-10-CM

## 2023-06-05 PROCEDURE — 99213 OFFICE O/P EST LOW 20 MIN: CPT | Performed by: NURSE PRACTITIONER

## 2023-06-05 RX ORDER — AZITHROMYCIN 250 MG/1
TABLET, FILM COATED ORAL
Qty: 6 TABLET | Refills: 0 | Status: SHIPPED | OUTPATIENT
Start: 2023-06-05 | End: 2023-06-09

## 2023-06-05 RX ORDER — TRIAMCINOLONE ACETONIDE 5 MG/G
1 CREAM TOPICAL 2 TIMES DAILY
COMMUNITY
Start: 2023-04-14

## 2023-06-05 RX ORDER — TRIAMCINOLONE ACETONIDE 5 MG/G
1 CREAM TOPICAL 2 TIMES DAILY
COMMUNITY
Start: 2023-04-14 | End: 2024-04-13

## 2023-06-05 NOTE — ASSESSMENT & PLAN NOTE
Unable to confirm ear infection, has been taking her routine prescribed pain medication including morphine which has not helped with her ear discomfort  We will try antibiotic to cover for ear infection    Did advise an office visit for proper evaluation

## 2023-06-05 NOTE — PROGRESS NOTES
Virtual Regular Visit    Verification of patient location:    Patient is located at Home in the following state in which I hold an active license [place]      Assessment/Plan:    Problem List Items Addressed This Visit        Other    Right ear pain - Primary     Unable to confirm ear infection, has been taking her routine prescribed pain medication including morphine which has not helped with her ear discomfort  We will try antibiotic to cover for ear infection  Did advise an office visit for proper evaluation         Relevant Medications    azithromycin (ZITHROMAX) 250 mg tablet            Reason for visit is   Chief Complaint   Patient presents with   • Earache     Pt reports virtually with complaints of ear in both ears for about 2 weeks  Pt reports no other symptoms  Unable to obtain vitals at this time due to apt being a virtual apt      • Virtual Regular Visit        Encounter provider NICOLE Guerrero    Provider [facility]  [address] [address] [phone]      Recent Visits  Date Type Provider Dept   05/30/23 Telephone Sha Laws [address]ir recent visits within past 7 days and meeting all other requirements  Today's Visits  Date Type Provider Dept   06/05/23 Telemedicine NICOLE Escalera [address] today's visits and meeting all other requirements  Future Appointments  No visits were found meeting these conditions  Showing future appointments within next 150 days and meeting all other requirements       The patient was identified by name and date of birth  Kirstie Ballard was informed that this is a telemedicine visit and that the visit is being conducted through the [address] [employer] platform  She agrees to proceed     My office door was closed  No one else was in the room  She acknowledged consent and understanding of privacy and security of the video platform   The patient has agreed to participate and understands they can discontinue the visit at any time  Patient is aware this is a billable service  Subjective  Doug Cummings is a 80 y o  female who presents today for right ear pain, she reports this has been ongoing for two weeks  She reports no fever no chills, no other cold-like symptoms    She reports stabbing ear discomfort unrelieved with routine prescribed pain medication    HPI     Past Medical History:   Diagnosis Date   • Anxiety    • Breast cancer ([place] [address] )    • CAD (coronary artery disease)    • Cancer ([place] [address] )    • Carpal tunnel syndrome    • Coronary artery disease    • CPAP (continuous positive airway pressure) dependence    • Depression    • Diabetes mellitus ([place] [address] )    • Disease of thyroid gland    • Diverticulitis of colon    • Elevated cholesterol    • Fibromyalgia    • GERD (gastroesophageal reflux disease)    • HL (hearing loss)    • Hypertension    • Kidney stone    • Melanoma ([place] [address] )     nose   • Memory loss    • Myocardial infarction ([place] [address] )    • Neuropathy    • Obesity    • BRAYDEN (obstructive sleep apnea)     cpap   • Panic attack    • Shingles    • Urinary tract infection        Past Surgical History:   Procedure Laterality Date   • BREAST SURGERY Bilateral    • CARDIAC SURGERY      cardiac ablation   •  SECTION      x3   • CHOLECYSTECTOMY     • COLONOSCOPY     • CORONARY ANGIOPLASTY WITH STENT PLACEMENT     • CT NEEDLE BIOPSY LUNG  2020   • CT NEEDLE BIOPSY LYMPH NODE  2022   • KNEE SURGERY     • LYMPH NODE BIOPSY Right 2022   • MASTECTOMY Bilateral    • NOSE SURGERY     • TUBAL LIGATION         Current Outpatient Medications   Medication Sig Dispense Refill   • Alpha-Lipoic Acid 600 MG CAPS Take by mouth 2 (two) times a day     • aspirin (ECOTRIN LOW STRENGTH) 81 mg EC tablet aspirin 81 mg tablet,delayed release     • azithromycin (ZITHROMAX) 250 mg tablet Take 2 tablets today then 1 tablet daily x 4 days 6 tablet 0   • B Complex Vitamins (B COMPLEX 1 PO) Take 1 tablet by mouth     • bisacodyl (DULCOLAX) 5 mg EC tablet Take 10 mg by mouth     • Blood Glucose Monitoring Suppl (ONE TOUCH ULTRA 2) w/Device KIT Test Blood Sugar Once Daily 1 kit 0   • Calcium Carb-Cholecalciferol (Oyster Shell Calcium w/D) 500-200 MG-UNIT TABS No Take by mouth 2 (two) times a day with meals     • clopidogrel (PLAVIX) 75 mg tablet Take 75 mg by mouth daily  3   • CVS B6 100 MG tablet TAKE 1 TABLET BY MOUTH EVERY DAY IN THE MORNING 30 tablet 2   • docusate sodium (COLACE) 100 mg capsule Take 1 capsule (100 mg total) by mouth daily at bedtime 180 capsule 3   • DULoxetine (CYMBALTA) 30 mg delayed release capsule TAKE 1 CAP BY MOUTH DAILY IN THE AM 90 capsule 1   • DULoxetine (CYMBALTA) 60 mg delayed release capsule Take 1 capsule (60 mg total) by mouth daily 30 capsule 1   • Evolocumab 140 MG/ML SOAJ Inject 2 mL under the skin     • famotidine (PEPCID) 40 MG tablet TAKE 1 TABLET BY MOUTH EVERY DAY 90 tablet 2   • Fluticasone Furoate-Vilanterol 100-25 mcg/actuation inhaler Inhale 1 puff daily     • folic acid (FOLVITE) 1 mg tablet Take 1 mg by mouth daily     • gabapentin (NEURONTIN) 300 mg capsule TAKE 1 CAPSULE BY MOUTH EVERY DAY AT NIGHT     • glucose blood (OneTouch Ultra) test strip USE TO TEST BLOOD SUGAR ONCE DAILY 100 strip 2   • Lancets (onetouch ultrasoft) lancets Patient to test once daily 100 each 1   • Lancets (onetouch ultrasoft) lancets Test Blood Sugar Once Daily 100 each 0   • levothyroxine 88 mcg tablet TAKE 1 TABLET BY MOUTH EVERY DAY 90 tablet 0   • Lidocaine HCl 4 % CREA Apply 1 application topically Three times daily as needed     • LORazepam (ATIVAN) 1 mg tablet Take 1 tablet (1 mg total) by mouth 2 (two) times a day 60 tablet 0   • lubiprostone (AMITIZA) 24 mcg capsule Take 1 capsule (24 mcg total) by mouth 2 (two) times a day with meals 180 capsule 1   • melatonin 3 mg Take 20 mg by mouth      • metFORMIN (GLUCOPHAGE) 500 mg tablet TAKE 1 TABLET BY MOUTH TWICE A DAY WITH MEALS 180 tablet 0   • methocarbamol (ROBAXIN) 750 mg tablet TAKE 1 TABLET (750 MG TOTAL) BY MOUTH EVERY 8 (EIGHT) HOURS 90 tablet 0   • metoprolol tartrate (LOPRESSOR) 50 mg tablet TAKE 1 TABLET BY MOUTH TWICE A  tablet 1   • Misc  Devices (GETGO ROLLING WALKER) MISC Use rolling walker as needed 1 each 0   • morphine (MS CONTIN) 15 mg 12 hr tablet 30 mg BID      • naloxone (NARCAN) 4 mg/0 1 mL nasal spray Administer 1 spray into a nostril  If no response after 2-3 minutes, give another dose in the other nostril using a new spray  (Patient taking differently: Administer 1 spray into a nostril  If no response after 2-3 minutes, give another dose in the other nostril using a new spray  PRN) 1 each 1   • nystatin (MYCOSTATIN) 500,000 units/5 mL suspension Apply 5 mL (500,000 Units total) to the mouth or throat 4 (four) times a day 473 mL 1   • nystatin-triamcinolone (MYCOLOG-II) cream Apply topically 2 (two) times a day 60 g 1   • oxyCODONE (ROXICODONE) 15 mg immediate release tablet PLEASE SEE ATTACHED FOR DETAILED DIRECTIONS     • pantoprazole (PROTONIX) 40 mg tablet TAKE 1 TABLET BY MOUTH DAILY BEFORE BREAKFAST   DO NOT TAKE WHEN YOU ARE TAKING YOUR XELODA  90 tablet 0   • pramipexole (MIRAPEX) 1 5 MG tablet TAKE 1 TABLET (1 5 MG TOTAL) BY MOUTH DAILY AT BEDTIME 30 tablet 1   • prochlorperazine (COMPAZINE) 10 mg tablet TAKE 1 TABLET BY MOUTH EVERY 6 HOURS AS NEEDED FOR NAUSEA OR VOMITING  • rosuvastatin (CRESTOR) 5 mg tablet Take 1 tablet (5 mg total) by mouth daily 30 tablet 1   • senna (SENOKOT) 8 6 MG tablet Take 8 6 mg by mouth 2 (two) times a day     • triamcinolone (KENALOG) 0 5 % cream Apply 1 application  topically 2 (two) times a day To affected area     • triamcinolone (KENALOG) 0 5 % cream Apply 1 application   topically 2 (two) times a day     • capecitabine (XELODA) 500 MG tablet Take 1,500 mg by mouth 2 (two) times a day     • sucralfate (CARAFATE) 1 g tablet TAKE 1 TABLET (1 G TOTAL) BY MOUTH 4 (FOUR) TIMES A DAY DISSOLVED PILL IN 10 ML WATER THEN SWALLOW 360 tablet 1     No current facility-administered medications for this visit  Allergies   Allergen Reactions   • Metoclopramide Other (See Comments)   • Nitrofurantoin Other (See Comments)     Very weak  Fell   • Symbicort [Budesonide-Formoterol Fumarate] Nausea Only   • Isosorbide      Other reaction(s): headache   • Pamidronate Other (See Comments), Confusion and Fever     Developed multiple side effects of drug including fever, tachycardia, and lethargy    • Pregabalin      Pt states made her feel suicidal    • Reglan [Metoclopramide] Other (See Comments)     Flares her neuropathy   • Statins Myalgia       Review of Systems   Constitutional: Negative for activity change, chills, fatigue and fever  HENT: Positive for ear pain  Negative for congestion, ear discharge, sinus pressure, sinus pain, sore throat, tinnitus and trouble swallowing  Eyes: Negative for photophobia, pain, discharge, itching and visual disturbance  Respiratory: Negative for cough, chest tightness, shortness of breath and wheezing  Cardiovascular: Negative for chest pain and leg swelling  Gastrointestinal: Negative for abdominal distention, abdominal pain, constipation, diarrhea, nausea and vomiting  Endocrine: Negative for polydipsia, polyphagia and polyuria  Genitourinary: Negative for dysuria and frequency  Musculoskeletal: Negative for arthralgias, myalgias, neck pain and neck stiffness  Skin: Negative for color change  Neurological: Negative for dizziness, syncope, weakness, numbness and headaches  Hematological: Does not bruise/bleed easily  Psychiatric/Behavioral: Negative for behavioral problems, confusion, self-injury, sleep disturbance and suicidal ideas  The patient is not nervous/anxious  Video Exam    There were no vitals filed for this visit  Physical Exam  Vitals and nursing note reviewed  Constitutional:       Appearance: Normal appearance  She is not ill-appearing  HENT:      Head: Normocephalic and atraumatic  Pulmonary:      Effort: Pulmonary effort is normal    Neurological:      General: No focal deficit present  Mental Status: She is alert and oriented to person, place, and time     Psychiatric:         Mood and Affect: Mood normal          Behavior: Behavior normal           Visit Time  Total Visit Duration: 15

## 2023-06-08 DIAGNOSIS — K21.00 GASTROESOPHAGEAL REFLUX DISEASE WITH ESOPHAGITIS WITHOUT HEMORRHAGE: ICD-10-CM

## 2023-06-08 DIAGNOSIS — K31.84 GASTROPARESIS: ICD-10-CM

## 2023-06-08 RX ORDER — SUCRALFATE 1 G/1
1 TABLET ORAL 4 TIMES DAILY
Qty: 360 TABLET | Refills: 1 | Status: SHIPPED | OUTPATIENT
Start: 2023-06-08 | End: 2023-09-06

## 2023-07-09 NOTE — TELEPHONE ENCOUNTER
Facility called wanting to notify Dr. Po Rodriguez of the patient's death.  Patient passed this morning (Sun 7/9) at 09:15am. Any questions, please reach out to [facility] @ [phone]

## 2023-07-11 DIAGNOSIS — I25.10 ATHEROSCLEROSIS OF NATIVE CORONARY ARTERY OF NATIVE HEART WITHOUT ANGINA PECTORIS: ICD-10-CM

## 2023-07-11 DIAGNOSIS — K59.01 CONSTIPATION BY DELAYED COLONIC TRANSIT: ICD-10-CM

## 2023-07-11 DIAGNOSIS — R73.9 HYPERGLYCEMIA: ICD-10-CM

## 2023-07-11 RX ORDER — LEVOTHYROXINE SODIUM 88 UG/1
TABLET ORAL
Qty: 90 TABLET | Refills: 0 | Status: CANCELLED | OUTPATIENT
Start: 2023-07-11

## 2023-10-03 NOTE — ASSESSMENT & PLAN NOTE
Hypothyroidism stable continue levothyroxine 88 micrograms follow-up TSH T4 as scheduled Patient called on 10/3/23 at 1437 for follow-up access site assessment. Patient denies any complaints. No new questions at this time. Advised to follow-up with MD for any further questions. Confirmed patient had correct contact information for MD.

## 2024-02-18 NOTE — ASSESSMENT & PLAN NOTE
COVID-19 infection picked up from her daughter patient has had both vaccines along with a booster she has upper respiratory symptoms along with a headache in light of her history of breast cancer and multiple medical problems she would like to try the paxlovid
Patient high risk for complications from [place] will start patient on oral medication
None

## 2025-05-05 NOTE — TELEPHONE ENCOUNTER
Sent clearance to Dr Sarah Iqbal at [phone] to get permission to stop clopidogrel 5 days prior to her 5/24 colon  Will call [phone] if not received in 2 wks  Subjective:     Patient ID: Renea Bourgeois is a 77 y.o. female.    Chief Complaint: No chief complaint on file.      HPI:  The patient is a 77-year-old female with osteoarthritis in multiple joints.  She particularly complains of left long and small finger PIP joint swelling and pain after packing boxes.    Past Medical History:   Diagnosis Date    Allergy     Anemia 11/14/2017    Angina pectoris     followed by cardiology    Arthritis     Breast cancer     Right T1 Ductal Ca in situ,high oncotype Dx 33, Estrogen positive. Lumpectomy, TAC chemo x 6 cyscles then RT,on aromatase inhibitor    Cataract     Chondromalacia of right patella 03/26/2018    Stable and controlled. Continue current treatment plan as previously prescribed with your PCP.      Diabetes mellitus type II 2011     am 12/06/2023    DM (diabetes mellitus) 2011     am 08/04/2017    DM (diabetes mellitus) 2010     am 06/22/2020    Elevated BP without diagnosis of hypertension 11/27/2018    GERD (gastroesophageal reflux disease)     History of colon polyps 02/21/2018    The patient had adenomatous colon polyps in 2014.      History of renal calculi 08/11/2015    Right obstructing 8/20/13 - passed.     Hyperlipidemia     Hypertension     Kidney stone     right side, passed    Malignant neoplasm of upper-outer quadrant of right breast in female, estrogen receptor positive 07/23/2018    Followed by Dr. Jones    Malignant neoplasm of upper-outer quadrant of right breast in female, estrogen receptor positive     Followed by Hematology/Oncology, continue current treatment plan       Meniere disease     reported per pt    Nuclear sclerosis of both eyes 10/05/2015    Osteopenia     Osteoporosis 04/12/2019    Pseudophakia 10/15/2015    PVC (premature ventricular contraction)     on and off    Refractive error 10/05/2015    Trouble in sleeping     Type 2 diabetes mellitus 2010     am 12/06/2022     Past Surgical History:   Procedure  Laterality Date    ARTHROPLASTY OF JOINT OF FINGER Right 01/05/2023    Procedure: ARTHROPLASTY, FINGER;  Surgeon: Iban Kuhn MD;  [facility];  Service: Orthopedics;  Laterality: Right;  right thumb basal joint arthroplasty    BREAST BIOPSY      BREAST LUMPECTOMY  02/11/2011    right    CATARACT EXTRACTION Bilateral 10/14/2015    CHOLECYSTECTOMY  1989    open    COLONOSCOPY N/A 02/22/2018    Procedure: COLONOSCOPY;  Surgeon: Carlito Odonnell MD;  Location: [facility];  Service: Endoscopy;  Laterality: N/A;    COLONOSCOPY N/A 02/24/2023    Procedure: COLONOSCOPY;  Surgeon: Yvonne Saldana MD;  Location: [facility];  Service: Endoscopy;  Laterality: N/A;    CYST REMOVAL Left 11/2017    foot    DILATION AND CURETTAGE OF UTERUS  10/2010    [place]    ESOPHAGOGASTRODUODENOSCOPY N/A 06/29/2020    Procedure: EGD (ESOPHAGOGASTRODUODENOSCOPY);  Surgeon: Nancy Hahn MD;  Location: [facility];  Service: Endoscopy;  Laterality: N/A;    EYE SURGERY  2012    cataract    GALLBLADDER SURGERY      removed in 1989    HAND SURGERY Left 2015    Nasal septal deviation repair  2009    toenail edges removed      TONSILLECTOMY  1959    TOTAL REDUCTION MAMMOPLASTY Left     2015    TUBAL LIGATION  1981    UVULOPALATOPLASTY       Family History   Problem Relation Name Age of Onset    Alzheimer's disease Mother      Diabetes Father Silvia     Hypertension Father Silvia     Stroke Father Silvia     Cancer Father Silvia 65        metastatic when diagnosed    Cataracts Sister      Cancer Brother Marquise         skin prostate    Stroke Brother Marquise         Stoke    Cataracts Brother      Parkinsonism Brother      Cancer Brother Zaid         met    Atrial fibrillation Son Meng         35    Heart disease Son Meng     Glaucoma Maternal Grandmother      Cancer Paternal Grandfather Silvia         prostate    Psoriasis Cousin      Cancer Other nephew         kidney    Alcohol abuse Neg Hx      Drug abuse Neg Hx      COPD Neg  Hx      Birth defects Neg Hx      Intellectual disability Neg Hx      Mental illness Neg Hx      Kidney disease Neg Hx      Hyperlipidemia Neg Hx      Melanoma Neg Hx      Lupus Neg Hx       Social History[1]  Medication List with Changes/Refills   Current Medications    ACCU-CHEK JD PLUS TEST STRP STRP    TEST three times a day    ACETAMINOPHEN (TYLENOL) 500 MG TABLET    Take 500 mg by mouth every 6 (six) hours as needed for Pain.    ALCOHOL PREP PADS PADM        CALCIUM-VITAMIN D (CALCIUM 600 + D,3,) 600 MG(1,500MG) -400 UNIT TAB    Take 1 tablet by mouth 2 (two) times daily.    CHOLECALCIFEROL, VITAMIN D3, (VITAMIN D3) 1,000 UNIT CAPSULE    Take 1 capsule (1,000 Units total) by mouth once daily.    ESOMEPRAZOLE MAGNESIUM (NEXIUM ORAL)    Take by mouth.    GABAPENTIN (NEURONTIN) 100 MG CAPSULE    Take 1 capsule by mouth up to three times daily.    GLIMEPIRIDE (AMARYL) 1 MG TABLET    TAKE 1 TABLET(1 MG) BY MOUTH EVERY DAY    LANCETS (ACCU-CHEK SOFTCLIX LANCETS) MISC    1 each by Misc.(Non-Drug; Combo Route) route 3 (three) times daily.    LANCING DEVICE MISC        MAGNESIUM ASPARTATE HCL 61 MG (615 MG) TBEC    Take by mouth once.    METOPROLOL SUCCINATE (TOPROL-XL) 25 MG 24 HR TABLET    Take 1 tablet (25 mg total) by mouth once daily.    OLMESARTAN (BENICAR) 20 MG TABLET    Take 1 tablet (20 mg total) by mouth once daily.    ROSUVASTATIN (CRESTOR) 20 MG TABLET    Take 1 tablet (20 mg total) by mouth once daily.    TRAZODONE (DESYREL) 100 MG TABLET    TAKE 1 TABLET BY MOUTH AT BEDTIME    TURMERIC 400 MG CAP    Take 400 mg by mouth 2 (two) times daily as needed.     Review of patient's allergies indicates:  No Known Allergies  Review of Systems   Constitutional: Negative for malaise/fatigue.   HENT:  Positive for tinnitus. Negative for hearing loss.    Eyes:  Positive for double vision. Negative for visual disturbance.   Cardiovascular:  Positive for irregular heartbeat. Negative for chest pain.   Respiratory:   Negative for shortness of breath.    Endocrine: Negative for cold intolerance.   Hematologic/Lymphatic: Does not bruise/bleed easily.   Skin:  Negative for poor wound healing and suspicious lesions.   Musculoskeletal:  Positive for arthritis, joint pain and joint swelling. Negative for gout.   Gastrointestinal:  Positive for abdominal pain, dysphagia and heartburn. Negative for nausea and vomiting.   Genitourinary:  Negative for dysuria.   Neurological:  Negative for numbness, paresthesias and sensory change.   Psychiatric/Behavioral:  Negative for depression, memory loss and substance abuse. The patient has insomnia. The patient is not nervous/anxious.    Allergic/Immunologic: Negative for persistent infections.       Objective:   There is no height or weight on file to calculate BMI.  There were no vitals filed for this visit.             General    Constitutional: She is oriented to person, place, and time. She appears well-developed and well-nourished. No distress.   HENT:   Head: Normocephalic.   Eyes: EOM are normal.   Pulmonary/Chest: Effort normal.   Neurological: She is oriented to person, place, and time.   Psychiatric: She has a normal mood and affect.         Left Hand/Wrist Exam     Inspection   Scars: Wrist - absent Hand -  absent  Effusion: Wrist - absent Hand -  absent    Pain   Hand - The patient exhibits pain of the middle IP and little IP.    Other     Sensory Exam  Median Distribution: normal  Ulnar Distribution: normal  Radial Distribution: normal    Comments:  There is fusiform swelling and pain PIP joint left small and long finger.  There is no triggering noted.          Vascular Exam       Capillary Refill  Left Hand: normal capillary refill          Relevant imaging results reviewed and interpreted by me, discussed with the patient and / or family today radiographs left hand showed osteoarthritic change in multiple small joints  Assessment:     Encounter Diagnosis   Name Primary?     Arthritis of carpometacarpal (CMC) joint of left thumb Yes        Plan:     The patient has was injected left long finger and left small finger PIP joints each with 0.5 cc of Kenalog and 0.5 cc of 2% plain lidocaine under sterile technique.  She will wait at least 3 months between injections.                Disclaimer: This note was prepared using a voice recognition system and is likely to have sound alike errors within the text.          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 Caffeine intake;1-2 per week - manily coke. Decaffinated tea and most beveridges. Does have a living will - at home in her filing cabinet.      Social Drivers of Health     Financial Resource Strain: Low Risk  (4/17/2024)    Overall Financial Resource Strain (CARDIA)     Difficulty of Paying Living Expenses: Not hard at all   Food Insecurity: No Food Insecurity (4/17/2024)    Hunger Vital Sign     Worried About Running Out of Food in the Last Year: Never true     Ran Out of Food in the Last Year: Never true   Transportation Needs: No Transportation Needs (4/17/2024)    PRAPARE - Transportation     Lack of Transportation (Medical): No     Lack of Transportation (Non-Medical): No   Physical Activity: Inactive (4/17/2024)    Exercise Vital Sign     Days of Exercise per Week: 0 days     Minutes of Exercise per Session: 0 min   Stress: Stress Concern Present (4/17/2024)    [facility] - Occupational Stress Questionnaire     Feeling of  Stress : To some extent   Housing Stability: Low Risk  (4/17/2024)    Housing Stability Vital Sign     Unable to Pay for Housing in the Last Year: No     Homeless in the Last Year: No